# Patient Record
Sex: MALE | Race: WHITE | NOT HISPANIC OR LATINO | Employment: OTHER | URBAN - NONMETROPOLITAN AREA
[De-identification: names, ages, dates, MRNs, and addresses within clinical notes are randomized per-mention and may not be internally consistent; named-entity substitution may affect disease eponyms.]

---

## 2017-06-22 ENCOUNTER — MEDICAL CORRESPONDENCE (OUTPATIENT)
Dept: HEALTH INFORMATION MANAGEMENT | Facility: HOSPITAL | Age: 82
End: 2017-06-22

## 2017-06-22 DIAGNOSIS — I48.91 ATRIAL FIBRILLATION (H): Primary | ICD-10-CM

## 2017-06-22 LAB — INR PPP: 2.77 (ref 0.8–1.2)

## 2017-06-22 PROCEDURE — 36415 COLL VENOUS BLD VENIPUNCTURE: CPT | Mod: ZL | Performed by: INTERNAL MEDICINE

## 2017-06-22 PROCEDURE — 85610 PROTHROMBIN TIME: CPT | Mod: ZL | Performed by: INTERNAL MEDICINE

## 2017-08-21 DIAGNOSIS — I48.91 ATRIAL FIBRILLATION (H): Primary | ICD-10-CM

## 2017-08-21 LAB — INR PPP: 2.68 (ref 0.8–1.2)

## 2017-08-21 PROCEDURE — 85610 PROTHROMBIN TIME: CPT | Mod: ZL | Performed by: INTERNAL MEDICINE

## 2017-08-21 PROCEDURE — 36415 COLL VENOUS BLD VENIPUNCTURE: CPT | Mod: ZL | Performed by: INTERNAL MEDICINE

## 2018-05-23 ENCOUNTER — TRANSFERRED RECORDS (OUTPATIENT)
Dept: HEALTH INFORMATION MANAGEMENT | Facility: CLINIC | Age: 83
End: 2018-05-23

## 2018-09-19 LAB — INR PPP: 2.11 (ref 1–3)

## 2018-10-17 LAB — INR PPP: 1.8 (ref 1–3)

## 2018-10-31 LAB — INR PPP: 2.88 (ref 1–3)

## 2018-11-20 ENCOUNTER — OFFICE VISIT (OUTPATIENT)
Dept: URBAN - METROPOLITAN AREA CLINIC 76 | Facility: CLINIC | Age: 83
End: 2018-11-20
Payer: MEDICARE

## 2018-11-20 DIAGNOSIS — H43.813 VITREOUS DEGENERATION, BILATERAL: ICD-10-CM

## 2018-11-20 DIAGNOSIS — E11.9 TYPE 2 DIABETES MELLITUS WITHOUT COMPLICATIONS: Primary | ICD-10-CM

## 2018-11-20 PROCEDURE — 92014 COMPRE OPH EXAM EST PT 1/>: CPT | Performed by: OPTOMETRIST

## 2018-11-20 RX ORDER — SIMVASTATIN 20 MG/1
20 MG TABLET, FILM COATED ORAL
Qty: 0 | Refills: 0 | Status: INACTIVE
Start: 2018-11-20 | End: 2020-01-03

## 2018-11-20 RX ORDER — CALCIUM CARBONATE 500(1250)
TABLET ORAL
Qty: 0 | Refills: 0 | Status: ACTIVE
Start: 2018-11-20

## 2018-11-20 ASSESSMENT — KERATOMETRY
OD: 40.25
OS: 40.25

## 2018-11-20 ASSESSMENT — INTRAOCULAR PRESSURE
OS: 11
OD: 12

## 2018-11-20 NOTE — IMPRESSION/PLAN
Impression: Type 2 diabetes mellitus without complications: H74.0. OU. Plan: Discussed diagnosis in detail with patient. No treatment is required at this time. Emphasized blood sugar control. Call if [address] worsens. Will continue to observe condition and or symptoms. Recommend yearly exams.

## 2018-11-20 NOTE — IMPRESSION/PLAN
Impression: Vitreous degeneration, bilateral: H43.813. OU. Plan: Discussed condition. No treatment at this time.

## 2018-12-06 ENCOUNTER — TRANSFERRED RECORDS (OUTPATIENT)
Dept: HEALTH INFORMATION MANAGEMENT | Facility: CLINIC | Age: 83
End: 2018-12-06

## 2018-12-06 LAB — INR PPP: 2.92 (ref 1–3)

## 2018-12-27 ENCOUNTER — TRANSFERRED RECORDS (OUTPATIENT)
Dept: HEALTH INFORMATION MANAGEMENT | Facility: CLINIC | Age: 83
End: 2018-12-27

## 2018-12-28 LAB
ALT SERPL-CCNC: 12 UNIT/L (ref 0–41)
AST SERPL-CCNC: 16 UNIT/L (ref 0–40)
CREAT SERPL-MCNC: 1.3 MG/DL (ref 0.8–1.2)
GFR SERPL CREATININE-BSD FRML MDRD: 52 ML/MIN/1.73M2
GLUCOSE SERPL-MCNC: 268 MG/DL (ref 65–105)
INR PPP: 1.81 (ref 1–3)
POTASSIUM SERPL-SCNC: 4.4 MMOL/L (ref 3.4–4.5)

## 2019-01-09 ENCOUNTER — TRANSFERRED RECORDS (OUTPATIENT)
Dept: HEALTH INFORMATION MANAGEMENT | Facility: CLINIC | Age: 84
End: 2019-01-09

## 2019-01-09 LAB
ALT SERPL-CCNC: 10 UNIT/L (ref 0–41)
AST SERPL-CCNC: 15 UNIT/L (ref 0–40)
CHOLEST SERPL-MCNC: 248 MG/DL (ref 110–200)
HDLC SERPL-MCNC: 33 MG/DL (ref 35–80)
LDLC SERPL CALC-MCNC: 149 MG/DL (ref 0–100)
TRIGL SERPL-MCNC: 347 MG/DL (ref 10–150)

## 2019-01-18 LAB
CREAT SERPL-MCNC: 1.3 MG/DL (ref 0.8–1.2)
GFR SERPL CREATININE-BSD FRML MDRD: 52 ML/MIN/1.73M2
GLUCOSE SERPL-MCNC: 249 MG/DL (ref 65–105)
INR PPP: 1.77 (ref 1–3)
POTASSIUM SERPL-SCNC: 4.2 MMOL/L (ref 3.4–4.5)

## 2019-02-04 LAB — INR PPP: 3.71 (ref 1–3)

## 2019-02-18 LAB — INR PPP: 3 (ref 1–3)

## 2019-03-08 ENCOUNTER — DOCUMENTATION ONLY (OUTPATIENT)
Dept: OTHER | Facility: CLINIC | Age: 84
End: 2019-03-08

## 2019-03-08 LAB — INR PPP: 2.87 (ref 1–3)

## 2019-04-08 LAB — INR PPP: 2.5 (ref 0.9–1.1)

## 2019-05-08 LAB — INR PPP: 1.92 (ref 1–3)

## 2019-05-22 LAB — INR PPP: 2.45 (ref 1–3)

## 2019-06-20 ENCOUNTER — TRANSFERRED RECORDS (OUTPATIENT)
Dept: HEALTH INFORMATION MANAGEMENT | Facility: CLINIC | Age: 84
End: 2019-06-20

## 2019-06-20 LAB — INR PPP: 2.02 (ref 1–3)

## 2019-07-22 DIAGNOSIS — R06.02 SHORTNESS OF BREATH: ICD-10-CM

## 2019-07-22 DIAGNOSIS — I25.10 CORONARY ATHEROSCLEROSIS OF NATIVE CORONARY ARTERY: Primary | ICD-10-CM

## 2019-07-22 DIAGNOSIS — I50.9 HEART FAILURE, UNSPECIFIED (H): ICD-10-CM

## 2019-07-22 LAB — INR PPP: 2.05 (ref 0.8–1.2)

## 2019-07-22 PROCEDURE — 85610 PROTHROMBIN TIME: CPT | Mod: ZL | Performed by: INTERNAL MEDICINE

## 2019-07-22 PROCEDURE — 36415 COLL VENOUS BLD VENIPUNCTURE: CPT | Mod: ZL | Performed by: INTERNAL MEDICINE

## 2019-07-23 ENCOUNTER — MEDICAL CORRESPONDENCE (OUTPATIENT)
Dept: HEALTH INFORMATION MANAGEMENT | Facility: CLINIC | Age: 84
End: 2019-07-23

## 2019-08-01 PROBLEM — I82.409 ACUTE EMBOLISM AND THROMBOSIS OF DEEP VEIN OF LOWER EXTREMITY, UNSPECIFIED LATERALITY (H): Status: ACTIVE | Noted: 2018-06-13

## 2020-01-03 ENCOUNTER — OFFICE VISIT (OUTPATIENT)
Dept: URBAN - METROPOLITAN AREA CLINIC 76 | Facility: CLINIC | Age: 85
End: 2020-01-03
Payer: MEDICARE

## 2020-01-03 DIAGNOSIS — Z96.1 PRESENCE OF INTRAOCULAR LENS: ICD-10-CM

## 2020-01-03 PROCEDURE — 92014 COMPRE OPH EXAM EST PT 1/>: CPT | Performed by: OPTOMETRIST

## 2020-01-03 ASSESSMENT — KERATOMETRY
OD: 40.00
OS: 39.88

## 2020-01-03 ASSESSMENT — INTRAOCULAR PRESSURE
OD: 12
OS: 11

## 2020-01-03 NOTE — IMPRESSION/PLAN
Impression: Type 2 diabetes mellitus w/o complication: L17.1. Plan: Discussed diagnosis in detail with patient. No treatment is required at this time. Emphasized blood sugar control. Call if [address] worsens. Will continue to observe condition and or symptoms. Recommend yearly exams.

## 2020-01-03 NOTE — IMPRESSION/PLAN
Impression: Vitreous degeneration, bilateral: H43.813. OU. OD>OS No tears Plan: Posterior vitreous detachment accounts for the patient's complaints. There is no evidence of retinal pathology. All signs and risks of retinal detachment and tears were discussed in detail. Patient instructed to call the office immediately if any symptoms noted. No further treatment required, unless signs/symptoms of retinal detachment develop.

## 2020-09-16 ENCOUNTER — TRANSFERRED RECORDS (OUTPATIENT)
Dept: HEALTH INFORMATION MANAGEMENT | Facility: CLINIC | Age: 85
End: 2020-09-16

## 2020-09-29 DIAGNOSIS — I48.91 ATRIAL FIBRILLATION (H): Primary | ICD-10-CM

## 2020-10-12 NOTE — PROGRESS NOTES
Subjective     Rigoberto Barriga is a 88 year old male who presents to clinic today for the following health issues:    HPI         New Patient/Transfer of Care  Hyperlipidemia Follow-Up      Are you regularly taking any medication or supplement to lower your cholesterol?   Yes-Ezetimibe    Are you having muscle aches or other side effects that you think could be caused by your cholesterol lowering medication?  No    Hypertension Follow-up      Do you check your blood pressure regularly outside of the clinic? Yes     Are you following a low salt diet? No    Are your blood pressures ever more than 140 on the top number (systolic) OR more   than 90 on the bottom number (diastolic), for example 140/90? No    Diabetes Follow-up      How often are you checking your blood sugar? Not at all    What concerns do you have today about your diabetes? None     Do you have any of these symptoms? (Select all that apply)  Numbness in feet and Burning in feet    Have you had a diabetic eye exam in the last 12 months? No        BP Readings from Last 2 Encounters:   10/15/20 132/86     LDL Cholesterol Calculated (mg/dL)   Date Value   01/09/2019 149 (H)         Vascular Disease Follow-up      How often do you take nitroglycerin? Never    Do you take an aspirin every day? Yes    COPD Follow-Up    Overall, how are your COPD symptoms since your last clinic visit?  No change    How much fatigue or shortness of breath do you have when you are walking?  Same as usual    How much shortness of breath do you have when you are resting?  None    How often do you cough? Never    Have you noticed any change in your sputum/phlegm?  No    Have you experienced a recent fever? No    Please describe how far you can walk without stopping to rest:  2-5 blocks    How many flights of stairs are you able to walk up without stopping?  1    Have you had any Emergency Room Visits, Urgent Care Visits, or Hospital Admissions because of your COPD since your last  "office visit?  No    History   Smoking Status     Former Smoker     Packs/day: 0.50     Years: 24.00     Start date: 1/1/1954     Quit date: 1/1/1978   Smokeless Tobacco     Never Used     No results found for: FEV1, [id number]      How many servings of fruits and vegetables do you eat daily?  2-3    On average, how many sweetened beverages do you drink each day (Examples: soda, juice, sweet tea, etc.  Do NOT count diet or artificially sweetened beverages)?   0    How many days per week do you exercise enough to make your heart beat faster? 3 or less    How many minutes a day do you exercise enough to make your heart beat faster? 9 or less  How many days per week do you miss taking your medication? 1    What makes it hard for you to take your medications?  remembering to take         Review of Systems   Constitutional, HEENT, cardiovascular, pulmonary, GI, , musculoskeletal, neuro, skin, endocrine and psych systems are negative, except as otherwise noted.      Objective    /86 (BP Location: Right arm, Patient Position: Sitting, Cuff Size: Adult Regular)   Pulse 75   Temp 97.4  F (36.3  C) (Tympanic)   Ht 1.765 m (5' 9.5\")   Wt 66.7 kg (147 lb)   SpO2 98%   BMI 21.40 kg/m    Body mass index is 21.4 kg/m .  Physical Exam   GENERAL: healthy, alert and no distress  EYES: Eyes grossly normal to inspection, PERRL and conjunctivae and sclerae normal  HENT: ear canals and TM's normal, nose and mouth without ulcers or lesions  NECK: no adenopathy, no asymmetry, masses, or scars and thyroid normal to palpation  RESP: lungs clear to auscultation - no rales, rhonchi or wheezes  CV: regular rate and rhythm, normal S1 S2, no S3 or S4,loud 3/6 pansystolic  murmur, click or rub, no peripheral edema and peripheral pulses strong  ABDOMEN: soft, nontender, no hepatosplenomegaly, no masses and bowel sounds normal  MS: no gross musculoskeletal defects noted, no edema  SKIN: no suspicious lesions or rashes  NEURO: Normal " strength and tone, mentation intact and speech normal  BACK: no CVA tenderness, no paralumbar tenderness    No results found for this or any previous visit (from the past 24 hour(s)).        Assessment & Plan     Need for prophylactic vaccination and inoculation against influenza  He is quiet happy to get this today.   - FLUZONE HIGH DOSE 65+  [69231]  - ADMIN INFLUENZA (For MEDICARE Patients ONLY) []    Establishing care with new doctor, encounter for  He wants to get in to our clinic. Has made an appointment with a male provider who he would like to see. Needing some things filled today so we will get this all set up. Has an appointment with Dr. Arredondo. We will get Mr Barriga's testing all up to date and make referral to see coumadin clinic.   - Magnesium  - Hemoglobin A1c  - Lipid Profile  - Comprehensive metabolic panel  - TSH with free T4 reflex  - CBC with platelets differential  - UA with Microscopic reflex to Culture    Chronic deep vein thrombosis (DVT) of proximal vein of both lower extremities (H)  Has IV filter. On coumadin.   - INR  - ANTICOAGULATION CLINIC REFERRAL  - Comprehensive metabolic panel    Atherosclerosis of native coronary artery of native heart without angina pectoris  Both steneting and bi pass. Multiple DVT in his past. See Cardiology for consult.   - INR  - CARDIOLOGY EVAL ADULT REFERRAL    Diabetes mellitus without complication (H)  Since his wife  a month ago he hasn't checked his sugars at all. He forgets where it is in the packing. His A1C  run near 6.0 eye exam yearly and has glasses. Wanting to get his number.  now.   - Hemoglobin A1c    Mixed hyperlipidemia  Keeps it down and goal is under 70.    - Lipid Profile  - Comprehensive metabolic panel  - TSH with free T4 reflex  - CBC with platelets differential  - UA with Microscopic reflex to Culture    Hypomagnesemia  Check level hx of low mag.   - Magnesium    Chronic obstructive pulmonary disease, unspecified COPD type  (H)  gettng fev1 and using inhaler tried Symbicort and did not like it at all.   - albuterol (PROAIR HFA/PROVENTIL HFA/VENTOLIN HFA) 108 (90 Base) MCG/ACT inhaler; Inhale 2 puffs into the lungs every 4 hours as needed for shortness of breath / dyspnea or wheezing  - General PFT Lab (Please always keep checked); Future  - Pulmonary Function Test; Future  - General PFT Lab (Please always keep checked)    Sarcoidosis  Diagnosis in 1981.            See Patient Instructions    No follow-ups on file.    KRISTI Mcdonnell  [facility]

## 2020-10-15 ENCOUNTER — OFFICE VISIT (OUTPATIENT)
Dept: FAMILY MEDICINE | Facility: OTHER | Age: 85
End: 2020-10-15
Attending: PHYSICIAN ASSISTANT
Payer: MEDICARE

## 2020-10-15 ENCOUNTER — ANTICOAGULATION THERAPY VISIT (OUTPATIENT)
Dept: ANTICOAGULATION | Facility: OTHER | Age: 85
End: 2020-10-15

## 2020-10-15 VITALS
TEMPERATURE: 97.4 F | BODY MASS INDEX: 21.05 KG/M2 | HEART RATE: 75 BPM | OXYGEN SATURATION: 98 % | DIASTOLIC BLOOD PRESSURE: 86 MMHG | WEIGHT: 147 LBS | SYSTOLIC BLOOD PRESSURE: 132 MMHG | HEIGHT: 70 IN

## 2020-10-15 DIAGNOSIS — I25.10 ATHEROSCLEROSIS OF NATIVE CORONARY ARTERY OF NATIVE HEART WITHOUT ANGINA PECTORIS: ICD-10-CM

## 2020-10-15 DIAGNOSIS — J44.9 CHRONIC OBSTRUCTIVE PULMONARY DISEASE, UNSPECIFIED COPD TYPE (H): ICD-10-CM

## 2020-10-15 DIAGNOSIS — E11.9 DIABETES MELLITUS WITHOUT COMPLICATION (H): ICD-10-CM

## 2020-10-15 DIAGNOSIS — Z76.89 ESTABLISHING CARE WITH NEW DOCTOR, ENCOUNTER FOR: ICD-10-CM

## 2020-10-15 DIAGNOSIS — E78.2 MIXED HYPERLIPIDEMIA: ICD-10-CM

## 2020-10-15 DIAGNOSIS — Z23 NEED FOR PROPHYLACTIC VACCINATION AND INOCULATION AGAINST INFLUENZA: Primary | ICD-10-CM

## 2020-10-15 DIAGNOSIS — I82.5Y3 CHRONIC DEEP VEIN THROMBOSIS (DVT) OF PROXIMAL VEIN OF BOTH LOWER EXTREMITIES (H): ICD-10-CM

## 2020-10-15 DIAGNOSIS — E83.42 HYPOMAGNESEMIA: ICD-10-CM

## 2020-10-15 DIAGNOSIS — D86.9 SARCOIDOSIS: ICD-10-CM

## 2020-10-15 PROCEDURE — 90662 IIV NO PRSV INCREASED AG IM: CPT

## 2020-10-15 PROCEDURE — 99204 OFFICE O/P NEW MOD 45 MIN: CPT | Performed by: PHYSICIAN ASSISTANT

## 2020-10-15 PROCEDURE — G0463 HOSPITAL OUTPT CLINIC VISIT: HCPCS | Mod: 25

## 2020-10-15 RX ORDER — ALBUTEROL SULFATE 90 UG/1
2 AEROSOL, METERED RESPIRATORY (INHALATION) EVERY 4 HOURS PRN
Qty: 1 INHALER | Refills: 4 | Status: SHIPPED | OUTPATIENT
Start: 2020-10-15 | End: 2022-04-13

## 2020-10-15 RX ORDER — EZETIMIBE 10 MG/1
10 TABLET ORAL DAILY
COMMUNITY
End: 2020-11-18

## 2020-10-15 RX ORDER — FUROSEMIDE 20 MG
20 TABLET ORAL DAILY
COMMUNITY
End: 2020-12-22

## 2020-10-15 RX ORDER — WARFARIN SODIUM 5 MG/1
5 TABLET ORAL DAILY
COMMUNITY
End: 2021-06-21

## 2020-10-15 ASSESSMENT — PAIN SCALES - GENERAL: PAINLEVEL: NO PAIN (0)

## 2020-10-15 ASSESSMENT — MIFFLIN-ST. JEOR: SCORE: 1335.1

## 2020-10-15 NOTE — NURSING NOTE
"Chief Complaint   Patient presents with     Establish Care       Initial /86 (BP Location: Right arm, Patient Position: Sitting, Cuff Size: Adult Regular)   Pulse 75   Temp 97.4  F (36.3  C) (Tympanic)   Ht 1.765 m (5' 9.5\")   Wt 66.7 kg (147 lb)   SpO2 98%   BMI 21.40 kg/m   Estimated body mass index is 21.4 kg/m  as calculated from the following:    Height as of this encounter: 1.765 m (5' 9.5\").    Weight as of this encounter: 66.7 kg (147 lb).  Medication Reconciliation: complete  Gabriela Swain LPN  "

## 2020-10-15 NOTE — PROGRESS NOTES
ANTICOAGULATION FOLLOW-UP CLINIC VISIT    Patient Name:  Sidney Barriga  Date:  10/15/2020  Contact Type:  Telephone    SUBJECTIVE:  Patient Findings     Comments:  INR referral sent to warfarin clinic by yvonne Macedo. Call placed to patient and spoke to him re:INR referral, INR recheck date and warfarin dosing. He has been on warfarin long term and states he takes 5mg daily. His last INR was approx. 1 month ago. We discussed that he will go to the lab and have INR done and I will call him and let him know what his number is and what dose of warfarin he should take. He verbalized understanding of all things discussed and has no questions. He was given  Number for warfarin clinic and will call if he has questions.         Clinical Outcomes     Negatives:  Major bleeding event, Thromboembolic event, Anticoagulation-related hospital admission, Anticoagulation-related ED visit, Anticoagulation-related fatality    Comments:  INR referral sent to warfarin clinic by yvnone Macedo. Call placed to patient and spoke to him re:INR referral, INR recheck date and warfarin dosing. He has been on warfarin long term and states he takes 5mg daily. His last INR was approx. 1 month ago. We discussed that he will go to the lab and have INR done and I will call him and let him know what his number is and what dose of warfarin he should take. He verbalized understanding of all things discussed and has no questions. He was given  Number for warfarin clinic and will call if he has questions.            OBJECTIVE    No results for input(s): INR, [id number], [id number], 2AGN, F2 in the last 168 hours.    ASSESSMENT / PLAN  No question data found.  Anticoagulation Summary  As of 10/15/2020    INR goal:  2.0-3.0   TTR:  --   INR used for dosing:  No new INR was available at the time of this encounter.   Warfarin maintenance plan:  5 mg (5 mg x 1) every day   Full warfarin instructions:  5 mg every day   Weekly warfarin total:  35 mg   Plan last  modified:  Lashae Barksdale RN (10/15/2020)   Next INR check:  10/20/2020   Priority:  High   Target end date:  10/15/2024    Indications    Chronic deep vein thrombosis (DVT) of proximal vein of both lower extremities (H) [I82.5Y3]             Anticoagulation Episode Summary     INR check location:      Preferred lab:      Send INR reminders to:  JOSR SORIA    Comments:        Anticoagulation Care Providers     Provider Role Specialty Phone number    Latricia Macedo PA Referring [facility] [phone]            See the Encounter Report to view Anticoagulation Flowsheet and Dosing Calendar (Go to Encounters tab in chart review, and find the Anticoagulation Therapy Visit)        Lashae Barksdale RN

## 2020-10-20 ENCOUNTER — ANTICOAGULATION THERAPY VISIT (OUTPATIENT)
Dept: ANTICOAGULATION | Facility: OTHER | Age: 85
End: 2020-10-20
Payer: MEDICARE

## 2020-10-20 DIAGNOSIS — I82.5Y3 CHRONIC DEEP VEIN THROMBOSIS (DVT) OF PROXIMAL VEIN OF BOTH LOWER EXTREMITIES (H): ICD-10-CM

## 2020-10-20 DIAGNOSIS — E83.42 HYPOMAGNESEMIA: ICD-10-CM

## 2020-10-20 DIAGNOSIS — E78.2 MIXED HYPERLIPIDEMIA: ICD-10-CM

## 2020-10-20 DIAGNOSIS — Z76.89 ESTABLISHING CARE WITH NEW DOCTOR, ENCOUNTER FOR: ICD-10-CM

## 2020-10-20 DIAGNOSIS — E11.9 DIABETES MELLITUS WITHOUT COMPLICATION (H): ICD-10-CM

## 2020-10-20 DIAGNOSIS — I25.10 ATHEROSCLEROSIS OF NATIVE CORONARY ARTERY OF NATIVE HEART WITHOUT ANGINA PECTORIS: ICD-10-CM

## 2020-10-20 LAB
ALBUMIN SERPL-MCNC: 3.9 G/DL (ref 3.4–5)
ALBUMIN UR-MCNC: NEGATIVE MG/DL
ALP SERPL-CCNC: 68 U/L (ref 40–150)
ALT SERPL W P-5'-P-CCNC: 15 U/L (ref 0–70)
ANION GAP SERPL CALCULATED.3IONS-SCNC: 5 MMOL/L (ref 3–14)
APPEARANCE UR: CLEAR
AST SERPL W P-5'-P-CCNC: 15 U/L (ref 0–45)
BACTERIA #/AREA URNS HPF: ABNORMAL /HPF
BASOPHILS # BLD AUTO: 0 10E9/L (ref 0–0.2)
BASOPHILS NFR BLD AUTO: 0.4 %
BILIRUB SERPL-MCNC: 1 MG/DL (ref 0.2–1.3)
BILIRUB UR QL STRIP: NEGATIVE
BUN SERPL-MCNC: 20 MG/DL (ref 7–30)
CALCIUM SERPL-MCNC: 9.4 MG/DL (ref 8.5–10.1)
CHLORIDE SERPL-SCNC: 105 MMOL/L (ref 94–109)
CHOLEST SERPL-MCNC: 240 MG/DL
CO2 SERPL-SCNC: 29 MMOL/L (ref 20–32)
COLOR UR AUTO: ABNORMAL
CREAT SERPL-MCNC: 1.15 MG/DL (ref 0.66–1.25)
DIFFERENTIAL METHOD BLD: ABNORMAL
EOSINOPHIL # BLD AUTO: 0.2 10E9/L (ref 0–0.7)
EOSINOPHIL NFR BLD AUTO: 2.7 %
ERYTHROCYTE [DISTWIDTH] IN BLOOD BY AUTOMATED COUNT: 12.5 % (ref 10–15)
EST. AVERAGE GLUCOSE BLD GHB EST-MCNC: 114 MG/DL
GFR SERPL CREATININE-BSD FRML MDRD: 56 ML/MIN/{1.73_M2}
GLUCOSE SERPL-MCNC: 145 MG/DL (ref 70–99)
GLUCOSE UR STRIP-MCNC: NEGATIVE MG/DL
HBA1C MFR BLD: 5.6 % (ref 0–5.6)
HCT VFR BLD AUTO: 40.5 % (ref 40–53)
HDLC SERPL-MCNC: 46 MG/DL
HGB BLD-MCNC: 13.3 G/DL (ref 13.3–17.7)
HGB UR QL STRIP: NEGATIVE
IMM GRANULOCYTES # BLD: 0 10E9/L (ref 0–0.4)
IMM GRANULOCYTES NFR BLD: 0.2 %
INR PPP: 1.73 (ref 0.86–1.14)
KETONES UR STRIP-MCNC: NEGATIVE MG/DL
LDLC SERPL CALC-MCNC: 164 MG/DL
LEUKOCYTE ESTERASE UR QL STRIP: NEGATIVE
LYMPHOCYTES # BLD AUTO: 1.1 10E9/L (ref 0.8–5.3)
LYMPHOCYTES NFR BLD AUTO: 19.6 %
MAGNESIUM SERPL-MCNC: 2.2 MG/DL (ref 1.6–2.3)
MCH RBC QN AUTO: 31.3 PG (ref 26.5–33)
MCHC RBC AUTO-ENTMCNC: 32.8 G/DL (ref 31.5–36.5)
MCV RBC AUTO: 95 FL (ref 78–100)
MONOCYTES # BLD AUTO: 0.6 10E9/L (ref 0–1.3)
MONOCYTES NFR BLD AUTO: 10.1 %
MUCOUS THREADS #/AREA URNS LPF: PRESENT /LPF
NEUTROPHILS # BLD AUTO: 3.8 10E9/L (ref 1.6–8.3)
NEUTROPHILS NFR BLD AUTO: 67 %
NITRATE UR QL: NEGATIVE
NONHDLC SERPL-MCNC: 194 MG/DL
NRBC # BLD AUTO: 0 10*3/UL
NRBC BLD AUTO-RTO: 0 /100
PH UR STRIP: 7 PH (ref 4.7–8)
PLATELET # BLD AUTO: 135 10E9/L (ref 150–450)
POTASSIUM SERPL-SCNC: 4.3 MMOL/L (ref 3.4–5.3)
PROT SERPL-MCNC: 7.8 G/DL (ref 6.8–8.8)
RBC # BLD AUTO: 4.25 10E12/L (ref 4.4–5.9)
RBC #/AREA URNS AUTO: 1 /HPF (ref 0–2)
SODIUM SERPL-SCNC: 139 MMOL/L (ref 133–144)
SOURCE: ABNORMAL
SP GR UR STRIP: 1.02 (ref 1–1.03)
SQUAMOUS #/AREA URNS AUTO: 0 /HPF (ref 0–1)
TRIGL SERPL-MCNC: 149 MG/DL
TSH SERPL DL<=0.005 MIU/L-ACNC: 3.89 MU/L (ref 0.4–4)
UROBILINOGEN UR STRIP-MCNC: NORMAL MG/DL (ref 0–2)
WBC # BLD AUTO: 5.7 10E9/L (ref 4–11)
WBC #/AREA URNS AUTO: <1 /HPF (ref 0–5)

## 2020-10-20 PROCEDURE — 85610 PROTHROMBIN TIME: CPT | Mod: ZL | Performed by: PHYSICIAN ASSISTANT

## 2020-10-20 PROCEDURE — 85025 COMPLETE CBC W/AUTO DIFF WBC: CPT | Mod: ZL | Performed by: PHYSICIAN ASSISTANT

## 2020-10-20 PROCEDURE — 36415 COLL VENOUS BLD VENIPUNCTURE: CPT | Mod: ZL | Performed by: PHYSICIAN ASSISTANT

## 2020-10-20 PROCEDURE — 84443 ASSAY THYROID STIM HORMONE: CPT | Mod: ZL | Performed by: PHYSICIAN ASSISTANT

## 2020-10-20 PROCEDURE — 81001 URINALYSIS AUTO W/SCOPE: CPT | Mod: ZL | Performed by: PHYSICIAN ASSISTANT

## 2020-10-20 PROCEDURE — 83735 ASSAY OF MAGNESIUM: CPT | Mod: ZL | Performed by: PHYSICIAN ASSISTANT

## 2020-10-20 PROCEDURE — 83036 HEMOGLOBIN GLYCOSYLATED A1C: CPT | Mod: ZL | Performed by: PHYSICIAN ASSISTANT

## 2020-10-20 PROCEDURE — 80061 LIPID PANEL: CPT | Mod: ZL | Performed by: PHYSICIAN ASSISTANT

## 2020-10-20 PROCEDURE — 80053 COMPREHEN METABOLIC PANEL: CPT | Mod: ZL | Performed by: PHYSICIAN ASSISTANT

## 2020-10-20 PROCEDURE — 999N001182 HC STATISTIC ESTIMATED AVERAGE GLUCOSE: Mod: ZL | Performed by: PHYSICIAN ASSISTANT

## 2020-10-20 NOTE — PROGRESS NOTES
ANTICOAGULATION FOLLOW-UP CLINIC VISIT    Patient Name:  Sidney Barriga  Date:  10/20/2020  Contact Type:  Telephone    SUBJECTIVE:  Patient Findings     Comments:  INR done by lab. Call placed to patient and spoke to him re: INR result, warfarin dosing/INR recheck date. He verbalized understanding and has no questions. He denies any bleeding/bruising and has no new changes in diet/meds/activity. He will call warfarin clinic if any questions/issues/illness/changes        Clinical Outcomes     Negatives:  Major bleeding event, Thromboembolic event, Anticoagulation-related hospital admission, Anticoagulation-related ED visit, Anticoagulation-related fatality    Comments:  INR done by lab. Call placed to patient and spoke to him re: INR result, warfarin dosing/INR recheck date. He verbalized understanding and has no questions. He denies any bleeding/bruising and has no new changes in diet/meds/activity. He will call warfarin clinic if any questions/issues/illness/changes           OBJECTIVE    Recent labs: (last 7 days)     10/20/20  0902   INR 1.73*       ASSESSMENT / PLAN  INR assessment SUB    Recheck INR In: 2 WEEKS    INR Location Clinic      Anticoagulation Summary  As of 10/20/2020    INR goal:  2.0-3.0   TTR:  --   INR used for dosin.73 (10/20/2020)   Warfarin maintenance plan:  5 mg (5 mg x 1) every day   Full warfarin instructions:  10/20: 7.5 mg; Otherwise 5 mg every day   Weekly warfarin total:  35 mg   Plan last modified:  Lashae Barksdale RN (10/15/2020)   Next INR check:  11/3/2020   Priority:  High   Target end date:  10/15/2024    Indications    Chronic deep vein thrombosis (DVT) of proximal vein of both lower extremities (H) [I82.5Y3]             Anticoagulation Episode Summary     INR check location:      Preferred lab:      Send INR reminders to:  JOSR SORIA    Comments:        Anticoagulation Care Providers     Provider Role Specialty Phone number    Latricia Macedo PA Referring  Family Practice [phone]            See the Encounter Report to view Anticoagulation Flowsheet and Dosing Calendar (Go to Encounters tab in chart review, and find the Anticoagulation Therapy Visit)        Lashae Barksdale RN

## 2020-10-21 ENCOUNTER — DOCUMENTATION ONLY (OUTPATIENT)
Dept: OTHER | Facility: CLINIC | Age: 85
End: 2020-10-21

## 2020-11-03 ENCOUNTER — ANTICOAGULATION THERAPY VISIT (OUTPATIENT)
Dept: ANTICOAGULATION | Facility: OTHER | Age: 85
End: 2020-11-03
Payer: MEDICARE

## 2020-11-03 DIAGNOSIS — I48.91 ATRIAL FIBRILLATION (H): ICD-10-CM

## 2020-11-03 DIAGNOSIS — I82.5Y3 CHRONIC DEEP VEIN THROMBOSIS (DVT) OF PROXIMAL VEIN OF BOTH LOWER EXTREMITIES (H): ICD-10-CM

## 2020-11-03 LAB — INR PPP: 2.58 (ref 0.86–1.14)

## 2020-11-03 PROCEDURE — 36415 COLL VENOUS BLD VENIPUNCTURE: CPT | Mod: ZL | Performed by: INTERNAL MEDICINE

## 2020-11-03 PROCEDURE — 85610 PROTHROMBIN TIME: CPT | Mod: ZL | Performed by: INTERNAL MEDICINE

## 2020-11-03 PROCEDURE — 36416 COLLJ CAPILLARY BLOOD SPEC: CPT | Mod: ZL | Performed by: INTERNAL MEDICINE

## 2020-11-03 NOTE — PROGRESS NOTES
ANTICOAGULATION FOLLOW-UP CLINIC VISIT    Patient Name:  Sidney Barriga  Date:  11/3/2020  Contact Type:  Telephone    SUBJECTIVE:  Patient Findings     Comments:  INR done by lab. Call placed to pt and spoke to pt re: INR results/Warfarin dosing/INR recheck date. Pt verbalized understanding of instructions. He denies any bleeding/bruising, or any new changes in diet/meds/activity. He is to call Warfarin clinic with any questions/changes.         Clinical Outcomes     Negatives:  Major bleeding event, Thromboembolic event, Anticoagulation-related hospital admission, Anticoagulation-related ED visit, Anticoagulation-related fatality    Comments:  INR done by lab. Call placed to pt and spoke to pt re: INR results/Warfarin dosing/INR recheck date. Pt verbalized understanding of instructions. He denies any bleeding/bruising, or any new changes in diet/meds/activity. He is to call Warfarin clinic with any questions/changes.            OBJECTIVE    Recent labs: (last 7 days)     20  0842   INR 2.58*       ASSESSMENT / PLAN  INR assessment THER    Recheck INR In: 4 WEEKS    INR Location Clinic      Anticoagulation Summary  As of 11/3/2020    INR goal:  2.0-3.0   TTR:  100.0 % (1.3 wk)   INR used for dosin.58 (11/3/2020)   Warfarin maintenance plan:  5 mg (5 mg x 1) every day   Full warfarin instructions:  5 mg every day   Weekly warfarin total:  35 mg   Plan last modified:  Lashae Barksdale RN (10/15/2020)   Next INR check:  2020   Priority:  High   Target end date:  10/15/2024    Indications    Chronic deep vein thrombosis (DVT) of proximal vein of both lower extremities (H) [I82.5Y3]             Anticoagulation Episode Summary     INR check location:      Preferred lab:      Send INR reminders to:  JOSR SORIA    Comments:        Anticoagulation Care Providers     Provider Role Specialty Phone number    Latricia Macedo PA Referring Family Medicine [phone]            See the Encounter Report  to view Anticoagulation Flowsheet and Dosing Calendar (Go to Encounters tab in chart review, and find the Anticoagulation Therapy Visit)        Kathryn Cardenas RN

## 2020-11-06 NOTE — PROGRESS NOTES
Subjective     Sidney Barriga is a 88 year old male who presents to clinic today for the following health issues:    HPI         New Patient/Transfer of Care     Lab       Duration: 10/20/2020    Description (location/character/radiation): platelet count was low    Intensity:  mild    Accompanying signs and symptoms: none    History (similar episodes/previous evaluation): None    Precipitating or alleviating factors: None    Therapies tried and outcome: None      Diabetic foot exam   1. foot deformity  no  2. Current or previous foot ulceration     no  3. Current or previous pre-ulcerative calluses     no  4. previous partial amputation of one or both feet or complete amputation of one foot     no  5. 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 poor circulation     no    Doing fine.  We did a meet and greet today.      Review of Systems   Constitutional, HEENT, cardiovascular, pulmonary, gi and gu systems are negative, except as otherwise noted.      Objective    /68   Pulse 69   Wt 69.9 kg (154 lb)   SpO2 98%   BMI 22.42 kg/m    Body mass index is 22.42 kg/m .  Physical Exam   GENERAL: healthy, alert and no distress  NECK: no adenopathy, no asymmetry, masses, or scars and thyroid normal to palpation  RESP: lungs clear to auscultation - no rales, rhonchi or wheezes  CV: regular rate and rhythm, normal S1 S2, no S3 or S4, no murmur, click or rub, no peripheral edema and peripheral pulses strong  CV: regular rates and rhythm, normal S1 S2, no S3 or S4, grade 2/6 systolic murmur heard best over the apex, peripheral pulses strong and no peripheral edema  ABDOMEN: soft, nontender, no hepatosplenomegaly, no masses and bowel sounds normal  MS: no gross musculoskeletal defects noted, no edema            Assessment & Plan     Diabetes mellitus without complication (H)  Stable.  Finish out the labs.  6 month f/u.  He has great control.   - CBC with platelets differential  - GA FOOT EXAM NO  CHARGE  - Albumin Random Urine Quantitative with Creat Ratio; Future    Essential hypertension, benign  Stable.     Chronic deep vein thrombosis (DVT) of proximal vein of both lower extremities (H)  On coumadin.     Atherosclerosis of native coronary artery of native heart without angina pectoris  Stable.     S/P CABG x 3  Stable.     Thrombocytopenia (H)  Stable numbers.  Recheck in a month just to make sure.              No follow-ups on file.    Zach Arredondo MD  [facility]

## 2020-11-10 ENCOUNTER — MEDICAL CORRESPONDENCE (OUTPATIENT)
Dept: HEALTH INFORMATION MANAGEMENT | Facility: CLINIC | Age: 85
End: 2020-11-10

## 2020-11-10 ENCOUNTER — OFFICE VISIT (OUTPATIENT)
Dept: FAMILY MEDICINE | Facility: OTHER | Age: 85
End: 2020-11-10
Attending: FAMILY MEDICINE
Payer: MEDICARE

## 2020-11-10 VITALS
BODY MASS INDEX: 22.42 KG/M2 | HEART RATE: 69 BPM | WEIGHT: 154 LBS | DIASTOLIC BLOOD PRESSURE: 68 MMHG | SYSTOLIC BLOOD PRESSURE: 112 MMHG | OXYGEN SATURATION: 98 %

## 2020-11-10 DIAGNOSIS — I10 ESSENTIAL HYPERTENSION, BENIGN: ICD-10-CM

## 2020-11-10 DIAGNOSIS — I25.10 ATHEROSCLEROSIS OF NATIVE CORONARY ARTERY OF NATIVE HEART WITHOUT ANGINA PECTORIS: ICD-10-CM

## 2020-11-10 DIAGNOSIS — I82.5Y3 CHRONIC DEEP VEIN THROMBOSIS (DVT) OF PROXIMAL VEIN OF BOTH LOWER EXTREMITIES (H): ICD-10-CM

## 2020-11-10 DIAGNOSIS — E11.9 DIABETES MELLITUS WITHOUT COMPLICATION (H): Primary | ICD-10-CM

## 2020-11-10 DIAGNOSIS — Z95.1 S/P CABG X 3: ICD-10-CM

## 2020-11-10 DIAGNOSIS — D69.6 THROMBOCYTOPENIA (H): ICD-10-CM

## 2020-11-10 LAB
BASOPHILS # BLD AUTO: 0 10E9/L (ref 0–0.2)
BASOPHILS NFR BLD AUTO: 0.3 %
DIFFERENTIAL METHOD BLD: ABNORMAL
EOSINOPHIL # BLD AUTO: 0.1 10E9/L (ref 0–0.7)
EOSINOPHIL NFR BLD AUTO: 1.5 %
ERYTHROCYTE [DISTWIDTH] IN BLOOD BY AUTOMATED COUNT: 12.8 % (ref 10–15)
HCT VFR BLD AUTO: 38.5 % (ref 40–53)
HGB BLD-MCNC: 13 G/DL (ref 13.3–17.7)
LYMPHOCYTES # BLD AUTO: 1.5 10E9/L (ref 0.8–5.3)
LYMPHOCYTES NFR BLD AUTO: 20.1 %
MCH RBC QN AUTO: 32 PG (ref 26.5–33)
MCHC RBC AUTO-ENTMCNC: 33.8 G/DL (ref 31.5–36.5)
MCV RBC AUTO: 95 FL (ref 78–100)
MONOCYTES # BLD AUTO: 0.9 10E9/L (ref 0–1.3)
MONOCYTES NFR BLD AUTO: 12.3 %
NEUTROPHILS # BLD AUTO: 4.9 10E9/L (ref 1.6–8.3)
NEUTROPHILS NFR BLD AUTO: 65.8 %
PLATELET # BLD AUTO: 140 10E9/L (ref 150–450)
RBC # BLD AUTO: 4.06 10E12/L (ref 4.4–5.9)
WBC # BLD AUTO: 7.5 10E9/L (ref 4–11)

## 2020-11-10 PROCEDURE — 85025 COMPLETE CBC W/AUTO DIFF WBC: CPT | Mod: ZL | Performed by: FAMILY MEDICINE

## 2020-11-10 PROCEDURE — G0463 HOSPITAL OUTPT CLINIC VISIT: HCPCS

## 2020-11-10 PROCEDURE — 99214 OFFICE O/P EST MOD 30 MIN: CPT | Performed by: FAMILY MEDICINE

## 2020-11-10 PROCEDURE — 36415 COLL VENOUS BLD VENIPUNCTURE: CPT | Mod: ZL | Performed by: FAMILY MEDICINE

## 2020-11-10 ASSESSMENT — PAIN SCALES - GENERAL: PAINLEVEL: NO PAIN (0)

## 2020-11-10 NOTE — NURSING NOTE
"Chief Complaint   Patient presents with     Establish Care       Initial /68   Pulse 69   Wt 69.9 kg (154 lb)   SpO2 98%   BMI 22.42 kg/m   Estimated body mass index is 22.42 kg/m  as calculated from the following:    Height as of 10/15/20: 1.765 m (5' 9.5\").    Weight as of this encounter: 69.9 kg (154 lb).  Medication Reconciliation: complete  Griselda Peck LPN  "

## 2020-11-11 DIAGNOSIS — D69.6 THROMBOCYTOPENIA (H): Primary | ICD-10-CM

## 2020-11-13 DIAGNOSIS — E11.9 DIABETES MELLITUS WITHOUT COMPLICATION (H): ICD-10-CM

## 2020-11-13 LAB
CREAT UR-MCNC: 93 MG/DL
MICROALBUMIN UR-MCNC: 8 MG/L
MICROALBUMIN/CREAT UR: 8.67 MG/G CR (ref 0–17)

## 2020-11-13 PROCEDURE — 82043 UR ALBUMIN QUANTITATIVE: CPT | Mod: ZL | Performed by: FAMILY MEDICINE

## 2020-11-18 ENCOUNTER — HOSPITAL ENCOUNTER (OUTPATIENT)
Dept: RESPIRATORY THERAPY | Facility: HOSPITAL | Age: 85
Discharge: HOME | End: 2020-11-18
Attending: PHYSICIAN ASSISTANT | Admitting: INTERNAL MEDICINE
Payer: MEDICARE

## 2020-11-18 DIAGNOSIS — J44.9 CHRONIC OBSTRUCTIVE PULMONARY DISEASE, UNSPECIFIED COPD TYPE (H): ICD-10-CM

## 2020-11-18 DIAGNOSIS — E78.2 MIXED HYPERLIPIDEMIA: Primary | ICD-10-CM

## 2020-11-18 LAB
COHGB MFR BLD: 1.5 % (ref 0–2)
HGB BLD-MCNC: 12.7 G/DL (ref 13.3–17.7)

## 2020-11-18 PROCEDURE — 85018 HEMOGLOBIN: CPT | Performed by: PHYSICIAN ASSISTANT

## 2020-11-18 PROCEDURE — 82375 ASSAY CARBOXYHB QUANT: CPT | Performed by: PHYSICIAN ASSISTANT

## 2020-11-18 PROCEDURE — 94060 EVALUATION OF WHEEZING: CPT

## 2020-11-18 PROCEDURE — 94726 PLETHYSMOGRAPHY LUNG VOLUMES: CPT

## 2020-11-18 PROCEDURE — 94729 DIFFUSING CAPACITY: CPT | Mod: 26 | Performed by: INTERNAL MEDICINE

## 2020-11-18 PROCEDURE — 94060 EVALUATION OF WHEEZING: CPT | Mod: 26 | Performed by: INTERNAL MEDICINE

## 2020-11-18 PROCEDURE — 94729 DIFFUSING CAPACITY: CPT

## 2020-11-18 PROCEDURE — 36415 COLL VENOUS BLD VENIPUNCTURE: CPT | Performed by: PHYSICIAN ASSISTANT

## 2020-11-18 PROCEDURE — 250N000009 HC RX 250: Performed by: PHYSICIAN ASSISTANT

## 2020-11-18 PROCEDURE — 94726 PLETHYSMOGRAPHY LUNG VOLUMES: CPT | Mod: 26 | Performed by: INTERNAL MEDICINE

## 2020-11-18 RX ORDER — ALBUTEROL SULFATE 90 UG/1
2 AEROSOL, METERED RESPIRATORY (INHALATION) ONCE
Status: COMPLETED | OUTPATIENT
Start: 2020-11-18 | End: 2020-11-18

## 2020-11-18 RX ORDER — EZETIMIBE 10 MG/1
10 TABLET ORAL DAILY
Qty: 90 TABLET | Refills: 0 | Status: SHIPPED | OUTPATIENT
Start: 2020-11-18 | End: 2021-02-09

## 2020-11-18 RX ADMIN — ALBUTEROL SULFATE 2 PUFF: 90 AEROSOL, METERED RESPIRATORY (INHALATION) at 09:59

## 2020-11-18 NOTE — TELEPHONE ENCOUNTER
zetia  Last Written Prescription Date:   Last Fill Quantity:  # of Refills:   Last Office Visit: 11/10/2020    Historical medication- pt is [facility], is having difficulties getting it through them on time

## 2020-12-01 ENCOUNTER — ALLIED HEALTH/NURSE VISIT (OUTPATIENT)
Dept: FAMILY MEDICINE | Facility: OTHER | Age: 85
End: 2020-12-01
Attending: PHYSICIAN ASSISTANT
Payer: MEDICARE

## 2020-12-01 ENCOUNTER — DOCUMENTATION ONLY (OUTPATIENT)
Dept: FAMILY MEDICINE | Facility: OTHER | Age: 85
End: 2020-12-01

## 2020-12-01 ENCOUNTER — ANTICOAGULATION THERAPY VISIT (OUTPATIENT)
Dept: ANTICOAGULATION | Facility: OTHER | Age: 85
End: 2020-12-01
Attending: PHYSICIAN ASSISTANT
Payer: MEDICARE

## 2020-12-01 VITALS — OXYGEN SATURATION: 97 % | HEART RATE: 96 BPM

## 2020-12-01 DIAGNOSIS — I48.91 ATRIAL FIBRILLATION (H): ICD-10-CM

## 2020-12-01 DIAGNOSIS — I82.5Y3 CHRONIC DEEP VEIN THROMBOSIS (DVT) OF PROXIMAL VEIN OF BOTH LOWER EXTREMITIES (H): ICD-10-CM

## 2020-12-01 DIAGNOSIS — J44.9 CHRONIC OBSTRUCTIVE PULMONARY DISEASE, UNSPECIFIED COPD TYPE (H): Primary | ICD-10-CM

## 2020-12-01 DIAGNOSIS — D69.6 THROMBOCYTOPENIA (H): ICD-10-CM

## 2020-12-01 LAB
BASOPHILS # BLD AUTO: 0 10E9/L (ref 0–0.2)
BASOPHILS NFR BLD AUTO: 0.4 %
DIFFERENTIAL METHOD BLD: ABNORMAL
EOSINOPHIL # BLD AUTO: 0.1 10E9/L (ref 0–0.7)
EOSINOPHIL NFR BLD AUTO: 1.8 %
ERYTHROCYTE [DISTWIDTH] IN BLOOD BY AUTOMATED COUNT: 12.4 % (ref 10–15)
HCT VFR BLD AUTO: 38.4 % (ref 40–53)
HGB BLD-MCNC: 12.5 G/DL (ref 13.3–17.7)
IMM GRANULOCYTES # BLD: 0 10E9/L (ref 0–0.4)
IMM GRANULOCYTES NFR BLD: 0.2 %
INR PPP: 2.65 (ref 0.86–1.14)
LYMPHOCYTES # BLD AUTO: 1.2 10E9/L (ref 0.8–5.3)
LYMPHOCYTES NFR BLD AUTO: 21.9 %
MCH RBC QN AUTO: 31.4 PG (ref 26.5–33)
MCHC RBC AUTO-ENTMCNC: 32.6 G/DL (ref 31.5–36.5)
MCV RBC AUTO: 97 FL (ref 78–100)
MONOCYTES # BLD AUTO: 0.7 10E9/L (ref 0–1.3)
MONOCYTES NFR BLD AUTO: 12.2 %
NEUTROPHILS # BLD AUTO: 3.5 10E9/L (ref 1.6–8.3)
NEUTROPHILS NFR BLD AUTO: 63.5 %
NRBC # BLD AUTO: 0 10*3/UL
NRBC BLD AUTO-RTO: 0 /100
PLATELET # BLD AUTO: 141 10E9/L (ref 150–450)
RBC # BLD AUTO: 3.98 10E12/L (ref 4.4–5.9)
WBC # BLD AUTO: 5.5 10E9/L (ref 4–11)

## 2020-12-01 PROCEDURE — 85610 PROTHROMBIN TIME: CPT | Mod: ZL | Performed by: INTERNAL MEDICINE

## 2020-12-01 PROCEDURE — 85025 COMPLETE CBC W/AUTO DIFF WBC: CPT | Mod: ZL | Performed by: FAMILY MEDICINE

## 2020-12-01 PROCEDURE — 36415 COLL VENOUS BLD VENIPUNCTURE: CPT | Mod: ZL | Performed by: FAMILY MEDICINE

## 2020-12-01 NOTE — PROGRESS NOTES
ANTICOAGULATION FOLLOW-UP CLINIC VISIT    Patient Name:  Sidney Barriga  Date:  2020  Contact Type:  Telephone    SUBJECTIVE:  Patient Findings     Comments:  INR done by lab. Call placed to pt and spoke to him re: INR results/Warfarin dosing/INR recheck date. He denies any bleeding/bruising or any other new changes to his diet/meds/activity. He verbalized understanding of instructions. He is to call Warfarin clinic if any questions/issues/illness.         Clinical Outcomes     Negatives:  Major bleeding event, Thromboembolic event, Anticoagulation-related hospital admission, Anticoagulation-related ED visit, Anticoagulation-related fatality    Comments:  INR done by lab. Call placed to pt and spoke to him re: INR results/Warfarin dosing/INR recheck date. He denies any bleeding/bruising or any other new changes to his diet/meds/activity. He verbalized understanding of instructions. He is to call Warfarin clinic if any questions/issues/illness.            OBJECTIVE    Recent labs: (last 7 days)     20  0848   INR 2.65*       ASSESSMENT / PLAN  INR assessment THER    Recheck INR In: 8 WEEKS    INR Location Clinic      Anticoagulation Summary  As of 2020    INR goal:  2.0-3.0   TTR:  100.0 % (1.2 mo)   INR used for dosin.65 (2020)   Warfarin maintenance plan:  5 mg (5 mg x 1) every day   Full warfarin instructions:  5 mg every day   Weekly warfarin total:  35 mg   No change documented:  Kathryn Cardenas RN   Plan last modified:  Lashae Barksdale RN (10/15/2020)   Next INR check:  2021   Priority:  High   Target end date:  10/15/2024    Indications    Chronic deep vein thrombosis (DVT) of proximal vein of both lower extremities (H) [I82.5Y3]             Anticoagulation Episode Summary     INR check location:      Preferred lab:      Send INR reminders to:  JOSR SORIA    Comments:        Anticoagulation Care Providers     Provider Role Specialty Phone number    Latricia Macedo,  PA Referring Family Medicine [phone]            See the Encounter Report to view Anticoagulation Flowsheet and Dosing Calendar (Go to Encounters tab in chart review, and find the Anticoagulation Therapy Visit)        Kathryn Cardenas RN

## 2020-12-01 NOTE — PROGRESS NOTES
6 minute walk  Baseline:  87 P  96 o2    1 minute:   90 P  94 o2    2 minute:  96 P  98o2    3 minute:  95 P  97o2    4 minute:  98 P  98 o2    5 minute:  97 P  97o2    6 minute:  97 P  96 o2

## 2020-12-03 DIAGNOSIS — D64.9 LOW HEMOGLOBIN: Primary | ICD-10-CM

## 2020-12-07 ENCOUNTER — OFFICE VISIT (OUTPATIENT)
Dept: CARDIOLOGY | Facility: OTHER | Age: 85
End: 2020-12-07
Attending: PHYSICIAN ASSISTANT
Payer: MEDICARE

## 2020-12-07 VITALS
OXYGEN SATURATION: 98 % | WEIGHT: 150.6 LBS | HEART RATE: 70 BPM | DIASTOLIC BLOOD PRESSURE: 72 MMHG | BODY MASS INDEX: 21.92 KG/M2 | TEMPERATURE: 97.8 F | SYSTOLIC BLOOD PRESSURE: 127 MMHG

## 2020-12-07 DIAGNOSIS — E78.2 MIXED HYPERLIPIDEMIA: ICD-10-CM

## 2020-12-07 DIAGNOSIS — E78.1 HYPERTRIGLYCERIDEMIA: ICD-10-CM

## 2020-12-07 DIAGNOSIS — R91.1 NODULE OF RIGHT LUNG: ICD-10-CM

## 2020-12-07 DIAGNOSIS — E83.42 HYPOMAGNESEMIA: ICD-10-CM

## 2020-12-07 DIAGNOSIS — I25.10 ATHEROSCLEROSIS OF NATIVE CORONARY ARTERY OF NATIVE HEART WITHOUT ANGINA PECTORIS: Primary | ICD-10-CM

## 2020-12-07 DIAGNOSIS — Z98.890 H/O CARDIAC CATHETERIZATION: ICD-10-CM

## 2020-12-07 DIAGNOSIS — I25.5 ISCHEMIC CARDIOMYOPATHY: ICD-10-CM

## 2020-12-07 DIAGNOSIS — R06.09 DOE (DYSPNEA ON EXERTION): Primary | ICD-10-CM

## 2020-12-07 DIAGNOSIS — Z95.5 HISTORY OF CORONARY ARTERY STENT PLACEMENT: ICD-10-CM

## 2020-12-07 DIAGNOSIS — I50.42 CHRONIC COMBINED SYSTOLIC AND DIASTOLIC CONGESTIVE HEART FAILURE (H): ICD-10-CM

## 2020-12-07 DIAGNOSIS — Z95.1 S/P CABG X 3: ICD-10-CM

## 2020-12-07 DIAGNOSIS — Z86.2 HX OF SARCOIDOSIS: ICD-10-CM

## 2020-12-07 DIAGNOSIS — E11.65 TYPE 2 DIABETES MELLITUS WITH HYPERGLYCEMIA, WITHOUT LONG-TERM CURRENT USE OF INSULIN (H): ICD-10-CM

## 2020-12-07 DIAGNOSIS — Z86.718 HISTORY OF DVT (DEEP VEIN THROMBOSIS): ICD-10-CM

## 2020-12-07 DIAGNOSIS — R01.1 HEART MURMUR: ICD-10-CM

## 2020-12-07 DIAGNOSIS — Z95.828 PRESENCE OF IVC FILTER: ICD-10-CM

## 2020-12-07 DIAGNOSIS — N18.31 STAGE 3A CHRONIC KIDNEY DISEASE (H): ICD-10-CM

## 2020-12-07 DIAGNOSIS — Z87.891 HISTORY OF TOBACCO ABUSE: ICD-10-CM

## 2020-12-07 DIAGNOSIS — I25.810 ATHEROSCLEROSIS OF CORONARY ARTERY BYPASS GRAFT OF NATIVE HEART WITHOUT ANGINA PECTORIS: ICD-10-CM

## 2020-12-07 DIAGNOSIS — J44.9 COPD, MILD (H): ICD-10-CM

## 2020-12-07 DIAGNOSIS — I10 ESSENTIAL HYPERTENSION, BENIGN: ICD-10-CM

## 2020-12-07 PROCEDURE — 93010 ELECTROCARDIOGRAM REPORT: CPT | Performed by: INTERNAL MEDICINE

## 2020-12-07 PROCEDURE — 99205 OFFICE O/P NEW HI 60 MIN: CPT | Performed by: INTERNAL MEDICINE

## 2020-12-07 PROCEDURE — G0463 HOSPITAL OUTPT CLINIC VISIT: HCPCS

## 2020-12-07 PROCEDURE — 93005 ELECTROCARDIOGRAM TRACING: CPT

## 2020-12-07 PROCEDURE — G0463 HOSPITAL OUTPT CLINIC VISIT: HCPCS | Mod: 25

## 2020-12-07 RX ORDER — NITROGLYCERIN 0.3 MG/1
TABLET SUBLINGUAL
COMMUNITY
Start: 2020-03-05 | End: 2020-12-07 | Stop reason: DRUGHIGH

## 2020-12-07 RX ORDER — NITROGLYCERIN 0.4 MG/1
TABLET SUBLINGUAL
Qty: 25 TABLET | Refills: 3 | Status: SHIPPED | OUTPATIENT
Start: 2020-12-07 | End: 2022-04-13

## 2020-12-07 RX ORDER — ROSUVASTATIN CALCIUM 20 MG/1
20 TABLET, COATED ORAL
Qty: 30 TABLET | Refills: 3 | Status: SHIPPED | OUTPATIENT
Start: 2020-12-07 | End: 2021-08-30

## 2020-12-07 ASSESSMENT — PAIN SCALES - GENERAL: PAINLEVEL: NO PAIN (0)

## 2020-12-07 NOTE — PROGRESS NOTES
[facility]   CARDIOLOGY CONSULT     Sidney Barriga   4/14/1932  [id number]    Zach Arredondo     Chief Complaint   Patient presents with     Consult          HPI:   Mr. Barriga is an 88-year-old gentleman being seen by cardiology to establish care.  He has a history of coronary disease with both stenting and CABG x3.  His wife passed away on 9/1/2020, suddenly from cancer.  He does not plan to go back to [place] and is establishing care locally.    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    Sidney is here to establish care.  He has a history of CAD with stenting and bypass.  He previously obtained his cardiac care through [place].  He states that dating back in 1982, he was diagnosed with sarcoidosis.  More recently, he had a cardiac catheterization on 7/17/2007 with history of MI with stenting x2 placed to the p-mLAD.  He went on to have CABG x3 on 4/29/2014 in [place].  He had LIMA to LAD, SVG to OM1 and OM 2.  At that time, his EF was reported to be 30% with ischemic cardiomyopathy.  More recently, he reports having had a cardiac catheterization once again in [place] with stenting to unknown vessels in 2019.  His symptoms in the past have been exertional dyspnea with intrascapular discomfort.  He states he has been having similar symptoms for the last 6 months but to a lesser degree.  Risk factors include history of tobacco abuse at approximately a half a pack a day until 1978, hypertension, hyperlipidemia, DM-2, family history, and diet.    He reportedly has a history of ischemic cardiomyopathy.  EF on 8/12/2010 was 40%.  Recently, while in [place] at  "time of bypass on 4/29/2014, his EF was 30%.  His echo on 11/1/2017 showed an EF of 40-45% with grade 1 diastolic dysfunction.  There was evidence for wall motion normalities as well as mild left atrial enlargement.  He has not had any swelling to his legs, presently on Lasix 20 mg daily.  He states he does not have issues with fluid overload.    He has been on Coumadin for extended period time.  He describes having had multiple recurrent DVT's with a IVC filter in place.  He is currently on Coumadin with management through the Coumadin clinic.    He has a history of hyperlipidemia.  His most recent lipid panel was on 10/20/2020 showing a total cholesterol of 248 and LDL of 164.  HDL was 46.  He has not been able to tolerate Lipitor or Crestor in the past.  He has been on Zetia.  On 12/7/2020,  he was started on Crestor 20 mg twice a week.  If he is able to tolerate this dosage, would increase as able.  He states he has been on \"every statin with issues with all of them\".    Patient is known to have mild COPD with history of tobacco abuse.  PFT's on 11/18/2020 support mild COPD with severe diffusion defect.    IMAGING RESULTS:   Cath on 7/17/2007:  1. Severe, single-vessel coronary artery disease. Chronic   proximal-to-mild  occlusion of the left anterior descending coronary artery with   distal  right-to-left collaterals.  2. Cypher drug-eluting stenting of the [id number] segment of   the left  anterior descending.    CURRENT MEDICATIONS:   Prior to Admission medications    Medication Sig Start Date End Date Taking? Authorizing Provider   albuterol (PROAIR HFA/PROVENTIL HFA/VENTOLIN HFA) 108 (90 Base) MCG/ACT inhaler Inhale 2 puffs into the lungs every 4 hours as needed for shortness of breath / dyspnea or wheezing 10/15/20   Latricia Macedo PA   calcium carbonate 600 mg-vitamin D 400 units (CALCIUM 600 + D) 600-400 MG-UNIT per tablet Take 1 tablet by mouth 2 times daily    Reported, Patient   carvedilol " (COREG) 3.125 MG tablet Take 1 tablet by mouth 2 times daily 3/12/19   Reported, Patient   CINNAMON PO Take 375 mg by mouth daily    Reported, Patient   clopidogrel (PLAVIX) 75 MG tablet Take 1 tablet by mouth daily 4/10/19   Reported, Patient   Coenzyme Q10 (CO Q-10) 200 MG CAPS Take 1 capsule by mouth daily    Reported, Patient   cyanocobalamin (VITAMIN B-12) 500 MCG SUBL sublingual tablet Place 500 mcg under the tongue daily    Reported, Patient   ezetimibe (ZETIA) 10 MG tablet Take 1 tablet (10 mg) by mouth daily 11/18/20   Zach Arredondo MD   furosemide (LASIX) 20 MG tablet Take 20 mg by mouth daily    Reported, Patient   lisinopril (ZESTRIL) 5 MG tablet Take 1 tablet by mouth daily  1/17/19   Reported, Patient   LUTEIN PO Take 1 tablet by mouth daily    Reported, Patient   metFORMIN (GLUCOPHAGE) 500 MG tablet Take 500 mg by mouth daily (with dinner)     Reported, Patient   Omega-3 Fatty Acids (FISH OIL) 1200 MG capsule Take 1 capsule by mouth daily 5/3/07   Reported, Patient   vitamin C (ASCORBIC ACID) 500 MG tablet Take 1,000 mg by mouth daily    Reported, Patient   Vitamin D3 (CHOLECALCIFEROL) 125 MCG (5000 UT) tablet Take by mouth daily    Reported, Patient   warfarin ANTICOAGULANT (COUMADIN) 5 MG tablet Take 5 mg by mouth daily    Reported, Patient       ALLERGIES:   Allergies   Allergen Reactions     Simvastatin Itching     Isosorbide Visual Disturbance     LIGHT HEADED,ALMOST PASSED OUT          PAST MEDICAL HISTORY:   Past Medical History:   Diagnosis Date     Acute thromboembolism of deep veins of lower extremity (H)     No Comments Provided     Benign essential hypertension     No Comments Provided     Coronary atherosclerosis     No Comments Provided     Osteoarthrosis     No Comments Provided     Other and unspecified hyperlipidemia     No Comments Provided     Other specified forms of chronic ischemic heart disease     No Comments Provided     Sarcoidosis     No Comments Provided     Type 2 or  unspecified type diabetes mellitus     No Comments Provided        PAST SURGICAL HISTORY:   Past Surgical History:   Procedure Laterality Date     ANGIOPLASTY  2019    stents     CARDIAC SURGERY  2014    heart bypass     COLONOSCOPY  2019     OTHER SURGICAL HISTORY      2059,BIOPSY LUNG OR [facility]        FAMILY HISTORY:   Family History   Problem Relation Age of Onset     Myocardial Infarction Father      Alcoholism Mother      Hearing Loss Mother      Coronary Artery Disease Brother      Cancer Brother      Hyperlipidemia Brother      Myocardial Infarction Brother      Obesity Brother      Kidney Disease Brother         SOCIAL HISTORY:   Social History     Socioeconomic History     Marital status:      Spouse name: Not on file     Number of children: Not on file     Years of education: Not on file     Highest education level: Not on file   Occupational History     Not on file   Social Needs     Financial resource strain: Not on file     Food insecurity     Worry: Not on file     Inability: Not on file     Transportation needs     Medical: Not on file     Non-medical: Not on file   Tobacco Use     Smoking status: Former Smoker     Packs/day: 0.50     Years: 24.00     Pack years: 12.00     Start date: 1954     Quit date: 1978     Years since quittin.9     Smokeless tobacco: Never Used   Substance and Sexual Activity     Alcohol use: Yes     Alcohol/week: 5.0 standard drinks     Types: 3 Cans of beer, 2 Shots of liquor per week     Frequency: Monthly or less     Drug use: Unknown     Types: Other     Comment: Drug use: No     Sexual activity: Not on file   Lifestyle     Physical activity     Days per week: Not on file     Minutes per session: Not on file     Stress: Not on file   Relationships     Social connections     Talks on phone: Not on file     Gets together: Not on file     Attends Jewish service: Not on file     Active member of club or  organization: Not on file     Attends meetings of clubs or organizations: Not on file     Relationship status: Not on file     Intimate partner violence     Fear of current or ex partner: Not on file     Emotionally abused: Not on file     Physically abused: Not on file     Forced sexual activity: Not on file   Other Topics Concern     Not on file   Social History Narrative     Not on file          ROS:   CONSTITUTIONAL: No weight loss, fever, chills, admits to generalized weakness and fatigue.   HEENT: Eyes: No visual changes. Ears, Nose, Throat: No hearing loss, congestion or difficulty swallowing.   CARDIOVASCULAR: No chest pain, chest pressure or chest discomfort. No palpitations or lower extremity edema.  But admits exertional dyspnea with intermittent scapular discomfort.  RESPIRATORY: (+) shortness of breath with dyspnea upon exertion, no cough or sputum production.   GASTROINTESTINAL: No abdominal pain. No anorexia, nausea, vomiting or diarrhea.   NEUROLOGICAL: No headache, lightheadedness, dizziness, syncope, ataxia or weakness.   HEMATOLOGIC: No anemia, bleeding or bruising.   PSYCHIATRIC: No history of depression or anxiety.   ENDOCRINOLOGIC: No reports of sweating, cold or heat intolerance. No polyuria or polydipsia.   SKIN: No abnormal rashes or itching.       PHYSICAL EXAM:   GENERAL: The patient is a well-developed, well-nourished, in no apparent distress. Alert and oriented x3.   HEENT: Head is normocephalic and atraumatic. Eyes are symmetrical with normal visual tracking.  HEART: Regular rate and rhythm, S1S2 present without murmur, rub or gallop.   LUNGS: Respirations regular and unlabored. Clear to auscultation.   GI: Abdomen is soft and nondistended.   EXTREMITIES: No peripheral edema present.   MUSCULOSKELETAL: No joint swelling.   NEUROLOGIC: Alert and oriented X3.    SKIN: No jaundice. No rashes or visible skin lesions present.        LAB RESULTS:   Orders Only on 12/01/2020   Component Date  Value Ref Range Status     INR 12/01/2020 2.65* 0.86 - 1.14 Final   Orders Only on 12/01/2020   Component Date Value Ref Range Status     WBC 12/01/2020 5.5  4.0 - 11.0 10e9/L Final     RBC Count 12/01/2020 3.98* 4.4 - 5.9 10e12/L Final     Hemoglobin 12/01/2020 12.5* 13.3 - 17.7 g/dL Final     Hematocrit 12/01/2020 38.4* 40.0 - 53.0 % Final     MCV 12/01/2020 97  78 - 100 fl Final     MCH 12/01/2020 31.4  26.5 - 33.0 pg Final     MCHC 12/01/2020 32.6  31.5 - 36.5 g/dL Final     RDW 12/01/2020 12.4  10.0 - 15.0 % Final     Platelet Count 12/01/2020 141* 150 - 450 10e9/L Final     Diff Method 12/01/2020 Automated Method   Final     % Neutrophils 12/01/2020 63.5  % Final     % Lymphocytes 12/01/2020 21.9  % Final     % Monocytes 12/01/2020 12.2  % Final     % Eosinophils 12/01/2020 1.8  % Final     % Basophils 12/01/2020 0.4  % Final     % Immature Granulocytes 12/01/2020 0.2  % Final     Nucleated RBCs 12/01/2020 0  0 /100 Final     Absolute Neutrophil 12/01/2020 3.5  1.6 - 8.3 10e9/L Final     Absolute Lymphocytes 12/01/2020 1.2  0.8 - 5.3 10e9/L Final     Absolute Monocytes 12/01/2020 0.7  0.0 - 1.3 10e9/L Final     Absolute Eosinophils 12/01/2020 0.1  0.0 - 0.7 10e9/L Final     Absolute Basophils 12/01/2020 0.0  0.0 - 0.2 10e9/L Final     Abs Immature Granulocytes 12/01/2020 0.0  0 - 0.4 10e9/L Final     Absolute Nucleated RBC 12/01/2020 0.0   Final   Orders Only on 11/13/2020   Component Date Value Ref Range Status     Creatinine Urine 11/13/2020 93  mg/dL Final     Albumin Urine mg/L 11/13/2020 8  mg/L Final     Albumin Urine mg/g Cr 11/13/2020 8.67  0 - 17 mg/g Cr Final   Office Visit on 11/10/2020   Component Date Value Ref Range Status     WBC 11/10/2020 7.5  4.0 - 11.0 10e9/L Final     RBC Count 11/10/2020 4.06* 4.4 - 5.9 10e12/L Final     Hemoglobin 11/10/2020 13.0* 13.3 - 17.7 g/dL Final     Hematocrit 11/10/2020 38.5* 40.0 - 53.0 % Final     MCV 11/10/2020 95  78 - 100 fl Final     MCH 11/10/2020 32.0   26.5 - 33.0 pg Final     MCHC 11/10/2020 33.8  31.5 - 36.5 g/dL Final     RDW 11/10/2020 12.8  10.0 - 15.0 % Final     Platelet Count 11/10/2020 140* 150 - 450 10e9/L Final     % Neutrophils 11/10/2020 65.8  % Final     % Lymphocytes 11/10/2020 20.1  % Final     % Monocytes 11/10/2020 12.3  % Final     % Eosinophils 11/10/2020 1.5  % Final     % Basophils 11/10/2020 0.3  % Final     Absolute Neutrophil 11/10/2020 4.9  1.6 - 8.3 10e9/L Final     Absolute Lymphocytes 11/10/2020 1.5  0.8 - 5.3 10e9/L Final     Absolute Monocytes 11/10/2020 0.9  0.0 - 1.3 10e9/L Final     Absolute Eosinophils 11/10/2020 0.1  0.0 - 0.7 10e9/L Final     Absolute Basophils 11/10/2020 0.0  0.0 - 0.2 10e9/L Final     Diff Method 11/10/2020 Automated Method   Final   Orders Only on 11/03/2020   Component Date Value Ref Range Status     INR 11/03/2020 2.58* 0.86 - 1.14 Final   Orders Only on 10/20/2020   Component Date Value Ref Range Status     Color Urine 10/20/2020 Light Yellow   Final     Appearance Urine 10/20/2020 Clear   Final     Glucose Urine 10/20/2020 Negative  NEG^Negative mg/dL Final     Bilirubin Urine 10/20/2020 Negative  NEG^Negative Final     Ketones Urine 10/20/2020 Negative  NEG^Negative mg/dL Final     Specific Gravity Urine 10/20/2020 1.019  1.003 - 1.035 Final     Blood Urine 10/20/2020 Negative  NEG^Negative Final     pH Urine 10/20/2020 7.0  4.7 - 8.0 pH Final     Protein Albumin Urine 10/20/2020 Negative  NEG^Negative mg/dL Final     Urobilinogen mg/dL 10/20/2020 Normal  0.0 - 2.0 mg/dL Final     Nitrite Urine 10/20/2020 Negative  NEG^Negative Final     Leukocyte Esterase Urine 10/20/2020 Negative  NEG^Negative Final     Source 10/20/2020 Midstream Urine   Final     WBC Urine 10/20/2020 <1  0 - 5 /HPF Final     RBC Urine 10/20/2020 1  0 - 2 /HPF Final     Bacteria Urine 10/20/2020 None* NEG^Negative /HPF Final     Squamous Epithelial /HPF Urine 10/20/2020 0  0 - 1 /HPF Final     Mucous Urine 10/20/2020 Present*  NEG^Negative /LPF Final     TSH 10/20/2020 3.89  0.40 - 4.00 mU/L Final     Magnesium 10/20/2020 2.2  1.6 - 2.3 mg/dL Final     Cholesterol 10/20/2020 240* <200 mg/dL Final     Triglycerides 10/20/2020 149  <150 mg/dL Final     HDL Cholesterol 10/20/2020 46  >39 mg/dL Final     LDL Cholesterol Calculated 10/20/2020 164* <100 mg/dL Final     Non HDL Cholesterol 10/20/2020 194* <130 mg/dL Final     Hemoglobin A1C 10/20/2020 5.6  0 - 5.6 % Final     INR 10/20/2020 1.73* 0.86 - 1.14 Final     Sodium 10/20/2020 139  133 - 144 mmol/L Final     Potassium 10/20/2020 4.3  3.4 - 5.3 mmol/L Final     Chloride 10/20/2020 105  94 - 109 mmol/L Final     Carbon Dioxide 10/20/2020 29  20 - 32 mmol/L Final     Anion Gap 10/20/2020 5  3 - 14 mmol/L Final     Glucose 10/20/2020 145* 70 - 99 mg/dL Final     Urea Nitrogen 10/20/2020 20  7 - 30 mg/dL Final     Creatinine 10/20/2020 1.15  0.66 - 1.25 mg/dL Final     GFR Estimate 10/20/2020 56* >60 mL/min/[1.73_m2] Final     GFR Estimate If Black 10/20/2020 65  >60 mL/min/[1.73_m2] Final     Calcium 10/20/2020 9.4  8.5 - 10.1 mg/dL Final     Bilirubin Total 10/20/2020 1.0  0.2 - 1.3 mg/dL Final     Albumin 10/20/2020 3.9  3.4 - 5.0 g/dL Final     Protein Total 10/20/2020 7.8  6.8 - 8.8 g/dL Final     Alkaline Phosphatase 10/20/2020 68  40 - 150 U/L Final     ALT 10/20/2020 15  0 - 70 U/L Final     AST 10/20/2020 15  0 - 45 U/L Final     WBC 10/20/2020 5.7  4.0 - 11.0 10e9/L Final     RBC Count 10/20/2020 4.25* 4.4 - 5.9 10e12/L Final     Hemoglobin 10/20/2020 13.3  13.3 - 17.7 g/dL Final     Hematocrit 10/20/2020 40.5  40.0 - 53.0 % Final     MCV 10/20/2020 95  78 - 100 fl Final     MCH 10/20/2020 31.3  26.5 - 33.0 pg Final     MCHC 10/20/2020 32.8  31.5 - 36.5 g/dL Final     RDW 10/20/2020 12.5  10.0 - 15.0 % Final     Platelet Count 10/20/2020 135* 150 - 450 10e9/L Final     Diff Method 10/20/2020 Automated Method   Final     % Neutrophils 10/20/2020 67.0  % Final     % Lymphocytes  10/20/2020 19.6  % Final     % Monocytes 10/20/2020 10.1  % Final     % Eosinophils 10/20/2020 2.7  % Final     % Basophils 10/20/2020 0.4  % Final     % Immature Granulocytes 10/20/2020 0.2  % Final     Nucleated RBCs 10/20/2020 0  0 /100 Final     Absolute Neutrophil 10/20/2020 3.8  1.6 - 8.3 10e9/L Final     Absolute Lymphocytes 10/20/2020 1.1  0.8 - 5.3 10e9/L Final     Absolute Monocytes 10/20/2020 0.6  0.0 - 1.3 10e9/L Final     Absolute Eosinophils 10/20/2020 0.2  0.0 - 0.7 10e9/L Final     Absolute Basophils 10/20/2020 0.0  0.0 - 0.2 10e9/L Final     Abs Immature Granulocytes 10/20/2020 0.0  0 - 0.4 10e9/L Final     Absolute Nucleated RBC 10/20/2020 0.0   Final     Estimated Average Glucose 10/20/2020 114  mg/dL Final          ASSESSMENT:       ICD-10-CM    1. RIVAS (dyspnea on exertion)  R06.00 NM Lexiscan stress test     Echocardiogram Complete   2. Atherosclerosis of coronary artery bypass graft of native heart without angina pectoris  I25.810 rosuvastatin (CRESTOR) 20 MG tablet     nitroGLYcerin (NITROSTAT) 0.4 MG sublingual tablet     NM Lexiscan stress test     Echocardiogram Complete   3. 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, SVG to first OM and second OM  Z95.1 rosuvastatin (CRESTOR) 20 MG tablet     nitroGLYcerin (NITROSTAT) 0.4 MG sublingual tablet     NM Lexiscan stress test     Echocardiogram Complete   4. Stage 3a chronic kidney disease  N18.31    5. Chronic combined systolic and diastolic congestive heart failure (H)  I50.42 Echocardiogram Complete   6. Ischemic cardiomyopathy  I25.5 rosuvastatin (CRESTOR) 20 MG tablet     nitroGLYcerin (NITROSTAT) 0.4 MG sublingual tablet     NM Lexiscan stress test     Echocardiogram Complete   7. Heart murmur  R01.1 Echocardiogram Complete   8. History of DVT (deep vein thrombosis)-multiple  Z86.718    9. Presence of IVC filter  Z95.828    10. Nodule of right lung on CT scan from 5/12/2009  R91.1    11. Mixed hyperlipidemia  E78.2    12. Hypomagnesemia   E83.42    13. Essential hypertension, benign  I10    14. Hypertriglyceridemia  E78.1 rosuvastatin (CRESTOR) 20 MG tablet   15. History of tobacco abuse quitting in 1978  Z87.891    16. H/O cardiac catheterization x3 on 7/17/2007, 4/20/2014, and 2019  Z98.890 rosuvastatin (CRESTOR) 20 MG tablet     nitroGLYcerin (NITROSTAT) 0.4 MG sublingual tablet     NM Lexiscan stress test   17. History of coronary artery stent placement on 7/17/2007 x2 stents to the LAD and in 2019 in [place] to unknown vessel  Z95.5 rosuvastatin (CRESTOR) 20 MG tablet     nitroGLYcerin (NITROSTAT) 0.4 MG sublingual tablet     NM Lexiscan stress test   18. Hx of sarcoidosis in 1982  Z86.2    19. Type 2 diabetes mellitus with hyperglycemia, without long-term current use of insulin (H)  E11.65 rosuvastatin (CRESTOR) 20 MG tablet   20. COPD, mild (H) on 11/18/2020  J44.9          PLAN:   1.  He endorses exertional dyspnea with intrascapular pain.  This has been his anginal equivalent previously.  He states his symptoms are comparable to his previous anginal symptoms but to a lesser degree.  In light of his history of CABG x3 with stenting on 7/17/2007 and 2019, will be set up for a Cardiolite stress test.  He states he would not be able to walk on a treadmill.  2.  I am concerned as he has a murmur.  His last echo was in 2018.  The print is so small when it was scanned in, I am unable to read it.  His report from 2017 suggests mild tricuspid and mitral insufficiency.  Will obtain an echocardiogram and follow-up to his murmur and abnormal valve function.  3.  He is on Zetia for hyperlipidemia.  His cholesterol was uncontrolled on 10/20/2020.  He has not been able to tolerate Crestor or Lipitor on a daily basis.  We will start him on Crestor 20 mg to be taken twice a week.  4.  He is to take Plavix for life.  He is currently on it in place of aspirin since he is on Coumadin.  5.  His cardiologist in [place] Rodrigo Gerber MD.  Area code  [phone].  Patient request that this information is provided in the chart and to call with any questions as heart history.  6.  With recurrent DVT's/blood clots, will need to be on anticoagulation which he is presently on Coumadin for life.            Thank you for allowing me to participate in the care of your patient. Please do not hesitate to contact me if you have any questions.     Justyn Rae, DO

## 2020-12-07 NOTE — PATIENT INSTRUCTIONS
Thank you for allowing Dr. Rae and our  team to participate in your care. Please call our office at [phone] with scheduling questions or if you need to cancel or change your appointment. With any other questions or concerns you may call [facility] cardiology nurse at [phone].       If you experience chest pain, chest pressure, chest tightness, shortness of breath, fainting, lightheadedness, nausea, vomiting, or other concerning symptoms, please report to the Emergency Department or call 911. These symptoms may be emergent, and best treated in the Emergency Department.    Follow up in 1 month     You will have an echocardiogram performed.  This is an ultrasound of the heart, that evaluates heart function.  The hospital scheduling department will call to schedule you for this test.     You will be scheduled for a stress test to evaluate the vessels which supply blood to the heart to rule out the possibility of blockages. You will be called by the hospital scheduling department to schedule this appointment.      Start Crestor taking twice a week.

## 2020-12-07 NOTE — NURSING NOTE
"Chief Complaint   Patient presents with     Consult       Initial /72 (BP Location: Left arm, Patient Position: Sitting, Cuff Size: Adult Regular)   Pulse 70   Temp 97.8  F (36.6  C) (Tympanic)   Wt 68.3 kg (150 lb 9.6 oz)   SpO2 98%   BMI 21.92 kg/m   Estimated body mass index is 21.92 kg/m  as calculated from the following:    Height as of 10/15/20: 1.765 m (5' 9.5\").    Weight as of this encounter: 68.3 kg (150 lb 9.6 oz).  Medication Reconciliation: complete  Orin Brooks LPN    "
times daily 60 tablet 2    blood glucose test strips (ASCENSIA AUTODISC VI;ONE TOUCH ULTRA TEST VI) strip 1 each by In Vitro route daily As needed. 100 each 3    Lancets MISC 1 each by Does not apply route 2 times daily 100 each 5    Prenatal Vit-Fe Fumarate-FA (PRENATAL VITAMINS PO) Take by mouth      pantoprazole (PROTONIX) 20 MG tablet Take 1 tablet by mouth every morning (before breakfast) 90 tablet 1    ferrous sulfate (QUIN-CHRIS) 325 (65 Fe) MG tablet Take 1 tablet by mouth daily (with breakfast) (Patient not taking: Reported on 2/20/2020) 30 tablet 3     No current facility-administered medications for this visit. No Known Allergies  There were no vitals filed for this visit. There is no height or weight on file to calculate BMI. Review of Systems   Constitutional: Negative. HENT: Negative. Eyes: Negative. Respiratory: Negative. Cardiovascular: Negative. Gastrointestinal: Negative. Endocrine: Negative. Genitourinary: Negative. Musculoskeletal: Negative. Skin: Negative. Allergic/Immunologic: Negative. Neurological: Negative. Hematological: Negative. Psychiatric/Behavioral: Negative. Due to this being a TeleHealth encounter, evaluation of the following organ systems is limited: Vitals/Constitutional/EENT/Resp/CV/GI//MS/Neuro/Skin/Heme-Lymph-Imm. Physical Exam  Constitutional:       Appearance: She is well-developed. She is not diaphoretic. HENT:      Head: Normocephalic and atraumatic. Nose: Nose normal.   Eyes:      Conjunctiva/sclera: Conjunctivae normal.      Pupils: Pupils are equal, round, and reactive to light. Neck:      Musculoskeletal: Normal range of motion and neck supple. Thyroid: No thyromegaly. Trachea: No tracheal deviation. Pulmonary:      Effort: Pulmonary effort is normal. No respiratory distress. Musculoskeletal: Normal range of motion. Skin:     General: Skin is dry. Findings: No lesion or rash.

## 2020-12-09 ENCOUNTER — HOSPITAL ENCOUNTER (OUTPATIENT)
Dept: CARDIOLOGY | Facility: HOSPITAL | Age: 85
Discharge: HOME | End: 2020-12-09
Attending: INTERNAL MEDICINE | Admitting: INTERNAL MEDICINE
Payer: MEDICARE

## 2020-12-09 DIAGNOSIS — Z95.1 S/P CABG X 3: ICD-10-CM

## 2020-12-09 DIAGNOSIS — R06.09 DOE (DYSPNEA ON EXERTION): ICD-10-CM

## 2020-12-09 DIAGNOSIS — R01.1 HEART MURMUR: ICD-10-CM

## 2020-12-09 DIAGNOSIS — I25.5 ISCHEMIC CARDIOMYOPATHY: ICD-10-CM

## 2020-12-09 DIAGNOSIS — I50.42 CHRONIC COMBINED SYSTOLIC AND DIASTOLIC CONGESTIVE HEART FAILURE (H): ICD-10-CM

## 2020-12-09 DIAGNOSIS — I25.810 ATHEROSCLEROSIS OF CORONARY ARTERY BYPASS GRAFT OF NATIVE HEART WITHOUT ANGINA PECTORIS: ICD-10-CM

## 2020-12-09 PROCEDURE — 255N000002 HC RX 255 OP 636: Performed by: INTERNAL MEDICINE

## 2020-12-09 PROCEDURE — 93306 TTE W/DOPPLER COMPLETE: CPT | Mod: 26 | Performed by: INTERNAL MEDICINE

## 2020-12-09 PROCEDURE — 93306 TTE W/DOPPLER COMPLETE: CPT

## 2020-12-14 RX ADMIN — PERFLUTREN 2 ML: 6.52 INJECTION, SUSPENSION INTRAVENOUS at 10:35

## 2020-12-15 ENCOUNTER — HOSPITAL ENCOUNTER (OUTPATIENT)
Dept: NUCLEAR MEDICINE | Facility: HOSPITAL | Age: 85
Setting detail: NUCLEAR MEDICINE
End: 2020-12-15
Attending: INTERNAL MEDICINE
Payer: MEDICARE

## 2020-12-15 ENCOUNTER — HOSPITAL ENCOUNTER (OUTPATIENT)
Dept: CARDIOLOGY | Facility: HOSPITAL | Age: 85
Setting detail: NUCLEAR MEDICINE
End: 2020-12-15
Attending: INTERNAL MEDICINE
Payer: MEDICARE

## 2020-12-15 DIAGNOSIS — I25.5 ISCHEMIC CARDIOMYOPATHY: ICD-10-CM

## 2020-12-15 DIAGNOSIS — I25.810 ATHEROSCLEROSIS OF CORONARY ARTERY BYPASS GRAFT OF NATIVE HEART WITHOUT ANGINA PECTORIS: ICD-10-CM

## 2020-12-15 DIAGNOSIS — Z98.890 H/O CARDIAC CATHETERIZATION: ICD-10-CM

## 2020-12-15 DIAGNOSIS — Z95.1 S/P CABG X 3: ICD-10-CM

## 2020-12-15 DIAGNOSIS — Z95.5 HISTORY OF CORONARY ARTERY STENT PLACEMENT: ICD-10-CM

## 2020-12-15 DIAGNOSIS — R06.09 DOE (DYSPNEA ON EXERTION): ICD-10-CM

## 2020-12-15 LAB
CV BLOOD PRESSURE: 44 %
CV STRESS MAX HR HE: 81
NUC STRESS EJECTION FRACTION: 35 %
RATE PRESSURE PRODUCT: 8100
STRESS ECHO BASELINE DIASTOLIC HE: 64
STRESS ECHO BASELINE HR: 68
STRESS ECHO BASELINE SYSTOLIC BP: 118
STRESS ECHO CALCULATED PERCENT HR: 61 %
STRESS ECHO LAST STRESS DIASTOLIC BP: 58
STRESS ECHO LAST STRESS SYSTOLIC BP: 100
STRESS ECHO TARGET HR: 132

## 2020-12-15 PROCEDURE — 250N000011 HC RX IP 250 OP 636: Performed by: INTERNAL MEDICINE

## 2020-12-15 PROCEDURE — 343N000001 HC RX 343: Performed by: RADIOLOGY

## 2020-12-15 PROCEDURE — 78452 HT MUSCLE IMAGE SPECT MULT: CPT

## 2020-12-15 PROCEDURE — 93016 CV STRESS TEST SUPVJ ONLY: CPT | Performed by: INTERNAL MEDICINE

## 2020-12-15 PROCEDURE — 93018 CV STRESS TEST I&R ONLY: CPT | Performed by: INTERNAL MEDICINE

## 2020-12-15 PROCEDURE — 93017 CV STRESS TEST TRACING ONLY: CPT | Performed by: INTERNAL MEDICINE

## 2020-12-15 PROCEDURE — A9500 TC99M SESTAMIBI: HCPCS | Performed by: RADIOLOGY

## 2020-12-15 RX ORDER — REGADENOSON 0.08 MG/ML
0.4 INJECTION, SOLUTION INTRAVENOUS ONCE
Status: COMPLETED | OUTPATIENT
Start: 2020-12-15 | End: 2020-12-15

## 2020-12-15 RX ORDER — AMINOPHYLLINE 25 MG/ML
INJECTION, SOLUTION INTRAVENOUS
Status: DISCONTINUED
Start: 2020-12-15 | End: 2020-12-15 | Stop reason: WASHOUT

## 2020-12-15 RX ADMIN — Medication 10.74 MILLICURIE: at 07:25

## 2020-12-15 RX ADMIN — REGADENOSON 0.4 MG: 0.08 INJECTION, SOLUTION INTRAVENOUS at 09:08

## 2020-12-15 RX ADMIN — Medication 32.8 MILLICURIE: at 09:12

## 2020-12-16 DIAGNOSIS — I10 ESSENTIAL HYPERTENSION: Primary | ICD-10-CM

## 2020-12-16 RX ORDER — CARVEDILOL 3.12 MG/1
3.12 TABLET ORAL 2 TIMES DAILY
Qty: 180 TABLET | Refills: 1 | Status: SHIPPED | OUTPATIENT
Start: 2020-12-16 | End: 2021-01-23

## 2020-12-21 DIAGNOSIS — I25.10 ATHEROSCLEROSIS OF NATIVE CORONARY ARTERY OF NATIVE HEART WITHOUT ANGINA PECTORIS: ICD-10-CM

## 2020-12-21 DIAGNOSIS — I35.0 NONRHEUMATIC AORTIC VALVE STENOSIS: ICD-10-CM

## 2020-12-21 DIAGNOSIS — Z98.890 H/O CARDIAC CATHETERIZATION: ICD-10-CM

## 2020-12-21 DIAGNOSIS — I25.5 ISCHEMIC CARDIOMYOPATHY: ICD-10-CM

## 2020-12-21 DIAGNOSIS — Z95.1 S/P CABG X 3: ICD-10-CM

## 2020-12-21 DIAGNOSIS — Z95.5 HISTORY OF CORONARY ARTERY STENT PLACEMENT: ICD-10-CM

## 2020-12-21 DIAGNOSIS — R06.09 DOE (DYSPNEA ON EXERTION): Primary | ICD-10-CM

## 2020-12-21 RX ORDER — LIDOCAINE 40 MG/G
CREAM TOPICAL
Status: CANCELLED | OUTPATIENT
Start: 2020-12-21

## 2020-12-21 RX ORDER — SODIUM CHLORIDE 9 MG/ML
INJECTION, SOLUTION INTRAVENOUS CONTINUOUS
Status: CANCELLED | OUTPATIENT
Start: 2020-12-21

## 2020-12-22 ENCOUNTER — TELEPHONE (OUTPATIENT)
Dept: CARDIOLOGY | Facility: OTHER | Age: 85
End: 2020-12-22

## 2020-12-22 DIAGNOSIS — I25.5 ISCHEMIC CARDIOMYOPATHY: Primary | ICD-10-CM

## 2020-12-22 PROBLEM — I35.0 NONRHEUMATIC AORTIC VALVE STENOSIS: Status: ACTIVE | Noted: 2020-12-22

## 2020-12-22 RX ORDER — CLOPIDOGREL BISULFATE 75 MG/1
75 TABLET ORAL DAILY
Qty: 90 TABLET | Refills: 1 | Status: SHIPPED | OUTPATIENT
Start: 2020-12-22 | End: 2021-07-06

## 2020-12-22 RX ORDER — FUROSEMIDE 20 MG
20 TABLET ORAL DAILY
Qty: 90 TABLET | Refills: 1 | Status: SHIPPED | OUTPATIENT
Start: 2020-12-22 | End: 2021-01-23

## 2020-12-22 NOTE — TELEPHONE ENCOUNTER
Instructions, dates, and times given to daughter Tish who will be taking care of patient. Verbalized understanding. Will mail out a copy of instructions and appt cards. Will call with any questions.

## 2020-12-22 NOTE — LETTER
December 22, 2020      Sidney Barriga  [address] [place] [address]    Dear Sidney,    Here are the angiogram dates, times, and directions. Feel free to call with any questions! Thank you.   Shamar WAGNER RN  Cardiology Care Coordinator   [phone]    Pre-op- you will be called to schedule this.   COVID Test- Monday 1- AT 10:45AM  Angiogram- Friday 1- AT 7:30AM  Follow up with Dr. Rae- Monday 1- AT 11:15AM    You will be scheduled for a COVID swab at our Curbside location. You are to arrive to the facility and park in the east parking lot. Please stay IN your vehicle and call [phone] and get further instructions. If you do not have a cell phone to call the number you will enter through door #1 and be screened at the door.       You are scheduled for a Coronary Angiogram on Friday January 15TH, 2021 at the [facility], [address] [place] [address], [place] [address]. You are to check in at the [facility] Waiting Area at 7:30AM.     When you arrive you will be escorted back to the pre procedure area called 2A. Here they will insert an IV, draw labs, and obtain a short medical history. Please bring an updated list of your current medications.     A physician will come and talk with you about the procedure and obtain consent.     A nurse from the Cardiac Catheterization Lab will then escort you to the procedure area. You will be receiving sedation during the procedure so you will need someone to drive you to and from the hospital.     After the procedure you will recover for a short period (2 - 6 hours) on 6B. You will be discharged with instructions for post procedure care. However, depending on what the angiogram shows you may have to have stents placed and this might require an overnight stay. We ask that you bring a small bag of necessities for your comfort if you would need to stay overnight. DO NOT BRING ANY VALUABLES!       Pre procedure instructions:   1.  You will need a preop physical (H&P) done by your PCP within 30 days prior to the procedure. Please bring a hard copy of this with you to your procedure OR have your PCP fax to: [phone].   2. Make arrangements to have a  as you will not be allowed to drive following the procedure. Someone should stay with you the night after your procedure.   3.  Do not eat any solid food or milk products for 8 hours prior to the exam. You may drink clear liquids until 2 hours prior to the exam. Clear liquids include the following: water, Jell-O, clear broth, apple juice or any non-carbonated drink that you can see through (no pop!).   4. Do not drink alcohol or smoke 24 hours prior to test.   5. Hold these meds:   Do not take your oral diabetic medication or short acting insulin the day of the procedure. Do not take metformin the day of and for 2 days following, contact your PCP regarding glucose control during this time.   Hold your warfarin (2-3 days prior), Continue Plavix  6. You may take your other morning medications as prescribed with a sip of water. If you currently take an aspirin, continue taking your same dose. If not, take a 325 mg aspirin the day before and the day of your procedure.     If you have further questions, please utilize [employer] to contact us.   If your question concerns the above instructions, contact:   Shamar JIMENEZ RN   Cardiology Care Coordinator   [phone]     If your question concerns the schedule/appointment times, contact:   [facility]    [phone]             Sincerely,        Justyn Rae, DO

## 2020-12-22 NOTE — TELEPHONE ENCOUNTER
Patient scheduled for angiogram at the [facility] on 1-. Patient will need a pre-op within 30 days of procedure. Please call daughter Tish to schedule pre-op at [phone]. PSP is Dr. Arredondo. Thank you.

## 2020-12-22 NOTE — TELEPHONE ENCOUNTER
Pre-op- you will be called to schedule this.   COVID Test- Monday 1- AT 10:45AM  Angiogram- Friday 1- AT 7:30AM  Follow up with Dr. Rae- Monday 1- AT 11:15AM    You will be scheduled for a COVID swab at our Curbside location. You are to arrive to the facility and park in the east parking lot. Please stay IN your vehicle and call [phone] and get further instructions. If you do not have a cell phone to call the number you will enter through door #1 and be screened at the door.       You are scheduled for a Coronary Angiogram on Friday January 15TH, 2021 at the [facility], [address] [place] [address], [place] [address]. You are to check in at the [facility] Waiting Area at 7:30AM.     When you arrive you will be escorted back to the pre procedure area called 2A. Here they will insert an IV, draw labs, and obtain a short medical history. Please bring an updated list of your current medications.     A physician will come and talk with you about the procedure and obtain consent.     A nurse from the Cardiac Catheterization Lab will then escort you to the procedure area. You will be receiving sedation during the procedure so you will need someone to drive you to and from the hospital.     After the procedure you will recover for a short period (2 - 6 hours) on 6B. You will be discharged with instructions for post procedure care. However, depending on what the angiogram shows you may have to have stents placed and this might require an overnight stay. We ask that you bring a small bag of necessities for your comfort if you would need to stay overnight. DO NOT BRING ANY VALUABLES!       Pre procedure instructions:   1. You will need a preop physical (H&P) done by your PCP within 30 days prior to the procedure. Please bring a hard copy of this with you to your procedure OR have your PCP fax to: [phone].   2. Make arrangements to have a  as you will not be  allowed to drive following the procedure. Someone should stay with you the night after your procedure.   3.  Do not eat any solid food or milk products for 8 hours prior to the exam. You may drink clear liquids until 2 hours prior to the exam. Clear liquids include the following: water, Jell-O, clear broth, apple juice or any non-carbonated drink that you can see through (no pop!).   4. Do not drink alcohol or smoke 24 hours prior to test.   5. Hold these meds:   Do not take your oral diabetic medication or short acting insulin the day of the procedure. Do not take metformin the day of and for 2 days following, contact your PCP regarding glucose control during this time.   Hold your warfarin (2-3 days prior), Continue Plavix  6. You may take your other morning medications as prescribed with a sip of water. If you currently take an aspirin, continue taking your same dose. If not, take a 325 mg aspirin the day before and the day of your procedure.     If you have further questions, please utilize [employer] to contact us.   If your question concerns the above instructions, contact:   Shamar JIMENEZ RN   Cardiology Care Coordinator   [phone]     If your question concerns the schedule/appointment times, contact:   [facility]    [phone]

## 2020-12-28 ENCOUNTER — TELEPHONE (OUTPATIENT)
Dept: CARDIOLOGY | Facility: OTHER | Age: 85
End: 2020-12-28

## 2020-12-28 NOTE — TELEPHONE ENCOUNTER
Returned call to patient. He reports he is just wanting Dr. Rae to be aware that he has a history of an abdominal aortic aneurysm that should be checked on yearly. States he will discuss it more in detail at his follow up angiogram on 1- with Dr. Rae. Would like an US ordered as well to make sure it has not gotten bigger.   Will call back with any further questions or concerns.

## 2020-12-28 NOTE — TELEPHONE ENCOUNTER
Patient would like you to call him and explain letter in detail and or his daughter he is hard of hearing. Patient states he has not heard from any one on recent test, patient advised Dr. Rae spoke with him about his Echo and writer is not sure on stress test, but given results and why angiogram is necessary. Patient also wanted to know if he will be getting US to measure Aortic Aneurysm and patient advised usually these are done every year and can discuss on follow up after angiogram. Still would like a call and writer states will have RN call patient back. Please call patient. Thank you.   Orin Brooks LPN

## 2020-12-30 DIAGNOSIS — Z11.59 ENCOUNTER FOR SCREENING FOR OTHER VIRAL DISEASES: Primary | ICD-10-CM

## 2021-01-04 NOTE — PROGRESS NOTES
[place] [address]  [facility] [place] [address]  Phone: [phone]  Primary Provider: Zach Arredondo      PREOPERATIVE EVALUATION:  Today's date: 1/12/2021    Sidney Barriga is a 88 year old male who presents for a preoperative evaluation.    Surgical Information:  Surgery/Procedure: angiogram  Surgery Location: [facility]  Surgeon: Dr Harris  Surgery Date: 1/15/21  Time of Surgery: TBD  Where patient plans to recover: At home with family  Fax number for surgical facility: Note does not need to be faxed, will be available electronically in Epic.    Type of Anesthesia Anticipated: to be determined    Subjective     HPI related to upcoming procedure: upcoming angiogram.  Doing well overall.  No new concerns.        Preop Questions 1/12/2021   1. Have you ever had a heart attack or stroke? YES - heart attack 2007   2. Have you ever had surgery on your heart or blood vessels, such as a stent placement, a coronary artery bypass, or surgery on an artery in your head, neck, heart, or legs? YES - stent and triple bypass   3. Do you have chest pain with activity? No   4. Do you have a history of  heart failure? No   5. Do you currently have a cold, bronchitis or symptoms of other infection? No   6. Do you have a cough, shortness of breath, or wheezing? No   7. Do you or anyone in your family have previous history of blood clots? YES - pt    8. Do you or does anyone in your family have a serious bleeding problem such as prolonged bleeding following surgeries or cuts? No   9. Have you ever had problems with anemia or been told to take iron pills? No   10. Have you had any abnormal blood loss such as black, tarry or bloody stools? No   11. Have you ever had a blood transfusion? No   12. Are you willing to have a blood transfusion if it is medically needed before, during, or after your surgery? Yes   13. Have you or any of your relatives ever had problems with anesthesia? No   14. Do you have sleep  apnea, excessive snoring or daytime drowsiness? No   15. Do you have any artifical heart valves or other implanted medical devices like a pacemaker, defibrillator, or continuous glucose monitor? No   16. Do you have artificial joints? No   17. Are you allergic to latex? No     Health Care Directive:  Patient has a Health Care Directive on file      Preoperative Review of :        Status of Chronic Conditions:  See problem list for active medical problems.  Problems all longstanding and stable, except as noted/documented.  See ROS for pertinent symptoms related to these conditions.      Review of Systems  CONSTITUTIONAL: NEGATIVE for fever, chills, change in weight  INTEGUMENTARY/SKIN: NEGATIVE for worrisome rashes, moles or lesions  EYES: NEGATIVE for vision changes or irritation  ENT/MOUTH: NEGATIVE for ear, mouth and throat problems  RESP: NEGATIVE for significant cough or SOB  BREAST: NEGATIVE for masses, tenderness or discharge  CV: NEGATIVE for chest pain, palpitations or peripheral edema  GI: NEGATIVE for nausea, abdominal pain, heartburn, or change in bowel habits  : NEGATIVE for frequency, dysuria, or hematuria  MUSCULOSKELETAL: NEGATIVE for significant arthralgias or myalgia  NEURO: NEGATIVE for weakness, dizziness or paresthesias  ENDOCRINE: NEGATIVE for temperature intolerance, skin/hair changes  HEME: NEGATIVE for bleeding problems  PSYCHIATRIC: NEGATIVE for changes in mood or affect    Patient Active Problem List    Diagnosis Date Noted     Nonrheumatic aortic valve stenosis 12/22/2020     Priority: Medium     Added automatically from request for surgery [id number]       Stage 3a chronic kidney disease 12/07/2020     Priority: Medium     History of coronary artery stent placement on 7/17/2007 x2 stents to the LAD and in 2019 in [place] to unknown vessel 12/07/2020     Priority: Medium     RIVAS (dyspnea on exertion) 12/07/2020     Priority: Medium     Chronic combined systolic and diastolic congestive  heart failure (H) 12/07/2020     Priority: Medium     Heart murmur 12/07/2020     Priority: Medium     History of DVT (deep vein thrombosis)-multiple 12/07/2020     Priority: Medium     Presence of IVC filter 12/07/2020     Priority: Medium     Nodule of right lung on CT scan from 5/12/2009 12/07/2020     Priority: Medium     Hypertriglyceridemia 12/07/2020     Priority: Medium     History of tobacco abuse quitting in 1978 12/07/2020     Priority: Medium     H/O cardiac catheterization x3 on 7/17/2007, 4/20/2014, and 2019 12/07/2020     Priority: Medium     Hx of sarcoidosis in 1982 10/15/2020     Priority: Medium     COPD, mild (H) on 11/18/2020 10/15/2020     Priority: Medium     Atherosclerosis of native coronary artery of native heart without angina pectoris 04/29/2014     Priority: Medium     Hypomagnesemia 04/29/2014     Priority: Medium     Ischemic cardiomyopathy 04/29/2014     Priority: Medium     Leukocytosis 04/29/2014     Priority: Medium     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, SVG to first OM and second OM 04/29/2014     Priority: Medium     Type 2 diabetes mellitus with hyperglycemia, without long-term current use of insulin (H) 05/03/2007     Priority: Medium     Essential hypertension, benign 05/03/2007     Priority: Medium     Mixed hyperlipidemia 05/03/2007     Priority: Medium      Past Medical History:   Diagnosis Date     Acute thromboembolism of deep veins of lower extremity (H)     No Comments Provided     Benign essential hypertension     No Comments Provided     Coronary atherosclerosis     No Comments Provided     Osteoarthrosis     No Comments Provided     Other and unspecified hyperlipidemia     No Comments Provided     Other specified forms of chronic ischemic heart disease     No Comments Provided     Sarcoidosis     No Comments Provided     Type 2 or unspecified type diabetes mellitus     No Comments Provided     Past Surgical History:   Procedure Laterality Date      ANGIOPLASTY  01/01/2019    stents     CARDIAC SURGERY  01/01/2014    heart bypass     COLONOSCOPY  01/01/2019     OTHER SURGICAL HISTORY      [id number],BIOPSY LUNG OR [facility]ST[facility]UM MEDICAL IMAGING     Current Outpatient Medications   Medication Sig Dispense Refill     albuterol (PROAIR HFA/PROVENTIL HFA/VENTOLIN HFA) 108 (90 Base) MCG/ACT inhaler Inhale 2 puffs into the lungs every 4 hours as needed for shortness of breath / dyspnea or wheezing 1 Inhaler 4     calcium carbonate 600 mg-vitamin D 400 units (CALCIUM 600 + D) 600-400 MG-UNIT per tablet Take 1 tablet by mouth 2 times daily       carvedilol (COREG) 3.125 MG tablet Take 1 tablet (3.125 mg) by mouth 2 times daily 180 tablet 1     CINNAMON PO Take 375 mg by mouth daily       clopidogrel (PLAVIX) 75 MG tablet Take 1 tablet (75 mg) by mouth daily 90 tablet 1     Coenzyme Q10 (CO Q-10) 200 MG CAPS Take 1 capsule by mouth daily       cyanocobalamin (VITAMIN B-12) 500 MCG SUBL sublingual tablet Place 500 mcg under the tongue daily       ezetimibe (ZETIA) 10 MG tablet Take 1 tablet (10 mg) by mouth daily 90 tablet 0     furosemide (LASIX) 20 MG tablet Take 1 tablet (20 mg) by mouth daily 90 tablet 1     lisinopril (ZESTRIL) 5 MG tablet Take 1 tablet by mouth daily   0     LUTEIN PO Take 1 tablet by mouth daily       metFORMIN (GLUCOPHAGE) 500 MG tablet Take 500 mg by mouth daily (with dinner)        nitroGLYcerin (NITROSTAT) 0.4 MG sublingual tablet For chest pain place 1 tablet under the tongue every 5 minutes for 3 doses. If symptoms persist 5 minutes after 1st dose call 911. 25 tablet 3     Omega-3 Fatty Acids (FISH OIL) 1200 MG capsule Take 1 capsule by mouth daily       rosuvastatin (CRESTOR) 20 MG tablet Take 1 tablet (20 mg) by mouth twice a week 30 tablet 3     vitamin C (ASCORBIC ACID) 500 MG tablet Take 1,000 mg by mouth daily       Vitamin D3 (CHOLECALCIFEROL) 125 MCG (5000 UT) tablet Take by mouth daily       warfarin ANTICOAGULANT (COUMADIN) 5 MG tablet  "Take 5 mg by mouth daily         Allergies   Allergen Reactions     Simvastatin Itching     Isosorbide Visual Disturbance     LIGHT HEADED,ALMOST PASSED OUT          Social History     Tobacco Use     Smoking status: Former Smoker     Packs/day: 0.50     Years: 24.00     Pack years: 12.00     Start date: 1954     Quit date: 1978     Years since quittin.0     Smokeless tobacco: Never Used   Substance Use Topics     Alcohol use: Yes     Alcohol/week: 5.0 standard drinks     Types: 3 Cans of beer, 2 Shots of liquor per week     Frequency: Monthly or less     Family History   Problem Relation Age of Onset     Myocardial Infarction Father      Alcoholism Mother      Hearing Loss Mother      Coronary Artery Disease Brother      Cancer Brother      Hyperlipidemia Brother      Myocardial Infarction Brother      Obesity Brother      Kidney Disease Brother      History   Drug use: Unknown     Types: Other     Comment: Drug use: No         Objective     /68   Pulse 74   Ht 1.765 m (5' 9.5\")   Wt 68.9 kg (152 lb)   SpO2 98%   BMI 22.12 kg/m      Physical Exam    GENERAL APPEARANCE: healthy, alert and no distress     EYES: EOMI,  PERRL     HENT: ear canals and TM's normal and nose and mouth without ulcers or lesions     NECK: no adenopathy, no asymmetry, masses, or scars and thyroid normal to palpation     RESP: lungs clear to auscultation - no rales, rhonchi or wheezes     CV: regular rates and rhythm, normal S1 S2, no S3 or S4 and no murmur, click or rub     ABDOMEN:  soft, nontender, no HSM or masses and bowel sounds normal     MS: extremities normal- no gross deformities noted, no evidence of inflammation in joints, FROM in all extremities.     SKIN: no suspicious lesions or rashes     NEURO: Normal strength and tone, sensory exam grossly normal, mentation intact and speech normal     PSYCH: mentation appears normal. and affect normal/bright     LYMPHATICS: No cervical adenopathy    Recent Labs   Lab " Test 12/01/20  0848 11/18/20  0921 11/10/20  1153 11/03/20  0842 10/20/20  0902 01/18/19  0000 01/18/19   HGB 12.5* 12.7* 13.0*  --  13.3   < >  --    *  --  140*  --  135*   < >  --    INR 2.65*  --   --  2.58* 1.73*   < > 1.77   NA  --   --   --   --  139  --   --    POTASSIUM  --   --   --   --  4.3  --  4.2   CR  --   --   --   --  1.15  --  1.30*   A1C  --   --   --   --  5.6  --   --     < > = values in this interval not displayed.        Diagnostics:  Cbc and bmp pending.  I did not repeat EKG       Revised Cardiac Risk Index (RCRI):  The patient has the following serious cardiovascular risks for perioperative complications:   - No serious cardiac risks = 0 points     RCRI Interpretation: 0 points: Class I (very low risk - 0.4% complication rate)           Assessment & Plan   The proposed surgical procedure is considered LOW risk.    (Z01.818) Preop general physical exam  (primary encounter diagnosis)  Comment: doing well.   Plan: CBC with platelets and differential, Basic         metabolic panel        No contraindication to the upcoming procedure.  It can proceed as planned.  We got his meds in order as outlined.      (E11.65) Type 2 diabetes mellitus with hyperglycemia, without long-term current use of insulin (H)  Comment: stable.   Plan: stable.     (I25.10) Atherosclerosis of native coronary artery of native heart without angina pectoris  Comment: stable.   Plan: stable.     (N18.31) Stage 3a chronic kidney disease  Comment: update.   Plan: follow.     (I48.91) Atrial fibrillation (H)  Comment: stable.   Plan: coumadin adjusted as requested.           Risks and Recommendations:  The patient has the following additional risks and recommendations for perioperative complications:   - No identified additional risk factors other than previously addressed    We reviewed instructions with meds.  He will hold metformin, continue plavix, add asa 325 day before and day of, and stay on his other meds other  than coumadin, which is held for 3 days.  He is holding his fish oil as well until after the procedure.      RECOMMENDATION:  APPROVAL GIVEN to proceed with proposed procedure, without further diagnostic evaluation.    Signed Electronically by: Zach Arredondo MD    Copy of this evaluation report is provided to requesting physician.    [facility] Guidelines    Revised Cardiac Risk Index

## 2021-01-11 ENCOUNTER — OFFICE VISIT (OUTPATIENT)
Dept: FAMILY MEDICINE | Facility: OTHER | Age: 86
End: 2021-01-11
Attending: FAMILY MEDICINE
Payer: MEDICARE

## 2021-01-11 DIAGNOSIS — Z11.59 ENCOUNTER FOR SCREENING FOR OTHER VIRAL DISEASES: ICD-10-CM

## 2021-01-11 PROCEDURE — U0003 INFECTIOUS AGENT DETECTION BY NUCLEIC ACID (DNA OR RNA); SEVERE ACUTE RESPIRATORY SYNDROME CORONAVIRUS 2 (SARS-COV-2) (CORONAVIRUS DISEASE [COVID-19]), AMPLIFIED PROBE TECHNIQUE, MAKING USE OF HIGH THROUGHPUT TECHNOLOGIES AS DESCRIBED BY CMS-2020-01-R: HCPCS | Mod: ZL | Performed by: INTERNAL MEDICINE

## 2021-01-11 PROCEDURE — U0005 INFEC AGEN DETEC AMPLI PROBE: HCPCS | Mod: ZL | Performed by: INTERNAL MEDICINE

## 2021-01-11 NOTE — PROGRESS NOTES
DOS 1- Kimberly at [facility]. [facility] testing complete

## 2021-01-12 ENCOUNTER — OFFICE VISIT (OUTPATIENT)
Dept: FAMILY MEDICINE | Facility: OTHER | Age: 86
End: 2021-01-12
Attending: FAMILY MEDICINE
Payer: MEDICARE

## 2021-01-12 ENCOUNTER — ANTICOAGULATION THERAPY VISIT (OUTPATIENT)
Dept: ANTICOAGULATION | Facility: OTHER | Age: 86
End: 2021-01-12

## 2021-01-12 VITALS
BODY MASS INDEX: 21.76 KG/M2 | OXYGEN SATURATION: 98 % | DIASTOLIC BLOOD PRESSURE: 68 MMHG | HEART RATE: 74 BPM | SYSTOLIC BLOOD PRESSURE: 102 MMHG | HEIGHT: 70 IN | WEIGHT: 152 LBS

## 2021-01-12 DIAGNOSIS — I82.5Y3 CHRONIC DEEP VEIN THROMBOSIS (DVT) OF PROXIMAL VEIN OF BOTH LOWER EXTREMITIES (H): ICD-10-CM

## 2021-01-12 DIAGNOSIS — E11.65 TYPE 2 DIABETES MELLITUS WITH HYPERGLYCEMIA, WITHOUT LONG-TERM CURRENT USE OF INSULIN (H): ICD-10-CM

## 2021-01-12 DIAGNOSIS — I25.10 ATHEROSCLEROSIS OF NATIVE CORONARY ARTERY OF NATIVE HEART WITHOUT ANGINA PECTORIS: ICD-10-CM

## 2021-01-12 DIAGNOSIS — N18.31 STAGE 3A CHRONIC KIDNEY DISEASE (H): ICD-10-CM

## 2021-01-12 DIAGNOSIS — I48.91 ATRIAL FIBRILLATION, UNSPECIFIED TYPE (H): ICD-10-CM

## 2021-01-12 DIAGNOSIS — D64.9 LOW HEMOGLOBIN: Primary | ICD-10-CM

## 2021-01-12 DIAGNOSIS — Z01.818 PREOP GENERAL PHYSICAL EXAM: Primary | ICD-10-CM

## 2021-01-12 LAB
ANION GAP SERPL CALCULATED.3IONS-SCNC: 4 MMOL/L (ref 3–14)
BASOPHILS # BLD AUTO: 0 10E9/L (ref 0–0.2)
BASOPHILS NFR BLD AUTO: 0.5 %
BUN SERPL-MCNC: 28 MG/DL (ref 7–30)
CALCIUM SERPL-MCNC: 9.6 MG/DL (ref 8.5–10.1)
CHLORIDE SERPL-SCNC: 109 MMOL/L (ref 94–109)
CO2 SERPL-SCNC: 29 MMOL/L (ref 20–32)
CREAT SERPL-MCNC: 1.14 MG/DL (ref 0.66–1.25)
DIFFERENTIAL METHOD BLD: ABNORMAL
EOSINOPHIL # BLD AUTO: 0.3 10E9/L (ref 0–0.7)
EOSINOPHIL NFR BLD AUTO: 4.2 %
ERYTHROCYTE [DISTWIDTH] IN BLOOD BY AUTOMATED COUNT: 12.2 % (ref 10–15)
GFR SERPL CREATININE-BSD FRML MDRD: 57 ML/MIN/{1.73_M2}
GLUCOSE SERPL-MCNC: 100 MG/DL (ref 70–99)
HCT VFR BLD AUTO: 35.3 % (ref 40–53)
HGB BLD-MCNC: 11.7 G/DL (ref 13.3–17.7)
INR BLD: 3.8 (ref 0.86–1.14)
LYMPHOCYTES # BLD AUTO: 1.3 10E9/L (ref 0.8–5.3)
LYMPHOCYTES NFR BLD AUTO: 19.7 %
MCH RBC QN AUTO: 31.5 PG (ref 26.5–33)
MCHC RBC AUTO-ENTMCNC: 33.1 G/DL (ref 31.5–36.5)
MCV RBC AUTO: 95 FL (ref 78–100)
MONOCYTES # BLD AUTO: 0.9 10E9/L (ref 0–1.3)
MONOCYTES NFR BLD AUTO: 13.5 %
NEUTROPHILS # BLD AUTO: 4 10E9/L (ref 1.6–8.3)
NEUTROPHILS NFR BLD AUTO: 62.1 %
PLATELET # BLD AUTO: 162 10E9/L (ref 150–450)
POTASSIUM SERPL-SCNC: 4.4 MMOL/L (ref 3.4–5.3)
RBC # BLD AUTO: 3.72 10E12/L (ref 4.4–5.9)
SARS-COV-2 RNA RESP QL NAA+PROBE: NOT DETECTED
SODIUM SERPL-SCNC: 142 MMOL/L (ref 133–144)
SPECIMEN SOURCE: NORMAL
WBC # BLD AUTO: 6.4 10E9/L (ref 4–11)

## 2021-01-12 PROCEDURE — 85025 COMPLETE CBC W/AUTO DIFF WBC: CPT | Mod: ZL | Performed by: FAMILY MEDICINE

## 2021-01-12 PROCEDURE — 85610 PROTHROMBIN TIME: CPT | Mod: ZL | Performed by: PHYSICIAN ASSISTANT

## 2021-01-12 PROCEDURE — 99214 OFFICE O/P EST MOD 30 MIN: CPT | Performed by: FAMILY MEDICINE

## 2021-01-12 PROCEDURE — 80048 BASIC METABOLIC PNL TOTAL CA: CPT | Mod: ZL | Performed by: FAMILY MEDICINE

## 2021-01-12 PROCEDURE — G0463 HOSPITAL OUTPT CLINIC VISIT: HCPCS

## 2021-01-12 PROCEDURE — 36415 COLL VENOUS BLD VENIPUNCTURE: CPT | Mod: ZL | Performed by: FAMILY MEDICINE

## 2021-01-12 ASSESSMENT — ANXIETY QUESTIONNAIRES
GAD7 TOTAL SCORE: 4
1. FEELING NERVOUS, ANXIOUS, OR ON EDGE: SEVERAL DAYS
3. WORRYING TOO MUCH ABOUT DIFFERENT THINGS: SEVERAL DAYS
5. BEING SO RESTLESS THAT IT IS HARD TO SIT STILL: NOT AT ALL
6. BECOMING EASILY ANNOYED OR IRRITABLE: SEVERAL DAYS
2. NOT BEING ABLE TO STOP OR CONTROL WORRYING: SEVERAL DAYS
7. FEELING AFRAID AS IF SOMETHING AWFUL MIGHT HAPPEN: NOT AT ALL
IF YOU CHECKED OFF ANY PROBLEMS ON THIS QUESTIONNAIRE, HOW DIFFICULT HAVE THESE PROBLEMS MADE IT FOR YOU TO DO YOUR WORK, TAKE CARE OF THINGS AT HOME, OR GET ALONG WITH OTHER PEOPLE: SOMEWHAT DIFFICULT

## 2021-01-12 ASSESSMENT — PATIENT HEALTH QUESTIONNAIRE - PHQ9
SUM OF ALL RESPONSES TO PHQ QUESTIONS 1-9: 5
5. POOR APPETITE OR OVEREATING: NOT AT ALL

## 2021-01-12 ASSESSMENT — PAIN SCALES - GENERAL: PAINLEVEL: NO PAIN (0)

## 2021-01-12 ASSESSMENT — MIFFLIN-ST. JEOR: SCORE: 1357.78

## 2021-01-12 NOTE — PROGRESS NOTES
ANTICOAGULATION FOLLOW-UP CLINIC VISIT    Patient Name:  Sidney Barriga  Date:  1/12/2021  Contact Type:  Telephone    SUBJECTIVE:  Patient Findings     Positives:  Upcoming invasive procedure (Angiogram on 1/15/2021)    Comments:  INR done by lab. Call placed to pt and spoke to pt re: INR results/Warfarin dosing/INR recheck date. Pt denied any bleeding/bruising. Pt did state that he may have had less activity the last couple weeks. He has an upcoming procedure for an angiogram on 1/15/2021. Pt was seen by Dr. Arredondo and was informed to hold Warfarin for 3 days prior to procedure. We discussed pre procedure Warfarin hold and post procedure Warfarin dosing. Pt verbalized understanding of all instructions. Pt is to call Warfarin Clinic if any questions/issues/illness.         Clinical Outcomes     Negatives:  Major bleeding event, Thromboembolic event, Anticoagulation-related hospital admission, Anticoagulation-related ED visit, Anticoagulation-related fatality    Comments:  INR done by lab. Call placed to pt and spoke to pt re: INR results/Warfarin dosing/INR recheck date. Pt denied any bleeding/bruising. Pt did state that he may have had less activity the last couple weeks. He has an upcoming procedure for an angiogram on 1/15/2021. Pt was seen by Dr. Arredondo and was informed to hold Warfarin for 3 days prior to procedure. We discussed pre procedure Warfarin hold and post procedure Warfarin dosing. Pt verbalized understanding of all instructions. Pt is to call Warfarin Clinic if any questions/issues/illness.            OBJECTIVE    Recent labs: (last 7 days)     01/12/21  1037   INR 3.8*       ASSESSMENT / PLAN  INR assessment SUPRA    Recheck INR In: 1 WEEK    INR Location Clinic      Anticoagulation Summary  As of 1/12/2021    INR goal:  2.0-3.0   TTR:  63.2 % (2.6 mo)   INR used for dosing:  3.8 (1/12/2021)   Warfarin maintenance plan:  5 mg (5 mg x 1) every day   Full warfarin instructions:  1/12: Hold;  1/13: Hold; 1/14: Hold; 1/16: 10 mg; 1/17: 10 mg; Otherwise 5 mg every day   Weekly warfarin total:  35 mg   Plan last modified:  Lashae Barksdale RN (10/15/2020)   Next INR check:  1/21/2021   Priority:  High   Target end date:  10/15/2024         Anticoagulation Episode Summary     INR check location:      Preferred lab:      Send INR reminders to:  [facility] [place]    Comments:        Anticoagulation Care Providers     Provider Role Specialty Phone number    Latricia Macedo PA Referring Family Medicine [phone]            See the Encounter Report to view Anticoagulation Flowsheet and Dosing Calendar (Go to Encounters tab in chart review, and find the Anticoagulation Therapy Visit)        Kathryn Cardenas RN

## 2021-01-12 NOTE — NURSING NOTE
"Chief Complaint   Patient presents with     Pre-Op Exam       Initial /68   Pulse 74   Ht 1.765 m (5' 9.5\")   Wt 68.9 kg (152 lb)   SpO2 98%   BMI 22.12 kg/m   Estimated body mass index is 22.12 kg/m  as calculated from the following:    Height as of this encounter: 1.765 m (5' 9.5\").    Weight as of this encounter: 68.9 kg (152 lb).  Medication Reconciliation: complete  Griselda Peck LPN  "

## 2021-01-13 ASSESSMENT — ANXIETY QUESTIONNAIRES: GAD7 TOTAL SCORE: 4

## 2021-01-14 ENCOUNTER — TELEPHONE (OUTPATIENT)
Dept: CARDIOLOGY | Facility: CLINIC | Age: 86
End: 2021-01-14

## 2021-01-15 ENCOUNTER — HOSPITAL ENCOUNTER (OUTPATIENT)
Facility: CLINIC | Age: 86
Discharge: HOME | End: 2021-01-15
Attending: INTERNAL MEDICINE | Admitting: INTERNAL MEDICINE
Payer: MEDICARE

## 2021-01-15 ENCOUNTER — APPOINTMENT (OUTPATIENT)
Dept: LAB | Facility: CLINIC | Age: 86
End: 2021-01-15
Attending: INTERNAL MEDICINE
Payer: MEDICARE

## 2021-01-15 ENCOUNTER — APPOINTMENT (OUTPATIENT)
Dept: MEDSURG UNIT | Facility: CLINIC | Age: 86
End: 2021-01-15
Attending: INTERNAL MEDICINE
Payer: MEDICARE

## 2021-01-15 VITALS
RESPIRATION RATE: 18 BRPM | TEMPERATURE: 98.6 F | SYSTOLIC BLOOD PRESSURE: 101 MMHG | OXYGEN SATURATION: 97 % | BODY MASS INDEX: 22.12 KG/M2 | HEART RATE: 69 BPM | WEIGHT: 152 LBS | DIASTOLIC BLOOD PRESSURE: 55 MMHG

## 2021-01-15 DIAGNOSIS — I25.5 ISCHEMIC CARDIOMYOPATHY: ICD-10-CM

## 2021-01-15 DIAGNOSIS — Z98.890 H/O CARDIAC CATHETERIZATION: ICD-10-CM

## 2021-01-15 DIAGNOSIS — Z95.1 S/P CABG X 3: ICD-10-CM

## 2021-01-15 DIAGNOSIS — I25.10 ATHEROSCLEROSIS OF NATIVE CORONARY ARTERY OF NATIVE HEART WITHOUT ANGINA PECTORIS: ICD-10-CM

## 2021-01-15 DIAGNOSIS — R06.09 DOE (DYSPNEA ON EXERTION): ICD-10-CM

## 2021-01-15 DIAGNOSIS — I35.0 NONRHEUMATIC AORTIC VALVE STENOSIS: ICD-10-CM

## 2021-01-15 DIAGNOSIS — Z95.5 HISTORY OF CORONARY ARTERY STENT PLACEMENT: ICD-10-CM

## 2021-01-15 LAB
ANION GAP SERPL CALCULATED.3IONS-SCNC: 5 MMOL/L (ref 3–14)
BUN SERPL-MCNC: 41 MG/DL (ref 7–30)
CALCIUM SERPL-MCNC: 10.4 MG/DL (ref 8.5–10.1)
CHLORIDE SERPL-SCNC: 109 MMOL/L (ref 94–109)
CO2 SERPL-SCNC: 28 MMOL/L (ref 20–32)
CREAT SERPL-MCNC: 1.32 MG/DL (ref 0.66–1.25)
ERYTHROCYTE [DISTWIDTH] IN BLOOD BY AUTOMATED COUNT: 12 % (ref 10–15)
GFR SERPL CREATININE-BSD FRML MDRD: 48 ML/MIN/{1.73_M2}
GLUCOSE BLDC GLUCOMTR-MCNC: 90 MG/DL (ref 70–99)
GLUCOSE SERPL-MCNC: 115 MG/DL (ref 70–99)
HCT VFR BLD AUTO: 37.8 % (ref 40–53)
HGB BLD-MCNC: 12.2 G/DL (ref 13.3–17.7)
INR PPP: 1.75 (ref 0.86–1.14)
KCT BLD-ACNC: 180 SEC (ref 75–150)
KCT BLD-ACNC: 205 SEC (ref 75–150)
MCH RBC QN AUTO: 31.3 PG (ref 26.5–33)
MCHC RBC AUTO-ENTMCNC: 32.3 G/DL (ref 31.5–36.5)
MCV RBC AUTO: 97 FL (ref 78–100)
PLATELET # BLD AUTO: 153 10E9/L (ref 150–450)
POTASSIUM SERPL-SCNC: 5 MMOL/L (ref 3.4–5.3)
RBC # BLD AUTO: 3.9 10E12/L (ref 4.4–5.9)
SODIUM SERPL-SCNC: 142 MMOL/L (ref 133–144)
WBC # BLD AUTO: 5.9 10E9/L (ref 4–11)

## 2021-01-15 PROCEDURE — 80048 BASIC METABOLIC PNL TOTAL CA: CPT | Performed by: INTERNAL MEDICINE

## 2021-01-15 PROCEDURE — 85027 COMPLETE CBC AUTOMATED: CPT | Performed by: INTERNAL MEDICINE

## 2021-01-15 PROCEDURE — 250N000013 HC RX MED GY IP 250 OP 250 PS 637: Mod: GY | Performed by: INTERNAL MEDICINE

## 2021-01-15 PROCEDURE — C1894 INTRO/SHEATH, NON-LASER: HCPCS | Performed by: INTERNAL MEDICINE

## 2021-01-15 PROCEDURE — 99153 MOD SED SAME PHYS/QHP EA: CPT | Performed by: INTERNAL MEDICINE

## 2021-01-15 PROCEDURE — 36415 COLL VENOUS BLD VENIPUNCTURE: CPT | Performed by: INTERNAL MEDICINE

## 2021-01-15 PROCEDURE — 250N000009 HC RX 250: Performed by: INTERNAL MEDICINE

## 2021-01-15 PROCEDURE — 250N000011 HC RX IP 250 OP 636: Performed by: STUDENT IN AN ORGANIZED HEALTH CARE EDUCATION/TRAINING PROGRAM

## 2021-01-15 PROCEDURE — 85347 COAGULATION TIME ACTIVATED: CPT

## 2021-01-15 PROCEDURE — 93010 ELECTROCARDIOGRAM REPORT: CPT | Performed by: INTERNAL MEDICINE

## 2021-01-15 PROCEDURE — 999N000054 HC STATISTIC EKG NON-CHARGEABLE

## 2021-01-15 PROCEDURE — 93461 R&L HRT ART/VENTRICLE ANGIO: CPT | Performed by: INTERNAL MEDICINE

## 2021-01-15 PROCEDURE — 99152 MOD SED SAME PHYS/QHP 5/>YRS: CPT | Performed by: INTERNAL MEDICINE

## 2021-01-15 PROCEDURE — C1769 GUIDE WIRE: HCPCS | Performed by: INTERNAL MEDICINE

## 2021-01-15 PROCEDURE — 272N000001 HC OR GENERAL SUPPLY STERILE: Performed by: INTERNAL MEDICINE

## 2021-01-15 PROCEDURE — 250N000011 HC RX IP 250 OP 636: Performed by: INTERNAL MEDICINE

## 2021-01-15 PROCEDURE — 999N000142 HC STATISTIC PROCEDURE PREP ONLY

## 2021-01-15 PROCEDURE — 999N001017 HC STATISTIC GLUCOSE BY METER IP

## 2021-01-15 PROCEDURE — 85610 PROTHROMBIN TIME: CPT | Performed by: INTERNAL MEDICINE

## 2021-01-15 PROCEDURE — 93005 ELECTROCARDIOGRAM TRACING: CPT

## 2021-01-15 RX ORDER — NALOXONE HYDROCHLORIDE 0.4 MG/ML
0.2 INJECTION, SOLUTION INTRAMUSCULAR; INTRAVENOUS; SUBCUTANEOUS
Status: DISCONTINUED | OUTPATIENT
Start: 2021-01-15 | End: 2021-01-15

## 2021-01-15 RX ORDER — FENTANYL CITRATE 50 UG/ML
INJECTION, SOLUTION INTRAMUSCULAR; INTRAVENOUS
Status: DISCONTINUED | OUTPATIENT
Start: 2021-01-15 | End: 2021-01-15 | Stop reason: HOSPADM

## 2021-01-15 RX ORDER — NALOXONE HYDROCHLORIDE 0.4 MG/ML
0.4 INJECTION, SOLUTION INTRAMUSCULAR; INTRAVENOUS; SUBCUTANEOUS
Status: DISCONTINUED | OUTPATIENT
Start: 2021-01-15 | End: 2021-01-15

## 2021-01-15 RX ORDER — SODIUM CHLORIDE 9 MG/ML
INJECTION, SOLUTION INTRAVENOUS CONTINUOUS
Status: DISCONTINUED | OUTPATIENT
Start: 2021-01-15 | End: 2021-01-15 | Stop reason: HOSPADM

## 2021-01-15 RX ORDER — HEPARIN SODIUM 10000 [USP'U]/100ML
100-1000 INJECTION, SOLUTION INTRAVENOUS CONTINUOUS PRN
Status: DISCONTINUED | OUTPATIENT
Start: 2021-01-15 | End: 2021-01-15 | Stop reason: HOSPADM

## 2021-01-15 RX ORDER — HEPARIN SODIUM 1000 [USP'U]/ML
INJECTION, SOLUTION INTRAVENOUS; SUBCUTANEOUS
Status: DISCONTINUED | OUTPATIENT
Start: 2021-01-15 | End: 2021-01-15 | Stop reason: HOSPADM

## 2021-01-15 RX ORDER — EPTIFIBATIDE 2 MG/ML
2 INJECTION, SOLUTION INTRAVENOUS CONTINUOUS PRN
Status: DISCONTINUED | OUTPATIENT
Start: 2021-01-15 | End: 2021-01-15 | Stop reason: HOSPADM

## 2021-01-15 RX ORDER — EPTIFIBATIDE 2 MG/ML
1 INJECTION, SOLUTION INTRAVENOUS CONTINUOUS PRN
Status: DISCONTINUED | OUTPATIENT
Start: 2021-01-15 | End: 2021-01-15 | Stop reason: HOSPADM

## 2021-01-15 RX ORDER — DOPAMINE HYDROCHLORIDE 160 MG/100ML
2-20 INJECTION, SOLUTION INTRAVENOUS CONTINUOUS PRN
Status: DISCONTINUED | OUTPATIENT
Start: 2021-01-15 | End: 2021-01-15 | Stop reason: HOSPADM

## 2021-01-15 RX ORDER — ARGATROBAN 1 MG/ML
150 INJECTION, SOLUTION INTRAVENOUS
Status: DISCONTINUED | OUTPATIENT
Start: 2021-01-15 | End: 2021-01-15 | Stop reason: HOSPADM

## 2021-01-15 RX ORDER — EPTIFIBATIDE 2 MG/ML
180 INJECTION, SOLUTION INTRAVENOUS EVERY 10 MIN PRN
Status: DISCONTINUED | OUTPATIENT
Start: 2021-01-15 | End: 2021-01-15 | Stop reason: HOSPADM

## 2021-01-15 RX ORDER — FENTANYL CITRATE 50 UG/ML
25-50 INJECTION, SOLUTION INTRAMUSCULAR; INTRAVENOUS
Status: DISPENSED | OUTPATIENT
Start: 2021-01-15 | End: 2021-01-15

## 2021-01-15 RX ORDER — NALOXONE HYDROCHLORIDE 0.4 MG/ML
0.4 INJECTION, SOLUTION INTRAMUSCULAR; INTRAVENOUS; SUBCUTANEOUS
Status: DISCONTINUED | OUTPATIENT
Start: 2021-01-15 | End: 2021-01-15 | Stop reason: HOSPADM

## 2021-01-15 RX ORDER — LIDOCAINE 40 MG/G
CREAM TOPICAL
Status: DISCONTINUED | OUTPATIENT
Start: 2021-01-15 | End: 2021-01-15 | Stop reason: HOSPADM

## 2021-01-15 RX ORDER — NITROGLYCERIN 5 MG/ML
VIAL (ML) INTRAVENOUS
Status: DISCONTINUED | OUTPATIENT
Start: 2021-01-15 | End: 2021-01-15 | Stop reason: HOSPADM

## 2021-01-15 RX ORDER — IOPAMIDOL 755 MG/ML
INJECTION, SOLUTION INTRAVASCULAR
Status: DISCONTINUED | OUTPATIENT
Start: 2021-01-15 | End: 2021-01-15 | Stop reason: HOSPADM

## 2021-01-15 RX ORDER — NITROGLYCERIN 20 MG/100ML
10-200 INJECTION INTRAVENOUS CONTINUOUS PRN
Status: DISCONTINUED | OUTPATIENT
Start: 2021-01-15 | End: 2021-01-15 | Stop reason: HOSPADM

## 2021-01-15 RX ORDER — DOBUTAMINE HYDROCHLORIDE 200 MG/100ML
2-20 INJECTION INTRAVENOUS CONTINUOUS PRN
Status: DISCONTINUED | OUTPATIENT
Start: 2021-01-15 | End: 2021-01-15 | Stop reason: HOSPADM

## 2021-01-15 RX ORDER — ATROPINE SULFATE 0.1 MG/ML
0.5 INJECTION INTRAVENOUS
Status: DISCONTINUED | OUTPATIENT
Start: 2021-01-15 | End: 2021-01-15 | Stop reason: HOSPADM

## 2021-01-15 RX ORDER — FLUMAZENIL 0.1 MG/ML
0.2 INJECTION, SOLUTION INTRAVENOUS
Status: DISCONTINUED | OUTPATIENT
Start: 2021-01-15 | End: 2021-01-15 | Stop reason: HOSPADM

## 2021-01-15 RX ORDER — NALOXONE HYDROCHLORIDE 0.4 MG/ML
0.2 INJECTION, SOLUTION INTRAMUSCULAR; INTRAVENOUS; SUBCUTANEOUS
Status: DISCONTINUED | OUTPATIENT
Start: 2021-01-15 | End: 2021-01-15 | Stop reason: HOSPADM

## 2021-01-15 RX ORDER — ARGATROBAN 1 MG/ML
350 INJECTION, SOLUTION INTRAVENOUS
Status: DISCONTINUED | OUTPATIENT
Start: 2021-01-15 | End: 2021-01-15 | Stop reason: HOSPADM

## 2021-01-15 RX ADMIN — ASPIRIN 325 MG: 325 TABLET, COATED ORAL at 08:32

## 2021-01-15 RX ADMIN — FENTANYL CITRATE 25 MCG: 50 INJECTION, SOLUTION INTRAMUSCULAR; INTRAVENOUS at 13:47

## 2021-01-15 ASSESSMENT — EJECTION FRACTION
EF_VALUE: .3
EF_VALUE: .27

## 2021-01-15 NOTE — PROGRESS NOTES
D/I/A: Manual pressure held for 20 minutes to R groin.  Sheath, size 6Fr arterial, 7 Fr venous,  pulled by Thu Lin RN.  Site CDI, no hematoma.  Stasis achieved at 1410. Off bedrest @ 1810.  A+O x4 and making needs known.  Denies pain or sob.  VSSA.  Tolerated sheath removal well.  P: Continue to monitor status.  Discharge to home once meeting criteria.    Thu Lin RN

## 2021-01-15 NOTE — Clinical Note
dry, intact, no bleeding and no hematoma. 4F from L radial sheath removed, TR Band in place with 12ml of air. Proximal and distal markings, clean dry and intact.

## 2021-01-15 NOTE — Clinical Note
HISTORY OF PRESENT ILLNESS  Benjy Espitia is a 68 y. o.white female who comes in for breast cancer follow up  Breast Cancer   Associated symptoms include headaches and shortness of breath. Pertinent negatives include no chest pain and no abdominal pain. Associated symptoms include headaches and shortness of breath. Pertinent negatives include no chest pain and no abdominal pain. She was diagnosed with a stage 1 ([id number]) left breast cancer on Coty 10, 2005. She had infiltrating lobular carcinoma which was ER pos 65%, PA negative and her2/thais negative. She was treated with Left lumpectomy and [place] lymph node biopsy, followed by whole breast radiation on B39/0413 clinical trial (Dr Shamika Rodas) and Arimidex (Dr Sb Dumont). She developed pulmonary infiltrates ultimately requiring lung bx and this was determined to be due to the Arimidex. She was switched over to tamoxifen which she took for five years. She has ongoing breast pain but denies any new changes in her breasts, nipple changes or drainage, unexplained weight loss or bone pain. Breast MRI ([facility]) 10/5/2015 was negative. She had a bilateral mammogram 9/21/2015 which was ok. She had menarche at 5, first of two pregnancies at 29, menopause in her late 45s and did not take HRT. Her sister was dx'd with breast cancer at 72 and her BRCA testing which was negative. She had a breast MRI at the [facility] 10/2016 and was noted to have a 5 mm enhancing suspicious lesion near the lumpectomy bed. MRI bx came back as fibrosis but Dr Owens felt the area was still worrisome and may have been discordant. She strongly recommended excision of the area. She underwent reexcision of the lumpectomy bed 11/2016 which was negative.        Past Medical History:   Diagnosis Date    Anxiety     Arthritis     Breast CA ([facility] [address] 2005    invasive breast lobular ca (radiation, arimidex, then switched tamoxifen)    Breast cancer ([facility] [address] 10/2016    left breast Report given to 3C RN lobular ca, [id number], lumpectomy, ALND, radiation, arimidex but switched to tamoxifen    Diarrhea     Dyspepsia and other specified disorders of function of stomach     GERD (gastroesophageal reflux disease)     Headache     Hypertension     Migraine     Osteoporosis     Osteoporosis     Osteoporosis     Premature ventricular contractions (PVCs) (VPCs)     Thyroid disease     Visual loss      Past Surgical History:   Procedure Laterality Date    CHEST SURGERY PROCEDURE UNLISTED  2005    lung bx    HX BREAST BIOPSY Left 12/5/2016    LEFT BREAST NEEDLE LOCALIZATION, EXCISIONAL BIOPSY (CHOICE ANESTH) performed by Juan Manuel Aragon MD at [facility]    HX BREAST LUMPECTOMY Left 05/2005    and ALND    HX BUNIONECTOMY Bilateral 1972    no hardware    HX CATARACT REMOVAL Bilateral 06/2012    HX GYN      3 D&Cs    HX HYSTERECTOMY  2008    HX LAURA AND BSO       Family History   Problem Relation Age of Onset    Stroke Mother     Heart Disease Mother     Hypertension Mother     Stroke Father     Heart Disease Father     Hypertension Father     Diabetes Brother     Heart Disease Brother     Hypertension Brother     Cancer Sister 76     breast ca    Cancer Sister 58     endometriosis     Social History   Substance Use Topics    Smoking status: Former Smoker     Quit date: 4/11/1978    Smokeless tobacco: Never Used    Alcohol use Yes      Comment: rarely     Current Outpatient Prescriptions   Medication Sig    mometasone-formoterol (DULERA) 200-5 mcg/actuation HFA inhaler Take 2 Puffs by inhalation two (2) times daily as needed.  albuterol (PROAIR HFA) 90 mcg/actuation inhaler Take 2 Puffs by inhalation every four (4) hours as needed for Wheezing.  THYROID,PORK (ARMOUR THYROID PO) Take 15 mcg by mouth.  clonazePAM (KLONOPIN) 0.5 mg tablet Take  by mouth nightly as needed.  fluticasone (FLONASE) 50 mcg/actuation nasal spray nightly as needed.     valsartan (DIOVAN) 80 mg tablet Take 80 mg by mouth two (2) times a day.  atenolol (TENORMIN) 25 mg tablet Take 25 mg by mouth two (2) times a day.  hydrochlorothiazide (HYDRODIURIL) 25 mg tablet Take 25 mg by mouth daily.  pantoprazole (PROTONIX) 40 mg tablet Take 40 mg by mouth daily.  CALCIUM CARBONATE (CALCIUM 600 PO) Take  by mouth.  cholecalciferol, vitamin d3, (VITAMIN D) 1,000 unit tablet Take 4,000 Units by mouth daily.  ciprofloxacin HCl (CIPRO) 500 mg tablet Take 500 mg by mouth two (2) times a day. No current facility-administered medications for this visit. Allergies   Allergen Reactions    Donnaphen [Phenobarb-Hyoscy-Atropine-Scop] Rash    Arimidex [Anastrozole] Other (comments)     Wandering pulmonary infiltrates    Levaquin [Levofloxacin] Other (comments)     \"set me wild\"      Erythromycin Other (comments)     Abdominal pain       Review of Systems   Constitutional: Negative for chills, diaphoresis, fever and weight loss. HENT: Positive for congestion. Negative for sore throat. Eyes: Positive for blurred vision. Negative for discharge. Respiratory: Positive for shortness of breath. Negative for cough ( occ) and wheezing. Cardiovascular: Negative for chest pain, palpitations, orthopnea, claudication and leg swelling. Gastrointestinal: Positive for diarrhea and heartburn. Negative for abdominal pain, constipation, melena, nausea and vomiting. Bloating and indigestion  Recent colonoscopy suggesting microcytic colitis   Genitourinary: Negative for dysuria, flank pain, frequency and hematuria. Musculoskeletal: Positive for joint pain. Negative for back pain, myalgias and neck pain. Skin: Negative for rash. Neurological: Positive for headaches. Negative for dizziness, speech change, focal weakness, seizures, loss of consciousness and weakness. Endo/Heme/Allergies: Bruises/bleeds easily. Psychiatric/Behavioral: Negative for depression and memory loss.      Visit Vitals    /75 (BP 1 Location: Left arm, BP Patient Position: Sitting)    Pulse 66    Ht 5' 1\" (1.549 m)    Wt 67.1 kg (148 lb)    SpO2 95%    BMI 27.96 kg/m2       Physical Exam   Constitutional: She is oriented to person, place, and time. She appears well-developed and well-nourished. No distress. HENT:   Head: Normocephalic and atraumatic. Mouth/Throat: Oropharynx is clear and moist. No oropharyngeal exudate. Eyes: Conjunctivae and EOM are normal. Pupils are equal, round, and reactive to light. No scleral icterus. Neck: Normal range of motion. Neck supple. No JVD present. No tracheal deviation present. No thyromegaly present. Cardiovascular: Normal rate and regular rhythm. Exam reveals no gallop and no friction rub. No murmur heard. Pulmonary/Chest: Effort normal. No respiratory distress. She has no wheezes. She has no rales. Right breast exhibits no inverted nipple, no mass, no nipple discharge, no skin change and no tenderness. Left breast exhibits mass (5 cm seroma in cavity) and tenderness (lumpectomy site). Left breast exhibits no inverted nipple, no nipple discharge and no skin change. Breasts are asymmetrical (slight disfigurement in the lower inner aspect of the left breast). Abdominal: Soft. Bowel sounds are normal. She exhibits no distension and no mass. There is no tenderness. There is no rebound and no guarding. Musculoskeletal: Normal range of motion. She exhibits no edema. Lymphadenopathy:     She has no cervical adenopathy. She has no axillary adenopathy. Right: No supraclavicular adenopathy present. Left: No supraclavicular adenopathy present. Neurological: She is alert and oriented to person, place, and time. No cranial nerve deficit. Skin: Skin is warm and dry. No rash noted. She is not diaphoretic. No erythema. No pallor.    Psychiatric: Her behavior is normal. Judgment and thought content normal.       ASSESSMENT and PLAN  Hx stage I breast ca: currently CHAR at 11 years 11 months:  CHAR  Bilateral 3D screening mammogram after 9/21/2017. She desires change to [facility] for mammogram  We had a lengthy discussion regarding breast cancer and MRIs. She does not want to pursue them unless absolutely necessary. We discussed her risk profile with her sister with postmenopausal breast cancer and her personal hx of post menopausal breast cancer. She is in a moderate risk category (BRCA negative)  Seroma left breast aspirated.    30 ml serosanguinous  Keep f/u with [facility]     RTC 6 months      Reji Camarena MD FACS

## 2021-01-15 NOTE — PROGRESS NOTES
D/I/A: Pt roomed on 3C in bay 28.  Arrived via litter and accompanied by cath lab RN On/Off: Off monitor.  VSSA.  Rhythm upon arrival SR on monitor.  Denies pain or sob.  Reviewed activity restrictions and when to notify RN, ie-changes to breathing or increased chest pressure or chest pain.  CCL access:  R groin site with sheaths in place (6 Fr Arterial, 7Fr Venous), CMS intact, no bleeding/hematoma; TR band on L wrist, bleeding noted upon arrival, 3 ml added, total 15 ml air, CMS intact, dusky, faint pulse. ACT due at 1300.   P: Continue to monitor status.  Discharge to home once meeting criteria.      Thu Lin RN

## 2021-01-15 NOTE — DISCHARGE INSTRUCTIONS
______________________________________________      After you go home:    Have an adult stay with you for 24 hours.    Drink plenty of fluids.    You may eat your normal diet, unless your doctor tells you otherwise.    For 24 hours:    Relax and take it easy.    Do NOT smoke.    Do NOT make any important or legal decisions.    Do NOT drive or operate machines at home or at work.    Do NOT drink alcohol.    Remove the Band-Aid after 24 hours. If there is minor oozing, apply another Band-aid and remove it after 12 hours.    For 2 days, do NOT have sex or do any heavy exercise.    Do NOT take a bath, or use a hot tub or pool for at least 3 days. You may shower.    Care of groin site  It is normal to have a small bruise or lump at the site.    Do not scrub the site.    For the first 2 days: Do not stoop or squat. When you cough, sneeze or move your bowels, hold your hand over the puncture site and press gently.    Do not lift more than 10 pounds for at least 3 to 5 days.    Do not use lotion or powder near the puncture site for 3 days.    If you start bleeding from the site in your groin, lie down flat and press firmly  on the site. Call your doctor as soon as you can.    Care of wrist or arm site  It is normal to have soreness at the puncture site and mild tingling in your hand for up to 3 days.    For 2 days, do not use your hand or arm to support your weight (such as rising from a chair) or bend your wrist (such as lifting a garage door).    For 2 days, do not lift more than 5 pounds or exercise your arm (tennis, golf or bowling).    If you start bleeding from the site in your arm:    Sit down and press firmly on the site with your fingers for 10 minutes. Call your doctor as soon as you can.    If the bleeding stops, sit still and keep your wrist straight for 2 hours.    Medicines    If you have started taking Plavix or Effient, do not stop taking it until you talk to your heart doctor (cardiologist).    If you  are on metformin (Glucophage), do not restart it until you have blood tests (within 2 to 3 days after discharge). When your doctor tells you it is safe, you may restart the metformin.    If you have stopped any other medicines, check with your nurse or provider about when to restart them.    Call 911 right away if you have bleeding that is heavy or does not stop.    Call your doctor if:    You have a large or growing hard lump around the site.    The site is red, swollen, hot or tender.    Blood or fluid is draining from the site.    You have chills or a fever greater than 101 F (38 C).    Your leg or arm feels numb or cool.    You have hives, a rash or unusual itching.      [facility] [place]:  [phone] (7 days a week)

## 2021-01-15 NOTE — H&P
Cardiology History and Physical  Sidney Barriga MRN: [id number]  Age: 88 year old, : 1932  Primary care provider: Zach Arredondo         History of Present Illness:     89 y/o male with PMHx significant for CAD s/p 3v CABG in  (LIMA->LAD, SVG->RI and OM1) s/p PCI proximal to mid LAD x2 in 2017, DVT s/p IVC filter which was removed but is on warfarin, CKD3 and DM2 comes in to undergo coronary angiogram,hemodynamic measurements of his aortic and mitral valve and RHC as part of work up as patient has been experiencing worsening anginal pain and dyspnea on exertion.           Past Medical History:     Past Medical History:   Diagnosis Date     Acute thromboembolism of deep veins of lower extremity (H)     No Comments Provided     Benign essential hypertension     No Comments Provided     Coronary atherosclerosis     No Comments Provided     Osteoarthrosis     No Comments Provided     Other and unspecified hyperlipidemia     No Comments Provided     Other specified forms of chronic ischemic heart disease     No Comments Provided     Sarcoidosis     No Comments Provided     Type 2 or unspecified type diabetes mellitus     No Comments Provided              Past Surgical History:      Past Surgical History:   Procedure Laterality Date     ANGIOPLASTY  2019    stents     CARDIAC SURGERY  2014    heart bypass     COLONOSCOPY  2019     OTHER SURGICAL HISTORY      2059,BIOPSY LUNG OR [facility]              Social History:     Social History     Tobacco Use     Smoking status: Former Smoker     Packs/day: 0.50     Years: 24.00     Pack years: 12.00     Start date: 1954     Quit date: 1978     Years since quittin.0     Smokeless tobacco: Never Used   Substance Use Topics     Alcohol use: Yes     Alcohol/week: 5.0 standard drinks     Types: 3 Cans of beer, 2 Shots of liquor per week     Frequency: Monthly or less              Family History:     Family  History   Problem Relation Age of Onset     Myocardial Infarction Father      Alcoholism Mother      Hearing Loss Mother      Coronary Artery Disease Brother      Cancer Brother      Hyperlipidemia Brother      Myocardial Infarction Brother      Obesity Brother      Kidney Disease Brother      Family history reviewed and updated in EPIC          Allergies:     Allergies   Allergen Reactions     Simvastatin Itching     Isosorbide Visual Disturbance     LIGHT HEADED,ALMOST PASSED OUT                Medications:     Current Facility-Administered Medications   Medication     argatroban (ACOVA) 1 mg/mL ANTICOAGULANT infusion     argatroban (ACOVA) ANTICOAGULANT bolus from infusion pump 10,340 mcg     argatroban (ACOVA) ANTICOAGULANT bolus from infusion pump 24,120 mcg     aspirin (ASA) EC tablet 325 mg     cangrelor (KENGREAL) 200 mcg/mL bolus dose from infusion pump 2.06 mg    Followed by     cangrelor (KENGREAL) 50 mg in sodium chloride 0.9 % 250 mL infusion     DOBUTamine 500 mg in dextrose 5% 250 mL (adult std conc) premix     DOPamine 400 mg in dextrose 5% 250 mL (adult std) - premix - CENTRAL     eptifibatide (INTEGRILIN) 2 mg/mL loading dose     eptifibatide (INTEGRILIN) 2 mg/mL loading dose     eptifibatide (INTEGRILIN) 200 mg/100 mL infusion     eptifibatide (INTEGRILIN) 200 mg/100 mL infusion     fentaNYL (PF) (SUBLIMAZE) injection     heparin (porcine) 100 unit/mL in 0.45% Sodium Chloride ANTICOAGULANT infusion     heparin (porcine) injection     Hold: warfarin (COUMADIN) pre-procedure     iopamidol (ISOVUE-370) solution     lidocaine (LMX4) cream     lidocaine 1 % 0.1-1 mL     Medication Considerations - To maintain platelet inhibition after discontinuation of cangrelor (KENGREAL) [see admin instructions]     midazolam (VERSED) injection     nitroGLYcerin 50 mg in D5W 250 mL (adult std) infusion CENTRAL     nitroGLYcerin in D5W injection     nitroPRUsside (NIPRIDE) 50 mg, sodium thiosulfate 500 mg in D5W 125  mL IV infusion (conc: 0.4 mg/mL)     Patient is NOT allergic to contrast dye OR was given pre-procedure oral steroids for treatment     sodium chloride (PF) 0.9% PF flush 3 mL     sodium chloride (PF) 0.9% PF flush 3 mL     sodium chloride 0.9% infusion                    Physical Exam:     B/P: 133/65, T: 98, P: 72, R: 18    Wt Readings from Last 4 Encounters:   01/15/21 68.9 kg (152 lb)   01/12/21 68.9 kg (152 lb)   12/07/20 68.3 kg (150 lb 9.6 oz)   11/10/20 69.9 kg (154 lb)           Gen: AA&Ox3, no acute distress  HEENT:AT/ NC, PERRL b/l, EOM grossly intact, mucous membranes pink, moist without plaque or exudate  BACK: no CVA tenderness, no midline bony tenderness  PULM/THORAX: Clear to auscultation bilaterally, no rales/rhonchi/wheezes  CV:RRR, 3/6 systolic murmur in the right upper sternal border.   ABD: , soft, nontender, nondistended. Normoactive bowel sounds x 4, no HSM appreciated.  SKIN: no rash or petechaie  EXT: No edema, clubbing or cyanosis. No asymmetrical edema or tenderness to palpation in calves bilaterally.  NEURO: no focal deficit            Data:     Labs Reviewed on Admission  Pertinent for:  No results found for: TROPI, TROPONIN, TROPR, TROPN    Results for orders placed or performed during the hospital encounter of 01/15/21 (from the past 24 hour(s))   Basic metabolic panel   Result Value Ref Range    Sodium 142 133 - 144 mmol/L    Potassium 5.0 3.4 - 5.3 mmol/L    Chloride 109 94 - 109 mmol/L    Carbon Dioxide 28 20 - 32 mmol/L    Anion Gap 5 3 - 14 mmol/L    Glucose 115 (H) 70 - 99 mg/dL    Urea Nitrogen 41 (H) 7 - 30 mg/dL    Creatinine 1.32 (H) 0.66 - 1.25 mg/dL    GFR Estimate 48 (L) >60 mL/min/[1.73_m2]    GFR Estimate If Black 55 (L) >60 mL/min/[1.73_m2]    Calcium 10.4 (H) 8.5 - 10.1 mg/dL   CBC with platelets   Result Value Ref Range    WBC 5.9 4.0 - 11.0 10e9/L    RBC Count 3.90 (L) 4.4 - 5.9 10e12/L    Hemoglobin 12.2 (L) 13.3 - 17.7 g/dL    Hematocrit 37.8 (L) 40.0 - 53.0 %     MCV 97 78 - 100 fl    MCH 31.3 26.5 - 33.0 pg    MCHC 32.3 31.5 - 36.5 g/dL    RDW 12.0 10.0 - 15.0 %    Platelet Count 153 150 - 450 10e9/L   INR   Result Value Ref Range    INR 1.75 (H) 0.86 - 1.14   EKG 12-lead, tracing only   Result Value Ref Range    Interpretation ECG Click View Image link to view waveform and result    Activated clotting time POCT   Result Value Ref Range    Activated Clot Time 205 (H) 75 - 150 sec                Assessment and Plan:     Proceed with coronary angiogram, hemodynamic study and RHC.     Hermann Puente MD PhD  Cardiology Fellow  Pager: [phone]                Staff Cardiologist: I appreciate the assistance of the cardiology fellow.  I agree with the documentation above.  Proceed with right heart catheterization and left heart catheterization, and coronary angiography.    Charlie Harris MD

## 2021-01-16 NOTE — PROGRESS NOTES
D/I/A:  Patient is tolerating liquids and foods, ambulating, urinating, puncture sites are stable ( no bleeding and no hematoma) and patient has a .  A+O x4 and making needs known.  CCL access sites C/D/I; no bleeding or hematoma; CMS intact.  VSSA.  SR on monitor.  IV access removed.  Education completed and outlined in AVS or handout: medications reviewed with patient. Pt's daughter also present at discharge instructions as pt is [place]. Pt and daughter denied any questions or complaints. Pt's left wrist ecchymotic, CMS intact and arm board in place. Pt ambulated to BR with SBA groin site reassessed and found no bleeding or hematoma. Questions answered prior to discharge.  Belongings returned to patient at discharge. Pt escorted to front door via WC safely.   P: Discharged to self care(care of daughter).  Patient to follow up with appts as per discharge instruction.

## 2021-01-17 LAB — INTERPRETATION ECG - MUSE: NORMAL

## 2021-01-21 ENCOUNTER — ANTICOAGULATION THERAPY VISIT (OUTPATIENT)
Dept: ANTICOAGULATION | Facility: OTHER | Age: 86
End: 2021-01-21
Attending: INTERNAL MEDICINE
Payer: MEDICARE

## 2021-01-21 DIAGNOSIS — R06.09 DOE (DYSPNEA ON EXERTION): ICD-10-CM

## 2021-01-21 DIAGNOSIS — I25.10 ATHEROSCLEROSIS OF NATIVE CORONARY ARTERY OF NATIVE HEART WITHOUT ANGINA PECTORIS: ICD-10-CM

## 2021-01-21 DIAGNOSIS — Z95.5 HISTORY OF CORONARY ARTERY STENT PLACEMENT: ICD-10-CM

## 2021-01-21 DIAGNOSIS — I82.5Y3 CHRONIC DEEP VEIN THROMBOSIS (DVT) OF PROXIMAL VEIN OF BOTH LOWER EXTREMITIES (H): ICD-10-CM

## 2021-01-21 DIAGNOSIS — Z95.1 S/P CABG X 3: ICD-10-CM

## 2021-01-21 DIAGNOSIS — I25.5 ISCHEMIC CARDIOMYOPATHY: ICD-10-CM

## 2021-01-21 DIAGNOSIS — I35.0 NONRHEUMATIC AORTIC VALVE STENOSIS: ICD-10-CM

## 2021-01-21 DIAGNOSIS — Z98.890 H/O CARDIAC CATHETERIZATION: ICD-10-CM

## 2021-01-21 LAB
ANION GAP SERPL CALCULATED.3IONS-SCNC: 5 MMOL/L (ref 3–14)
BUN SERPL-MCNC: 26 MG/DL (ref 7–30)
CALCIUM SERPL-MCNC: 9.9 MG/DL (ref 8.5–10.1)
CAPILLARY BLOOD COLLECTION: NORMAL
CHLORIDE SERPL-SCNC: 107 MMOL/L (ref 94–109)
CO2 SERPL-SCNC: 29 MMOL/L (ref 20–32)
CREAT SERPL-MCNC: 1.4 MG/DL (ref 0.66–1.25)
ERYTHROCYTE [DISTWIDTH] IN BLOOD BY AUTOMATED COUNT: 12.2 % (ref 10–15)
GFR SERPL CREATININE-BSD FRML MDRD: 44 ML/MIN/{1.73_M2}
GLUCOSE SERPL-MCNC: 125 MG/DL (ref 70–99)
HCT VFR BLD AUTO: 34.9 % (ref 40–53)
HGB BLD-MCNC: 11.3 G/DL (ref 13.3–17.7)
INR BLD: 2.9 (ref 0.86–1.14)
MCH RBC QN AUTO: 31.1 PG (ref 26.5–33)
MCHC RBC AUTO-ENTMCNC: 32.4 G/DL (ref 31.5–36.5)
MCV RBC AUTO: 96 FL (ref 78–100)
PLATELET # BLD AUTO: 163 10E9/L (ref 150–450)
POTASSIUM SERPL-SCNC: 4.7 MMOL/L (ref 3.4–5.3)
RBC # BLD AUTO: 3.63 10E12/L (ref 4.4–5.9)
SODIUM SERPL-SCNC: 141 MMOL/L (ref 133–144)
WBC # BLD AUTO: 6.7 10E9/L (ref 4–11)

## 2021-01-21 PROCEDURE — 80048 BASIC METABOLIC PNL TOTAL CA: CPT | Mod: ZL | Performed by: INTERNAL MEDICINE

## 2021-01-21 PROCEDURE — 85610 PROTHROMBIN TIME: CPT | Mod: ZL | Performed by: PHYSICIAN ASSISTANT

## 2021-01-21 PROCEDURE — 36415 COLL VENOUS BLD VENIPUNCTURE: CPT | Mod: ZL | Performed by: INTERNAL MEDICINE

## 2021-01-21 PROCEDURE — 85027 COMPLETE CBC AUTOMATED: CPT | Mod: ZL | Performed by: INTERNAL MEDICINE

## 2021-01-21 NOTE — PROGRESS NOTES
ANTICOAGULATION FOLLOW-UP CLINIC VISIT    Patient Name:  Sidney Barriga  Date:  2021  Contact Type:  Telephone    SUBJECTIVE:  Patient Findings     Comments:  INR done by lab. Call placed to pt and spoke to pt re: INR results/Warfarin dosing/INR recheck date. Pt stated that he has had increased bruising with this angiogram versus prior procedures. He stated his arm and groin are pretty bruised, but improving slowly. Encouraged pt to call Warfarin clinic if bruising becomes worse, swollen, or painful. He was encouraged to eat some greens throughout the week as well. Pt denied any bleeding or any other new changes to their diet/meds/activity. Pt verbalized understanding of all instructions. Pt is to call Warfarin Clinic if any questions/issues/illness.           Clinical Outcomes     Negatives:  Major bleeding event, Thromboembolic event, Anticoagulation-related hospital admission, Anticoagulation-related ED visit, Anticoagulation-related fatality    Comments:  INR done by lab. Call placed to pt and spoke to pt re: INR results/Warfarin dosing/INR recheck date. Pt stated that he has had increased bruising with this angiogram versus prior procedures. He stated his arm and groin are pretty bruised, but improving slowly. Encouraged pt to call Warfarin clinic if bruising becomes worse, swollen, or painful. He was encouraged to eat some greens throughout the week as well. Pt denied any bleeding or any other new changes to their diet/meds/activity. Pt verbalized understanding of all instructions. Pt is to call Warfarin Clinic if any questions/issues/illness.              OBJECTIVE    Recent labs: (last 7 days)     21  0935   INR 2.9*       ASSESSMENT / PLAN  INR assessment THER    Recheck INR In: 2 WEEKS    INR Location Clinic      Anticoagulation Summary  As of 2021    INR goal:  2.0-3.0   TTR:  63.7 % (2.9 mo)   INR used for dosin.9 (2021)   Warfarin maintenance plan:  5 mg (5 mg x 1) every day    Full warfarin instructions:  5 mg every day   Weekly warfarin total:  35 mg   Plan last modified:  Lashae Barksdale RN (10/15/2020)   Next INR check:  2/4/2021   Priority:  High   Target end date:  10/15/2024         Anticoagulation Episode Summary     INR check location:      Preferred lab:      Send INR reminders to:  JOSR SORIA    Comments:        Anticoagulation Care Providers     Provider Role Specialty Phone number    Latricia Macedo, PA Referring Family Medicine [phone]            See the Encounter Report to view Anticoagulation Flowsheet and Dosing Calendar (Go to Encounters tab in chart review, and find the Anticoagulation Therapy Visit)        Kathryn Cardenas RN

## 2021-01-22 DIAGNOSIS — Z95.1 S/P CABG X 3: ICD-10-CM

## 2021-01-22 DIAGNOSIS — Z95.5 HISTORY OF CORONARY ARTERY STENT PLACEMENT: ICD-10-CM

## 2021-01-22 DIAGNOSIS — R01.1 HEART MURMUR: ICD-10-CM

## 2021-01-22 DIAGNOSIS — I25.5 ISCHEMIC CARDIOMYOPATHY: ICD-10-CM

## 2021-01-22 DIAGNOSIS — I50.42 CHRONIC COMBINED SYSTOLIC AND DIASTOLIC CONGESTIVE HEART FAILURE (H): ICD-10-CM

## 2021-01-22 DIAGNOSIS — R06.09 DOE (DYSPNEA ON EXERTION): Primary | ICD-10-CM

## 2021-01-22 DIAGNOSIS — I35.0 NONRHEUMATIC AORTIC VALVE STENOSIS: ICD-10-CM

## 2021-01-22 DIAGNOSIS — I10 ESSENTIAL HYPERTENSION, BENIGN: ICD-10-CM

## 2021-01-22 DIAGNOSIS — Z98.890 H/O CARDIAC CATHETERIZATION: ICD-10-CM

## 2021-01-22 DIAGNOSIS — Z98.890 STATUS POST CORONARY ANGIOGRAM: ICD-10-CM

## 2021-01-23 NOTE — PROGRESS NOTES
[facility] HEART [facility]   CARDIOLOGY PROGRESS NOTE     Chief Complaint   Patient presents with     Follow Up     follow up from angiogram          Diagnosis:  1.  RIVAS 2/2 CHF, pulm htn, mild COPD, and DVT/PE???.  2.  CAD s/p CABG x3 on 4/29/2014-LIMA to LAD, SVG to OM, and second OM.  3.  CKD-3.  4.  Chronic systolic at 30-35% on 12/9/20 and diastolic heart failure grade 1 on 11/1/2007.  5.  Ischemic cardiomyopathy.  6.  Moderate mitral and aorta valve stenosis on cardiac cath on 1/15/21.   7.  H/O DVT with h/o IVC filter.  8.  Hypomagnesia.  9.  Hypertension-controlled.  10.  Hypertriglyceridemia.  11.  H/O tobacco abuse quitting in 1978.  12.  H/O cardiac cath x3 on 7/17/2007, 4/20//14, 2019, and 1/15/21.  13.  H/O coronary stent placement on 7/17/2007 x2 stents to the LAD and in 2019 in [place] to unknown vessel.  14.  H/O sarcoidosis in 1982.  15.  DM-2 with A1c of 5.6% on 10/20/20.   16.  Mild COPD on 11/18/2020.  17.  WMA on 12/9/20.  18.  Right lung nodule.  19.  Concern for TAAA.  20.  Concern for AAA.  21.  Mild pulmonary hypertension on his cardiac cath at the [facility] on 1/15/2021.      Assessment/Plan:    1.  He endorses exertional dyspnea with intrascapular pain.  This has been his anginal equivalent previously.  He states his symptoms were comparable to his previous anginal symptoms but to a lesser degree.  In light of his history of CABG x3 with stenting on 7/17/2007 and 2019, he was set up for a Cardiolite stress test.  Stress test was completed on 12/15/2020 with a large area of severe infarction in the entire apical and anterior septal segments.  Based on these findings, a cardiac catheterization was completed at the [facility] on 1/15/2021.  He was found to have significant disease but with appropriate flow to all vessels with history of CABG.  No stenting or intervention performed.  He was found to have moderate both aortic and mitral insufficiency with elevated left  ventricular pressures.  2.  Will increase Lasix from 20 mg to 40 mg daily secondary to the elevated pressures on his cardiac catheterization as outlined above.  3.  Will increase Coreg from 3.125 mg twice a day to 6.25 mg twice a day.  Goal will be 25 mg twice a day pending blood pressure and heart rate tolerance.  4.  We will increase lisinopril from 5 mg to 10 mg daily with a goal of 40 mg daily secondary to severely reduced EF potentially related to ischemia or sarcoidosis.  5.  We will obtain a CT of chest as he reports a history of a ascending aortic aneurysm.  He is not confident he has 1.  Looking at his echo from 12/9/2020, the ascending aorta is measured at 4.0 cm.  6.  Reported history of a AAA, will have an ultrasound of his abdomen looking for AAA.  7.  Will obtain an ultrasound carotids looking for carotid artery stenosis.  8.  With a history of sarcoidosis, COPD, chronic cough, and dyspnea exertion, will obtain a chest x-ray today.  9.  With the increase of Lasix, lisinopril, and Coreg, will obtain labs in 1 to 2 weeks.  This will include CBC, BMP, and BNP.  10.  We will plan for repeat echocardiogram to assess his ejection fraction as well as ascending aortic aneurysm.  10.  Will be set up for cardiac rehab with a history of severe systolic heart failure with an EF of 30 to 35% on 12/9/2020.  11.  With concerns for sarcoidosis and reduced ejection fraction.  It is possible his reduced EF is from sarcoidosis.  He may benefit from a cardiac MRI at [facility] versus biopsy in the future.  11.  Related to declining hemoglobin and possible sarcoidosis.  It was suggested he follow-up with Dr. Arredondo.  12.  With ongoing shortness of breath with history of DVT and filter, may consider VQ scan in the future but I feel this will be adversely affected by his history of sarcoidosis.  13.  Follow-up in the future after completion of testing.      Interval history:  Duane is here in follow-up to his  cardiac catheterization completed at the [facility].  He had a cardiac catheterization on 1/15/2021.  He has significant findings.  He did have coronary disease but nothing that would require stenting.  It appears he has appropriate blood flow to all vessels.  He was found to have both moderate aortic and mitral insufficiency.  He was found to have moderately elevated left-sided heart pressures on his cath on 1/15/2021.  Based on his findings, he was suggested we increase Lasix from 20 to 40 mg.  He does not have significant peripheral edema.  Chest x-ray shows effusion to the costophrenic angles.  Also, with his history of severely reduced EF potentially ischemia related or related sarcoidosis, suggested lisinopril and Coreg be increased.  He was previously doing cardiac rehab and is wishing to get back to this.  Based on his severely reduced EF, he should qualify for cardiac rehab related to heart failure.  He states he has been coughing occasionally with shortness of breath and history of sarcoidosis, suggesting the chest x-ray.  He reports a history of an aneurysm both abdominal and ascending.  I believe he has an abdominal aortic aneurysm.  We will obtain an ultrasound of his abdomen, echocardiogram of his heart, CTA of his chest looking at this abdomen and ascending aortic aneurysm.  As part of routine screening, will obtain an ultrasound of his carotids.  If CT head continues to show sarcoidosis, it was suggested he see pulmonary.  It may be that he would benefit from a biopsy or cardiac MRI in the future of his heart.  He is concerned with declining hemoglobin.  He has not noted dark stools.  It was suggested he follow-up with his primary related to sarcoidosis and anemia.      HPI:    Mr. Barriga is being seen by cardiology to establish care.  He has a history of coronary disease with both stenting and CABG x3.  His wife passed away on 9/1/2020, suddenly from cancer.  He does not plan to go back  to [place] and is establishing care locally.     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     Sidney is here to establish care.  He has a history of CAD with stenting and bypass. He previously obtained his cardiac care through [place].  He states that dating back in 1982, he was diagnosed with sarcoidosis.  More recently, he had a cardiac catheterization on 7/17/2007 with history of MI with stenting x2 placed to the p-mLAD.  He went on to have CABG x3 on 4/29/2014 in [place].  He had LIMA to LAD, SVG to OM1 and OM 2.  At that time, his EF was reported to be 30% with ischemic cardiomyopathy.  More recently, he reports having had a cardiac catheterization once again in [place] with stenting to unknown vessels in 2019.  His symptoms in the past have been exertional dyspnea with intrascapular discomfort.  He states he has been having similar symptoms for the last 6 months but to a lesser degree.  Risk factors include history of tobacco abuse at approximately a half a pack a day until 1978, hypertension, hyperlipidemia, DM-2, family history, and diet.     He reportedly has a history of ischemic cardiomyopathy.  EF on 8/12/2010 was 40%.  Recently, while in [place] at time of bypass on 4/29/2014, his EF was 30%.  His echo on 11/1/2017 showed an EF of 40-45% with grade 1 diastolic dysfunction.  There was evidence for wall motion normalities as well as mild left atrial enlargement.  He has not had any swelling to his legs, presently on Lasix 20 mg daily.  He states he does not have issues with fluid overload.     He has been on Coumadin for extended period time.  He describes  "having had multiple recurrent DVT's with a IVC filter in place.  He is currently on Coumadin with management through the Coumadin clinic.     He has a history of hyperlipidemia.  His most recent lipid panel was on 10/20/2020 showing a total cholesterol of 248 and LDL of 164.  HDL was 46.  He has not been able to tolerate Lipitor or Crestor in the past.  He has been on Zetia.  On 12/7/2020,  he was started on Crestor 20 mg twice a week.  If he is able to tolerate this dosage, would increase as able. He states he has been on \"every statin with issues with all of them\".     Patient is known to have mild COPD with history of tobacco abuse.  PFT's on 11/18/2020 support mild COPD with severe diffusion defect.      Relevant testing:  Cardiac cath on 1/15/21:    Right sided filling pressures are normal.    Mild elevated pulmonary hypertension.    Left sided filling pressures are moderately elevated.    Left ventricular filling pressures are moderately elevated.    Normal cardiac output level.    Moderate mitral valve stenosis (mean gradient 8.4 mm Hg, 2.2 cm2)    Moderate aortic valve stenosis (mean gradient 17 mm Hg, 1.2 cm2)    Native three vessel CAD    >>> Moderate 50% proximal RCA stenosis [ungrafted vessel]    >>> Mimimal LMCA stenosis    >>> 100% Ostial LAD stenosis [LIMA-LAD widely patent]    >>> Proximal 50% LCx and 90% RI stenoses [Sequential SVG-RI-OM patent with focal 60% ISR in jump segment of SVG]     Suggest medical treatment at this time.  Neither the aortic valve nor mitral valve require intervention at this time.  The proximal RCA stenosis is unlikely be physiologically significant. The LAD territory is infarcted but the LIMA-LAD remains widely patent.  There is 70% ISR of SVG in jump segment but the OM is receiving brisk DAVID-3 flow via the native coronary (only 50% proximal stenosis).    Cath on 7/17/2007:  1. Severe, single-vessel coronary artery disease. Chronic proximal-to-mild occlusion of the left " anterior descending coronary artery with distal right-to-left collaterals.  2. Cypher drug-eluting stenting of the [id number]-[id number] segment of the left anterior descending.    Stress test on 12/15/20:     The nuclear stress test is abnormal.     There is a large area of a severe degree of infarction in the entire apical and anteroseptal segment(s) of the left ventricle.     Left ventricular function is moderately reduced.     The left ventricular ejection fraction at rest is 44%.  The left ventricular ejection fraction at stress is 35%.     There is no prior study for comparison.    ECHO on 12/9/20:  No pericardial effusion is present.  Left ventricular size is normal.  Anterior wall hypokinesis is present.  Apical wall hypokinesis is present.  Moderately (EF 30-35%) reduced left ventricular function is present.  The right ventricle is normal size.  Global right ventricular function is normal.  Mild mitral annular calcification is present.  Mild mitral insufficiency is present.  Moderate aortic valve calcification is present.  Trace aortic insufficiency is present.  The Aortic valve has significant calcification of valve leaflets with poor  mobility. Our measured values I feel underestimate the amount of stenosis. By  visual estimate would put at least moderate Aortic stenosis.  Trace tricuspid insufficiency is present.  Right ventricular systolic pressure is 6 mmHg above the right atrial pressure.  The peak aortic velocity is 2.57 m/sec.  The mean gradient across the aortic valve is15.5 mmHg.        ICD-10-CM    1. RIVAS (dyspnea on exertion)  R06.00 X-ray Chest 2 vws*     Basic metabolic panel     CBC with platelets     CARDIAC REHAB REFERRAL     CTA Chest with Contrast     Echocardiogram Complete     BNP-N terminal pro   2. COPD, mild (H) on 11/18/2020  J44.9 X-ray Chest 2 vws*   3. Chronic combined systolic and diastolic congestive heart failure (H)  I50.42 carvedilol (COREG) 6.25 MG tablet     lisinopril (ZESTRIL)  10 MG tablet     X-ray Chest 2 vws*     CBC with platelets     CARDIAC REHAB REFERRAL   4. Ischemic cardiomyopathy  I25.5 carvedilol (COREG) 6.25 MG tablet     lisinopril (ZESTRIL) 10 MG tablet     furosemide (LASIX) 40 MG tablet     CARDIAC REHAB REFERRAL   5. Nonrheumatic aortic valve stenosis  I35.0    6. Heart murmur  R01.1    7. Essential hypertension  I10 carvedilol (COREG) 6.25 MG tablet     lisinopril (ZESTRIL) 10 MG tablet     furosemide (LASIX) 40 MG tablet   8. 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, SVG to first OM and second OM  Z95.1    9. History of coronary artery stent placement on 7/17/2007 x2 stents to the LAD and in 2019 in [place] to unknown vessel  Z95.5    10. H/O cardiac catheterization x3 on 7/17/2007, 4/20/2014, and 2019  Z98.890    11. History of tobacco abuse quitting in 1978  Z87.891    12. Status post coronary angiogram x4 on 7/17/2007, 4/20/2014, 2019, and 1/15/2021  Z98.890    13. Essential hypertension, benign  I10 carvedilol (COREG) 6.25 MG tablet     lisinopril (ZESTRIL) 10 MG tablet     furosemide (LASIX) 40 MG tablet   14. Atherosclerosis of native coronary artery of native heart without angina pectoris  I25.10    15. Presence of IVC filter  Z95.828    16. Type 2 diabetes mellitus with hyperglycemia, without long-term current use of insulin (H)  E11.65    17. Mixed hyperlipidemia  E78.2    18. Hypomagnesemia  E83.42    19. Hypertriglyceridemia  E78.1    20. Nodule of right lung on CT scan from 5/12/2009  R91.1 X-ray Chest 2 vws*   21. History of tobacco abuse quitting in the 1970s  Z87.891    22. Stage 3b chronic kidney disease  N18.32 Basic metabolic panel     CBC with platelets   23. Nodule of right lung  R91.1 CTA Chest with Contrast   24. Regional wall motion abnormality of heart  R93.1    25. Hx of sarcoidosis in 1982  Z86.2 CTA Chest with Contrast   26. History of DVT (deep vein thrombosis)-multiple  Z86.718 CTA Chest with Contrast   27. Ascending aortic aneurysm (H)   I71.2 CTA Chest with Contrast   28. Bruit  R09.89 US Carotid Bilateral   29. Encounter for abdominal aortic aneurysm screening  Z13.6 US Aortic Imaging     CANCELED: US Aorta Medicare AAA Screening       Past Medical History:   Diagnosis Date     Acute thromboembolism of deep veins of lower extremity (H)     No Comments Provided     Benign essential hypertension     No Comments Provided     Coronary atherosclerosis     No Comments Provided     Osteoarthrosis     No Comments Provided     Other and unspecified hyperlipidemia     No Comments Provided     Other specified forms of chronic ischemic heart disease     No Comments Provided     Sarcoidosis     No Comments Provided     Type 2 or unspecified type diabetes mellitus     No Comments Provided       Past Surgical History:   Procedure Laterality Date     ANGIOPLASTY  01/01/2019    stents     CARDIAC SURGERY  01/01/2014    heart bypass     COLONOSCOPY  01/01/2019     CV CORONARY ANGIOGRAM N/A 1/15/2021    Procedure: CV CORONARY ANGIOGRAM;  Surgeon: Charlie Harris MD;  Location: [facility] CARDIAC CATH LAB     CV LEFT HEART CATH N/A 1/15/2021    Procedure: Left Heart Cath;  Surgeon: Chalrie Harris MD;  Location:  HEART CARDIAC CATH LAB     CV RIGHT HEART CATH MEASUREMENTS RECORDED N/A 1/15/2021    Procedure: CV RIGHT HEART CATH;  Surgeon: Charlie Harris MD;  Location:  HEART CARDIAC CATH LAB     OTHER SURGICAL HISTORY      [id number],BIOPSY LUNG OR [facility]ING       Allergies   Allergen Reactions     Simvastatin Itching     Isosorbide Visual Disturbance     LIGHT HEADED,ALMOST PASSED OUT         Current Outpatient Medications   Medication Sig Dispense Refill     albuterol (PROAIR HFA/PROVENTIL HFA/VENTOLIN HFA) 108 (90 Base) MCG/ACT inhaler Inhale 2 puffs into the lungs every 4 hours as needed for shortness of breath / dyspnea or wheezing 1 Inhaler 4     calcium carbonate 600 mg-vitamin D 400 units (CALCIUM 600 + D) 600-400 MG-UNIT per  tablet Take 1 tablet by mouth 2 times daily       carvedilol (COREG) 6.25 MG tablet Take 1 tablet (6.25 mg) by mouth 2 times daily 180 tablet 3     CINNAMON PO Take 375 mg by mouth daily       clopidogrel (PLAVIX) 75 MG tablet Take 1 tablet (75 mg) by mouth daily 90 tablet 1     Coenzyme Q10 (CO Q-10) 200 MG CAPS Take 1 capsule by mouth daily       cyanocobalamin (VITAMIN B-12) 500 MCG SUBL sublingual tablet Place 500 mcg under the tongue daily       ezetimibe (ZETIA) 10 MG tablet Take 1 tablet (10 mg) by mouth daily 90 tablet 0     furosemide (LASIX) 40 MG tablet Take 1 tablet (40 mg) by mouth daily 90 tablet 3     lisinopril (ZESTRIL) 10 MG tablet Take 1 tablet (10 mg) by mouth daily 90 tablet 3     LUTEIN PO Take 1 tablet by mouth daily       metFORMIN (GLUCOPHAGE) 500 MG tablet Take 500 mg by mouth daily (with dinner)        Omega-3 Fatty Acids (FISH OIL) 1200 MG capsule Take 1 capsule by mouth daily       rosuvastatin (CRESTOR) 20 MG tablet Take 1 tablet (20 mg) by mouth twice a week 30 tablet 3     vitamin C (ASCORBIC ACID) 500 MG tablet Take 1,000 mg by mouth daily       Vitamin D3 (CHOLECALCIFEROL) 125 MCG (5000 UT) tablet Take by mouth daily       warfarin ANTICOAGULANT (COUMADIN) 5 MG tablet Take 5 mg by mouth daily       nitroGLYcerin (NITROSTAT) 0.4 MG sublingual tablet For chest pain place 1 tablet under the tongue every 5 minutes for 3 doses. If symptoms persist 5 minutes after 1st dose call 911. (Patient not taking: Reported on 1/25/2021) 25 tablet 3       Social History     Socioeconomic History     Marital status:      Spouse name: Not on file     Number of children: Not on file     Years of education: Not on file     Highest education level: Not on file   Occupational History     Not on file   Social Needs     Financial resource strain: Not on file     Food insecurity     Worry: Not on file     Inability: Not on file     Transportation needs     Medical: Not on file     Non-medical: Not on  file   Tobacco Use     Smoking status: Former Smoker     Packs/day: 0.50     Years: 24.00     Pack years: 12.00     Start date: 1954     Quit date: 1978     Years since quittin.0     Smokeless tobacco: Never Used   Substance and Sexual Activity     Alcohol use: Yes     Alcohol/week: 5.0 standard drinks     Types: 3 Cans of beer, 2 Shots of liquor per week     Frequency: Monthly or less     Drug use: Unknown     Types: Other     Comment: Drug use: No     Sexual activity: Not on file   Lifestyle     Physical activity     Days per week: Not on file     Minutes per session: Not on file     Stress: Not on file   Relationships     Social connections     Talks on phone: Not on file     Gets together: Not on file     Attends Taoist service: Not on file     Active member of club or organization: Not on file     Attends meetings of clubs or organizations: Not on file     Relationship status: Not on file     Intimate partner violence     Fear of current or ex partner: Not on file     Emotionally abused: Not on file     Physically abused: Not on file     Forced sexual activity: Not on file   Other Topics Concern     Not on file   Social History Narrative     Not on file       LAB RESULTS:   Orders Only on 2021   Component Date Value Ref Range Status     Sodium 2021 141  133 - 144 mmol/L Final     Potassium 2021 4.7  3.4 - 5.3 mmol/L Final     Chloride 2021 107  94 - 109 mmol/L Final     Carbon Dioxide 2021 29  20 - 32 mmol/L Final     Anion Gap 2021 5  3 - 14 mmol/L Final     Glucose 2021 125* 70 - 99 mg/dL Final     Urea Nitrogen 2021 26  7 - 30 mg/dL Final     Creatinine 2021 1.40* 0.66 - 1.25 mg/dL Final     GFR Estimate 2021 44* >60 mL/min/[1.73_m2] Final     GFR Estimate If Black 2021 51* >60 mL/min/[1.73_m2] Final     Calcium 2021 9.9  8.5 - 10.1 mg/dL Final     WBC 2021 6.7  4.0 - 11.0 10e9/L Final     RBC Count 2021 3.63*  4.4 - 5.9 10e12/L Final     Hemoglobin 01/21/2021 11.3* 13.3 - 17.7 g/dL Final     Hematocrit 01/21/2021 34.9* 40.0 - 53.0 % Final     MCV 01/21/2021 96  78 - 100 fl Final     MCH 01/21/2021 31.1  26.5 - 33.0 pg Final     MCHC 01/21/2021 32.4  31.5 - 36.5 g/dL Final     RDW 01/21/2021 12.2  10.0 - 15.0 % Final     Platelet Count 01/21/2021 163  150 - 450 10e9/L Final     INR Point of Care 01/21/2021 2.9* 0.86 - 1.14 Final     Capillary Blood Collection 01/21/2021 Capillary collection performed   Final   Office Visit on 01/12/2021   Component Date Value Ref Range Status     WBC 01/12/2021 6.4  4.0 - 11.0 10e9/L Final     RBC Count 01/12/2021 3.72* 4.4 - 5.9 10e12/L Final     Hemoglobin 01/12/2021 11.7* 13.3 - 17.7 g/dL Final     Hematocrit 01/12/2021 35.3* 40.0 - 53.0 % Final     MCV 01/12/2021 95  78 - 100 fl Final     MCH 01/12/2021 31.5  26.5 - 33.0 pg Final     MCHC 01/12/2021 33.1  31.5 - 36.5 g/dL Final     RDW 01/12/2021 12.2  10.0 - 15.0 % Final     Platelet Count 01/12/2021 162  150 - 450 10e9/L Final     % Neutrophils 01/12/2021 62.1  % Final     % Lymphocytes 01/12/2021 19.7  % Final     % Monocytes 01/12/2021 13.5  % Final     % Eosinophils 01/12/2021 4.2  % Final     % Basophils 01/12/2021 0.5  % Final     Absolute Neutrophil 01/12/2021 4.0  1.6 - 8.3 10e9/L Final     Absolute Lymphocytes 01/12/2021 1.3  0.8 - 5.3 10e9/L Final     Absolute Monocytes 01/12/2021 0.9  0.0 - 1.3 10e9/L Final     Absolute Eosinophils 01/12/2021 0.3  0.0 - 0.7 10e9/L Final     Absolute Basophils 01/12/2021 0.0  0.0 - 0.2 10e9/L Final     Diff Method 01/12/2021 Automated Method   Final     Sodium 01/12/2021 142  133 - 144 mmol/L Final     Potassium 01/12/2021 4.4  3.4 - 5.3 mmol/L Final     Chloride 01/12/2021 109  94 - 109 mmol/L Final     Carbon Dioxide 01/12/2021 29  20 - 32 mmol/L Final     Anion Gap 01/12/2021 4  3 - 14 mmol/L Final     Glucose 01/12/2021 100* 70 - 99 mg/dL Final     Urea Nitrogen 01/12/2021 28  7 - 30  mg/dL Final     Creatinine 01/12/2021 1.14  0.66 - 1.25 mg/dL Final     GFR Estimate 01/12/2021 57* >60 mL/min/[1.73_m2] Final     GFR Estimate If Black 01/12/2021 66  >60 mL/min/[1.73_m2] Final     Calcium 01/12/2021 9.6  8.5 - 10.1 mg/dL Final     INR Point of Care 01/12/2021 3.8* 0.86 - 1.14 Final   Office Visit on 01/11/2021   Component Date Value Ref Range Status     COVID-19 Virus PCR to [facility] - So* 01/11/2021 Nasopharyngeal   Final     COVID-19 Virus PCR to [facility] - Re* 01/11/2021 Not Detected   Final   Orders Only on 12/01/2020   Component Date Value Ref Range Status     INR 12/01/2020 2.65* 0.86 - 1.14 Final   Orders Only on 12/01/2020   Component Date Value Ref Range Status     WBC 12/01/2020 5.5  4.0 - 11.0 10e9/L Final     RBC Count 12/01/2020 3.98* 4.4 - 5.9 10e12/L Final     Hemoglobin 12/01/2020 12.5* 13.3 - 17.7 g/dL Final     Hematocrit 12/01/2020 38.4* 40.0 - 53.0 % Final     MCV 12/01/2020 97  78 - 100 fl Final     MCH 12/01/2020 31.4  26.5 - 33.0 pg Final     MCHC 12/01/2020 32.6  31.5 - 36.5 g/dL Final     RDW 12/01/2020 12.4  10.0 - 15.0 % Final     Platelet Count 12/01/2020 141* 150 - 450 10e9/L Final     Diff Method 12/01/2020 Automated Method   Final     % Neutrophils 12/01/2020 63.5  % Final     % Lymphocytes 12/01/2020 21.9  % Final     % Monocytes 12/01/2020 12.2  % Final     % Eosinophils 12/01/2020 1.8  % Final     % Basophils 12/01/2020 0.4  % Final     % Immature Granulocytes 12/01/2020 0.2  % Final     Nucleated RBCs 12/01/2020 0  0 /100 Final     Absolute Neutrophil 12/01/2020 3.5  1.6 - 8.3 10e9/L Final     Absolute Lymphocytes 12/01/2020 1.2  0.8 - 5.3 10e9/L Final     Absolute Monocytes 12/01/2020 0.7  0.0 - 1.3 10e9/L Final     Absolute Eosinophils 12/01/2020 0.1  0.0 - 0.7 10e9/L Final     Absolute Basophils 12/01/2020 0.0  0.0 - 0.2 10e9/L Final     Abs Immature Granulocytes 12/01/2020 0.0  0 - 0.4 10e9/L Final     Absolute Nucleated RBC 12/01/2020 0.0   Final        Review  "of systems: Negative except that which was noted in the HPI.    Physical examination:  /63 (BP Location: Left arm, Cuff Size: Adult Regular)   Pulse 51   Ht 1.778 m (5' 10\")   Wt 68.9 kg (152 lb)   SpO2 97%   BMI 21.81 kg/m      GENERAL APPEARANCE: healthy, alert and no distress  HEENT: no icterus, no xanthelasmas, normal pupil size and reaction, no cyanosis.  NECK: no adenopathy, no asymmetry, masses.  CHEST: lungs clear to auscultation - no rales, rhonchi or wheezes, no use of accessory muscles, no retractions, respirations are unlabored, normal respiratory rate  CARDIOVASCULAR: regular rhythm, normal S1 with physiologic split S2, no S3 or S4 and no murmur, click or rub  ABDOMEN: soft, non tender, bowel sounds normal  EXTREMITIES: no clubbing, cyanosis or edema  NEURO: alert and oriented normal speech, and affect  VASC: No vascular bruits heard.  SKIN: no ecchymoses, no rashes        Thank you for allowing me to participate in the care of your patient. Please do not hesitate to contact me if you have any questions.     Justyn Rae, DO          "

## 2021-01-25 ENCOUNTER — ANCILLARY PROCEDURE (OUTPATIENT)
Dept: GENERAL RADIOLOGY | Facility: OTHER | Age: 86
End: 2021-01-25
Attending: INTERNAL MEDICINE
Payer: MEDICARE

## 2021-01-25 ENCOUNTER — OFFICE VISIT (OUTPATIENT)
Dept: CARDIOLOGY | Facility: OTHER | Age: 86
End: 2021-01-25
Attending: INTERNAL MEDICINE
Payer: MEDICARE

## 2021-01-25 VITALS
WEIGHT: 152 LBS | SYSTOLIC BLOOD PRESSURE: 106 MMHG | HEIGHT: 70 IN | BODY MASS INDEX: 21.76 KG/M2 | OXYGEN SATURATION: 97 % | HEART RATE: 51 BPM | DIASTOLIC BLOOD PRESSURE: 63 MMHG

## 2021-01-25 DIAGNOSIS — E11.65 TYPE 2 DIABETES MELLITUS WITH HYPERGLYCEMIA, WITHOUT LONG-TERM CURRENT USE OF INSULIN (H): ICD-10-CM

## 2021-01-25 DIAGNOSIS — Z87.891 HISTORY OF TOBACCO ABUSE: ICD-10-CM

## 2021-01-25 DIAGNOSIS — R06.09 DOE (DYSPNEA ON EXERTION): Primary | ICD-10-CM

## 2021-01-25 DIAGNOSIS — Z95.1 S/P CABG X 3: ICD-10-CM

## 2021-01-25 DIAGNOSIS — E78.1 HYPERTRIGLYCERIDEMIA: ICD-10-CM

## 2021-01-25 DIAGNOSIS — E83.42 HYPOMAGNESEMIA: ICD-10-CM

## 2021-01-25 DIAGNOSIS — J44.9 COPD, MILD (H): ICD-10-CM

## 2021-01-25 DIAGNOSIS — R01.1 HEART MURMUR: ICD-10-CM

## 2021-01-25 DIAGNOSIS — Z98.890 H/O CARDIAC CATHETERIZATION: ICD-10-CM

## 2021-01-25 DIAGNOSIS — I50.42 CHRONIC COMBINED SYSTOLIC AND DIASTOLIC CONGESTIVE HEART FAILURE (H): ICD-10-CM

## 2021-01-25 DIAGNOSIS — R91.1 NODULE OF RIGHT LUNG: ICD-10-CM

## 2021-01-25 DIAGNOSIS — R09.89 BRUIT: ICD-10-CM

## 2021-01-25 DIAGNOSIS — N18.32 STAGE 3B CHRONIC KIDNEY DISEASE (H): ICD-10-CM

## 2021-01-25 DIAGNOSIS — I27.20 PULMONARY HYPERTENSION, MILD (H): ICD-10-CM

## 2021-01-25 DIAGNOSIS — Z98.890 STATUS POST CORONARY ANGIOGRAM: ICD-10-CM

## 2021-01-25 DIAGNOSIS — R93.1 REGIONAL WALL MOTION ABNORMALITY OF HEART: ICD-10-CM

## 2021-01-25 DIAGNOSIS — E78.2 MIXED HYPERLIPIDEMIA: ICD-10-CM

## 2021-01-25 DIAGNOSIS — I25.5 ISCHEMIC CARDIOMYOPATHY: ICD-10-CM

## 2021-01-25 DIAGNOSIS — Z95.828 PRESENCE OF IVC FILTER: ICD-10-CM

## 2021-01-25 DIAGNOSIS — I35.0 NONRHEUMATIC AORTIC VALVE STENOSIS: ICD-10-CM

## 2021-01-25 DIAGNOSIS — Z13.6 ENCOUNTER FOR ABDOMINAL AORTIC ANEURYSM SCREENING: ICD-10-CM

## 2021-01-25 DIAGNOSIS — R06.09 DOE (DYSPNEA ON EXERTION): ICD-10-CM

## 2021-01-25 DIAGNOSIS — Z86.718 HISTORY OF DVT (DEEP VEIN THROMBOSIS): ICD-10-CM

## 2021-01-25 DIAGNOSIS — I10 ESSENTIAL HYPERTENSION: ICD-10-CM

## 2021-01-25 DIAGNOSIS — Z95.5 HISTORY OF CORONARY ARTERY STENT PLACEMENT: ICD-10-CM

## 2021-01-25 DIAGNOSIS — I25.10 ATHEROSCLEROSIS OF NATIVE CORONARY ARTERY OF NATIVE HEART WITHOUT ANGINA PECTORIS: ICD-10-CM

## 2021-01-25 DIAGNOSIS — I10 ESSENTIAL HYPERTENSION, BENIGN: ICD-10-CM

## 2021-01-25 DIAGNOSIS — Z86.2 HX OF SARCOIDOSIS: ICD-10-CM

## 2021-01-25 DIAGNOSIS — I71.21 ASCENDING AORTIC ANEURYSM (H): ICD-10-CM

## 2021-01-25 PROCEDURE — 71046 X-RAY EXAM CHEST 2 VIEWS: CPT | Mod: TC

## 2021-01-25 PROCEDURE — G0463 HOSPITAL OUTPT CLINIC VISIT: HCPCS

## 2021-01-25 PROCEDURE — 99215 OFFICE O/P EST HI 40 MIN: CPT | Performed by: INTERNAL MEDICINE

## 2021-01-25 PROCEDURE — G0463 HOSPITAL OUTPT CLINIC VISIT: HCPCS | Mod: 25

## 2021-01-25 RX ORDER — LISINOPRIL 10 MG/1
10 TABLET ORAL DAILY
Qty: 90 TABLET | Refills: 3 | Status: SHIPPED | OUTPATIENT
Start: 2021-01-25 | End: 2022-07-25

## 2021-01-25 RX ORDER — FUROSEMIDE 40 MG
40 TABLET ORAL DAILY
Qty: 90 TABLET | Refills: 3 | Status: SHIPPED | OUTPATIENT
Start: 2021-01-25 | End: 2021-02-15

## 2021-01-25 RX ORDER — CARVEDILOL 6.25 MG/1
6.25 TABLET ORAL 2 TIMES DAILY
Qty: 180 TABLET | Refills: 3 | Status: SHIPPED | OUTPATIENT
Start: 2021-01-25 | End: 2021-04-23

## 2021-01-25 ASSESSMENT — MIFFLIN-ST. JEOR: SCORE: 1365.72

## 2021-01-25 ASSESSMENT — PAIN SCALES - GENERAL: PAINLEVEL: NO PAIN (0)

## 2021-01-25 NOTE — PATIENT INSTRUCTIONS
Thank you for allowing Dr. Rae and our  team to participate in your care. Please call our office at [phone] with scheduling questions or if you need to cancel or change your appointment. With any other questions or concerns you may call [facility] cardiology nurse at [phone].       If you experience chest pain, chest pressure, chest tightness, shortness of breath, fainting, lightheadedness, nausea, vomiting, or other concerning symptoms, please report to the Emergency Department or call 911. These symptoms may be emergent, and best treated in the Emergency Department.    Follow up in 2-3 months.     You will be called to schedule a CTA of the chest.     You will have an ultrasound of the bilateral carotids and abdomen. The hospital will call you to schedule this.     You will have a chest xray today. We will notify you of the results.     Furosemide 40 mg daily.     Lisinopril 10 mg daily.     Carvedilol 6.25 mg twice per day.     You will have an echocardiogram performed.  This is an ultrasound of the heart, that evaluates heart function.  The hospital scheduling department will call to schedule you for this test.     You will be referred to cardiac rehab, they will contact you to schedule this.     Follow up with Dr. Arredondo regarding anemia.

## 2021-01-25 NOTE — NURSING NOTE
"Chief Complaint   Patient presents with     Follow Up     follow up from angiogram       Initial /63 (BP Location: Left arm, Cuff Size: Adult Regular)   Pulse 51   Ht 1.778 m (5' 10\")   Wt 68.9 kg (152 lb)   SpO2 97%   BMI 21.81 kg/m   Estimated body mass index is 21.81 kg/m  as calculated from the following:    Height as of this encounter: 1.778 m (5' 10\").    Weight as of this encounter: 68.9 kg (152 lb).  Medication Reconciliation: complete  Rylie You LPN  "

## 2021-01-27 DIAGNOSIS — Z98.890 H/O CARDIAC CATHETERIZATION: ICD-10-CM

## 2021-01-27 DIAGNOSIS — Z98.890 STATUS POST CORONARY ANGIOGRAM: Primary | ICD-10-CM

## 2021-02-03 ENCOUNTER — IMMUNIZATION (OUTPATIENT)
Dept: FAMILY MEDICINE | Facility: OTHER | Age: 86
End: 2021-02-03
Attending: FAMILY MEDICINE
Payer: MEDICARE

## 2021-02-03 PROCEDURE — 91300 PR COVID VAC PFIZER DIL RECON 30 MCG/0.3 ML IM: CPT

## 2021-02-04 ENCOUNTER — ANTICOAGULATION THERAPY VISIT (OUTPATIENT)
Dept: ANTICOAGULATION | Facility: OTHER | Age: 86
End: 2021-02-04
Attending: FAMILY MEDICINE
Payer: MEDICARE

## 2021-02-04 ENCOUNTER — HOSPITAL ENCOUNTER (OUTPATIENT)
Dept: CARDIAC REHAB | Facility: HOSPITAL | Age: 86
Setting detail: THERAPIES SERIES
End: 2021-02-04
Attending: INTERNAL MEDICINE
Payer: MEDICARE

## 2021-02-04 ENCOUNTER — TRANSFERRED RECORDS (OUTPATIENT)
Dept: HEALTH INFORMATION MANAGEMENT | Facility: CLINIC | Age: 86
End: 2021-02-04

## 2021-02-04 VITALS — WEIGHT: 158 LBS | BODY MASS INDEX: 22.62 KG/M2 | HEIGHT: 70 IN

## 2021-02-04 DIAGNOSIS — Z98.890 STATUS POST CORONARY ANGIOGRAM: ICD-10-CM

## 2021-02-04 DIAGNOSIS — Z98.890 H/O CARDIAC CATHETERIZATION: ICD-10-CM

## 2021-02-04 DIAGNOSIS — I82.5Y3 CHRONIC DEEP VEIN THROMBOSIS (DVT) OF PROXIMAL VEIN OF BOTH LOWER EXTREMITIES (H): ICD-10-CM

## 2021-02-04 LAB
CAPILLARY BLOOD COLLECTION: NORMAL
GLUCOSE BLDC GLUCOMTR-MCNC: 113 MG/DL (ref 70–99)
GLUCOSE BLDC GLUCOMTR-MCNC: 90 MG/DL (ref 70–99)
GLUCOSE BLDC GLUCOMTR-MCNC: 96 MG/DL (ref 70–99)
INR BLD: 3 (ref 0.86–1.14)

## 2021-02-04 PROCEDURE — 93798 PHYS/QHP OP CAR RHAB W/ECG: CPT

## 2021-02-04 PROCEDURE — 999N000109 HC STATISTIC OP CR VISIT

## 2021-02-04 PROCEDURE — 85610 PROTHROMBIN TIME: CPT | Mod: ZL | Performed by: PHYSICIAN ASSISTANT

## 2021-02-04 PROCEDURE — 999N001017 HC STATISTIC GLUCOSE BY METER IP

## 2021-02-04 PROCEDURE — 999N000108 HC STATISTIC OP CARDIAC VISIT #2

## 2021-02-04 PROCEDURE — 36416 COLLJ CAPILLARY BLOOD SPEC: CPT | Mod: ZL | Performed by: PHYSICIAN ASSISTANT

## 2021-02-04 ASSESSMENT — 6 MINUTE WALK TEST (6MWT)
TOTAL DISTANCE WALKED (FT): 940
FEMALE CALC: 1210.5
MALE CALC: 1537.62
PREDICTED: 1546.99
GENDER SELECTION: MALE

## 2021-02-04 ASSESSMENT — MIFFLIN-ST. JEOR: SCORE: 1392.93

## 2021-02-04 NOTE — PROGRESS NOTES
02/04/21 1000   Session   Session Initial Evaluation and Exercise Prescription   Certified through this date 03/05/21   Cardiac Rehab Assessment   Cardiac Rehab Assessment 2/4: Pt arrives to Phase II rehab following recent angioplasty on January 15th, 2021 for symptoms of increased SOB and pain in between the shoulder blades. Pt states they have been experiencing SOB since 1982 after diagnosis of sarcoidosis and mild-COPD. Additionally, pt states their back pain has always been associated as an angina symptom but recent XRAY shows arthritis formation. Cardiac rehab staff discussed the importance of notifying therapists of pain to assess angina-like symptoms. Staff will continue to monitor and assess changes during rehab sessions and encourage pt to stop exercise, reduce workloads or lower their mask (when appropriate) during exercise. Pt has an extensive cardiac history and which has ranged from MI, PCI, CABG, and heart failure.     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 Previous jobs had pt running up and down flights of stairs throughout their day and since intermediate pt has slowly decreased their activity. It is the goal for pt to increase stamina and strength while in rehab 2 d/week.    The patient's history and clinical status including hemodynamics and ECG were evaluated. The patient was assessed to be stable and appropriate to begin exercise.   The patient's functional capacity and exercise prescription were determined by the completion of the 6 minute walk test. See results above. The patient was oriented to the program.  Risk factor profile was completed. Goals and objectives were discussed. CV response was WNL. No symptoms, complaints or pain were reported. Good prognosis for reaching goals below. Skilled therapy is necessary in order to monitor CV response to exercise, to provide education  on risk factors and behavior change counseling needed to achieve patient's goals.  Plan to progress to 30-40 minutes of exercise prior to discharge from cardiac rehab.  Initial THR of 20-30 beats above RHR; Effort rating of 4-6. Initiate muscle conditioning as appropriate. Provide risk factor education and behavior change counseling.    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   Lead up symptoms 2/4: Pt noticed an increase of SOB, associated with intrascapular pain with exertion. Pt states the SOB has been ongoing with sarcoidosis diagnosis in 1982, pt unsure if symptoms is associated with pulmonary disease.   Hospital Location [facility]   Hospital Discharge Date 01/15/21   Signs and Symptoms Post Hospital Discharge SOB  (Continuation from prior event)   Outpatient Cardiac Rehab Start Date 02/04/21   Primary Physician Dr. Arredondo   Primary Physician Follow Up Advised to schedule appointment   Specialist Dr. Charlie Harris   Specialist Follow Up    Cardiologist Dr. Rae   Cardiologist Follow Up Completed   Ejection Fraction 34-44%   Risk Stratification Low   Living and Work Status    Living Arrangements and Social Status house;other relative  (Daughter)   Support System Live with an adult;Check-in help as needed   Return to Employment Retired   Occupation  worker in the mine; Owner of [employer]   Preventative Medications   CMS recommended medications Anticoagulants;Beta Blocker;Lipid Lowering;Ace inhibitors   Fall Risk Screen   Fall screen completed by Cardiac Rehab   Have you fallen 2 or more times in the past year? No   Have you fallen and had an  "injury in the past year? No   Timed Up and Go score (seconds) NA   Is patient a fall risk? No   Fall screen comments 2/4: Pt is not concerned about falling at this time and will not need a referral to PT.    Abuse Screen (yes response referral indicated)   Feels Unsafe at Home or Work/School no   Feels Threatened by Someone no   Does Anyone Try to Keep You From Having Contact with Others or Doing Things Outside Your Home? no   Physical Signs of Abuse Present no   Pain   Patient Currently in Pain No   Physical Assessments   Incisions WNL   Edema None   Right Lung Sounds not assessed   Left Lung Sounds not assessed   Limitations Arthritis   Comments 2/4: Pt has known arthritis between the scapula as seen on XRAY.   Individualized Treatment Plan   Monitored Sessions Scheduled 24   Monitored Sessions Attended 1   Oxygen   Supplemental Oxygen needed No   Nutrition Management - Weight Management   Assessment Initial Assessment   Age 88   Weight 71.7 kg (158 lb)   Height 1.778 m (5' 10\")   BMI (Calculated) 22.67   Initial Rate Your Plate Score. Dietary tool to assess eating patterns. Scores range from 24 to 72. The higher the score the healthier the eating pattern. 49   Weight Management Comments 2/4: Pt is content with current weight and is not concerned with losing or gaining weight.    Nutrition Management - Lipids   Lipids Labs Available   Date 10/20/20   Total Cholesterol 240   Triglycerides 149   HDL 46      Prescribed Lipid Medication Yes;Intolerant   Statin Intensity High Intensity   Lipid Comments 2/4: Pt is intolerant to statin medications and has been prescribed Zetia. Currently, cardiology has added rosuvastatin to 2x weekly. Cardiology will continue to monitor their symptoms and adjust as needed.    Nutrition Management - Diabetes   Diabetes Type II   Do you Monitor BS at Home? Yes   Diabetes Medication Prescribed Yes, Oral Medication   Diabetes Comments 2/4: Staff will continue to monitor blood sugar " "values while they are in attnedance at [facility], as requested by pt.    Nutrition Management Summary   Dietary Recommendations Low Fat;Low Cholesterol;Low Sodium   Stages of Change for Diet Compliance Contemplation   Interventions Planned Instruct on Label Reading;Educate about Signs/Symptoms and Treatment of Hypo/Hyperglycemia   Nutrition Summary Comments 2/4: Pt is content with current diet but is aware that they are not living a complete \"heart healthy\" lifestyle. Staff will provide information during wekely discussions.   Nutrition Target Outcome BMI < 25   Psychosocial Management   Psychosocial Assessment Initial   Is there history of clinical depression or increased risk of depression? No previous history   Current Level of Stress per Patient Report Denies   Current Coping Skills Has Positive Support System   Initial Patient Health Questionnaire -9 Score (PHQ-9) for depression. 5-9 Minimal symptoms, 10-14 Minor depression, 15-19 Major depression, moderately severe, > 20 Major depression, severe  4   Initial [facility] Survey score.  Quality of Life:   If total score > 25 review individual areas where patient rated a 4 or 5.  Consider patients current medical condition and what role that plays on the score.   Adjust treatment protocol to improve areas of concern.  Consider the following:  PHQ9 score, DASI, and re-assessment within the next 30 days to assist with developing treatments.  23   Stages of Change Action   Interventions Planned Patient denies need for intervention at this time.   Patient Goal No   Psychosocial Comments 2/4: Pt does not have a desire to set a goal pertaining to the psychosocial goals Staff will continue to monitor and assess.    Psychosocial Target Outcome Identify absence or presence of depression using valid screening tool   Other Core Components - Hypertension   History of or Diagnosis of Hypertension Yes   Currently taking Anti-Hypertensives Yes;Beta blocker;Ace Inhibitor "   Hypertension Comments 2/4: Pt adheres to medications as prescribed but staff will continue to monitor as their blood pressure is below 100 systolically at rest.    Other Core Components - Tobacco   History of Tobacco Use Yes   Quit Date or Planned Quit Date 01/01/78   Tobacco Use Status Former (Quit > 6 mo ago)   Tobacco Habit Cigarettes   Tobacco Use per Day (average) .5   Years of Tobacco Use 24   Stages of Change Maintenance   Tobacco Comments 2/4: Pt denies the urgentcy to smoke and limits their time from second hand smoke as much as possible.    Other Core Components Summary   Interventions Planned None   Patient Goals No   Other Core Components Comments 2/4: Pt denies the need to set goals at this time.    Other Core Components Target Outcome BP < 140/90 or < 130/80 with DM or CKD   Activity/Exercise History   Activity/Exercise Assessment Initial   Activity/Exercise Status prior to event? Sedentary   Number of Days Currently participating in Moderate Physical Activity? 0   Number of Days Currently performing  Aerobic Exercise (including rehab)? 0   Number of Minutes per Session Currently of Aerobic Exercise (average)? 0   Current Stage of Change (Physical Activity) Preparation   Current Stage of Change (Aerobic Exercise) Preparation   Activity/Exercise Comments 2/4: Due to the colder weather and snow, pt has not been as active outdoors for fear of falling. Pt is eager to start exercising in the rehab setting to build up endurance.    Activity/Exercise Target Outcome An Accumulation of 150  Minutes of Aerobic Activity per Week   Exercise Assessment   6 Minute Walk Predicted - Gender Selection Male   6 Minute Walk Predicted (Male) 1537.62   6 Minute Walk Predicted (Female) 1210.5   Initial 6 Minute Walk Distance (Feet) 940 ft   Resting HR 73 bpm   Exercise HR 93 bpm   Post Exercise HR 68 bpm   Resting BP 98/60   Exercise /60   Post Exercise BP 92/62   Pre SpO2 98   While Exercising SpO2 100   Post SpO2  99   Pre BG 90 mg/dL   Post  mg/dL   Effort Rating 3   Current MET Level 2.4   MET Level Goal 3-4   ECG Rhythm Sinus arrhythmia   Ectopy PACs;PVCs   Current Symptoms Denies symptoms   Limitations/Restrictions Other (see comments)  (Arthritic)   Exercise Prescription   Mode Nustep;Recumbant bike;Arm Ergometer;Weights;Calisthenics;Ambulation;Upright bike   Duration/Time Intermittent bouts;15-30 min   Frequency 2 days/week   THR (85% of age predicted max HR) 112.2   OMNI Effort Rating (0-10 Scale) 4-6/10   Progression Intermittent bouts;Total exercise time of 20-30 minutes   Comments 2/4: Pt will be attending Phase II rehab 2x weekly and focusing on increasing stamina/strength.    Recommended Home Exercise   Type of Exercise Walking;Weights   Frequency (days per week) 2   Duration (minutes per session) Intermittent;15-30 min   Effort Rating Recommended 4-6/10   30 Day Exercise Plan 2/4: Pt will be encouraged to start hand weight exercises and/or ambulating within the household.   Current Home Exercise   Type of Exercise None   Frequency (days per week) 0   Duration (minutes per session) 0   Follow-up/On-going Support   Provider follow-up needed on the following No follow-up needed   Comments 2/4: Staff will contact MD if blood pressure and back pain continues or is effecting pt's ability to exercise.    Learning Assessment   Learner Patient   Primary Language English   Preferred Learning Style Listening;Reading;Demonstration;Pictures/Video   Barriers to Learning No barriers noted   Patient Education   Title of Program Cardiac Rehab Education   Education recommended Anatomy and Physiology of the Heart;Blood Pressure;Diabetes;Exercise Principles;Heart Failure;Medication Overview;Muscle Conditioning;Nutrition;Risk Factors;Stress Management   Education Comments 2/4: Currently, staff has been providing pt with educational material based on personal risk factors to review. Staff offers pt the ability to discuss  questions or concerns pertaining to the educational topics.    Physician cosignature/electronic signature indicates approval of this ITP document. I have established, reviewed and made necessary changes to the individualized treatment plan and exercise prescription for this patient.

## 2021-02-04 NOTE — PROGRESS NOTES
ANTICOAGULATION FOLLOW-UP CLINIC VISIT    Patient Name:  Sidney Barriga  Date:  2/4/2021  Contact Type:  Telephone    SUBJECTIVE:  Patient Findings     Comments:  INR done by lab. Call placed to patient and spoke with him re: INR results/Warfarin dosing/INR recheck date. He stated that his bruising from his last procedure (Angiogram) has since healed. He denied any bruising/bleeding or any other new changes to his diet/meds/activity. He was encouraged to eat a little more greens- he reported he does have salads 2-3 times per week, but unsure which type of lettuce. We discussed the difference that Iceberg vs Lauri lettuce has on an INR. He verbalized understanding of all instructions. He is to call Warfarin clinic with any questions/issues/illness.         Clinical Outcomes     Negatives:  Major bleeding event, Thromboembolic event, Anticoagulation-related hospital admission, Anticoagulation-related ED visit, Anticoagulation-related fatality    Comments:  INR done by lab. Call placed to patient and spoke with him re: INR results/Warfarin dosing/INR recheck date. He stated that his bruising from his last procedure (Angiogram) has since healed. He denied any bruising/bleeding or any other new changes to his diet/meds/activity. He was encouraged to eat a little more greens- he reported he does have salads 2-3 times per week, but unsure which type of lettuce. We discussed the difference that Iceberg vs Lauri lettuce has on an INR. He verbalized understanding of all instructions. He is to call Warfarin clinic with any questions/issues/illness.            OBJECTIVE    Recent labs: (last 7 days)     02/04/21  0927   INR 3.0*       ASSESSMENT / PLAN  INR assessment THER    Recheck INR In: 4 WEEKS    INR Location Clinic      Anticoagulation Summary  As of 2/4/2021    INR goal:  2.0-3.0   TTR:  68.7 % (3.4 mo)   INR used for dosing:  3.0 (2/4/2021)   Warfarin maintenance plan:  5 mg (5 mg x 1) every day   Full warfarin  instructions:  5 mg every day   Weekly warfarin total:  35 mg   No change documented:  Kathryn Cardenas RN   Plan last modified:  Lashae Barksdale RN (10/15/2020)   Next INR check:  3/4/2021   Priority:  High   Target end date:  10/15/2024         Anticoagulation Episode Summary     INR check location:      Preferred lab:      Send INR reminders to:  JOSR SORIA    Comments:        Anticoagulation Care Providers     Provider Role Specialty Phone number    Latricia Macedo, PA Referring Family Medicine [phone]            See the Encounter Report to view Anticoagulation Flowsheet and Dosing Calendar (Go to Encounters tab in chart review, and find the Anticoagulation Therapy Visit)        Kathryn Cardenas, RN

## 2021-02-09 DIAGNOSIS — E78.2 MIXED HYPERLIPIDEMIA: ICD-10-CM

## 2021-02-09 RX ORDER — EZETIMIBE 10 MG/1
10 TABLET ORAL DAILY
Qty: 90 TABLET | Refills: 3 | Status: SHIPPED | OUTPATIENT
Start: 2021-02-09 | End: 2022-02-07

## 2021-02-10 ENCOUNTER — HOSPITAL ENCOUNTER (OUTPATIENT)
Dept: CARDIAC REHAB | Facility: HOSPITAL | Age: 86
Setting detail: THERAPIES SERIES
End: 2021-02-10
Attending: INTERNAL MEDICINE
Payer: MEDICARE

## 2021-02-10 LAB
GLUCOSE BLDC GLUCOMTR-MCNC: 108 MG/DL (ref 70–99)
GLUCOSE BLDC GLUCOMTR-MCNC: 110 MG/DL (ref 70–99)

## 2021-02-10 PROCEDURE — 93798 PHYS/QHP OP CAR RHAB W/ECG: CPT

## 2021-02-10 PROCEDURE — 999N000109 HC STATISTIC OP CR VISIT

## 2021-02-10 PROCEDURE — 999N001017 HC STATISTIC GLUCOSE BY METER IP

## 2021-02-11 ENCOUNTER — HOSPITAL ENCOUNTER (OUTPATIENT)
Dept: ULTRASOUND IMAGING | Facility: HOSPITAL | Age: 86
End: 2021-02-11
Attending: INTERNAL MEDICINE
Payer: MEDICARE

## 2021-02-11 ENCOUNTER — HOSPITAL ENCOUNTER (OUTPATIENT)
Dept: CT IMAGING | Facility: HOSPITAL | Age: 86
End: 2021-02-11
Attending: INTERNAL MEDICINE
Payer: MEDICARE

## 2021-02-11 ENCOUNTER — HOSPITAL ENCOUNTER (OUTPATIENT)
Dept: CARDIOLOGY | Facility: HOSPITAL | Age: 86
End: 2021-02-11
Attending: INTERNAL MEDICINE
Payer: MEDICARE

## 2021-02-11 DIAGNOSIS — R09.89 BRUIT: ICD-10-CM

## 2021-02-11 DIAGNOSIS — J44.9 COPD, MILD (H): ICD-10-CM

## 2021-02-11 DIAGNOSIS — Z86.2 HX OF SARCOIDOSIS: ICD-10-CM

## 2021-02-11 DIAGNOSIS — I71.21 ASCENDING AORTIC ANEURYSM (H): ICD-10-CM

## 2021-02-11 DIAGNOSIS — R06.09 DOE (DYSPNEA ON EXERTION): ICD-10-CM

## 2021-02-11 DIAGNOSIS — R91.8 PULMONARY NODULES: Primary | ICD-10-CM

## 2021-02-11 DIAGNOSIS — Z13.6 ENCOUNTER FOR ABDOMINAL AORTIC ANEURYSM SCREENING: ICD-10-CM

## 2021-02-11 DIAGNOSIS — R91.1 NODULE OF RIGHT LUNG: ICD-10-CM

## 2021-02-11 DIAGNOSIS — Z86.718 HISTORY OF DVT (DEEP VEIN THROMBOSIS): ICD-10-CM

## 2021-02-11 PROCEDURE — 93880 EXTRACRANIAL BILAT STUDY: CPT

## 2021-02-11 PROCEDURE — 93306 TTE W/DOPPLER COMPLETE: CPT | Mod: 26 | Performed by: INTERNAL MEDICINE

## 2021-02-11 PROCEDURE — 71275 CT ANGIOGRAPHY CHEST: CPT | Mod: ME

## 2021-02-11 PROCEDURE — 250N000011 HC RX IP 250 OP 636: Performed by: RADIOLOGY

## 2021-02-11 PROCEDURE — 93306 TTE W/DOPPLER COMPLETE: CPT

## 2021-02-11 PROCEDURE — 76775 US EXAM ABDO BACK WALL LIM: CPT

## 2021-02-11 RX ORDER — IOPAMIDOL 755 MG/ML
100 INJECTION, SOLUTION INTRAVASCULAR ONCE
Status: COMPLETED | OUTPATIENT
Start: 2021-02-11 | End: 2021-02-11

## 2021-02-11 RX ADMIN — IOPAMIDOL 100 ML: 755 INJECTION, SOLUTION INTRAVENOUS at 08:43

## 2021-02-12 ENCOUNTER — HOSPITAL ENCOUNTER (OUTPATIENT)
Dept: CARDIAC REHAB | Facility: HOSPITAL | Age: 86
Setting detail: THERAPIES SERIES
End: 2021-02-12
Attending: INTERNAL MEDICINE
Payer: MEDICARE

## 2021-02-12 LAB
GLUCOSE BLDC GLUCOMTR-MCNC: 108 MG/DL (ref 70–99)
GLUCOSE BLDC GLUCOMTR-MCNC: 116 MG/DL (ref 70–99)

## 2021-02-12 PROCEDURE — 93798 PHYS/QHP OP CAR RHAB W/ECG: CPT

## 2021-02-12 PROCEDURE — 999N001017 HC STATISTIC GLUCOSE BY METER IP

## 2021-02-12 PROCEDURE — 999N000109 HC STATISTIC OP CR VISIT

## 2021-02-15 DIAGNOSIS — R93.1 REGIONAL WALL MOTION ABNORMALITY OF HEART: ICD-10-CM

## 2021-02-15 DIAGNOSIS — I25.5 ISCHEMIC CARDIOMYOPATHY: ICD-10-CM

## 2021-02-15 DIAGNOSIS — R06.09 DOE (DYSPNEA ON EXERTION): Primary | ICD-10-CM

## 2021-02-15 DIAGNOSIS — I50.42 CHRONIC COMBINED SYSTOLIC AND DIASTOLIC CONGESTIVE HEART FAILURE (H): ICD-10-CM

## 2021-02-15 DIAGNOSIS — I10 ESSENTIAL HYPERTENSION, BENIGN: ICD-10-CM

## 2021-02-15 DIAGNOSIS — R01.1 HEART MURMUR: ICD-10-CM

## 2021-02-15 DIAGNOSIS — I27.20 MILD PULMONARY HYPERTENSION (H): ICD-10-CM

## 2021-02-15 DIAGNOSIS — I35.0 NONRHEUMATIC AORTIC VALVE STENOSIS: ICD-10-CM

## 2021-02-15 DIAGNOSIS — Z86.2 HX OF SARCOIDOSIS: ICD-10-CM

## 2021-02-15 DIAGNOSIS — I71.21 ASCENDING AORTIC ANEURYSM (H): ICD-10-CM

## 2021-02-15 DIAGNOSIS — I10 ESSENTIAL HYPERTENSION: ICD-10-CM

## 2021-02-15 DIAGNOSIS — I35.0 MODERATE AORTIC STENOSIS: ICD-10-CM

## 2021-02-15 DIAGNOSIS — N18.32 STAGE 3B CHRONIC KIDNEY DISEASE (H): ICD-10-CM

## 2021-02-15 RX ORDER — FUROSEMIDE 20 MG
20 TABLET ORAL DAILY
Qty: 90 TABLET | Refills: 3 | Status: SHIPPED | OUTPATIENT
Start: 2021-02-15 | End: 2022-03-04

## 2021-02-16 ENCOUNTER — TELEPHONE (OUTPATIENT)
Dept: CARDIOLOGY | Facility: OTHER | Age: 86
End: 2021-02-16

## 2021-02-16 NOTE — TELEPHONE ENCOUNTER
Patient was called and given instruction on taking his Lasix per Dr. Rae's note on 2/15/2021 ( note of decrease was with fax from Cardiac Rehab sent to us on 2/15/2021) writer sent to scanning. Patient to decrease lasix to 20 mg daily upon reaching out to patient today he has already taken his 40 mg for the day, patient advised to start the new order tmrw. Patient was asked to repeat on what orders I advised him and he was able to repeat what he should take in lasix daily. Patient also was read letter that is in mail of echocardiogram results and what to be watching daily on  fluids and salt and wt's daily. Patient did mention that lately as well his Blood Sugars and Blood pressure have been low in rehab. ( Dr. Rae aware due to fax sent by CR ) Patient on his own has decreased his Metformin to 500 mg every other day blood sugers he states are still low. Patient advised he should see primary on this. patient agreed and will be making arrangements to schedule appointment. Writer did speak with CR in regards to lasix change.    negative

## 2021-02-17 ENCOUNTER — HOSPITAL ENCOUNTER (OUTPATIENT)
Dept: CARDIAC REHAB | Facility: HOSPITAL | Age: 86
Setting detail: THERAPIES SERIES
End: 2021-02-17
Attending: INTERNAL MEDICINE
Payer: MEDICARE

## 2021-02-17 LAB
GLUCOSE BLDC GLUCOMTR-MCNC: 113 MG/DL (ref 70–99)
GLUCOSE BLDC GLUCOMTR-MCNC: 129 MG/DL (ref 70–99)

## 2021-02-17 PROCEDURE — 93798 PHYS/QHP OP CAR RHAB W/ECG: CPT

## 2021-02-17 PROCEDURE — 999N001017 HC STATISTIC GLUCOSE BY METER IP

## 2021-02-17 PROCEDURE — 999N000109 HC STATISTIC OP CR VISIT

## 2021-02-19 ENCOUNTER — TELEPHONE (OUTPATIENT)
Dept: CARDIOLOGY | Facility: OTHER | Age: 86
End: 2021-02-19

## 2021-02-19 ENCOUNTER — HOSPITAL ENCOUNTER (OUTPATIENT)
Dept: CARDIAC REHAB | Facility: HOSPITAL | Age: 86
Setting detail: THERAPIES SERIES
End: 2021-02-19
Attending: INTERNAL MEDICINE
Payer: MEDICARE

## 2021-02-19 DIAGNOSIS — Z98.890 STATUS POST CORONARY ANGIOGRAM: Primary | ICD-10-CM

## 2021-02-19 DIAGNOSIS — I49.9 IRREGULAR HEARTBEAT: ICD-10-CM

## 2021-02-19 DIAGNOSIS — I49.8 VENTRICULAR BIGEMINY: Primary | ICD-10-CM

## 2021-02-19 DIAGNOSIS — Z98.890 H/O CARDIAC CATHETERIZATION: ICD-10-CM

## 2021-02-19 LAB
GLUCOSE BLDC GLUCOMTR-MCNC: 132 MG/DL (ref 70–99)
GLUCOSE BLDC GLUCOMTR-MCNC: 145 MG/DL (ref 70–99)

## 2021-02-19 PROCEDURE — 999N000109 HC STATISTIC OP CR VISIT

## 2021-02-19 PROCEDURE — 999N001017 HC STATISTIC GLUCOSE BY METER IP

## 2021-02-19 PROCEDURE — 93798 PHYS/QHP OP CAR RHAB W/ECG: CPT

## 2021-02-19 NOTE — TELEPHONE ENCOUNTER
Dr. Rae I believe we were going to order patient a zio patch due to having some prominent bigeminy in cardiac rehab. I sent rehab note to scanning and kept a copy for myself if you need reminder. And we also change his lasix order as well to decrease to 20 mg. Patient is aware of med change. I have not called on zio patch as still needs ordering.   Orin Brooks, JUSTINAN

## 2021-02-21 PROBLEM — I49.9 IRREGULAR HEARTBEAT: Status: ACTIVE | Noted: 2021-02-21

## 2021-02-21 PROBLEM — I49.8 VENTRICULAR BIGEMINY: Status: ACTIVE | Noted: 2021-02-21

## 2021-02-22 ENCOUNTER — HOSPITAL ENCOUNTER (OUTPATIENT)
Dept: CARDIAC REHAB | Facility: HOSPITAL | Age: 86
Setting detail: THERAPIES SERIES
End: 2021-02-22
Attending: INTERNAL MEDICINE
Payer: MEDICARE

## 2021-02-22 ENCOUNTER — TELEPHONE (OUTPATIENT)
Dept: CARDIOLOGY | Facility: OTHER | Age: 86
End: 2021-02-22

## 2021-02-22 LAB
GLUCOSE BLDC GLUCOMTR-MCNC: 103 MG/DL (ref 70–99)
GLUCOSE BLDC GLUCOMTR-MCNC: 120 MG/DL (ref 70–99)

## 2021-02-22 PROCEDURE — 93798 PHYS/QHP OP CAR RHAB W/ECG: CPT

## 2021-02-22 PROCEDURE — 999N001017 HC STATISTIC GLUCOSE BY METER IP

## 2021-02-22 PROCEDURE — 999N000109 HC STATISTIC OP CR VISIT

## 2021-02-22 NOTE — TELEPHONE ENCOUNTER
Called patient today to let him know that we are going to place zio due to report from rehab, patient still has not received letter for results and verbally gave permission to send via e-mail to daughter,  [email or web address]. Writer let patient know we do need consent to communication, after this patient agreed to plan and will obtain paperwork at rehab.

## 2021-02-24 ENCOUNTER — IMMUNIZATION (OUTPATIENT)
Dept: FAMILY MEDICINE | Facility: OTHER | Age: 86
End: 2021-02-24
Attending: FAMILY MEDICINE
Payer: MEDICARE

## 2021-02-24 PROCEDURE — 91300 PR COVID VAC PFIZER DIL RECON 30 MCG/0.3 ML IM: CPT | Performed by: COUNSELOR

## 2021-02-26 ENCOUNTER — HOSPITAL ENCOUNTER (OUTPATIENT)
Dept: CARDIAC REHAB | Facility: HOSPITAL | Age: 86
Setting detail: THERAPIES SERIES
End: 2021-02-26
Attending: INTERNAL MEDICINE
Payer: MEDICARE

## 2021-02-26 LAB
GLUCOSE BLDC GLUCOMTR-MCNC: 131 MG/DL (ref 70–99)
GLUCOSE BLDC GLUCOMTR-MCNC: 158 MG/DL (ref 70–99)

## 2021-02-26 PROCEDURE — 999N001017 HC STATISTIC GLUCOSE BY METER IP

## 2021-02-26 PROCEDURE — 999N000109 HC STATISTIC OP CR VISIT

## 2021-02-26 PROCEDURE — 93798 PHYS/QHP OP CAR RHAB W/ECG: CPT

## 2021-02-26 NOTE — PROGRESS NOTES
Assessment & Plan     Type 2 diabetes mellitus with hyperglycemia, without long-term current use of insulin (H)  Doing great.  Using metformin every 2 days.  We will check a1c.  We can be less strict with his sugars.    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      Essential hypertension  Stable.  Doing great.      Family history of prostate cancer  Reviewed.  He understands very well my discussion on advanced age and prostate cancer.  2 brothers with prostate cancer one fatal.  He would like to know it.  History of a 12 last check in [place].                     No follow-ups on file.    Zach Arredondo MD  [facility]   Sidney is a 88 year old who presents for the following health issues     HPI       Medication Followup of metformin    Taking Medication as prescribed: NO-taking every other day due to low blood sugars    Side Effects:  None    Medication Helping Symptoms:  yes     Pt is requesting diabetic shoes and inserts.  Approval given for 3 pairs of diabetic shoes and 3 inserts.        Review of Systems   Constitutional, HEENT, cardiovascular, pulmonary, gi and gu systems are negative, except as otherwise noted.      Objective    BP (!) 86/58   Pulse 53   Wt 73 kg (161 lb)   SpO2 99%   BMI 23.10 kg/m    Body mass index is 23.1 kg/m .  Physical Exam   GENERAL: healthy, alert and no distress  NECK: no adenopathy, no asymmetry, masses, or scars and thyroid normal to palpation  RESP: lungs clear to auscultation - no rales, rhonchi or wheezes  CV: regular rate and rhythm, normal S1 S2, no S3 or S4, 3/6 systolic murmur, click or rub, no peripheral edema and peripheral pulses strong  ABDOMEN: soft, nontender, no hepatosplenomegaly, no masses and bowel sounds normal  MS: no gross musculoskeletal defects noted, no edema    Full labs stable.  We are stopping the metformin with such a low  a1c and his advanced age.  He is going to think on the PSA but he understands at his age options are limited and he will think about it.

## 2021-03-01 ENCOUNTER — HOSPITAL ENCOUNTER (OUTPATIENT)
Dept: CARDIOLOGY | Facility: HOSPITAL | Age: 86
Discharge: HOME | End: 2021-03-01
Attending: INTERNAL MEDICINE | Admitting: INTERNAL MEDICINE
Payer: MEDICARE

## 2021-03-01 DIAGNOSIS — I49.8 VENTRICULAR BIGEMINY: ICD-10-CM

## 2021-03-01 DIAGNOSIS — I49.9 IRREGULAR HEARTBEAT: ICD-10-CM

## 2021-03-01 PROCEDURE — 93248 EXT ECG>7D<15D REV&INTERPJ: CPT | Performed by: INTERNAL MEDICINE

## 2021-03-01 PROCEDURE — 93246 EXT ECG>7D<15D RECORDING: CPT

## 2021-03-02 VITALS — WEIGHT: 158 LBS | HEIGHT: 70 IN | BODY MASS INDEX: 22.62 KG/M2

## 2021-03-02 ASSESSMENT — 6 MINUTE WALK TEST (6MWT)
MALE CALC: 1537.62
GENDER SELECTION: MALE
PREDICTED: 1546.99
FEMALE CALC: 1210.5
TOTAL DISTANCE WALKED (FT): 940

## 2021-03-02 ASSESSMENT — MIFFLIN-ST. JEOR: SCORE: 1392.93

## 2021-03-02 NOTE — PROGRESS NOTES
03/02/21 0700   Session   Session 30 Day Individualized Treatment Plan   Certified through this date 03/31/21   Cardiac Rehab Assessment   Cardiac Rehab Assessment 3/2: Pt completes 7 sessions of phase II rehab and has been provided education regarding diabetes/HF, nutrition, and stress management. Pt is progressing appropriately while reaching an overall MET level of 3.6. 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 Blood pressure remains low and experiences dizziness sometimes while standing. Cardiology has been notified and staff will continue to monitor and assess. Pt's EKG has been showing frequent bigeminy to which a Zio patch monitor was ordered and has been placed on pt. Staff will continue to check pt's blood sugar levels pre and post exercise with Cardiac Rehab's glucometer due to the discrepancy between the pt's 2 monitors and staffs. Primary has been notified and pt was encouraged to schedule an appointment. Skilled services are necessary to monitor CV response and educate on personal risk factors.   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   Lead up symptoms 2/4: Pt noticed an increase of SOB, associated with intrascapular pain with exertion. Pt states the SOB has been ongoing with sarcoidosis diagnosis in 1982, pt unsure if symptoms is associated with pulmonary disease.   Hospital Location [facility]   Hospital Discharge Date 01/15/21   Signs and Symptoms Post Hospital Discharge SOB  (Continuation from prior event)   Comments 3/2: Staff contacted cardiology regarding persistent bigeminy during phase II rehab with no associated  symptoms and pt unaware of skipped beats. Ziopatch has been placed on pt. Primary was contacted to bring awareness about the discrepancy between staff anf pt's glucometer. He was advised to make an appointment with Dr. Arredondo.    Outpatient Cardiac Rehab Start Date 02/04/21   Primary Physician Dr. Arredondo   Primary Physician Follow Up Advised to schedule appointment   Specialist Dr. Charlie Harris   Specialist Follow Up NA   Cardiologist Dr. Rae   Cardiologist Follow Up Completed   Ejection Fraction 34-44%   Risk Stratification Low   Summary of Cath Report   Summary of Cath Report No information available   Living and Work Status    Living Arrangements and Social Status house;other relative  (Daughter)   Support System Live with an adult;Check-in help as needed   Return to Employment Retired   Occupation  worker in the mine; Owner of [employer]   Preventative Medications   CMS recommended medications Anticoagulants;Beta Blocker;Lipid Lowering;Ace inhibitors   Fall Risk Screen   Fall screen completed by Cardiac Rehab   Have you fallen 2 or more times in the past year? No   Have you fallen and had an injury in the past year? No   Timed Up and Go score (seconds) NA   Is patient a fall risk? No   Fall screen comments 3/2: Pt has not had any recent falls and is not concerned about falling at this time. Staff will continue to monitor and assess pt.   Abuse Screen (yes response referral indicated)   Feels Unsafe at Home or Work/School no   Feels Threatened by Someone no   Does Anyone Try to Keep You From Having Contact with Others or Doing Things Outside Your Home? no   Physical Signs of Abuse Present no   Pain   Patient Currently in Pain No   Physical Assessments   Incisions WNL   Edema None   Right Lung Sounds not assessed   Left Lung Sounds not assessed   Limitations Arthritis   Comments 3/2: Pt does not have any physical limitations at this time. He has known arthiritis between the scapula, bu that  "ddoe not seem to limit him.   Individualized Treatment Plan   Monitored Sessions Scheduled 24   Monitored Sessions Attended 7   Oxygen   Supplemental Oxygen needed No   Nutrition Management - Weight Management   Assessment Initial Assessment   Age 88   Weight 71.7 kg (158 lb)   Height 1.778 m (5' 10\")   BMI (Calculated) 22.67   Initial Rate Your Plate Score. Dietary tool to assess eating patterns. Scores range from 24 to 72. The higher the score the healthier the eating pattern. 49   Weight Management Comments 3/2: Pt continues to be content with current weight and is not concerned about gaining or losing weight.   Nutrition Management - Lipids   Lipids Labs Available   Date 10/20/20   Total Cholesterol 240   Triglycerides 149   HDL 46      Prescribed Lipid Medication Yes;Intolerant   Statin Intensity High Intensity   Lipid Comments 3/2: Pt is intolerant to statin medications and has been prescribed Zetia. Currently, cardiology has added rosuvastatin to 2x weekly. Cardiology will continue to monitor their symptoms and adjust as needed.    Nutrition Management - Diabetes   Diabetes Type II   Do you Monitor BS at Home? Yes   Diabetes Medication Prescribed Yes, Oral Medication   Diabetes Comments 3/2: There has been a consistent discrepancy between pt's 2 glucometers and the one placed in Cardiac Rehab. Staff contacted primary and pt is advised to schdule an appointment. Staff will continue to check blood sugar levels pre and post exercise.      Nutrition Management Summary   Dietary Recommendations Low Fat;Low Cholesterol;Low Sodium   Stages of Change for Diet Compliance Contemplation   Interventions Planned Instruct on Label Reading;Educate about Signs/Symptoms and Treatment of Hypo/Hyperglycemia   Nutrition Summary Comments 3/2: Pt is content with current diet but is aware that they are not living a complete \"heart healthy\" lifestyle. Staff will provide information during wekely discussions.   Nutrition " Target Outcome BMI < 25   Psychosocial Management   Psychosocial Assessment Re-assessment   Is there history of clinical depression or increased risk of depression? No previous history   Current Level of Stress per Patient Report Denies   Current Coping Skills Has Positive Support System   Initial Patient Health Questionnaire -9 Score (PHQ-9) for depression. 5-9 Minimal symptoms, 10-14 Minor depression, 15-19 Major depression, moderately severe, > 20 Major depression, severe  4   Initial [facility] Survey score.  Quality of Life:   If total score > 25 review individual areas where patient rated a 4 or 5.  Consider patients current medical condition and what role that plays on the score.   Adjust treatment protocol to improve areas of concern.  Consider the following:  PHQ9 score, DASI, and re-assessment within the next 30 days to assist with developing treatments.  23   Stages of Change Action   Interventions Planned Patient denies need for intervention at this time.   Patient Goal No   Psychosocial Comments 3/2: Pt does not have a desire to set a goal pertaining to the psychosocial goals Staff will continue to monitor and assess.    Psychosocial Target Outcome Identify absence or presence of depression using valid screening tool   Other Core Components - Hypertension   History of or Diagnosis of Hypertension Yes   Currently taking Anti-Hypertensives Yes;Beta blocker;Ace Inhibitor   Hypertension Comments 3/2: Pt continues to be compliant with prescribed medications and pt's BP continues to be lower, but stable throughout exercise sessions.   Other Core Components - Tobacco   History of Tobacco Use Yes   Quit Date or Planned Quit Date 01/01/78   Tobacco Use Status Former (Quit > 6 mo ago)   Tobacco Habit Cigarettes   Tobacco Use per Day (average) .5   Years of Tobacco Use 24   Stages of Change Maintenance   Tobacco Comments 2/4: Pt denies the urgency to smoke and limits their time from second hand smoke as much  as possible.    Other Core Components Summary   Interventions Planned None   Patient Goals No   Other Core Components Comments 3/2: Pt denies the need to set goals at this time.    Other Core Components Target Outcome BP < 140/90 or < 130/80 with DM or CKD   Activity/Exercise History   Activity/Exercise Assessment Re-assessment   Activity/Exercise Status prior to event? Sedentary   Number of Days Currently participating in Moderate Physical Activity? 2   Number of Days Currently performing  Aerobic Exercise (including rehab)? 2   Number of Minutes per Session Currently of Aerobic Exercise (average)? 34   Current Stage of Change (Physical Activity) Action   Current Stage of Change (Aerobic Exercise) Action   Activity/Exercise Comments 3/2: Pt has been attendong phase II rehab 2 days/wk reaching an overall MET level of 3.6 while aerobically exercising for 34 minutes.   Activity/Exercise Target Outcome An Accumulation of 150  Minutes of Aerobic Activity per Week   Exercise Assessment   6 Minute Walk Predicted - Gender Selection Male   6 Minute Walk Predicted (Male) 1537.62   6 Minute Walk Predicted (Female) 1210.5   Initial 6 Minute Walk Distance (Feet) 940 ft   Resting HR 71 bpm   Exercise HR 89 bpm   Post Exercise HR 73 bpm   Resting BP 92/58   Exercise /56   Post Exercise BP 86/54   Pre  mg/dL   Post  mg/dL   Effort Rating 4-5   Current MET Level 3.6   MET Level Goal 3-4   ECG Rhythm Sinus arrhythmia   Ectopy PVCs;Other (Comments)  (Staff notes regular episodes of bigeminy started 2/17/21.)   Current Symptoms Denies symptoms   Limitations/Restrictions Other (see comments)  (Arthritic)   Exercise Prescription   Mode Nustep;Recumbant bike;Arm Ergometer;Weights;Calisthenics;Ambulation;Upright bike   Duration/Time 30-45 min;Intermittent bouts   Frequency 2 days/week   THR (85% of age predicted max HR) 112.2   OMNI Effort Rating (0-10 Scale) 4-6/10   Progression Total exercise time of 30-45  minutes;Continuous bouts;Aerobic exercise to OMNI rating of 5-7, and heart rate at or below target   Comments 3/2:    Recommended Home Exercise   Type of Exercise Walking;Weights   Frequency (days per week) 2   Duration (minutes per session) Intermittent;15-30 min   Effort Rating Recommended 4-6/10   30 Day Exercise Plan 3/2: Staff will check HEP status since warmer weather has arrived.   Current Home Exercise   Type of Exercise None   Frequency (days per week) 0   Duration (minutes per session) 0   Follow-up/On-going Support   Provider follow-up needed on the following Blood Glucose;Other (see comments)   Comments 3/2: Staff contacted PCP about glucometer discrepancies. Cardiology has been notified about bigeminy.   Learning Assessment   Learner Patient   Primary Language English   Preferred Learning Style Listening;Reading;Demonstration;Pictures/Video   Barriers to Learning No barriers noted   Patient Education   Title of Program Cardiac Rehab Education   Education recommended Anatomy and Physiology of the Heart;Blood Pressure;Diabetes;Exercise Principles;Heart Failure;Medication Overview;Muscle Conditioning;Nutrition;Risk Factors;Stress Management   Education classes attended Diabetes;Heart Failure;Nutrition;Stress Management   Education Comments 3/2: Currently, staff has been providing pt with educational material based on personal risk factors to review. Staff offers pt the ability to discuss questions or concerns pertaining to the educational topics.    Physician cosignature/electronic signature indicates approval of this ITP document. I have established, reviewed and made necessary changes to the individualized treatment plan and exercise prescription for this patient.

## 2021-03-03 ENCOUNTER — HOSPITAL ENCOUNTER (OUTPATIENT)
Dept: CARDIAC REHAB | Facility: HOSPITAL | Age: 86
Setting detail: THERAPIES SERIES
End: 2021-03-03
Attending: INTERNAL MEDICINE
Payer: MEDICARE

## 2021-03-03 LAB
GLUCOSE BLDC GLUCOMTR-MCNC: 118 MG/DL (ref 70–99)
GLUCOSE BLDC GLUCOMTR-MCNC: 126 MG/DL (ref 70–99)

## 2021-03-03 PROCEDURE — 93798 PHYS/QHP OP CAR RHAB W/ECG: CPT

## 2021-03-03 PROCEDURE — 999N001017 HC STATISTIC GLUCOSE BY METER IP

## 2021-03-03 PROCEDURE — 999N000109 HC STATISTIC OP CR VISIT

## 2021-03-04 ENCOUNTER — ANTICOAGULATION THERAPY VISIT (OUTPATIENT)
Dept: ANTICOAGULATION | Facility: OTHER | Age: 86
End: 2021-03-04
Attending: FAMILY MEDICINE
Payer: MEDICARE

## 2021-03-04 ENCOUNTER — OFFICE VISIT (OUTPATIENT)
Dept: FAMILY MEDICINE | Facility: OTHER | Age: 86
End: 2021-03-04
Attending: FAMILY MEDICINE
Payer: MEDICARE

## 2021-03-04 VITALS
OXYGEN SATURATION: 99 % | HEART RATE: 53 BPM | DIASTOLIC BLOOD PRESSURE: 58 MMHG | SYSTOLIC BLOOD PRESSURE: 86 MMHG | BODY MASS INDEX: 23.1 KG/M2 | WEIGHT: 161 LBS

## 2021-03-04 DIAGNOSIS — D64.9 LOW HEMOGLOBIN: ICD-10-CM

## 2021-03-04 DIAGNOSIS — I10 ESSENTIAL HYPERTENSION: ICD-10-CM

## 2021-03-04 DIAGNOSIS — Z80.42 FAMILY HISTORY OF PROSTATE CANCER: ICD-10-CM

## 2021-03-04 DIAGNOSIS — I82.5Y3 CHRONIC DEEP VEIN THROMBOSIS (DVT) OF PROXIMAL VEIN OF BOTH LOWER EXTREMITIES (H): ICD-10-CM

## 2021-03-04 DIAGNOSIS — I50.42 CHRONIC COMBINED SYSTOLIC AND DIASTOLIC CONGESTIVE HEART FAILURE (H): ICD-10-CM

## 2021-03-04 DIAGNOSIS — E11.65 TYPE 2 DIABETES MELLITUS WITH HYPERGLYCEMIA, WITHOUT LONG-TERM CURRENT USE OF INSULIN (H): Primary | ICD-10-CM

## 2021-03-04 DIAGNOSIS — D69.6 THROMBOCYTOPENIA (H): Primary | ICD-10-CM

## 2021-03-04 DIAGNOSIS — Z12.5 ENCOUNTER FOR SCREENING FOR MALIGNANT NEOPLASM OF PROSTATE: ICD-10-CM

## 2021-03-04 DIAGNOSIS — R06.09 DOE (DYSPNEA ON EXERTION): ICD-10-CM

## 2021-03-04 DIAGNOSIS — J44.9 COPD, MILD (H): ICD-10-CM

## 2021-03-04 DIAGNOSIS — N18.32 STAGE 3B CHRONIC KIDNEY DISEASE (H): ICD-10-CM

## 2021-03-04 LAB
ANION GAP SERPL CALCULATED.3IONS-SCNC: 6 MMOL/L (ref 3–14)
BASOPHILS # BLD AUTO: 0 10E9/L (ref 0–0.2)
BASOPHILS NFR BLD AUTO: 0.3 %
BUN SERPL-MCNC: 36 MG/DL (ref 7–30)
CALCIUM SERPL-MCNC: 9.2 MG/DL (ref 8.5–10.1)
CHLORIDE SERPL-SCNC: 106 MMOL/L (ref 94–109)
CO2 SERPL-SCNC: 26 MMOL/L (ref 20–32)
CREAT SERPL-MCNC: 1.23 MG/DL (ref 0.66–1.25)
DIFFERENTIAL METHOD BLD: ABNORMAL
EOSINOPHIL # BLD AUTO: 0.2 10E9/L (ref 0–0.7)
EOSINOPHIL NFR BLD AUTO: 3.1 %
ERYTHROCYTE [DISTWIDTH] IN BLOOD BY AUTOMATED COUNT: 13 % (ref 10–15)
EST. AVERAGE GLUCOSE BLD GHB EST-MCNC: 111 MG/DL
GFR SERPL CREATININE-BSD FRML MDRD: 52 ML/MIN/{1.73_M2}
GLUCOSE SERPL-MCNC: 95 MG/DL (ref 70–99)
HBA1C MFR BLD: 5.5 % (ref 0–5.6)
HCT VFR BLD AUTO: 35.4 % (ref 40–53)
HGB BLD-MCNC: 11.3 G/DL (ref 13.3–17.7)
INR BLD: 2.4 (ref 0.86–1.14)
LYMPHOCYTES # BLD AUTO: 1.3 10E9/L (ref 0.8–5.3)
LYMPHOCYTES NFR BLD AUTO: 22.4 %
MCH RBC QN AUTO: 31 PG (ref 26.5–33)
MCHC RBC AUTO-ENTMCNC: 31.9 G/DL (ref 31.5–36.5)
MCV RBC AUTO: 97 FL (ref 78–100)
MONOCYTES # BLD AUTO: 0.8 10E9/L (ref 0–1.3)
MONOCYTES NFR BLD AUTO: 14.1 %
NEUTROPHILS # BLD AUTO: 3.5 10E9/L (ref 1.6–8.3)
NEUTROPHILS NFR BLD AUTO: 60.1 %
NT-PROBNP SERPL-MCNC: 2099 PG/ML (ref 0–450)
PLATELET # BLD AUTO: 122 10E9/L (ref 150–450)
POTASSIUM SERPL-SCNC: 4.5 MMOL/L (ref 3.4–5.3)
PSA SERPL-ACNC: 13 UG/L (ref 0–4)
RBC # BLD AUTO: 3.64 10E12/L (ref 4.4–5.9)
SODIUM SERPL-SCNC: 138 MMOL/L (ref 133–144)
WBC # BLD AUTO: 5.8 10E9/L (ref 4–11)

## 2021-03-04 PROCEDURE — G0463 HOSPITAL OUTPT CLINIC VISIT: HCPCS

## 2021-03-04 PROCEDURE — 83880 ASSAY OF NATRIURETIC PEPTIDE: CPT | Mod: ZL | Performed by: FAMILY MEDICINE

## 2021-03-04 PROCEDURE — 36415 COLL VENOUS BLD VENIPUNCTURE: CPT | Mod: ZL | Performed by: FAMILY MEDICINE

## 2021-03-04 PROCEDURE — 999N001182 HC STATISTIC ESTIMATED AVERAGE GLUCOSE: Mod: ZL | Performed by: FAMILY MEDICINE

## 2021-03-04 PROCEDURE — 83036 HEMOGLOBIN GLYCOSYLATED A1C: CPT | Mod: ZL | Performed by: FAMILY MEDICINE

## 2021-03-04 PROCEDURE — 85610 PROTHROMBIN TIME: CPT | Mod: ZL | Performed by: PHYSICIAN ASSISTANT

## 2021-03-04 PROCEDURE — G0103 PSA SCREENING: HCPCS | Mod: ZL | Performed by: FAMILY MEDICINE

## 2021-03-04 PROCEDURE — 80048 BASIC METABOLIC PNL TOTAL CA: CPT | Mod: ZL | Performed by: FAMILY MEDICINE

## 2021-03-04 PROCEDURE — 85025 COMPLETE CBC W/AUTO DIFF WBC: CPT | Mod: ZL | Performed by: FAMILY MEDICINE

## 2021-03-04 PROCEDURE — 99214 OFFICE O/P EST MOD 30 MIN: CPT | Performed by: FAMILY MEDICINE

## 2021-03-04 ASSESSMENT — PAIN SCALES - GENERAL: PAINLEVEL: NO PAIN (0)

## 2021-03-04 NOTE — PROGRESS NOTES
ANTICOAGULATION FOLLOW-UP CLINIC VISIT    Patient Name:  Sidney Barriga  Date:  3/4/2021  Contact Type:  Telephone    SUBJECTIVE:  Patient Findings     Comments:  INR done by lab. Call placed to pt and spoke to patient re: INR results/Warfarin dosing/INR recheck date. Pt denied any bleeding/bruising or any other new changes to their diet/meds/activity. Pt verbalized understanding of all instructions. Pt is to call Warfarin Clinic if any questions/issues/illness.           Clinical Outcomes     Negatives:  Major bleeding event, Thromboembolic event, Anticoagulation-related hospital admission, Anticoagulation-related ED visit, Anticoagulation-related fatality    Comments:  INR done by lab. Call placed to pt and spoke to patient re: INR results/Warfarin dosing/INR recheck date. Pt denied any bleeding/bruising or any other new changes to their diet/meds/activity. Pt verbalized understanding of all instructions. Pt is to call Warfarin Clinic if any questions/issues/illness.              OBJECTIVE    Recent labs: (last 7 days)     21  1033   INR 2.4*       ASSESSMENT / PLAN  INR assessment THER    Recheck INR In: 6 WEEKS    INR Location Clinic      Anticoagulation Summary  As of 3/4/2021    INR goal:  2.0-3.0   TTR:  75.4 % (4.3 mo)   INR used for dosin.4 (3/4/2021)   Warfarin maintenance plan:  5 mg (5 mg x 1) every day   Full warfarin instructions:  5 mg every day   Weekly warfarin total:  35 mg   No change documented:  Kathryn Cardenas RN   Plan last modified:  Lashae Barksdale RN (10/15/2020)   Next INR check:  2021   Priority:  High   Target end date:  10/15/2024         Anticoagulation Episode Summary     INR check location:      Preferred lab:      Send INR reminders to:  JOSR SORIA    Comments:        Anticoagulation Care Providers     Provider Role Specialty Phone number    Latricia Macedo PA Referring Family Medicine [phone]            See the Encounter Report to view  Anticoagulation Flowsheet and Dosing Calendar (Go to Encounters tab in chart review, and find the Anticoagulation Therapy Visit)        Kathryn Cardenas RN

## 2021-03-04 NOTE — NURSING NOTE
"Chief Complaint   Patient presents with     RECHECK       Initial BP (!) 86/58   Pulse 53   Wt 73 kg (161 lb)   SpO2 99%   BMI 23.10 kg/m   Estimated body mass index is 23.1 kg/m  as calculated from the following:    Height as of 3/2/21: 1.778 m (5' 10\").    Weight as of this encounter: 73 kg (161 lb).  Medication Reconciliation: complete  Griselda Peck LPN  "

## 2021-03-04 NOTE — LETTER
March 4, 2021      Sidney Barriga  [address]  [place] [address]        Dear ,    We are writing to inform you of your test results.    Your BNP which is a marker for heart failure/fluid overload is elevated.  This is consistent with your history of a reduced heart function/heart failure.    Resulted Orders   BNP-N terminal pro   Result Value Ref Range    N-Terminal Pro Bnp 2,099 (H) 0 - 450 pg/mL      Comment:         Reference range shown and results flagged as abnormal are for the outpatient,   non acute settings. Establishing a baseline value for each individual patient   is useful for follow-up.  Suggested inpatient cut points for confirming diagnosis of CHF in an acute   setting are:   >450 pg/mL (age 18 to less than 50)   >900 pg/mL (age 50 to less than 75)   >1800 pg/mL (75 yrs and older)  An inpatient or emergency department NT-proPBNP <300 pg/mL effectively rules   out acute CHF, with 99% negative predictive value.          If you have any questions or concerns, please call the clinic at the number listed above.       Sincerely,      Justyn Rae, DO

## 2021-03-04 NOTE — LETTER
March 5, 2021      Sidneycarol Barriga  [address]  [place] [address]        Dear ,    We are writing to inform you of your test results.    Your test results fall within the expected range(s) or remain unchanged from previous results.  Please continue with current treatment plan.    Resulted Orders   Hemoglobin A1c   Result Value Ref Range    Hemoglobin A1C 5.5 0 - 5.6 %      Comment:      Normal <5.7% Prediabetes 5.7-6.4%  Diabetes 6.5% or higher - adopted from ADA   consensus guidelines.     Basic metabolic panel   Result Value Ref Range    Sodium 138 133 - 144 mmol/L    Potassium 4.5 3.4 - 5.3 mmol/L    Chloride 106 94 - 109 mmol/L    Carbon Dioxide 26 20 - 32 mmol/L    Anion Gap 6 3 - 14 mmol/L    Glucose 95 70 - 99 mg/dL    Urea Nitrogen 36 (H) 7 - 30 mg/dL    Creatinine 1.23 0.66 - 1.25 mg/dL    GFR Estimate 52 (L) >60 mL/min/[1.73_m2]      Comment:      Non  GFR Calc  Starting 12/18/2018, serum creatinine based estimated GFR (eGFR) will be   calculated using the Chronic Kidney Disease Epidemiology Collaboration   (CKD-EPI) equation.      GFR Estimate If Black 60 (L) >60 mL/min/[1.73_m2]      Comment:       GFR Calc  Starting 12/18/2018, serum creatinine based estimated GFR (eGFR) will be   calculated using the Chronic Kidney Disease Epidemiology Collaboration   (CKD-EPI) equation.      Calcium 9.2 8.5 - 10.1 mg/dL   CBC with platelets differential   Result Value Ref Range    WBC 5.8 4.0 - 11.0 10e9/L    RBC Count 3.64 (L) 4.4 - 5.9 10e12/L    Hemoglobin 11.3 (L) 13.3 - 17.7 g/dL    Hematocrit 35.4 (L) 40.0 - 53.0 %    MCV 97 78 - 100 fl    MCH 31.0 26.5 - 33.0 pg    MCHC 31.9 31.5 - 36.5 g/dL    RDW 13.0 10.0 - 15.0 %    Platelet Count 122 (L) 150 - 450 10e9/L    % Neutrophils 60.1 %    % Lymphocytes 22.4 %    % Monocytes 14.1 %    % Eosinophils 3.1 %    % Basophils 0.3 %    Absolute Neutrophil 3.5 1.6 - 8.3 10e9/L    Absolute Lymphocytes 1.3 0.8 - 5.3 10e9/L     Absolute Monocytes 0.8 0.0 - 1.3 10e9/L    Absolute Eosinophils 0.2 0.0 - 0.7 10e9/L    Absolute Basophils 0.0 0.0 - 0.2 10e9/L    Diff Method Automated Method    PSA, screen   Result Value Ref Range    PSA 13.00 (H) 0 - 4 ug/L      Comment:      Assay Method:  Chemiluminescence using Siemens Vista analyzer   BNP-N terminal pro   Result Value Ref Range    N-Terminal Pro Bnp 2,099 (H) 0 - 450 pg/mL      Comment:         Reference range shown and results flagged as abnormal are for the outpatient,   non acute settings. Establishing a baseline value for each individual patient   is useful for follow-up.  Suggested inpatient cut points for confirming diagnosis of CHF in an acute   setting are:   >450 pg/mL (age 18 to less than 50)   >900 pg/mL (age 50 to less than 75)   >1800 pg/mL (75 yrs and older)  An inpatient or emergency department NT-proPBNP <300 pg/mL effectively rules   out acute CHF, with 99% negative predictive value.      Estimated Average Glucose   Result Value Ref Range    Estimated Average Glucose 111 mg/dL       If you have any questions or concerns, please call the clinic at the number listed above.       Sincerely,      Zach Arredondo MD

## 2021-03-05 ENCOUNTER — HOSPITAL ENCOUNTER (OUTPATIENT)
Dept: CARDIAC REHAB | Facility: HOSPITAL | Age: 86
Setting detail: THERAPIES SERIES
End: 2021-03-05
Attending: INTERNAL MEDICINE
Payer: MEDICARE

## 2021-03-05 LAB
GLUCOSE BLDC GLUCOMTR-MCNC: 131 MG/DL (ref 70–99)
GLUCOSE BLDC GLUCOMTR-MCNC: 89 MG/DL (ref 70–99)

## 2021-03-05 PROCEDURE — 999N001017 HC STATISTIC GLUCOSE BY METER IP

## 2021-03-05 PROCEDURE — 999N000109 HC STATISTIC OP CR VISIT

## 2021-03-05 PROCEDURE — 93798 PHYS/QHP OP CAR RHAB W/ECG: CPT

## 2021-03-10 ENCOUNTER — HOSPITAL ENCOUNTER (OUTPATIENT)
Dept: CARDIAC REHAB | Facility: HOSPITAL | Age: 86
Setting detail: THERAPIES SERIES
End: 2021-03-10
Attending: INTERNAL MEDICINE
Payer: MEDICARE

## 2021-03-10 LAB
GLUCOSE BLDC GLUCOMTR-MCNC: 116 MG/DL (ref 70–99)
GLUCOSE BLDC GLUCOMTR-MCNC: 136 MG/DL (ref 70–99)

## 2021-03-10 PROCEDURE — 93798 PHYS/QHP OP CAR RHAB W/ECG: CPT | Mod: KX

## 2021-03-10 PROCEDURE — 999N001017 HC STATISTIC GLUCOSE BY METER IP

## 2021-03-10 PROCEDURE — 999N000109 HC STATISTIC OP CR VISIT

## 2021-03-12 ENCOUNTER — HOSPITAL ENCOUNTER (OUTPATIENT)
Dept: CARDIAC REHAB | Facility: HOSPITAL | Age: 86
Setting detail: THERAPIES SERIES
End: 2021-03-12
Attending: INTERNAL MEDICINE
Payer: MEDICARE

## 2021-03-12 LAB — GLUCOSE BLDC GLUCOMTR-MCNC: 131 MG/DL (ref 70–99)

## 2021-03-12 PROCEDURE — 999N000109 HC STATISTIC OP CR VISIT

## 2021-03-12 PROCEDURE — 999N001017 HC STATISTIC GLUCOSE BY METER IP

## 2021-03-12 PROCEDURE — 93798 PHYS/QHP OP CAR RHAB W/ECG: CPT

## 2021-03-15 LAB — GLUCOSE BLDC GLUCOMTR-MCNC: 102 MG/DL (ref 70–99)

## 2021-03-16 ENCOUNTER — TELEPHONE (OUTPATIENT)
Dept: FAMILY MEDICINE | Facility: OTHER | Age: 86
End: 2021-03-16

## 2021-03-16 NOTE — TELEPHONE ENCOUNTER
9:01 AM    Reason for Call: Request for Addendum to Office Visit Note on 3/4/21    Description:     Call from Emma with [facility] requesting an addendum to Dr. 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 Arredondo included this on the form (diabetic statement) signed and returned to [facility]) in order to cover diabetic shoes.     Please fax addended note to: [phone]  Attn: Emma Carter may be reached at [phone] if you have any questions.     Was an appointment offered for this call? No  If yes : Appointment type              Date    Preferred method for responding to this message: Telephone Call  What is your phone number- Emma- [phone]    If we cannot reach you directly, may we leave a detailed response at the number you provided? Yes    Can this message wait until your PCP/provider returns, if available today?   Not applicable        Mariangel Walsh RN

## 2021-03-17 ENCOUNTER — HOSPITAL ENCOUNTER (OUTPATIENT)
Dept: CARDIAC REHAB | Facility: HOSPITAL | Age: 86
Setting detail: THERAPIES SERIES
End: 2021-03-17
Attending: INTERNAL MEDICINE
Payer: MEDICARE

## 2021-03-17 LAB
GLUCOSE BLDC GLUCOMTR-MCNC: 141 MG/DL (ref 70–99)
GLUCOSE BLDC GLUCOMTR-MCNC: 152 MG/DL (ref 70–99)

## 2021-03-17 PROCEDURE — 999N001017 HC STATISTIC GLUCOSE BY METER IP

## 2021-03-17 PROCEDURE — 93798 PHYS/QHP OP CAR RHAB W/ECG: CPT

## 2021-03-17 PROCEDURE — 999N000109 HC STATISTIC OP CR VISIT

## 2021-03-19 ENCOUNTER — HOSPITAL ENCOUNTER (OUTPATIENT)
Dept: CARDIAC REHAB | Facility: HOSPITAL | Age: 86
Setting detail: THERAPIES SERIES
End: 2021-03-19
Attending: INTERNAL MEDICINE
Payer: MEDICARE

## 2021-03-19 LAB — GLUCOSE BLDC GLUCOMTR-MCNC: 122 MG/DL (ref 70–99)

## 2021-03-19 PROCEDURE — 999N001017 HC STATISTIC GLUCOSE BY METER IP

## 2021-03-19 PROCEDURE — 999N001017 HC STATISTIC GLUCOSE BY METER IP: Performed by: INTERNAL MEDICINE

## 2021-03-19 PROCEDURE — 93798 PHYS/QHP OP CAR RHAB W/ECG: CPT | Mod: KX

## 2021-03-19 PROCEDURE — 999N000109 HC STATISTIC OP CR VISIT

## 2021-03-24 ENCOUNTER — HOSPITAL ENCOUNTER (OUTPATIENT)
Dept: CARDIAC REHAB | Facility: HOSPITAL | Age: 86
Setting detail: THERAPIES SERIES
End: 2021-03-24
Attending: INTERNAL MEDICINE
Payer: MEDICARE

## 2021-03-24 LAB
GLUCOSE BLDC GLUCOMTR-MCNC: 113 MG/DL (ref 70–99)
GLUCOSE BLDC GLUCOMTR-MCNC: 121 MG/DL (ref 70–99)
GLUCOSE BLDC GLUCOMTR-MCNC: 149 MG/DL (ref 70–99)

## 2021-03-24 PROCEDURE — 999N001017 HC STATISTIC GLUCOSE BY METER IP

## 2021-03-24 PROCEDURE — 93798 PHYS/QHP OP CAR RHAB W/ECG: CPT

## 2021-03-24 PROCEDURE — 999N000109 HC STATISTIC OP CR VISIT

## 2021-03-26 ENCOUNTER — HOSPITAL ENCOUNTER (OUTPATIENT)
Dept: CARDIAC REHAB | Facility: HOSPITAL | Age: 86
Setting detail: THERAPIES SERIES
End: 2021-03-26
Attending: INTERNAL MEDICINE
Payer: MEDICARE

## 2021-03-26 LAB
GLUCOSE BLDC GLUCOMTR-MCNC: 114 MG/DL (ref 70–99)
GLUCOSE BLDC GLUCOMTR-MCNC: 119 MG/DL (ref 70–99)

## 2021-03-26 PROCEDURE — 999N001017 HC STATISTIC GLUCOSE BY METER IP

## 2021-03-26 PROCEDURE — 999N000109 HC STATISTIC OP CR VISIT

## 2021-03-26 PROCEDURE — 93798 PHYS/QHP OP CAR RHAB W/ECG: CPT | Mod: KX

## 2021-03-29 VITALS — BODY MASS INDEX: 22.76 KG/M2 | WEIGHT: 159 LBS | HEIGHT: 70 IN

## 2021-03-29 ASSESSMENT — 6 MINUTE WALK TEST (6MWT)
TOTAL DISTANCE WALKED (FT): 940
GENDER SELECTION: MALE
MALE CALC: 1534.99
PREDICTED: 1544.35
FEMALE CALC: 1207.08

## 2021-03-29 ASSESSMENT — MIFFLIN-ST. JEOR: SCORE: 1397.47

## 2021-03-29 NOTE — PROGRESS NOTES
03/29/21 1300   Session   Session 60 Day Individualized Treatment Plan   Certified through this date 04/27/21   Cardiac Rehab Assessment   Cardiac Rehab Assessment 3/29: Patient has attended 15 exercise sessions thus far in Phase II Cardiac Rehab. Overall, he is doing well. He is working at up to 4.5 MET's intermittently. His blood pressures have been low systolically, however he is not symptomatic. Patient applies himself throughout his exercise and is progressing well. Education has been given to patient on a weekly basis.   Continued therapy is necessary to help him advance to higher levels of exercise capabilities safely by monitoring his CV response to exercise.    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   Lead up symptoms 2/4: Pt noticed an increase of SOB, associated with intrascapular pain with exertion. Pt states the SOB has been ongoing with sarcoidosis diagnosis in 1982, pt unsure if symptoms is associated with pulmonary disease.   Hospital Location [facility]   Hospital Discharge Date 01/15/21   Signs and Symptoms Post Hospital Discharge SOB  (Continuation from prior event)   Outpatient Cardiac Rehab Start Date 02/04/21   Primary Physician Dr. Arredondo   Primary Physician Follow Up Advised to schedule appointment   Specialist Dr. Charlie Harris   Specialist Follow Up NA   Cardiologist Dr. Rae   Cardiologist Follow Up Completed   Ejection Fraction 34-44%   Risk Stratification Low   Summary of Cath Report   Summary of Cath Report No information available   Living and Work Status    Living Arrangements and Social Status house;other relative  (Daughter)  "  Support System Live with an adult;Check-in help as needed   Return to Employment Retired   Occupation  worker in the mine; Owner of [employer]   Preventative Medications   CMS recommended medications Anticoagulants;Beta Blocker;Lipid Lowering;Ace inhibitors   Fall Risk Screen   Fall screen completed by Cardiac Rehab   Have you fallen 2 or more times in the past year? No   Have you fallen and had an injury in the past year? No   Timed Up and Go score (seconds) NA   Is patient a fall risk? No   Fall screen comments 3/2: Pt has not had any recent falls and is not concerned about falling at this time. Staff will continue to monitor and assess pt.   Abuse Screen (yes response referral indicated)   Feels Unsafe at Home or Work/School no   Feels Threatened by Someone no   Does Anyone Try to Keep You From Having Contact with Others or Doing Things Outside Your Home? no   Physical Signs of Abuse Present no   Pain   Patient Currently in Pain No   Physical Assessments   Incisions Not assessed   Edema Not assessed   Right Lung Sounds not assessed   Left Lung Sounds not assessed   Limitations Arthritis   Comments 3/29: Patient has not been limited by his arthritis thus far in Phase II CR.    Individualized Treatment Plan   Monitored Sessions Scheduled 24   Monitored Sessions Attended 15   Oxygen   Supplemental Oxygen needed No   Nutrition Management - Weight Management   Assessment Re-assessment   Age 88   Weight 72.1 kg (159 lb)   Height 1.778 m (5' 10\")   BMI (Calculated) 22.81   Initial Rate Your Plate Score. Dietary tool to assess eating patterns. Scores range from 24 to 72. The higher the score the healthier the eating pattern. 49   Weight Management Comments 3/29: Patient is not concerned about his weight.    Nutrition Management - Lipids   Lipids Labs Available   Date 10/20/20   Total Cholesterol 240   Triglycerides 149   HDL 46      Prescribed Lipid Medication Yes;Intolerant   Statin Intensity " "High Intensity   Lipid Comments 3/29: Pt is intolerant to statin medications and has been prescribed Zetia. Currently, cardiology has added rosuvastatin to 2x weekly. Cardiology will continue to monitor their symptoms and adjust as needed.    Nutrition Management - Diabetes   Diabetes Type II   Do you Monitor BS at Home? Yes   Diabetes Medication Prescribed Yes, Oral Medication   Diabetes Comments 3/29: Patient continues to have blood sugar levels checked during Cardiac Rehab. Patient appears to manage his blood sugars well.    Nutrition Management Summary   Dietary Recommendations Low Fat;Low Cholesterol;Low Sodium   Stages of Change for Diet Compliance Contemplation   Interventions Planned Instruct on Label Reading;Educate about Signs/Symptoms and Treatment of Hypo/Hyperglycemia   Interventions In Progress or Completed Other (see comments)  (Patient has been given education regarding Nutrition. )   Nutrition Summary Comments 3/29:  Pt is content with current diet but is aware that they are not living a complete \"heart healthy\" lifestyle. Staff will provide information during wekely discussions.   Nutrition Target Outcome BMI < 25   Psychosocial Management   Psychosocial Assessment Re-assessment   Is there history of clinical depression or increased risk of depression? No previous history   Current Level of Stress per Patient Report Denies   Current Coping Skills Has Positive Support System   Initial Patient Health Questionnaire -9 Score (PHQ-9) for depression. 5-9 Minimal symptoms, 10-14 Minor depression, 15-19 Major depression, moderately severe, > 20 Major depression, severe  4   [facility] score.  Quality of Life:   If total score > 25 review individual areas where patient rated a 4 or 5.  Consider patients current medical condition and what role that plays on the score.   Adjust treatment protocol to improve areas of concern.  Consider the following:  PHQ9 score, DASI, and re-assessment " within the next 30 days to assist with developing treatments.  23   Stages of Change Action   Interventions Planned Patient denies need for intervention at this time.   Patient Goal No   Psychosocial Comments 3/29: Pt does not have a desire to set a goal pertaining to the psychosocial goals Staff will continue to monitor and assess.    Psychosocial Target Outcome Identify absence or presence of depression using valid screening tool   Other Core Components - Hypertension   History of or Diagnosis of Hypertension Yes   Currently taking Anti-Hypertensives Yes;Beta blocker;Ace Inhibitor   Hypertension Comments 3/21: Pt continues to be compliant with prescribed medications and pt's BP continues to be lower, but stable throughout exercise sessions.   Other Core Components - Tobacco   History of Tobacco Use Yes   Quit Date or Planned Quit Date 01/01/78   Tobacco Use Status Former (Quit > 6 mo ago)   Tobacco Habit Cigarettes   Tobacco Use per Day (average) .5   Years of Tobacco Use 24   Stages of Change Maintenance   Tobacco Comments 3/29: Patient continues to live a smoke-free lifestyle.    Other Core Components Summary   Interventions Planned None   Patient Goals No   Other Core Components Comments 3/29: Patient does not wish to set goals at this time.    Other Core Components Target Outcome BP < 140/90 or < 130/80 with DM or CKD   Activity/Exercise History   Activity/Exercise Assessment Re-assessment   Activity/Exercise Status prior to event? Sedentary   Number of Days Currently participating in Moderate Physical Activity? 2   Number of Days Currently performing  Aerobic Exercise (including rehab)? 2   Number of Minutes per Session Currently of Aerobic Exercise (average)? 34   Current Stage of Change (Physical Activity) Action   Current Stage of Change (Aerobic Exercise) Action   Activity/Exercise Comments 3/29: Patient has been attending CR twice per week. he has reached a maximum of 4.5 METs at this time.     Activity/Exercise Target Outcome An Accumulation of 150  Minutes of Aerobic Activity per Week   Exercise Assessment   6 Minute Walk Predicted - Gender Selection Male   6 Minute Walk Predicted (Male) 1534.99   6 Minute Walk Predicted (Female) 1207.08   Initial 6 Minute Walk Distance (Feet) 940 ft   Resting HR 69 bpm   Exercise HR 87 bpm   Post Exercise HR 74 bpm   Resting BP 88/58   Exercise /60   Post Exercise BP 86/62   Pre  mg/dL   Post  mg/dL   Effort Rating 4-5   Current MET Level 4.5   MET Level Goal 5   ECG Rhythm Sinus arrhythmia   Ectopy PVCs;Other (Comments)  (Staff notes regular episodes of bigeminy started 2/17/21.)   Current Symptoms Denies symptoms   Limitations/Restrictions Other (see comments)  (Arthritic)   Exercise Prescription   Mode Nustep;Recumbant bike;Arm Ergometer;Weights;Calisthenics;Ambulation;Upright bike   Duration/Time 30-45 min;Intermittent bouts   Frequency 2 days/week   THR (85% of age predicted max HR) 112.2   OMNI Effort Rating (0-10 Scale) 4-6/10   Progression Total exercise time of 30-45 minutes;Continuous bouts;Aerobic exercise to OMNI rating of 5-7, and heart rate at or below target   Comments 3/29: Patient has attended Phase II regularly. He continues to increase his overall MET level.    Recommended Home Exercise   Type of Exercise Walking;Weights   Frequency (days per week) 2   Duration (minutes per session) Intermittent;15-30 min   Effort Rating Recommended 4-6/10   30 Day Exercise Plan 3/29: Patient has not started HEP to date.    Current Home Exercise   Type of Exercise None   Follow-up/On-going Support   Provider follow-up needed on the following No follow-up needed   Learning Assessment   Learner Patient   Primary Language English   Preferred Learning Style Listening;Reading;Demonstration;Pictures/Video   Barriers to Learning No barriers noted   Patient Education   Title of Program Cardiac Rehab Education   Education recommended Anatomy and Physiology  of the Heart;Blood Pressure;Diabetes;Exercise Principles;Heart Failure;Medication Overview;Muscle Conditioning;Nutrition;Risk Factors;Stress Management   Education classes attended Anatomy and Physiology of the Heart;Blood Pressure;Diabetes;Exercise Principles;Heart Failure;Medication Overview;Nutrition;Stress Management   Education Comments 3/29: Currently, staff has been providing pt with educational material based on personal risk factors to review. Staff offers pt the ability to discuss questions or concerns pertaining to the educational topics.    Physician cosignature/electronic signature indicates approval of this ITP document. I have established, reviewed and made necessary changes to the individualized treatment plan and exercise prescription for this patient.

## 2021-03-30 ENCOUNTER — HOSPITAL ENCOUNTER (OUTPATIENT)
Dept: CARDIAC REHAB | Facility: HOSPITAL | Age: 86
Setting detail: THERAPIES SERIES
End: 2021-03-30
Attending: INTERNAL MEDICINE
Payer: MEDICARE

## 2021-03-30 LAB
GLUCOSE BLDC GLUCOMTR-MCNC: 114 MG/DL (ref 70–99)
GLUCOSE BLDC GLUCOMTR-MCNC: 132 MG/DL (ref 70–99)

## 2021-03-30 PROCEDURE — 93798 PHYS/QHP OP CAR RHAB W/ECG: CPT

## 2021-03-30 PROCEDURE — 999N000109 HC STATISTIC OP CR VISIT

## 2021-03-30 PROCEDURE — 999N001017 HC STATISTIC GLUCOSE BY METER IP

## 2021-04-01 ENCOUNTER — HOSPITAL ENCOUNTER (OUTPATIENT)
Dept: CARDIAC REHAB | Facility: HOSPITAL | Age: 86
Setting detail: THERAPIES SERIES
End: 2021-04-01
Attending: INTERNAL MEDICINE
Payer: MEDICARE

## 2021-04-01 LAB
GLUCOSE BLDC GLUCOMTR-MCNC: 115 MG/DL (ref 70–99)
GLUCOSE BLDC GLUCOMTR-MCNC: 147 MG/DL (ref 70–99)

## 2021-04-01 PROCEDURE — 93798 PHYS/QHP OP CAR RHAB W/ECG: CPT | Mod: KX

## 2021-04-01 PROCEDURE — 999N000109 HC STATISTIC OP CR VISIT

## 2021-04-01 PROCEDURE — 999N001017 HC STATISTIC GLUCOSE BY METER IP

## 2021-04-07 ENCOUNTER — HOSPITAL ENCOUNTER (OUTPATIENT)
Dept: CARDIAC REHAB | Facility: HOSPITAL | Age: 86
Setting detail: THERAPIES SERIES
End: 2021-04-07
Attending: INTERNAL MEDICINE
Payer: MEDICARE

## 2021-04-07 LAB
GLUCOSE BLDC GLUCOMTR-MCNC: 118 MG/DL (ref 70–99)
GLUCOSE BLDC GLUCOMTR-MCNC: 137 MG/DL (ref 70–99)

## 2021-04-07 PROCEDURE — 93798 PHYS/QHP OP CAR RHAB W/ECG: CPT

## 2021-04-07 PROCEDURE — 999N001017 HC STATISTIC GLUCOSE BY METER IP

## 2021-04-07 PROCEDURE — 999N000109 HC STATISTIC OP CR VISIT

## 2021-04-09 ENCOUNTER — HOSPITAL ENCOUNTER (OUTPATIENT)
Dept: CARDIAC REHAB | Facility: HOSPITAL | Age: 86
Setting detail: THERAPIES SERIES
End: 2021-04-09
Attending: INTERNAL MEDICINE
Payer: MEDICARE

## 2021-04-09 LAB
GLUCOSE BLDC GLUCOMTR-MCNC: 107 MG/DL (ref 70–99)
GLUCOSE BLDC GLUCOMTR-MCNC: 94 MG/DL (ref 70–99)

## 2021-04-09 PROCEDURE — 999N001017 HC STATISTIC GLUCOSE BY METER IP

## 2021-04-09 PROCEDURE — 999N000109 HC STATISTIC OP CR VISIT

## 2021-04-09 PROCEDURE — 93798 PHYS/QHP OP CAR RHAB W/ECG: CPT | Mod: KX

## 2021-04-12 ENCOUNTER — OFFICE VISIT (OUTPATIENT)
Dept: CARDIOLOGY | Facility: OTHER | Age: 86
End: 2021-04-12
Attending: INTERNAL MEDICINE
Payer: MEDICARE

## 2021-04-12 ENCOUNTER — ANTICOAGULATION THERAPY VISIT (OUTPATIENT)
Dept: ANTICOAGULATION | Facility: OTHER | Age: 86
End: 2021-04-12
Attending: FAMILY MEDICINE
Payer: MEDICARE

## 2021-04-12 VITALS
OXYGEN SATURATION: 100 % | TEMPERATURE: 97.4 F | DIASTOLIC BLOOD PRESSURE: 61 MMHG | BODY MASS INDEX: 21.84 KG/M2 | WEIGHT: 152.2 LBS | HEART RATE: 66 BPM | SYSTOLIC BLOOD PRESSURE: 98 MMHG

## 2021-04-12 DIAGNOSIS — Z95.1 S/P CABG X 3: ICD-10-CM

## 2021-04-12 DIAGNOSIS — D69.6 THROMBOCYTOPENIA (H): Primary | ICD-10-CM

## 2021-04-12 DIAGNOSIS — R91.8 PULMONARY NODULES: ICD-10-CM

## 2021-04-12 DIAGNOSIS — Z98.890 H/O CARDIAC CATHETERIZATION: ICD-10-CM

## 2021-04-12 DIAGNOSIS — E78.1 HYPERTRIGLYCERIDEMIA: ICD-10-CM

## 2021-04-12 DIAGNOSIS — I10 ESSENTIAL HYPERTENSION, BENIGN: ICD-10-CM

## 2021-04-12 DIAGNOSIS — Z98.890 STATUS POST CORONARY ANGIOGRAM: ICD-10-CM

## 2021-04-12 DIAGNOSIS — Z95.5 HISTORY OF CORONARY ARTERY STENT PLACEMENT: ICD-10-CM

## 2021-04-12 DIAGNOSIS — I35.0 MODERATE AORTIC STENOSIS: ICD-10-CM

## 2021-04-12 DIAGNOSIS — R01.1 HEART MURMUR: ICD-10-CM

## 2021-04-12 DIAGNOSIS — Z86.718 HISTORY OF DVT (DEEP VEIN THROMBOSIS): ICD-10-CM

## 2021-04-12 DIAGNOSIS — I82.5Y3 CHRONIC DEEP VEIN THROMBOSIS (DVT) OF PROXIMAL VEIN OF BOTH LOWER EXTREMITIES (H): ICD-10-CM

## 2021-04-12 DIAGNOSIS — I71.21 ASCENDING AORTIC ANEURYSM (H): ICD-10-CM

## 2021-04-12 DIAGNOSIS — R91.1 NODULE OF RIGHT LUNG: ICD-10-CM

## 2021-04-12 DIAGNOSIS — I49.3 FREQUENT PVCS: ICD-10-CM

## 2021-04-12 DIAGNOSIS — I25.5 ISCHEMIC CARDIOMYOPATHY: ICD-10-CM

## 2021-04-12 DIAGNOSIS — I50.42 CHRONIC COMBINED SYSTOLIC AND DIASTOLIC CONGESTIVE HEART FAILURE (H): ICD-10-CM

## 2021-04-12 DIAGNOSIS — Z86.2 HX OF SARCOIDOSIS: ICD-10-CM

## 2021-04-12 DIAGNOSIS — E11.65 TYPE 2 DIABETES MELLITUS WITH HYPERGLYCEMIA, WITHOUT LONG-TERM CURRENT USE OF INSULIN (H): ICD-10-CM

## 2021-04-12 DIAGNOSIS — I27.20 MILD PULMONARY HYPERTENSION (H): ICD-10-CM

## 2021-04-12 DIAGNOSIS — J44.9 COPD, MILD (H): ICD-10-CM

## 2021-04-12 DIAGNOSIS — E83.42 HYPOMAGNESEMIA: ICD-10-CM

## 2021-04-12 DIAGNOSIS — E78.2 MIXED HYPERLIPIDEMIA: ICD-10-CM

## 2021-04-12 DIAGNOSIS — I05.0 MODERATE MITRAL STENOSIS: ICD-10-CM

## 2021-04-12 DIAGNOSIS — Z95.828 PRESENCE OF IVC FILTER: ICD-10-CM

## 2021-04-12 DIAGNOSIS — I49.9 IRREGULAR HEARTBEAT: ICD-10-CM

## 2021-04-12 DIAGNOSIS — D69.6 THROMBOCYTOPENIA (H): ICD-10-CM

## 2021-04-12 DIAGNOSIS — N18.32 STAGE 3B CHRONIC KIDNEY DISEASE (H): ICD-10-CM

## 2021-04-12 DIAGNOSIS — I49.8 VENTRICULAR BIGEMINY: ICD-10-CM

## 2021-04-12 DIAGNOSIS — I25.10 ATHEROSCLEROSIS OF NATIVE CORONARY ARTERY OF NATIVE HEART WITHOUT ANGINA PECTORIS: ICD-10-CM

## 2021-04-12 DIAGNOSIS — I35.0 NONRHEUMATIC AORTIC VALVE STENOSIS: ICD-10-CM

## 2021-04-12 DIAGNOSIS — Z87.891 HISTORY OF TOBACCO ABUSE: ICD-10-CM

## 2021-04-12 DIAGNOSIS — R06.09 DOE (DYSPNEA ON EXERTION): Primary | ICD-10-CM

## 2021-04-12 DIAGNOSIS — R93.1 REGIONAL WALL MOTION ABNORMALITY OF HEART: ICD-10-CM

## 2021-04-12 LAB
BASOPHILS # BLD AUTO: 0 10E9/L (ref 0–0.2)
BASOPHILS NFR BLD AUTO: 0.5 %
DIFFERENTIAL METHOD BLD: ABNORMAL
EOSINOPHIL # BLD AUTO: 0.2 10E9/L (ref 0–0.7)
EOSINOPHIL NFR BLD AUTO: 3 %
ERYTHROCYTE [DISTWIDTH] IN BLOOD BY AUTOMATED COUNT: 13 % (ref 10–15)
HCT VFR BLD AUTO: 39.3 % (ref 40–53)
HGB BLD-MCNC: 12.6 G/DL (ref 13.3–17.7)
IMM GRANULOCYTES # BLD: 0 10E9/L (ref 0–0.4)
IMM GRANULOCYTES NFR BLD: 0.2 %
INR BLD: 2.8 (ref 0.86–1.14)
LYMPHOCYTES # BLD AUTO: 1.2 10E9/L (ref 0.8–5.3)
LYMPHOCYTES NFR BLD AUTO: 19.2 %
MCH RBC QN AUTO: 30.3 PG (ref 26.5–33)
MCHC RBC AUTO-ENTMCNC: 32.1 G/DL (ref 31.5–36.5)
MCV RBC AUTO: 95 FL (ref 78–100)
MONOCYTES # BLD AUTO: 0.7 10E9/L (ref 0–1.3)
MONOCYTES NFR BLD AUTO: 11.2 %
NEUTROPHILS # BLD AUTO: 4 10E9/L (ref 1.6–8.3)
NEUTROPHILS NFR BLD AUTO: 65.9 %
NRBC # BLD AUTO: 0 10*3/UL
NRBC BLD AUTO-RTO: 0 /100
PLATELET # BLD AUTO: 121 10E9/L (ref 150–450)
RBC # BLD AUTO: 4.16 10E12/L (ref 4.4–5.9)
WBC # BLD AUTO: 6 10E9/L (ref 4–11)

## 2021-04-12 PROCEDURE — 85610 PROTHROMBIN TIME: CPT | Mod: ZL | Performed by: FAMILY MEDICINE

## 2021-04-12 PROCEDURE — G0463 HOSPITAL OUTPT CLINIC VISIT: HCPCS

## 2021-04-12 PROCEDURE — 99214 OFFICE O/P EST MOD 30 MIN: CPT | Performed by: INTERNAL MEDICINE

## 2021-04-12 PROCEDURE — 36415 COLL VENOUS BLD VENIPUNCTURE: CPT | Mod: ZL | Performed by: FAMILY MEDICINE

## 2021-04-12 PROCEDURE — 85025 COMPLETE CBC W/AUTO DIFF WBC: CPT | Mod: ZL | Performed by: FAMILY MEDICINE

## 2021-04-12 ASSESSMENT — PAIN SCALES - GENERAL: PAINLEVEL: NO PAIN (0)

## 2021-04-12 NOTE — NURSING NOTE
"Chief Complaint   Patient presents with     RECHECK       Initial BP 98/61 (BP Location: Left arm, Patient Position: Sitting, Cuff Size: Adult Regular)   Pulse 66   Temp 97.4  F (36.3  C) (Tympanic)   Wt 69 kg (152 lb 3.2 oz)   SpO2 100%   BMI 21.84 kg/m   Estimated body mass index is 21.84 kg/m  as calculated from the following:    Height as of 3/29/21: 1.778 m (5' 10\").    Weight as of this encounter: 69 kg (152 lb 3.2 oz).  Medication Reconciliation: complete  Orin Brooks LPN    "

## 2021-04-12 NOTE — PROGRESS NOTES
ANTICOAGULATION FOLLOW-UP CLINIC VISIT    Patient Name:  Sidney Barriga  Date:  2021  Contact Type:  Telephone/ Left voicemail on patient's home phone     SUBJECTIVE:  Patient Findings     Comments:  INR done by lab. Call placed to pt and left voicemail on home phone re: INR results/Warfarin dosing/INR recheck date. INR therapeutic today- 2.8. He is to continue on his maintenance dose. Pt is to call Warfarin clinic with any bleeding/bruising or any new changes to their diet/meds/activity or any questions/issues/illness.           Clinical Outcomes     Negatives:  Major bleeding event, Thromboembolic event, Anticoagulation-related hospital admission, Anticoagulation-related ED visit, Anticoagulation-related fatality    Comments:  INR done by lab. Call placed to pt and left voicemail on home phone re: INR results/Warfarin dosing/INR recheck date. INR therapeutic today- 2.8. He is to continue on his maintenance dose. Pt is to call Warfarin clinic with any bleeding/bruising or any new changes to their diet/meds/activity or any questions/issues/illness.              OBJECTIVE    Recent labs: (last 7 days)     21  1045   INR 2.8*       ASSESSMENT / PLAN  INR assessment THER    Recheck INR In: 6 WEEKS    INR Location Clinic      Anticoagulation Summary  As of 2021    INR goal:  2.0-3.0   TTR:  81.1 % (5.6 mo)   INR used for dosin.8 (2021)   Warfarin maintenance plan:  5 mg (5 mg x 1) every day   Full warfarin instructions:  5 mg every day   Weekly warfarin total:  35 mg   No change documented:  Kathryn Cardenas RN   Plan last modified:  Lashae Barksdale RN (10/15/2020)   Next INR check:  2021   Priority:  High   Target end date:  10/15/2024         Anticoagulation Episode Summary     INR check location:      Preferred lab:      Send INR reminders to:  JOSR SORIA    Comments:        Anticoagulation Care Providers     Provider Role Specialty Phone number    Latricia Macedo PA  Referring Family Medicine [phone]            See the Encounter Report to view Anticoagulation Flowsheet and Dosing Calendar (Go to Encounters tab in chart review, and find the Anticoagulation Therapy Visit)        Kathryn Cardenas RN

## 2021-04-12 NOTE — PATIENT INSTRUCTIONS
Thank you for allowing Dr. Rae and our  team to participate in your care. Please call our office at [phone] with scheduling questions or if you need to cancel or change your appointment. With any other questions or concerns you may call Orin cardiology nurse at [phone].       If you experience chest pain, chest pressure, chest tightness, shortness of breath, fainting, lightheadedness, nausea, vomiting, or other concerning symptoms, please report to the Emergency Department or call 911. These symptoms may be emergent, and best treated in the Emergency Department.    Follow up in 1 -2 months     You will be scheduled for a stress test to evaluate the vessels which supply blood to the heart to rule out the possibility of blockages. You will be called by the hospital scheduling department to schedule this appointment. Someone from [facility] will call to set this up, if you do not hear from someone with in a week please call us.

## 2021-04-12 NOTE — PROGRESS NOTES
[facility] HEART CARE   CARDIOLOGY PROGRESS NOTE     Chief Complaint   Patient presents with     RECHECK          Diagnosis:  1.  RIVAS 2/2 CHF, pulm htn, mild COPD, severe diffusion defect on 11/18/20, and DVT/PE???.  2.  CAD s/p CABG x3 on 4/29/2014-LIMA to LAD, SVG to OM, and second OM.  3.  CKD-3.  4.  Chronic systolic at 30-35% on 12/9/20 and diastolic heart failure grade 1 on 11/1/2007. Unchanged on 2/11/21.  5.  Ischemic cardiomyopathy.  6.  Moderate mitral and aorta valve stenosis on cardiac cath on 1/15/21.   7.  H/O DVT with h/o IVC filter.  8.  Hypomagnesia.  9.  Hypertension-controlled.  10.  Hypertriglyceridemia.  11.  H/O tobacco abuse quitting in 1978.  12.  H/O cardiac cath x3 on 7/17/2007, 4/20//14, 2019, and 1/15/21.  13.  H/O coronary stent placement on 7/17/2007 x2 stents to the LAD and in 2019 in [place] to unknown vessel.  14.  H/O sarcoidosis in 1982.  15.  DM-2 with A1c of 5.5% on 3/4/2021.   16.  Mild COPD on 11/18/2020.  17.  WMA on 12/9/20.  18.  Right lung nodule x2 on 2/12/2021.  19.  Concern for TAAA at 4.3 cm on 5/15/2021.  20.  Concern for AAA r/o on ultrasound from 2/12/2021.  21.  Mild pulmonary hypertension on his cardiac cath at the [facility] on 1/15/2021.      Assessment/Plan:    1.  He endorses exertional dyspnea with intrascapular pain.  This has been his anginal equivalent previously.  He states his symptoms were comparable to his previous anginal symptoms but to a lesser degree.  In light of his history of CABG x3 with stenting on 7/17/2007 and 2019, he was set up for a Cardiolite stress test.  Stress test was completed on 12/15/2020 with a large area of severe infarction in the entire apical and anterior septal segments.  Based on these findings, a cardiac catheterization was completed at the [facility] on 1/15/2021.  He was found to have significant disease but with appropriate flow to all vessels with history of CABG.  No stenting or intervention  performed.  He was found to have moderate both aortic and mitral insufficiency with elevated left ventricular pressures.  2.  Would consider increasing Coreg and lisinopril in the future with history of systolic heart failure.    3.  Reviewed the CTA of chest showing a stable aneurysm at 4.2 cm.    4.  Reviewed the ultrasound for AAA, found to have a 4.3 cm ascending aortic aneurysm.  5.  Ultrasound of carotids on 2/11/2021 which was deficient of stenosis.  6.  His echo shows a severely reduced EF at 30-35% with moderate aortic stenosis and an aneurysm at 4.3 cm.  7.  Reviewed his Zio patch.  8.  Continue cardiac rehab for severe systolic heart failure with an EF of 30 to 35% on 2/11/2020.  9.  Plan for low-dose dobutamine stress echo in [place] for possible severe AS.  10.  Follow-up after completion of this stress test.      Interval history:  Duane had a cardiac catheterization completed at the [facility].  He had a cardiac catheterization on 1/15/2021. He did have coronary disease but nothing that would require stenting.  It appears he has appropriate blood flow to all vessels.  He was found to have both moderate aortic and mitral stenosis.  He continues to be dyspneic on exertion.  We will plan for low-dose dobutamine stress echo in [place].  He was found to have moderately elevated left-sided heart pressures on his cath on 1/15/2021.  Based on his findings, Lasix was increased from 20 to 40 mg.  He does not have significant peripheral edema.  Chest x-ray showed effusion to the costophrenic angles.  Also, with his history of severely reduced EF potentially ischemia related or related sarcoidosis, suggested lisinopril and Coreg be increased.  He has been doing cardiac rehab for his severely reduced EF.  We reviewed testing as outlined above.  He continues to be dyspneic on exertion.    HPI:    Mr. Barriga is being seen by cardiology to establish care.  He has a history of coronary disease  with both stenting and CABG x3.  His wife passed away on 9/1/2020, suddenly from cancer.  He does not plan to go back to [place] and is establishing care locally.     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     Sidney is here to establish care.  He has a history of CAD with stenting and bypass. He previously obtained his cardiac care through [place].  He states that dating back in 1982, he was diagnosed with sarcoidosis.  More recently, he had a cardiac catheterization on 7/17/2007 with history of MI with stenting x2 placed to the p-mLAD.  He went on to have CABG x3 on 4/29/2014 in [place].  He had LIMA to LAD, SVG to OM1 and OM 2.  At that time, his EF was reported to be 30% with ischemic cardiomyopathy.  More recently, he reports having had a cardiac catheterization once again in [place] with stenting to unknown vessels in 2019.  His symptoms in the past have been exertional dyspnea with intrascapular discomfort.  He states he has been having similar symptoms for the last 6 months but to a lesser degree.  Risk factors include history of tobacco abuse at approximately a half a pack a day until 1978, hypertension, hyperlipidemia, DM-2, family history, and diet.     He reportedly has a history of ischemic cardiomyopathy.  EF on 8/12/2010 was 40%.  Recently, while in [place] at time of bypass on 4/29/2014, his EF was 30%.  His echo on 11/1/2017 showed an EF of 40-45% with grade 1 diastolic dysfunction.  There was evidence for wall motion normalities as well as mild left atrial enlargement.  He has not had any swelling to his legs, presently on Lasix 20 mg daily.  He  "states he does not have issues with fluid overload.     He has been on Coumadin for extended period time.  He describes having had multiple recurrent DVT's with a IVC filter in place.  He is currently on Coumadin with management through the Coumadin clinic.     He has a history of hyperlipidemia.  His most recent lipid panel was on 10/20/2020 showing a total cholesterol of 248 and LDL of 164.  HDL was 46.  He has not been able to tolerate Lipitor or Crestor in the past.  He has been on Zetia.  On 12/7/2020,  he was started on Crestor 20 mg twice a week.  If he is able to tolerate this dosage, would increase as able. He states he has been on \"every statin with issues with all of them\".     Patient is known to have mild COPD with history of tobacco abuse.  PFT's on 11/18/2020 support mild COPD with severe diffusion defect.      Relevant testing:  Zio on 3/1/21:  Underlying rhythm was sinus.  Hrt rate ranged from 50 bpm, maximum heart rate of 197 bmp, averaging 72 bmp.  No significant bradycardia, pauses, 2nd degree Mobitz type II or 3rd degree heart block.  No atrial fibrillation was identified on this study.  x0 triggered events and x0 diary entries.  x18 runs of VT lasting to 11 beats with a maximum heart rate of 197 bmp.    x25 runs of SVT lasting up to 15 beats with a maximum heart rate of 156 bmp.  Rare, less than 1% of PAC's, atrial couplets, atrial triplets, ventricular couplets and ventricular triplets.  Frequent PVCs at 15.9%.  + episodes of ventricular bigeminy lasting up to 18 min's and 9 sec's.  + episodes of ventricular trigeminy lasting up to 30 mins and 5 sec's.    US aorta on 2/11/21:  No abdominal aortic aneurysm.    US carotids on 2/11/21:  No hemodynamically significant stenoses are identified in either carotid bifurcation.    ECHO on 2/11/21:  No pericardial effusion is present.  Moderately (EF 30-35%) reduced left ventricular function is present.  The right ventricle is normal size.  Global right " ventricular function is normal.  Mild left atrial enlargement is present.  Mild mitral insufficiency is present.  Severe aortic valve calcification is present.  The peak aortic velocity is 3.02 m/sec.  The mean gradient across the aortic valve is18.4 mmHg.  Moderate aortic stenosis is present.  Right ventricular systolic pressure is 44mmHg above the right atrial pressure.  Mild tricuspid insufficiency is present.  Mild pulmonic insufficiency is present.  Ascending aorta 4.1 cm.  Sinuses of Valsalva 4.3 cm.    CTA chest on 2/11/21:  2 small nodules are identified in the right lung.  Follow-up CT scan in 6 months time is recommended. The ascending aorta measures 4.2 cm in maximal transverse diameter.    Cardiac cath on 1/15/21:    Right sided filling pressures are normal.    Mild elevated pulmonary hypertension.    Left sided filling pressures are moderately elevated.    Left ventricular filling pressures are moderately elevated.    Normal cardiac output level.    Moderate mitral valve stenosis (mean gradient 8.4 mm Hg, 2.2 cm2)    Moderate aortic valve stenosis (mean gradient 17 mm Hg, 1.2 cm2)    Native three vessel CAD    >>> Moderate 50% proximal RCA stenosis [ungrafted vessel]    >>> Mimimal LMCA stenosis    >>> 100% Ostial LAD stenosis [LIMA-LAD widely patent]    >>> Proximal 50% LCx and 90% RI stenoses [Sequential SVG-RI-OM patent with focal 60% ISR in jump segment of SVG]     Suggest medical treatment at this time.  Neither the aortic valve nor mitral valve require intervention at this time.  The proximal RCA stenosis is unlikely be physiologically significant. The LAD territory is infarcted but the LIMA-LAD remains widely patent.  There is 70% ISR of SVG in jump segment but the OM is receiving brisk DAVID-3 flow via the native coronary (only 50% proximal stenosis).    Cath on 7/17/2007:  1. Severe, single-vessel coronary artery disease. Chronic proximal-to-mild occlusion of the left anterior descending  coronary artery with distal right-to-left collaterals.  2. Cypher drug-eluting stenting of the [id number] segment of the left anterior descending.    Stress test on 12/15/20:     The nuclear stress test is abnormal.     There is a large area of a severe degree of infarction in the entire apical and anteroseptal segment(s) of the left ventricle.     Left ventricular function is moderately reduced.     The left ventricular ejection fraction at rest is 44%.  The left ventricular ejection fraction at stress is 35%.     There is no prior study for comparison.    ECHO on 12/9/20:  No pericardial effusion is present.  Left ventricular size is normal.  Anterior wall hypokinesis is present.  Apical wall hypokinesis is present.  Moderately (EF 30-35%) reduced left ventricular function is present.  The right ventricle is normal size.  Global right ventricular function is normal.  Mild mitral annular calcification is present.  Mild mitral insufficiency is present.  Moderate aortic valve calcification is present.  Trace aortic insufficiency is present.  The Aortic valve has significant calcification of valve leaflets with poor  mobility. Our measured values I feel underestimate the amount of stenosis. By  visual estimate would put at least moderate Aortic stenosis.  Trace tricuspid insufficiency is present.  Right ventricular systolic pressure is 6 mmHg above the right atrial pressure.  The peak aortic velocity is 2.57 m/sec.  The mean gradient across the aortic valve is15.5 mmHg.        ICD-10-CM    1. RIVAS (dyspnea on exertion)  R06.00 Echocardiogram Dobutamine Stress   2. Heart murmur  R01.1 Echocardiogram Dobutamine Stress   3. Moderate aortic stenosis  I35.0 Echocardiogram Dobutamine Stress   4. COPD, mild (H) on 11/18/2020  J44.9    5. Nodule of right lung on CT scan from 5/12/2009  R91.1    6. Pulmonary nodules  R91.8    7. Chronic combined systolic and diastolic congestive heart failure (H)  I50.42    8. Ischemic  cardiomyopathy  I25.5    9. Mild pulmonary hypertension (H)  I27.20    10. Nonrheumatic aortic valve stenosis  I35.0    11. History of tobacco abuse quitting in 1978  Z87.891    12. H/O cardiac catheterization x3 on 7/17/2007, 4/20/2014, and 2019  Z98.890    13. History of coronary artery stent placement on 7/17/2007 x2 stents to the LAD and in 2019 in [place] to unknown vessel  Z95.5    14. History of DVT (deep vein thrombosis)-multiple  Z86.718    15. Hx of sarcoidosis in 1982  Z86.2    16. 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, SVG to first OM and second OM  Z95.1    17. Status post coronary angiogram x4 on 7/17/2007, 4/20/2014, 2019, and 1/15/2021  Z98.890    18. Regional wall motion abnormality of heart  R93.1    19. Essential hypertension, benign  I10    20. Atherosclerosis of native coronary artery of native heart without angina pectoris  I25.10    21. Presence of IVC filter  Z95.828    22. Ventricular bigeminy  I49.8    23. Stage 3b chronic kidney disease  N18.32    24. Ascending aortic aneurysm (H)  I71.2    25. Type 2 diabetes mellitus with hyperglycemia, without long-term current use of insulin (H)  E11.65    26. Mixed hyperlipidemia  E78.2    27. Hypomagnesemia  E83.42    28. Hypertriglyceridemia  E78.1    29. Irregular heartbeat  I49.9    30. Frequent PVCs  I49.3    31. Moderate mitral stenosis on his cardiac cath on 1/15/2021  I05.0        Past Medical History:   Diagnosis Date     Acute thromboembolism of deep veins of lower extremity (H)     No Comments Provided     Benign essential hypertension     No Comments Provided     Coronary atherosclerosis     No Comments Provided     Osteoarthrosis     No Comments Provided     Other and unspecified hyperlipidemia     No Comments Provided     Other specified forms of chronic ischemic heart disease     No Comments Provided     Sarcoidosis     No Comments Provided     Type 2 or unspecified type diabetes mellitus     No Comments Provided       Past  Surgical History:   Procedure Laterality Date     ANGIOPLASTY  01/01/2019    stents     CARDIAC SURGERY  01/01/2014    heart bypass     COLONOSCOPY  01/01/2019     CV CORONARY ANGIOGRAM N/A 1/15/2021    Procedure: CV CORONARY ANGIOGRAM;  Surgeon: Charlie Harris MD;  Location: [facility] CARDIAC CATH LAB     CV LEFT HEART CATH N/A 1/15/2021    Procedure: Left Heart Cath;  Surgeon: Charlie Harris MD;  Location: [facility] CARDIAC CATH LAB     CV RIGHT HEART CATH MEASUREMENTS RECORDED N/A 1/15/2021    Procedure: CV RIGHT HEART CATH;  Surgeon: Charlie Harris MD;  Location:  HEART CARDIAC CATH LAB     OTHER SURGICAL HISTORY      [id number],BIOPSY LUNG OR [facility]       Allergies   Allergen Reactions     Simvastatin Itching     Isosorbide Visual Disturbance     LIGHT HEADED,ALMOST PASSED OUT         Current Outpatient Medications   Medication Sig Dispense Refill     albuterol (PROAIR HFA/PROVENTIL HFA/VENTOLIN HFA) 108 (90 Base) MCG/ACT inhaler Inhale 2 puffs into the lungs every 4 hours as needed for shortness of breath / dyspnea or wheezing 1 Inhaler 4     calcium carbonate 600 mg-vitamin D 400 units (CALCIUM 600 + D) 600-400 MG-UNIT per tablet Take 1 tablet by mouth 2 times daily       carvedilol (COREG) 6.25 MG tablet Take 1 tablet (6.25 mg) by mouth 2 times daily 180 tablet 3     CINNAMON PO Take 500 mg by mouth daily        clopidogrel (PLAVIX) 75 MG tablet Take 1 tablet (75 mg) by mouth daily 90 tablet 1     Coenzyme Q10 (CO Q-10) 200 MG CAPS Take 1 capsule by mouth daily       cyanocobalamin (VITAMIN B-12) 500 MCG SUBL sublingual tablet Place 500 mcg under the tongue daily       ezetimibe (ZETIA) 10 MG tablet Take 1 tablet (10 mg) by mouth daily 90 tablet 3     furosemide (LASIX) 20 MG tablet Take 1 tablet (20 mg) by mouth daily 90 tablet 3     lisinopril (ZESTRIL) 10 MG tablet Take 1 tablet (10 mg) by mouth daily 90 tablet 3     LUTEIN PO Take 1 tablet by mouth daily        nitroGLYcerin (NITROSTAT) 0.4 MG sublingual tablet For chest pain place 1 tablet under the tongue every 5 minutes for 3 doses. If symptoms persist 5 minutes after 1st dose call 911. 25 tablet 3     Omega-3 Fatty Acids (FISH OIL) 1200 MG capsule Take 1 capsule by mouth daily       rosuvastatin (CRESTOR) 20 MG tablet Take 1 tablet (20 mg) by mouth twice a week 30 tablet 3     vitamin C (ASCORBIC ACID) 500 MG tablet Take 1,000 mg by mouth daily       Vitamin D3 (CHOLECALCIFEROL) 125 MCG (5000 UT) tablet Take by mouth daily       warfarin ANTICOAGULANT (COUMADIN) 5 MG tablet Take 5 mg by mouth daily         Social History     Socioeconomic History     Marital status:      Spouse name: Not on file     Number of children: Not on file     Years of education: Not on file     Highest education level: Not on file   Occupational History     Not on file   Social Needs     Financial resource strain: Not on file     Food insecurity     Worry: Not on file     Inability: Not on file     Transportation needs     Medical: Not on file     Non-medical: Not on file   Tobacco Use     Smoking status: Former Smoker     Packs/day: 0.50     Years: 24.00     Pack years: 12.00     Start date: 1954     Quit date: 1978     Years since quittin.3     Smokeless tobacco: Never Used   Substance and Sexual Activity     Alcohol use: Yes     Alcohol/week: 5.0 standard drinks     Types: 3 Cans of beer, 2 Shots of liquor per week     Frequency: Monthly or less     Drug use: Unknown     Types: Other     Comment: Drug use: No     Sexual activity: Not on file   Lifestyle     Physical activity     Days per week: Not on file     Minutes per session: Not on file     Stress: Not on file   Relationships     Social connections     Talks on phone: Not on file     Gets together: Not on file     Attends Yazidi service: Not on file     Active member of club or organization: Not on file     Attends meetings of clubs or organizations: Not on file      Relationship status: Not on file     Intimate partner violence     Fear of current or ex partner: Not on file     Emotionally abused: Not on file     Physically abused: Not on file     Forced sexual activity: Not on file   Other Topics Concern     Not on file   Social History Narrative     Not on file       LAB RESULTS:   Orders Only on 01/21/2021   Component Date Value Ref Range Status     Sodium 01/21/2021 141  133 - 144 mmol/L Final     Potassium 01/21/2021 4.7  3.4 - 5.3 mmol/L Final     Chloride 01/21/2021 107  94 - 109 mmol/L Final     Carbon Dioxide 01/21/2021 29  20 - 32 mmol/L Final     Anion Gap 01/21/2021 5  3 - 14 mmol/L Final     Glucose 01/21/2021 125* 70 - 99 mg/dL Final     Urea Nitrogen 01/21/2021 26  7 - 30 mg/dL Final     Creatinine 01/21/2021 1.40* 0.66 - 1.25 mg/dL Final     GFR Estimate 01/21/2021 44* >60 mL/min/[1.73_m2] Final     GFR Estimate If Black 01/21/2021 51* >60 mL/min/[1.73_m2] Final     Calcium 01/21/2021 9.9  8.5 - 10.1 mg/dL Final     WBC 01/21/2021 6.7  4.0 - 11.0 10e9/L Final     RBC Count 01/21/2021 3.63* 4.4 - 5.9 10e12/L Final     Hemoglobin 01/21/2021 11.3* 13.3 - 17.7 g/dL Final     Hematocrit 01/21/2021 34.9* 40.0 - 53.0 % Final     MCV 01/21/2021 96  78 - 100 fl Final     MCH 01/21/2021 31.1  26.5 - 33.0 pg Final     MCHC 01/21/2021 32.4  31.5 - 36.5 g/dL Final     RDW 01/21/2021 12.2  10.0 - 15.0 % Final     Platelet Count 01/21/2021 163  150 - 450 10e9/L Final     INR Point of Care 01/21/2021 2.9* 0.86 - 1.14 Final     Capillary Blood Collection 01/21/2021 Capillary collection performed   Final   Office Visit on 01/12/2021   Component Date Value Ref Range Status     WBC 01/12/2021 6.4  4.0 - 11.0 10e9/L Final     RBC Count 01/12/2021 3.72* 4.4 - 5.9 10e12/L Final     Hemoglobin 01/12/2021 11.7* 13.3 - 17.7 g/dL Final     Hematocrit 01/12/2021 35.3* 40.0 - 53.0 % Final     MCV 01/12/2021 95  78 - 100 fl Final     MCH 01/12/2021 31.5  26.5 - 33.0 pg Final     MCHC  01/12/2021 33.1  31.5 - 36.5 g/dL Final     RDW 01/12/2021 12.2  10.0 - 15.0 % Final     Platelet Count 01/12/2021 162  150 - 450 10e9/L Final     % Neutrophils 01/12/2021 62.1  % Final     % Lymphocytes 01/12/2021 19.7  % Final     % Monocytes 01/12/2021 13.5  % Final     % Eosinophils 01/12/2021 4.2  % Final     % Basophils 01/12/2021 0.5  % Final     Absolute Neutrophil 01/12/2021 4.0  1.6 - 8.3 10e9/L Final     Absolute Lymphocytes 01/12/2021 1.3  0.8 - 5.3 10e9/L Final     Absolute Monocytes 01/12/2021 0.9  0.0 - 1.3 10e9/L Final     Absolute Eosinophils 01/12/2021 0.3  0.0 - 0.7 10e9/L Final     Absolute Basophils 01/12/2021 0.0  0.0 - 0.2 10e9/L Final     Diff Method 01/12/2021 Automated Method   Final     Sodium 01/12/2021 142  133 - 144 mmol/L Final     Potassium 01/12/2021 4.4  3.4 - 5.3 mmol/L Final     Chloride 01/12/2021 109  94 - 109 mmol/L Final     Carbon Dioxide 01/12/2021 29  20 - 32 mmol/L Final     Anion Gap 01/12/2021 4  3 - 14 mmol/L Final     Glucose 01/12/2021 100* 70 - 99 mg/dL Final     Urea Nitrogen 01/12/2021 28  7 - 30 mg/dL Final     Creatinine 01/12/2021 1.14  0.66 - 1.25 mg/dL Final     GFR Estimate 01/12/2021 57* >60 mL/min/[1.73_m2] Final     GFR Estimate If Black 01/12/2021 66  >60 mL/min/[1.73_m2] Final     Calcium 01/12/2021 9.6  8.5 - 10.1 mg/dL Final     INR Point of Care 01/12/2021 3.8* 0.86 - 1.14 Final   Office Visit on 01/11/2021   Component Date Value Ref Range Status     COVID-19 Virus PCR to [facility] - So* 01/11/2021 Nasopharyngeal   Final     COVID-19 Virus PCR to [facility] - Re* 01/11/2021 Not Detected   Final   Orders Only on 12/01/2020   Component Date Value Ref Range Status     INR 12/01/2020 2.65* 0.86 - 1.14 Final   Orders Only on 12/01/2020   Component Date Value Ref Range Status     WBC 12/01/2020 5.5  4.0 - 11.0 10e9/L Final     RBC Count 12/01/2020 3.98* 4.4 - 5.9 10e12/L Final     Hemoglobin 12/01/2020 12.5* 13.3 - 17.7 g/dL Final     Hematocrit 12/01/2020 38.4*  40.0 - 53.0 % Final     MCV 12/01/2020 97  78 - 100 fl Final     MCH 12/01/2020 31.4  26.5 - 33.0 pg Final     MCHC 12/01/2020 32.6  31.5 - 36.5 g/dL Final     RDW 12/01/2020 12.4  10.0 - 15.0 % Final     Platelet Count 12/01/2020 141* 150 - 450 10e9/L Final     Diff Method 12/01/2020 Automated Method   Final     % Neutrophils 12/01/2020 63.5  % Final     % Lymphocytes 12/01/2020 21.9  % Final     % Monocytes 12/01/2020 12.2  % Final     % Eosinophils 12/01/2020 1.8  % Final     % Basophils 12/01/2020 0.4  % Final     % Immature Granulocytes 12/01/2020 0.2  % Final     Nucleated RBCs 12/01/2020 0  0 /100 Final     Absolute Neutrophil 12/01/2020 3.5  1.6 - 8.3 10e9/L Final     Absolute Lymphocytes 12/01/2020 1.2  0.8 - 5.3 10e9/L Final     Absolute Monocytes 12/01/2020 0.7  0.0 - 1.3 10e9/L Final     Absolute Eosinophils 12/01/2020 0.1  0.0 - 0.7 10e9/L Final     Absolute Basophils 12/01/2020 0.0  0.0 - 0.2 10e9/L Final     Abs Immature Granulocytes 12/01/2020 0.0  0 - 0.4 10e9/L Final     Absolute Nucleated RBC 12/01/2020 0.0   Final        Review of systems: Negative except that which was noted in the HPI.    Physical examination:  BP 98/61 (BP Location: Left arm, Patient Position: Sitting, Cuff Size: Adult Regular)   Pulse 66   Temp 97.4  F (36.3  C) (Tympanic)   Wt 69 kg (152 lb 3.2 oz)   SpO2 100%   BMI 21.84 kg/m      GENERAL APPEARANCE: healthy, alert and no distress  HEENT: no icterus, no xanthelasmas, normal pupil size and reaction, no cyanosis.  NECK: no adenopathy, no asymmetry, masses.  CHEST: lungs clear to auscultation - no rales, rhonchi or wheezes, no use of accessory muscles, no retractions, respirations are unlabored, normal respiratory rate  CARDIOVASCULAR: regular rhythm, normal S1 with physiologic split S2, no S3 or S4 but with systolic crescendo decrescendo murmur, no click or rub  ABDOMEN: soft, non tender, bowel sounds normal  EXTREMITIES: no clubbing, cyanosis or edema  NEURO: alert and  oriented normal speech, and affect  VASC: No vascular bruits heard.  SKIN: no ecchymoses, no rashes        Thank you for allowing me to participate in the care of your patient. Please do not hesitate to contact me if you have any questions.     Justyn Rae, DO

## 2021-04-14 ENCOUNTER — HOSPITAL ENCOUNTER (OUTPATIENT)
Dept: CARDIAC REHAB | Facility: HOSPITAL | Age: 86
Setting detail: THERAPIES SERIES
End: 2021-04-14
Attending: INTERNAL MEDICINE
Payer: MEDICARE

## 2021-04-14 LAB
GLUCOSE BLDC GLUCOMTR-MCNC: 101 MG/DL (ref 70–99)
GLUCOSE BLDC GLUCOMTR-MCNC: 97 MG/DL (ref 70–99)

## 2021-04-14 PROCEDURE — 999N001017 HC STATISTIC GLUCOSE BY METER IP

## 2021-04-14 PROCEDURE — 93798 PHYS/QHP OP CAR RHAB W/ECG: CPT

## 2021-04-14 PROCEDURE — 999N000109 HC STATISTIC OP CR VISIT

## 2021-04-16 ENCOUNTER — HOSPITAL ENCOUNTER (OUTPATIENT)
Dept: CARDIAC REHAB | Facility: HOSPITAL | Age: 86
Setting detail: THERAPIES SERIES
End: 2021-04-16
Attending: INTERNAL MEDICINE
Payer: MEDICARE

## 2021-04-16 LAB
GLUCOSE BLDC GLUCOMTR-MCNC: 120 MG/DL (ref 70–99)
GLUCOSE BLDC GLUCOMTR-MCNC: 135 MG/DL (ref 70–99)

## 2021-04-16 PROCEDURE — 999N000109 HC STATISTIC OP CR VISIT: Mod: KX

## 2021-04-16 PROCEDURE — 999N001017 HC STATISTIC GLUCOSE BY METER IP

## 2021-04-16 PROCEDURE — 93798 PHYS/QHP OP CAR RHAB W/ECG: CPT | Mod: KX

## 2021-04-20 NOTE — PROGRESS NOTES
Place Shingle 1st injection and T-dap noted in immun. Reported. Patient reported. [facility] administered.

## 2021-04-21 ENCOUNTER — TELEPHONE (OUTPATIENT)
Dept: FAMILY MEDICINE | Facility: OTHER | Age: 86
End: 2021-04-21

## 2021-04-21 ENCOUNTER — HOSPITAL ENCOUNTER (OUTPATIENT)
Dept: CARDIAC REHAB | Facility: HOSPITAL | Age: 86
Setting detail: THERAPIES SERIES
End: 2021-04-21
Attending: INTERNAL MEDICINE
Payer: MEDICARE

## 2021-04-21 LAB
GLUCOSE BLDC GLUCOMTR-MCNC: 111 MG/DL (ref 70–99)
GLUCOSE BLDC GLUCOMTR-MCNC: 146 MG/DL (ref 70–99)

## 2021-04-21 PROCEDURE — 999N001017 HC STATISTIC GLUCOSE BY METER IP

## 2021-04-21 PROCEDURE — 999N000109 HC STATISTIC OP CR VISIT: Mod: KX

## 2021-04-21 PROCEDURE — 93798 PHYS/QHP OP CAR RHAB W/ECG: CPT | Mod: KX

## 2021-04-21 NOTE — TELEPHONE ENCOUNTER
2:07 PM    Reason for Call: Phone Call    Description: FYI pt called wanted to let Dr. Arredondo he received his Shingles shot, and Tetanus w/ whooping cough and diptheria [facility]. Call pt if any questions    Was an appointment offered for this call? No  If yes : Appointment type              Date    Preferred method for responding to this message: Telephone Call  What is your phone number ? [phone]    If we cannot reach you directly, may we leave a detailed response at the number you provided? Yes    Can this message wait until your PCP/provider returns, if available today? YES    Aedla Alejandra

## 2021-04-23 ENCOUNTER — HOSPITAL ENCOUNTER (OUTPATIENT)
Dept: CARDIAC REHAB | Facility: HOSPITAL | Age: 86
Setting detail: THERAPIES SERIES
End: 2021-04-23
Attending: INTERNAL MEDICINE
Payer: MEDICARE

## 2021-04-23 ENCOUNTER — TELEPHONE (OUTPATIENT)
Dept: FAMILY MEDICINE | Facility: OTHER | Age: 86
End: 2021-04-23

## 2021-04-23 DIAGNOSIS — I10 ESSENTIAL HYPERTENSION, BENIGN: ICD-10-CM

## 2021-04-23 DIAGNOSIS — I10 ESSENTIAL HYPERTENSION: ICD-10-CM

## 2021-04-23 DIAGNOSIS — I25.5 ISCHEMIC CARDIOMYOPATHY: ICD-10-CM

## 2021-04-23 DIAGNOSIS — I50.42 CHRONIC COMBINED SYSTOLIC AND DIASTOLIC CONGESTIVE HEART FAILURE (H): ICD-10-CM

## 2021-04-23 LAB
GLUCOSE BLDC GLUCOMTR-MCNC: 132 MG/DL (ref 70–99)
GLUCOSE BLDC GLUCOMTR-MCNC: 148 MG/DL (ref 70–99)

## 2021-04-23 PROCEDURE — 999N001017 HC STATISTIC GLUCOSE BY METER IP

## 2021-04-23 PROCEDURE — 93798 PHYS/QHP OP CAR RHAB W/ECG: CPT

## 2021-04-23 PROCEDURE — 999N000109 HC STATISTIC OP CR VISIT: Mod: KX

## 2021-04-23 RX ORDER — CARVEDILOL 3.12 MG/1
3.12 TABLET ORAL 2 TIMES DAILY
Qty: 180 TABLET | Refills: 3
Start: 2021-04-23 | End: 2021-04-29

## 2021-04-23 NOTE — TELEPHONE ENCOUNTER
4/23/21:     Pt arrives to Phase II rehab asymptomatic but staff notes blood pressure at rest to be 84/42. Pt takes medications as prescribed and denies experiencing symptoms outside of Phase II rehab. Blood pressure increased to 104/48 during exercise and decreased to 100/60 upon discharge.     Please advise.    Ck

## 2021-04-26 VITALS — BODY MASS INDEX: 22.76 KG/M2 | WEIGHT: 159 LBS | HEIGHT: 70 IN

## 2021-04-26 ASSESSMENT — 6 MINUTE WALK TEST (6MWT)
PREDICTED: 1544.35
MALE CALC: 1534.99
TOTAL DISTANCE WALKED (FT): 940
FEMALE CALC: 1207.08
GENDER SELECTION: MALE

## 2021-04-26 ASSESSMENT — MIFFLIN-ST. JEOR: SCORE: 1392.47

## 2021-04-26 NOTE — PROGRESS NOTES
04/26/21 1200   Session   Session 90 Day Individualized Treatment Plan   Certified through this date 05/25/21   Cardiac Rehab Assessment   Cardiac Rehab Assessment 3/29: Pt completes 24 sessions of phase II rehab and will be discharged at nest session. Overall, pt has been maintaining his strength and endurance by attending phase II rehab 2 days/wk. He reaches 5.1 METs with appropriate hemodynamic response. Pt's blood pressure reading have been lower both pre and post exercise. Staff contacted cardiology who decreased pt's carvedilol from 6.25 mg to 3.125 mg twice daily. Pt is aware of change. Staff continues to monitor pt's blood sugar values even though pt was able to discontinue taking metformin. Blood sugar values have remained appropriate. Skilled services are necessary to monitor CV response and educate on personal risk factors.       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   Lead up symptoms 2/4: Pt noticed an increase of SOB, associated with intrascapular pain with exertion. Pt states the SOB has been ongoing with sarcoidosis diagnosis in 1982, pt unsure if symptoms is associated with pulmonary disease.   Hospital Location [facility]   Hospital Discharge Date 01/15/21   Signs and Symptoms Post Hospital Discharge SOB  (Continuation from prior event)   Comments 4/26: Staff sent message to PCP and cardiologist regarding pt's low BP. Dr. Rae decreased pt's carvedilol from 6.25 mg twice daily to 3.125 mg twice daily.    Outpatient Cardiac Rehab Start Date 02/04/21   Primary Physician Dr. Arredondo   Primary Physician Follow Up Advised to schedule appointment   Specialist Dr. Charlie Harris  "  Specialist Follow Up NA   Cardiologist Dr. Rae   Cardiologist Follow Up Completed   Ejection Fraction 34-44% %   Risk Stratification Low   Summary of Cath Report   Summary of Cath Report No information available   Living and Work Status    Living Arrangements and Social Status house;other relative  (Daughter)   Support System Live with an adult;Check-in help as needed   Return to Employment Retired   Occupation  worker in the mine; Owner of [employer]   Preventative Medications   CMS recommended medications Anticoagulants;Beta Blocker;Lipid Lowering;Ace inhibitors   Fall Risk Screen   Fall screen completed by Cardiac Rehab   Have you fallen 2 or more times in the past year? No   Have you fallen and had an injury in the past year? No   Timed Up and Go score (seconds) NA   Is patient a fall risk? No   Fall screen comments 4/26: Pt has not had any recent falls and is not concerned about falling at this time. Staff will continue to monitor and assess pt.   Abuse Screen (yes response referral indicated)   Feels Unsafe at Home or Work/School no   Feels Threatened by Someone no   Does Anyone Try to Keep You From Having Contact with Others or Doing Things Outside Your Home? no   Physical Signs of Abuse Present no   Pain   Patient Currently in Pain No   Physical Assessments   Incisions Not assessed   Edema Not assessed   Right Lung Sounds not assessed   Left Lung Sounds not assessed   Limitations Arthritis   Comments 4/26: Patient has not been limited by his arthritis thus far in Phase II CR.    Individualized Treatment Plan   Monitored Sessions Scheduled 25   Monitored Sessions Attended 24   Oxygen   Supplemental Oxygen needed No   Nutrition Management - Weight Management   Assessment Re-assessment   Age 88   Weight 72.1 kg (159 lb)   Height 1.778 m (5' 10\")   BMI (Calculated) 22.81   Initial Rate Your Plate Score. Dietary tool to assess eating patterns. Scores range from 24 to 72. The higher the score " "the healthier the eating pattern. 49   Weight Management Comments 4/26: Pt continues to be content with his stable weight since initial evaluation.   Nutrition Management - Lipids   Lipids Labs Available   Date 10/20/20   Total Cholesterol 240   Triglycerides 149   HDL 46      Prescribed Lipid Medication Yes;Intolerant   Statin Intensity High Intensity   Lipid Comments 4/26: Pt is intolerant to statin medications and has been prescribed Zetia. Currently, cardiology has added rosuvastatin to 2x weekly. Cardiology will continue to monitor their symptoms and adjust as needed.    Nutrition Management - Diabetes   Diabetes Type II   Do you Monitor BS at Home? Yes   Diabetes Medication Prescribed Yes, Oral Medication;Other (see comments)  (Pt was able to discontinue metformin.)   Diabetes Comments 4/26: Staff continues to check pt's blood sugar values both pre and post exercise. Pt continues to manage his blood sugars well.   Nutrition Management Summary   Dietary Recommendations Low Fat;Low Cholesterol;Low Sodium   Stages of Change for Diet Compliance Contemplation   Interventions Planned Instruct on Label Reading;Educate about Signs/Symptoms and Treatment of Hypo/Hyperglycemia   Interventions In Progress or Completed Other (see comments)  (Patient has been given education regarding Nutrition. )   Nutrition Summary Comments 3/29:  Pt is content with current diet but is aware that they are not living a complete \"heart healthy\" lifestyle. Staff has provided educational material on nutrition.   Nutrition Target Outcome BMI < 25   Psychosocial Management   Psychosocial Assessment Re-assessment   Is there history of clinical depression or increased risk of depression? No previous history   Current Level of Stress per Patient Report Denies   Current Coping Skills Has Positive Support System   Initial Patient Health Questionnaire -9 Score (PHQ-9) for depression. 5-9 Minimal symptoms, 10-14 Minor depression, 15-19 Major " depression, moderately severe, > 20 Major depression, severe  4   Initial [facility] Survey score.  Quality of Life:   If total score > 25 review individual areas where patient rated a 4 or 5.  Consider patients current medical condition and what role that plays on the score.   Adjust treatment protocol to improve areas of concern.  Consider the following:  PHQ9 score, DASI, and re-assessment within the next 30 days to assist with developing treatments.  23   Stages of Change Action   Interventions Planned Patient denies need for intervention at this time.   Patient Goal No   Psychosocial Comments 4/26: Pt does not need further assessment regarding psychosocial components.   Psychosocial Target Outcome Identify absence or presence of depression using valid screening tool   Other Core Components - Hypertension   History of or Diagnosis of Hypertension Yes   Currently taking Anti-Hypertensives Yes;Beta blocker;Ace Inhibitor   Hypertension Comments 4/26: Cardiology decreased pt's carvedilol fron 6.25 to 3.125 mg twice daily.   Other Core Components - Tobacco   History of Tobacco Use Yes   Quit Date or Planned Quit Date 01/01/78   Tobacco Use Status Former (Quit > 6 mo ago)   Tobacco Habit Cigarettes   Tobacco Use per Day (average) .5   Years of Tobacco Use 24   Stages of Change Maintenance   Tobacco Comments 4/26: Patient continues to live a smoke-free lifestyle.    Other Core Components Summary   Interventions Planned None   Patient Goals No   Other Core Components Comments 4/26: Pt chose not to set cany goals pertaining to core components.   Other Core Components Target Outcome BP < 140/90 or < 130/80 with DM or CKD   Activity/Exercise History   Activity/Exercise Assessment Re-assessment   Activity/Exercise Status prior to event? Sedentary   Number of Days Currently participating in Moderate Physical Activity? 2   Number of Days Currently performing  Aerobic Exercise (including rehab)? 2   Number of Minutes per  Session Currently of Aerobic Exercise (average)? 37   Current Stage of Change (Physical Activity) Action   Current Stage of Change (Aerobic Exercise) Action   Activity/Exercise Comments 4/26: Pt continues to attend phase II rehab 2 days/wk reaching 5.1 METs.   Activity/Exercise Target Outcome An Accumulation of 150  Minutes of Aerobic Activity per Week   Exercise Assessment   6 Minute Walk Predicted - Gender Selection Male   6 Minute Walk Predicted (Male) 1534.99   6 Minute Walk Predicted (Female) 1207.08   Initial 6 Minute Walk Distance (Feet) 940 ft   Resting HR 70 bpm   Exercise HR 98 bpm   Post Exercise HR 71 bpm   Resting BP 84/42   Exercise /48   Post Exercise /60   Pre  mg/dL   Post  mg/dL   Effort Rating 4-5   Current MET Level 5.1   MET Level Goal 5   ECG Rhythm Sinus arrhythmia   Ectopy PVCs;Other (Comments)  (Staff notes regular episodes of bigeminy started 2/17/21.)   Current Symptoms Denies symptoms   Limitations/Restrictions Other (see comments)  (Arthritic)   Exercise Prescription   Mode Nustep;Recumbant bike;Arm Ergometer;Weights;Calisthenics;Ambulation;Upright bike   Duration/Time 30-45 min;Intermittent bouts   Frequency 2 days/week   THR (85% of age predicted max HR) 112.2   OMNI Effort Rating (0-10 Scale) 4-6/10   Progression Total exercise time of 30-45 minutes;Continuous bouts;Aerobic exercise to OMNI rating of 5-7, and heart rate at or below target   Comments 4/26: Pt increases his workloads as tolerated during phase II rehab. He is encouraged to start his HEP.   Recommended Home Exercise   Type of Exercise Walking;Weights   Frequency (days per week) 2   Duration (minutes per session) Intermittent;15-30 min   Effort Rating Recommended 4-6/10   30 Day Exercise Plan 4/26: Patient has not started HEP to date.    Current Home Exercise   Type of Exercise None   Follow-up/On-going Support   Provider follow-up needed on the following No follow-up needed   Learning Assessment    Learner Patient   Primary Language English   Preferred Learning Style Listening;Reading;Demonstration;Pictures/Video   Barriers to Learning No barriers noted   Patient Education   Title of Program Cardiac Rehab Education   Education recommended Anatomy and Physiology of the Heart;Blood Pressure;Diabetes;Exercise Principles;Heart Failure;Medication Overview;Nutrition;Risk Factors;Stress Management   Education classes attended Anatomy and Physiology of the Heart;Blood Pressure;Diabetes;Exercise Principles;Heart Failure;Medication Overview;Nutrition;Stress Management   Education Comments 4/26: Currently, staff has been providing pt with educational material based on personal risk factors to review. Staff offers pt the ability to discuss questions or concerns pertaining to the educational topics.    Physician cosignature/electronic signature indicates approval of this ITP document. I have established, reviewed and made necessary changes to the individualized treatment plan and exercise prescription for this patient.

## 2021-04-28 ENCOUNTER — HOSPITAL ENCOUNTER (OUTPATIENT)
Dept: CARDIAC REHAB | Facility: HOSPITAL | Age: 86
Setting detail: THERAPIES SERIES
End: 2021-04-28
Attending: INTERNAL MEDICINE
Payer: MEDICARE

## 2021-04-28 ENCOUNTER — TELEPHONE (OUTPATIENT)
Dept: CARDIOLOGY | Facility: OTHER | Age: 86
End: 2021-04-28

## 2021-04-28 VITALS — WEIGHT: 161 LBS | BODY MASS INDEX: 23.05 KG/M2 | HEIGHT: 70 IN

## 2021-04-28 LAB
GLUCOSE BLDC GLUCOMTR-MCNC: 102 MG/DL (ref 70–99)
GLUCOSE BLDC GLUCOMTR-MCNC: 147 MG/DL (ref 70–99)

## 2021-04-28 PROCEDURE — 93798 PHYS/QHP OP CAR RHAB W/ECG: CPT | Mod: KX

## 2021-04-28 PROCEDURE — 999N001017 HC STATISTIC GLUCOSE BY METER IP

## 2021-04-28 PROCEDURE — 999N000109 HC STATISTIC OP CR VISIT

## 2021-04-28 ASSESSMENT — 6 MINUTE WALK TEST (6MWT)
TOTAL DISTANCE WALKED (FT): 955
MALE CALC: 1513.28
GENDER SELECTION: MALE
FEMALE CALC: 1181.3
PREDICTED: 1522.51

## 2021-04-28 ASSESSMENT — MIFFLIN-ST. JEOR: SCORE: 1401.54

## 2021-04-28 NOTE — TELEPHONE ENCOUNTER
Patient called back and he states he does not have new prescription, but due to the fact he has a lot of the 6.25 mg tabs he is just going to take one in the AM.

## 2021-04-28 NOTE — PROGRESS NOTES
04/28/21 1000   Session   Session Discharge Note   Certified through this date 05/08/21   Cardiac Rehab Assessment   Cardiac Rehab Assessment 4/28: Pt completes 25 sessions of phase II rehab and has been provided all educational material. Overall, pt did well reaching 5.2 METs an increase of 2.3 METs when compared to his 3rd session. All survey scores improved from pre to post. Pt has discontinued metformin and blood sugar values have remained in range. Pt will continue to check daily post discharge. Staff has notified cardiology of low blood pressure readings, asymptomatic, and is making a medication change to Coreg. Pt will be calling today to discuss after discharge. Pt's EF was shown to be reduced to 30-35% during his last echo on 2/11/2021. Pt increased his 6MWT by 15 ft and feels cardiac rehab has made a huge difference in his strength. Pt plans to join the WEL Program at [facility] and staff will contact PCP for referral.   Pt made significant gains in exercise tolerance. Initially patient tolerated 30 minutes at 2.9 METs, now tolerating 37 minutes at 5.2 METs. Patient also increased 6-minute walk test by 2% (an increase of 15 feet.) The PT was given instructions on frequency (3-5 days/wk), intensity (4-6/10), and duration (30-45 minutes intermittently) for continued exercise as well as muscle conditioning and stretching exercises.  Your PT plans to join the WEL Program at [facility].    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   Lead up symptoms 2/4: Pt noticed an increase of SOB, associated with intrascapular pain with exertion. 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   Hospital [facility]   Hospital Discharge Date 01/15/21   Signs and Symptoms Post Hospital Discharge SOB  (Continuation from prior event)   Comments 4/28: Pt's BP have been running low. Staff contacted cardiology who made a med change. Pt is going to contact cardiology to discuss med change.   Outpatient Cardiac Rehab Start Date 02/04/21   Primary Physician Dr. Arredondo   Primary Physician Follow Up Advised to schedule appointment   Specialist Dr. Charlie Harris   Specialist Follow Up NA   Cardiologist Dr. Rae   Cardiologist Follow Up Completed   Ejection Fraction 30-35%  (2/11/2021)   Risk Stratification High   Summary of Cath Report   Summary of Cath Report No information available   Living and Work Status    Living Arrangements and Social Status house;other relative  (Daughter)   Support System Live with an adult;Check-in help as needed   Return to Employment Retired   Occupation  worker in the mine; Owner of [employer]   Preventative Medications   CMS recommended medications Anticoagulants;Beta Blocker;Lipid Lowering;Ace inhibitors   Fall Risk Screen   Fall screen completed by Cardiac Rehab   Have you fallen 2 or more times in the past year? No   Have you fallen and had an injury in the past year? No   Timed Up and Go score (seconds) NA   Is patient a fall risk? No   Fall screen comments 4/28: Pt has not experienced any falls during phase II rehab.   Abuse Screen (yes response referral indicated)   Feels Unsafe at Home or Work/School no   Feels Threatened by Someone no   Does Anyone Try to Keep You From Having Contact with Others or Doing Things Outside Your Home? no   Physical Signs of Abuse Present no   Pain   Patient Currently in Pain No   Physical Assessments   Incisions Not assessed   Edema Not assessed   Right Lung Sounds not assessed   Left Lung Sounds not assessed  "  Limitations Arthritis   Comments 4/28: Patient has not been limited by his arthritis thus far in Phase II CR.    Individualized Treatment Plan   Monitored Sessions Scheduled 25   Monitored Sessions Attended 25   Oxygen   Supplemental Oxygen needed No   Nutrition Management - Weight Management   Assessment Discharge   Age 89   Weight 73 kg (161 lb)   Height 1.778 m (5' 10\")   BMI (Calculated) 23.1   Initial Rate Your Plate Score. Dietary tool to assess eating patterns. Scores range from 24 to 72. The higher the score the healthier the eating pattern. 49   Discharge Rate Your Plate Score 51   Weight Management Comments 4/28: Pt is discharged from phase II rehab satisfied with his current weight.   Nutrition Management - Lipids   Lipids Labs Available   Date 10/20/20   Total Cholesterol 240   Triglycerides 149   HDL 46      Prescribed Lipid Medication Yes;Intolerant   Statin Intensity High Intensity   Lipid Comments 4/28: Pt continues to take medications as prescribed.   Nutrition Management - Diabetes   Diabetes Type II   Do you Monitor BS at Home? Yes   Diabetes Medication Prescribed Yes, Oral Medication;Other (see comments)  (Pt was able to discontinue metformin.)   Diabetes Comments 4/28: Pt will continue to monitor his blood sugar values daily following his phase II rehab discharge.   Nutrition Management Summary   Dietary Recommendations Low Fat;Low Cholesterol;Low Sodium   Stages of Change for Diet Compliance Contemplation   Interventions Planned Instruct on Label Reading;Educate about Signs/Symptoms and Treatment of Hypo/Hyperglycemia   Interventions In Progress or Completed Other (see comments);Recognizes Signs and Symptoms of Hypo/Hyperglycemia;Understands how to treat Hypo/Hyperglycemia  (Patient has been given education regarding Nutrition. )   Nutrition Summary Comments 4/28:  Pt is content with current diet but is aware that they are not living a complete \"heart healthy\" lifestyle. Staff has " provided educational material on nutrition.   Nutrition Target Outcome BMI < 25   Psychosocial Management   Psychosocial Assessment Discharge   Is there history of clinical depression or increased risk of depression? No previous history   Current Level of Stress per Patient Report Denies   Current Coping Skills Has Positive Support System   Initial Patient Health Questionnaire -9 Score (PHQ-9) for depression. 5-9 Minimal symptoms, 10-14 Minor depression, 15-19 Major depression, moderately severe, > 20 Major depression, severe  4   Discharge PHQ-9 Score for Depression 3   Initial [place] CO[employer] Survey score.  Quality of Life:   If total score > 25 review individual areas where patient rated a 4 or 5.  Consider patients current medical condition and what role that plays on the score.   Adjust treatment protocol to improve areas of concern.  Consider the following:  PHQ9 score, DASI, and re-assessment within the next 30 days to assist with developing treatments.  23   Reassessment [place] CO[employer] Survey Score 21   Stages of Change Action   Interventions Planned Patient denies need for intervention at this time.   Patient Goal No   Psychosocial Comments 4/28: Pt's [place] COOP and PHQ-9 surveys both increased during discharge session.    Psychosocial Target Outcome Identify absence or presence of depression using valid screening tool   Other Core Components - Hypertension   History of or Diagnosis of Hypertension Yes   Currently taking Anti-Hypertensives Yes;Beta blocker;Ace Inhibitor   Hypertension Comments 4/26: Cardiology decreased pt's carvedilol fron 6.25 to 3.125 mg twice daily. Pt was not notfied of change directly, so pt will call cardiology directly.   Other Core Components - Tobacco   History of Tobacco Use Yes   Quit Date or Planned Quit Date 01/01/78   Tobacco Use Status Former (Quit > 6 mo ago)   Tobacco Habit Cigarettes   Tobacco Use per Day (average) .5   Years of Tobacco Use 24   Stages of Change  Maintenance   Tobacco Comments 4/28: Patient continues to live a smoke-free lifestyle.    Other Core Components Summary   Interventions Planned None   Patient Goals No   Other Core Components Comments 4/28: Pt chose not to set any goals pertaining to core components.   Other Core Components Target Outcome BP < 140/90 or < 130/80 with DM or CKD   Activity/Exercise History   Activity/Exercise Assessment Discharge   Activity/Exercise Status prior to event? Sedentary   Number of Days Currently participating in Moderate Physical Activity? 2   Number of Days Currently performing  Aerobic Exercise (including rehab)? 2   Number of Minutes per Session Currently of Aerobic Exercise (average)? 37   Current Stage of Change (Physical Activity) Action   Current Stage of Change (Aerobic Exercise) Action   Activity/Exercise Comments 4/28: Pt is discharged from phase II rehab reaching on overall MET level of 5.2. He will continue to aerobically exercise by joining the WEL program at [facility]. Staff to contact pcp for referral.   Activity/Exercise Target Outcome An Accumulation of 150  Minutes of Aerobic Activity per Week   Exercise Assessment   6 Minute Walk Predicted - Gender Selection Male   6 Minute Walk Predicted (Male) 1513.28   6 Minute Walk Predicted (Female) 1181.3   Initial 6 Minute Walk Distance (Feet) 955 ft   Resting HR 70 bpm   Exercise HR 98 bpm   Post Exercise HR 74 bpm   Resting BP 75/46   Exercise /50   Post Exercise BP 86/52   Pre SpO2 100   While Exercising SpO2 98   Post SpO2 99   Pre  mg/dL   Post  mg/dL   Effort Rating 5-7   Current MET Level 5.2   MET Level Goal 5   ECG Rhythm Sinus arrhythmia   Ectopy PVCs;Other (Comments)  (Staff notes regular episodes of bigeminy started 2/17/21.)   Current Symptoms Denies symptoms   Limitations/Restrictions Other (see comments)  (Arthritic)   Exercise Prescription   Mode Nustep;Recumbant bike;Arm Ergometer;Weights;Calisthenics;Ambulation;Upright  bike   Duration/Time 30-45 min;Intermittent bouts   Frequency 2 days/week   THR (85% of age predicted max HR) 111.35   OMNI Effort Rating (0-10 Scale) 4-6/10   Progression Total exercise time of 30-45 minutes;Continuous bouts;Aerobic exercise to OMNI rating of 5-7, and heart rate at or below target   Comments 4/28: Pt reaches 5.2 METs during phase II rehab and will be joining the [facility] program at [facility].   Recommended Home Exercise   Type of Exercise Walking;Weights   Frequency (days per week) 2   Duration (minutes per session) Intermittent;15-30 min   Effort Rating Recommended 4-6/10   30 Day Exercise Plan 4/28: Pt will be joining the [facility] program at [facility] for continuation of exercise.   Current Home Exercise   Type of Exercise None   Follow-up/On-going Support   Provider follow-up needed on the following No follow-up needed   Learning Assessment   Learner Patient   Primary Language English   Preferred Learning Style Listening;Reading;Demonstration;Pictures/Video   Barriers to Learning No barriers noted   Patient Education   Title of Program Cardiac Rehab Education   Education recommended Anatomy and Physiology of the Heart;Blood Pressure;Diabetes;Exercise Principles;Heart Failure;Medication Overview;Nutrition;Risk Factors;Stress Management   Education classes attended Anatomy and Physiology of the Heart;Blood Pressure;Diabetes;Exercise Principles;Heart Failure;Medication Overview;Nutrition;Stress Management   Education Comments 4/28: Currently, staff has been providing pt with educational material based on personal risk factors to review. Staff offers pt the ability to discuss questions or concerns pertaining to the educational topics.    Physician cosignature/electronic signature indicates approval of this ITP document. I have established, reviewed and made necessary changes to the individualized treatment plan and exercise prescription for this patient.

## 2021-04-28 NOTE — TELEPHONE ENCOUNTER
Patient called today and stated Rehab stated he should reach out due to B/P being low when he is at Rehab. Upon speaking to patient he has been taking Coreg at 6.25 mg twice daily and should be taking 3.125 mg twice daily. Patient was advised if he has taken coreg at 6.25 mg today that all he should take for today, and start taking the 6.25 mg once daily if this is what he has. Patient agreed to plan. He is aware there is a new script for the 3.125 mg.

## 2021-04-29 RX ORDER — CARVEDILOL 3.12 MG/1
3.12 TABLET ORAL 2 TIMES DAILY
Qty: 180 TABLET | Refills: 3 | Status: SHIPPED | OUTPATIENT
Start: 2021-04-29 | End: 2022-03-01 | Stop reason: DRUGHIGH

## 2021-04-29 NOTE — TELEPHONE ENCOUNTER
Patient advised to start taking the 3.125 twice daily, patient does have the 6.25 tab and advised to ask pharmacist if he can cut them in half. Patient agreed to plan. Patient is now aware of what he needs to be taking on a daily basis and this may help improve those B/P's that are low in the AM before rehab.

## 2021-04-30 ENCOUNTER — TELEPHONE (OUTPATIENT)
Dept: FAMILY MEDICINE | Facility: OTHER | Age: 86
End: 2021-04-30

## 2021-04-30 NOTE — TELEPHONE ENCOUNTER
4/30: Pt completes Phase II cardiac rehab and is interested to attend the WEL program offered at [facility]. Pt would like to remain physically active by joining the program and will be monitored by rehab staff.     Pt is interested for PCP to order the WEL program.     Ck

## 2021-05-24 ENCOUNTER — ANTICOAGULATION THERAPY VISIT (OUTPATIENT)
Dept: ANTICOAGULATION | Facility: OTHER | Age: 86
End: 2021-05-24
Attending: FAMILY MEDICINE
Payer: MEDICARE

## 2021-05-24 DIAGNOSIS — I82.5Y3 CHRONIC DEEP VEIN THROMBOSIS (DVT) OF PROXIMAL VEIN OF BOTH LOWER EXTREMITIES (H): ICD-10-CM

## 2021-05-24 DIAGNOSIS — D69.6 THROMBOCYTOPENIA (H): Primary | ICD-10-CM

## 2021-05-24 DIAGNOSIS — D69.6 THROMBOCYTOPENIA (H): ICD-10-CM

## 2021-05-24 LAB
BASOPHILS # BLD AUTO: 0 10E9/L (ref 0–0.2)
BASOPHILS NFR BLD AUTO: 0.6 %
DIFFERENTIAL METHOD BLD: ABNORMAL
EOSINOPHIL # BLD AUTO: 0.2 10E9/L (ref 0–0.7)
EOSINOPHIL NFR BLD AUTO: 3 %
ERYTHROCYTE [DISTWIDTH] IN BLOOD BY AUTOMATED COUNT: 13.1 % (ref 10–15)
HCT VFR BLD AUTO: 38.3 % (ref 40–53)
HGB BLD-MCNC: 12.1 G/DL (ref 13.3–17.7)
IMM GRANULOCYTES # BLD: 0 10E9/L (ref 0–0.4)
IMM GRANULOCYTES NFR BLD: 0.2 %
INR BLD: 1.6 (ref 0.86–1.14)
LYMPHOCYTES # BLD AUTO: 1 10E9/L (ref 0.8–5.3)
LYMPHOCYTES NFR BLD AUTO: 19.5 %
MCH RBC QN AUTO: 30.5 PG (ref 26.5–33)
MCHC RBC AUTO-ENTMCNC: 31.6 G/DL (ref 31.5–36.5)
MCV RBC AUTO: 97 FL (ref 78–100)
MONOCYTES # BLD AUTO: 0.5 10E9/L (ref 0–1.3)
MONOCYTES NFR BLD AUTO: 9.9 %
NEUTROPHILS # BLD AUTO: 3.3 10E9/L (ref 1.6–8.3)
NEUTROPHILS NFR BLD AUTO: 66.8 %
NRBC # BLD AUTO: 0 10*3/UL
NRBC BLD AUTO-RTO: 0 /100
PLATELET # BLD AUTO: 106 10E9/L (ref 150–450)
RBC # BLD AUTO: 3.97 10E12/L (ref 4.4–5.9)
WBC # BLD AUTO: 5 10E9/L (ref 4–11)

## 2021-05-24 PROCEDURE — 85025 COMPLETE CBC W/AUTO DIFF WBC: CPT | Mod: ZL | Performed by: FAMILY MEDICINE

## 2021-05-24 PROCEDURE — 85610 PROTHROMBIN TIME: CPT | Mod: ZL | Performed by: FAMILY MEDICINE

## 2021-05-24 PROCEDURE — 36415 COLL VENOUS BLD VENIPUNCTURE: CPT | Mod: ZL | Performed by: FAMILY MEDICINE

## 2021-05-24 NOTE — PROGRESS NOTES
ANTICOAGULATION FOLLOW-UP CLINIC VISIT    Patient Name:  Sidney Barriga  Date:  2021  Contact Type:  Telephone    SUBJECTIVE:  Patient Findings     Positives:  Missed doses    Comments:  INR done by lab. Call placed to patient and spoke with him re: INR results/Warfarin dosing/INR recheck date. INR sub-therapeutic today- 1.6. He stated that he had forgotten to take all of his medications one day last week, but took a half dose of his Warfarin the next day. A 1x dose increase was given to today's dose and after today he is to continue on his maintenance dose. He verbalized understanding of all instructions. He is to contact Warfarin Clinic with any unusual bleeding/bruising or any other new changes to his diet/meds/activity or any questions/issues/illness.         Clinical Outcomes     Negatives:  Major bleeding event, Thromboembolic event, Anticoagulation-related hospital admission, Anticoagulation-related ED visit, Anticoagulation-related fatality    Comments:  INR done by lab. Call placed to patient and spoke with him re: INR results/Warfarin dosing/INR recheck date. INR sub-therapeutic today- 1.6. He stated that he had forgotten to take all of his medications one day last week, but took a half dose of his Warfarin the next day. A 1x dose increase was given to today's dose and after today he is to continue on his maintenance dose. He verbalized understanding of all instructions. He is to contact Warfarin Clinic with any unusual bleeding/bruising or any other new changes to his diet/meds/activity or any questions/issues/illness.            OBJECTIVE    Recent labs: (last 7 days)     21  0926   INR 1.6*       ASSESSMENT / PLAN  No question data found.  Anticoagulation Summary  As of 2021    INR goal:  2.0-3.0   TTR:  78.2 % (7 mo)   INR used for dosin.6 (2021)   Warfarin maintenance plan:  5 mg (5 mg x 1) every day   Full warfarin instructions:  : 10 mg; Otherwise 5 mg every day    Weekly warfarin total:  35 mg   Plan last modified:  Lashae Barksdale RN (10/15/2020)   Next INR check:  6/28/2021   Priority:  Maintenance   Target end date:  10/15/2024         Anticoagulation Episode Summary     INR check location:      Preferred lab:      Send INR reminders to:  JOSR SORIA    Comments:        Anticoagulation Care Providers     Provider Role Specialty Phone number    Latricia Macedo, PA Referring Family Medicine [phone]            See the Encounter Report to view Anticoagulation Flowsheet and Dosing Calendar (Go to Encounters tab in chart review, and find the Anticoagulation Therapy Visit)        Kathryn Cardenas, RN

## 2021-06-21 DIAGNOSIS — I25.5 ISCHEMIC CARDIOMYOPATHY: ICD-10-CM

## 2021-06-21 DIAGNOSIS — E11.65 TYPE 2 DIABETES MELLITUS WITH HYPERGLYCEMIA, WITHOUT LONG-TERM CURRENT USE OF INSULIN (H): Primary | ICD-10-CM

## 2021-06-21 DIAGNOSIS — Z86.718 HISTORY OF DVT (DEEP VEIN THROMBOSIS): ICD-10-CM

## 2021-06-21 RX ORDER — PERPHENAZINE/AMITRIPTYLINE HCL 4 MG-25 MG
1 TABLET ORAL
COMMUNITY
End: 2022-09-06

## 2021-06-21 RX ORDER — WARFARIN SODIUM 5 MG/1
5 TABLET ORAL DAILY
Qty: 30 TABLET | Refills: 0 | Status: SHIPPED | OUTPATIENT
Start: 2021-06-21 | End: 2021-07-16

## 2021-06-21 RX ORDER — WARFARIN SODIUM 5 MG/1
5 TABLET ORAL
COMMUNITY
End: 2022-09-06

## 2021-06-21 RX ORDER — LYSINE HCL 500 MG
1 TABLET ORAL DAILY
COMMUNITY

## 2021-06-21 NOTE — TELEPHONE ENCOUNTER
.coumadin pending   Patient also requesting new meter to check blood sugars.    Orders pending     TAISHA Harding

## 2021-06-28 ENCOUNTER — ANTICOAGULATION THERAPY VISIT (OUTPATIENT)
Dept: ANTICOAGULATION | Facility: OTHER | Age: 86
End: 2021-06-28
Attending: FAMILY MEDICINE
Payer: MEDICARE

## 2021-06-28 DIAGNOSIS — I82.5Y3 CHRONIC DEEP VEIN THROMBOSIS (DVT) OF PROXIMAL VEIN OF BOTH LOWER EXTREMITIES (H): ICD-10-CM

## 2021-06-28 LAB
CAPILLARY BLOOD COLLECTION: NORMAL
INR BLD: 3.5 (ref 0.86–1.14)

## 2021-06-28 PROCEDURE — 36416 COLLJ CAPILLARY BLOOD SPEC: CPT | Mod: ZL | Performed by: PHYSICIAN ASSISTANT

## 2021-06-28 PROCEDURE — 85610 PROTHROMBIN TIME: CPT | Mod: ZL | Performed by: PHYSICIAN ASSISTANT

## 2021-06-28 NOTE — PROGRESS NOTES
ANTICOAGULATION FOLLOW-UP CLINIC VISIT    Patient Name:  Sidney Barriga  Date:  6/28/2021  Contact Type:  Telephone    SUBJECTIVE:  Patient Findings     Positives:  Change in medications (Motrin )    Comments:  INR done by lab. Call placed to patient and spoke with him re: INR results/Warfarin dosing/INR recheck date. INR supra therapeutic today- 3.5. He stated that he has had a sore neck for a couple days and has been taking Motrin. Informed him that Motrin will increase INRs, but Tylenol will not. A 1x dose decrease was made to today's dose and after today he is to continue on his maintenance dose. He verbalized understanding of all instructions. He is to contact Warfarin Clinic with any unusual bleeding/bruising or any other new changes to his diet/meds/activity or any questions/issues/illness.         Clinical Outcomes     Negatives:  Major bleeding event, Thromboembolic event, Anticoagulation-related hospital admission, Anticoagulation-related ED visit, Anticoagulation-related fatality    Comments:  INR done by lab. Call placed to patient and spoke with him re: INR results/Warfarin dosing/INR recheck date. INR supra therapeutic today- 3.5. He stated that he has had a sore neck for a couple days and has been taking Motrin. Informed him that Motrin will increase INRs, but Tylenol will not. A 1x dose decrease was made to today's dose and after today he is to continue on his maintenance dose. He verbalized understanding of all instructions. He is to contact Warfarin Clinic with any unusual bleeding/bruising or any other new changes to his diet/meds/activity or any questions/issues/illness.            OBJECTIVE    Recent labs: (last 7 days)     06/28/21  1013   INR 3.5*       ASSESSMENT / PLAN  No question data found.  Anticoagulation Summary  As of 6/28/2021    INR goal:  2.0-3.0   TTR:  74.6 % (8.2 mo)   INR used for dosing:  3.5 (6/28/2021)   Warfarin maintenance plan:  5 mg (5 mg x 1) every day   Full warfarin  instructions:  6/28: 2.5 mg; Otherwise 5 mg every day   Weekly warfarin total:  35 mg   Plan last modified:  Lashae Barksdale RN (10/15/2020)   Next INR check:  7/20/2021   Priority:  Maintenance   Target end date:  10/15/2024         Anticoagulation Episode Summary     INR check location:      Preferred lab:      Send INR reminders to:  JOSR SORIA    Comments:        Anticoagulation Care Providers     Provider Role Specialty Phone number    Latricia Macedo PA Referring Family Medicine [phone]            See the Encounter Report to view Anticoagulation Flowsheet and Dosing Calendar (Go to Encounters tab in chart review, and find the Anticoagulation Therapy Visit)        Kathryn Cardenas RN

## 2021-07-06 DIAGNOSIS — I25.5 ISCHEMIC CARDIOMYOPATHY: ICD-10-CM

## 2021-07-06 RX ORDER — CLOPIDOGREL BISULFATE 75 MG/1
75 TABLET ORAL DAILY
Qty: 90 TABLET | Refills: 3 | Status: SHIPPED | OUTPATIENT
Start: 2021-07-06 | End: 2022-04-13

## 2021-07-09 ENCOUNTER — ANTICOAGULATION THERAPY VISIT (OUTPATIENT)
Dept: ANTICOAGULATION | Facility: OTHER | Age: 86
End: 2021-07-09

## 2021-07-09 NOTE — PROGRESS NOTES
ANTICOAGULATION FOLLOW-UP CLINIC VISIT    Patient Name:  Sidney Barriga  Date:  7/9/2021  Contact Type:  Telephone    SUBJECTIVE:  Patient Findings     Positives:  Upcoming invasive procedure (CV TRANSCATHETER AORTIC VALVE REPLACEMENT on 8/11)    Comments:  Patient called reporting that he has an upcoming procedure on 8/11- CV TRANSCATHETER AORTIC VALVE REPLACEMENT        Clinical Outcomes     Negatives:  Major bleeding event, Thromboembolic event, Anticoagulation-related hospital admission, Anticoagulation-related ED visit, Anticoagulation-related fatality    Comments:  Patient called reporting that he has an upcoming procedure on 8/11- CV TRANSCATHETER AORTIC VALVE REPLACEMENT           OBJECTIVE    No results for input(s): INR, [id number], [id number], 2AGN, F2 in the last 168 hours.    ASSESSMENT / PLAN  No question data found.  Anticoagulation Summary  As of 7/9/2021    INR goal:  2.0-3.0   TTR:  74.6 % (8.2 mo)   INR used for dosing:  No new INR was available at the time of this encounter.   Warfarin maintenance plan:  5 mg (5 mg x 1) every day   Full warfarin instructions:  8/6: Hold; 8/7: Hold; 8/8: Hold; 8/9: Hold; 8/10: Hold; 8/12: 10 mg; 8/13: 10 mg; 8/14: 7.5 mg; Otherwise 5 mg every day   Weekly warfarin total:  35 mg   Plan last modified:  Lashae Barksdale RN (10/15/2020)   Next INR check:  8/17/2021   Priority:  Maintenance   Target end date:  10/15/2024         Anticoagulation Episode Summary     INR check location:      Preferred lab:      Send INR reminders to:  JOSR SORIA    Comments:        Anticoagulation Care Providers     Provider Role Specialty Phone number    Latricia Macedo PA Referring Family Medicine [phone]            See the Encounter Report to view Anticoagulation Flowsheet and Dosing Calendar (Go to Encounters tab in chart review, and find the Anticoagulation Therapy Visit)        Kathryn Cardenas, AUNG

## 2021-07-15 ENCOUNTER — ANTICOAGULATION THERAPY VISIT (OUTPATIENT)
Dept: ANTICOAGULATION | Facility: OTHER | Age: 86
End: 2021-07-15

## 2021-07-15 DIAGNOSIS — Z86.718 HISTORY OF DVT (DEEP VEIN THROMBOSIS): Primary | ICD-10-CM

## 2021-07-15 NOTE — PROGRESS NOTES
ANTICOAGULATION MANAGEMENT     Sidney Barriga 89 year old male is on warfarin. (Goal INR 2.0-3.0).     No results for input(s): INR in the last 168 hours.    ASSESSMENT     Source(s): Patient/Caregiver Call       Warfarin doses taken: Warfarin taken as instructed    Diet: No new diet changes identified    New illness, injury, or hospitalization: No    Medication/supplement changes: None noted    Signs or symptoms of bleeding or clotting: No    Previous INR: Supratherapeutic    Additional findings: Bridging with Enoxaparin until INR >= 2.0     PLAN     Recommended plan for temporary change(s) affecting INR     Dosing Instructions: See anticog calendar.   with next INR in 5 weeks       Summary  As of 7/15/2021    Full warfarin instructions:  8/6: Hold; 8/7: Hold; 8/8: Hold; 8/9: Hold; 8/10: Hold; 8/12: 10 mg; 8/13: 10 mg; 8/14: 7.5 mg; Otherwise 5 mg every day   Next INR check:  8/17/2021             Telephone call with Sidney who verbalizes understanding and agrees to plan and will call with any questions after receiving printed dosing instructions and lovenox.    Lab visit scheduled    Education provided: Please call back if any changes to your diet, medications or how you've been taking warfarin, Importance of therapeutic range, Monitoring for bleeding signs and symptoms, Monitoring for clotting signs and symptoms, Lovenox/Heparin education provided: role of enoxaparin/heparin in bridge therapy, prescribed dose and frequency, recommended injection site and site rotation, adjusting syringe/pushing out medication to obtain prescribed dose, proper technique for administration of subcutaneous injection, disposal of syringes, monitoring for signs and symptoms of bruising and bleeding and monitoring for signs and symptoms of clotting  and Written instructions provided    Plan made per [facility] anticoagulation protocol    Siri Jay, AUNG  Anticoagulation  Clinic  7/15/2021    _______________________________________________________________________     Anticoagulation Episode Summary     Current INR goal:  2.0-3.0   TTR:  74.6 % (8.2 mo)   Target end date:  10/15/2024   Send INR reminders to:  JOSR SORIA       Comments:           Anticoagulation Care Providers     Provider Role Specialty Phone number    Latricia Macedo PA Referring Family Medicine [phone]

## 2021-07-16 DIAGNOSIS — Z86.718 HISTORY OF DVT (DEEP VEIN THROMBOSIS): ICD-10-CM

## 2021-07-16 RX ORDER — WARFARIN SODIUM 5 MG/1
5 TABLET ORAL DAILY
Qty: 90 TABLET | Refills: 1 | Status: SHIPPED | OUTPATIENT
Start: 2021-07-16 | End: 2022-02-07

## 2021-07-20 ENCOUNTER — HOSPITAL ENCOUNTER (OUTPATIENT)
Dept: GENERAL RADIOLOGY | Facility: HOSPITAL | Age: 86
End: 2021-07-20
Attending: INTERNAL MEDICINE
Payer: MEDICARE

## 2021-07-20 ENCOUNTER — LAB (OUTPATIENT)
Dept: LAB | Facility: OTHER | Age: 86
End: 2021-07-20
Attending: FAMILY MEDICINE
Payer: MEDICARE

## 2021-07-20 ENCOUNTER — ONCOLOGY VISIT (OUTPATIENT)
Dept: ONCOLOGY | Facility: OTHER | Age: 86
End: 2021-07-20
Attending: FAMILY MEDICINE
Payer: MEDICARE

## 2021-07-20 ENCOUNTER — ANTICOAGULATION THERAPY VISIT (OUTPATIENT)
Dept: ANTICOAGULATION | Facility: OTHER | Age: 86
End: 2021-07-20
Attending: FAMILY MEDICINE
Payer: MEDICARE

## 2021-07-20 ENCOUNTER — TELEPHONE (OUTPATIENT)
Dept: ONCOLOGY | Facility: OTHER | Age: 86
End: 2021-07-20

## 2021-07-20 VITALS
HEART RATE: 43 BPM | SYSTOLIC BLOOD PRESSURE: 98 MMHG | DIASTOLIC BLOOD PRESSURE: 60 MMHG | TEMPERATURE: 98.5 F | RESPIRATION RATE: 20 BRPM | OXYGEN SATURATION: 98 % | WEIGHT: 155.42 LBS | BODY MASS INDEX: 22.25 KG/M2 | HEIGHT: 70 IN

## 2021-07-20 DIAGNOSIS — M75.00 FROZEN SHOULDER: ICD-10-CM

## 2021-07-20 DIAGNOSIS — D69.6 THROMBOCYTOPENIA (H): ICD-10-CM

## 2021-07-20 DIAGNOSIS — I82.5Y3 CHRONIC DEEP VEIN THROMBOSIS (DVT) OF PROXIMAL VEIN OF BOTH LOWER EXTREMITIES (H): ICD-10-CM

## 2021-07-20 DIAGNOSIS — M75.01 ADHESIVE CAPSULITIS OF RIGHT SHOULDER: Primary | ICD-10-CM

## 2021-07-20 DIAGNOSIS — I48.91 ATRIAL FIBRILLATION, UNSPECIFIED TYPE (H): ICD-10-CM

## 2021-07-20 LAB
ALBUMIN SERPL-MCNC: 3.8 G/DL (ref 3.4–5)
ALP SERPL-CCNC: 64 U/L (ref 40–150)
ALT SERPL W P-5'-P-CCNC: 24 U/L (ref 0–70)
ANION GAP SERPL CALCULATED.3IONS-SCNC: 8 MMOL/L (ref 3–14)
AST SERPL W P-5'-P-CCNC: 18 U/L (ref 0–45)
BASOPHILS # BLD AUTO: 0 10E3/UL (ref 0–0.2)
BASOPHILS # BLD AUTO: 0 10E3/UL (ref 0–0.2)
BASOPHILS NFR BLD AUTO: 0 %
BASOPHILS NFR BLD AUTO: 0 %
BILIRUB SERPL-MCNC: 0.8 MG/DL (ref 0.2–1.3)
BUN SERPL-MCNC: 39 MG/DL (ref 7–30)
CALCIUM SERPL-MCNC: 9.5 MG/DL (ref 8.5–10.1)
CHLORIDE BLD-SCNC: 105 MMOL/L (ref 94–109)
CO2 SERPL-SCNC: 26 MMOL/L (ref 20–32)
CREAT SERPL-MCNC: 1.52 MG/DL (ref 0.66–1.25)
EOSINOPHIL # BLD AUTO: 0.2 10E3/UL (ref 0–0.7)
EOSINOPHIL # BLD AUTO: 0.2 10E3/UL (ref 0–0.7)
EOSINOPHIL NFR BLD AUTO: 3 %
EOSINOPHIL NFR BLD AUTO: 3 %
ERYTHROCYTE [DISTWIDTH] IN BLOOD BY AUTOMATED COUNT: 13.3 % (ref 10–15)
ERYTHROCYTE [DISTWIDTH] IN BLOOD BY AUTOMATED COUNT: 13.4 % (ref 10–15)
ERYTHROCYTE [SEDIMENTATION RATE] IN BLOOD BY WESTERGREN METHOD: 57 MM/HR (ref 0–20)
GFR SERPL CREATININE-BSD FRML MDRD: 40 ML/MIN/1.73M2
GLUCOSE BLD-MCNC: 152 MG/DL (ref 70–99)
HCT VFR BLD AUTO: 35 % (ref 40–53)
HCT VFR BLD AUTO: 35.7 % (ref 40–53)
HGB BLD-MCNC: 11.4 G/DL (ref 13.3–17.7)
HGB BLD-MCNC: 11.5 G/DL (ref 13.3–17.7)
IMM GRANULOCYTES # BLD: 0 10E3/UL
IMM GRANULOCYTES # BLD: 0 10E3/UL
IMM GRANULOCYTES NFR BLD: 0 %
IMM GRANULOCYTES NFR BLD: 0 %
INR BLD: 3.3 (ref 0.9–1.1)
LDH SERPL L TO P-CCNC: 206 U/L (ref 85–227)
LYMPHOCYTES # BLD AUTO: 1.2 10E3/UL (ref 0.8–5.3)
LYMPHOCYTES # BLD AUTO: 1.3 10E3/UL (ref 0.8–5.3)
LYMPHOCYTES NFR BLD AUTO: 16 %
LYMPHOCYTES NFR BLD AUTO: 17 %
MCH RBC QN AUTO: 29.9 PG (ref 26.5–33)
MCH RBC QN AUTO: 30.2 PG (ref 26.5–33)
MCHC RBC AUTO-ENTMCNC: 32.2 G/DL (ref 31.5–36.5)
MCHC RBC AUTO-ENTMCNC: 32.6 G/DL (ref 31.5–36.5)
MCV RBC AUTO: 93 FL (ref 78–100)
MCV RBC AUTO: 93 FL (ref 78–100)
MONOCYTES # BLD AUTO: 1 10E3/UL (ref 0–1.3)
MONOCYTES # BLD AUTO: 1 10E3/UL (ref 0–1.3)
MONOCYTES NFR BLD AUTO: 12 %
MONOCYTES NFR BLD AUTO: 13 %
NEUTROPHILS # BLD AUTO: 5.3 10E3/UL (ref 1.6–8.3)
NEUTROPHILS # BLD AUTO: 5.4 10E3/UL (ref 1.6–8.3)
NEUTROPHILS NFR BLD AUTO: 68 %
NEUTROPHILS NFR BLD AUTO: 68 %
NRBC # BLD AUTO: 0 10E3/UL
NRBC # BLD AUTO: 0 10E3/UL
NRBC BLD AUTO-RTO: 0 /100
NRBC BLD AUTO-RTO: 0 /100
PLATELET # BLD AUTO: 144 10E3/UL (ref 150–450)
PLATELET # BLD AUTO: 146 10E3/UL (ref 150–450)
POTASSIUM BLD-SCNC: 4.4 MMOL/L (ref 3.4–5.3)
PROT SERPL-MCNC: 7.6 G/DL (ref 6.8–8.8)
RBC # BLD AUTO: 3.77 10E6/UL (ref 4.4–5.9)
RBC # BLD AUTO: 3.84 10E6/UL (ref 4.4–5.9)
RETICS # AUTO: 0.04 10E6/UL (ref 0.03–0.1)
RETICS/RBC NFR AUTO: 1 % (ref 0.5–2)
SODIUM SERPL-SCNC: 139 MMOL/L (ref 133–144)
WBC # BLD AUTO: 7.8 10E3/UL (ref 4–11)
WBC # BLD AUTO: 7.9 10E3/UL (ref 4–11)

## 2021-07-20 PROCEDURE — 80053 COMPREHEN METABOLIC PANEL: CPT | Mod: ZL

## 2021-07-20 PROCEDURE — 73030 X-RAY EXAM OF SHOULDER: CPT | Mod: RT

## 2021-07-20 PROCEDURE — 84155 ASSAY OF PROTEIN SERUM: CPT | Mod: ZL,91 | Performed by: INTERNAL MEDICINE

## 2021-07-20 PROCEDURE — 82746 ASSAY OF FOLIC ACID SERUM: CPT | Mod: ZL

## 2021-07-20 PROCEDURE — 36416 COLLJ CAPILLARY BLOOD SPEC: CPT | Mod: ZL

## 2021-07-20 PROCEDURE — 83615 LACTATE (LD) (LDH) ENZYME: CPT | Mod: ZL

## 2021-07-20 PROCEDURE — 99203 OFFICE O/P NEW LOW 30 MIN: CPT | Performed by: INTERNAL MEDICINE

## 2021-07-20 PROCEDURE — 36415 COLL VENOUS BLD VENIPUNCTURE: CPT | Mod: ZL | Performed by: INTERNAL MEDICINE

## 2021-07-20 PROCEDURE — 36415 COLL VENOUS BLD VENIPUNCTURE: CPT | Mod: ZL

## 2021-07-20 PROCEDURE — 85045 AUTOMATED RETICULOCYTE COUNT: CPT | Mod: ZL

## 2021-07-20 PROCEDURE — 85610 PROTHROMBIN TIME: CPT | Mod: ZL

## 2021-07-20 PROCEDURE — 86038 ANTINUCLEAR ANTIBODIES: CPT | Mod: ZL

## 2021-07-20 PROCEDURE — 86431 RHEUMATOID FACTOR QUANT: CPT | Mod: ZL

## 2021-07-20 PROCEDURE — 84165 PROTEIN E-PHORESIS SERUM: CPT | Mod: ZL | Performed by: INTERNAL MEDICINE

## 2021-07-20 PROCEDURE — 85652 RBC SED RATE AUTOMATED: CPT | Mod: ZL

## 2021-07-20 PROCEDURE — G0463 HOSPITAL OUTPT CLINIC VISIT: HCPCS

## 2021-07-20 PROCEDURE — 85025 COMPLETE CBC W/AUTO DIFF WBC: CPT | Mod: ZL

## 2021-07-20 PROCEDURE — 82607 VITAMIN B-12: CPT | Mod: ZL

## 2021-07-20 ASSESSMENT — MIFFLIN-ST. JEOR: SCORE: 1376.25

## 2021-07-20 ASSESSMENT — PAIN SCALES - GENERAL: PAINLEVEL: NO PAIN (0)

## 2021-07-20 ASSESSMENT — PATIENT HEALTH QUESTIONNAIRE - PHQ9: SUM OF ALL RESPONSES TO PHQ QUESTIONS 1-9: 2

## 2021-07-20 NOTE — TELEPHONE ENCOUNTER
Clinic Care Coordination Contact  Care Team Conversations    TC to patient to review x ray. Informed him that Dr. Cisneros would like him to follow up with primary care provider regarding shoulder. Patient verbalizes understanding.  Melani Montesinos RN Oncology Care Coordinator

## 2021-07-20 NOTE — NURSING NOTE
"Chief Complaint   Patient presents with     Consult     Consult Thrombocytopenia       Initial BP 98/60   Pulse (!) 43   Temp 98.5  F (36.9  C) (Tympanic)   Resp 20   Ht 1.778 m (5' 10\")   Wt 70.5 kg (155 lb 6.8 oz)   SpO2 98%   BMI 22.30 kg/m   Estimated body mass index is 22.3 kg/m  as calculated from the following:    Height as of this encounter: 1.778 m (5' 10\").    Weight as of this encounter: 70.5 kg (155 lb 6.8 oz).  Medication Reconciliation: complete.  Immunizations and advance directives status reviewed. Pain scale 3 right neck and shoulder , PHQ-9=2.    Patient was assessed using the NCCN psychosocial distress thermometer. Patient rated the score as a 1. Patient rated current stressors as per scanned form. Stressors will be brought to the attention of provider or Oncology RN Care Coordinator for a score of 6 or greater or per nurses discretion.                   Lyssa Fraire LPN    "
systolic

## 2021-07-20 NOTE — PATIENT INSTRUCTIONS
We would like to see you back in 1-2- weeks to review lab results. We will call with results for shoulder x ray.When you are in need of a refill of your medications, please call your pharmacy and they will send us the request. If you have any questions please call [phone]

## 2021-07-20 NOTE — PROGRESS NOTES
ANTICOAGULATION MANAGEMENT     Sidney Barriga 89 year old male is on warfarin with supratherapeutic INR result. (Goal INR 2.0-3.0)    Recent labs: (last 7 days)     07/20/21  1236   INR 3.3*       ASSESSMENT     Source(s): Patient/Caregiver Call       Warfarin doses taken: Warfarin taken as instructed    Diet: No new diet changes identified    New illness, injury, or hospitalization: No    Medication/supplement changes: None noted    Signs or symptoms of bleeding or clotting: No    Previous INR: Supratherapeutic    Additional findings: Upcoming surgery/procedure CV TRANSCATHETER AORTIC VALVE REPLACEMENT on 8/11)     PLAN     Recommended plan for no diet, medication or health factor changes affecting INR     Dosing Instructions: Partial hold then continue your current warfarin dose  Patient has already received dosing instructions for pre-procedure Warfarin hold/Lovenox injections and post-procedure Warfarin dosing/Lovenox injections/INR recheck date with next INR in 4 weeks       Summary  As of 7/20/2021    Full warfarin instructions:  7/20: 2.5 mg; 8/6: Hold; 8/7: Hold; 8/8: Hold; 8/9: Hold; 8/10: Hold; 8/12: 10 mg; 8/13: 10 mg; 8/14: 7.5 mg; Otherwise 5 mg every day   Next INR check:  8/17/2021             Detailed voice message left for Sidney with dosing instructions and follow up date.     Lab visit scheduled    Education provided: Please call back if any changes to your diet, medications or how you've been taking warfarin, Monitoring for bleeding signs and symptoms and Monitoring for clotting signs and symptoms    Plan made per [facility] anticoagulation protocol    Kathryn Cardenas, RN  Anticoagulation Clinic  7/20/2021    _______________________________________________________________________     Anticoagulation Episode Summary     Current INR goal:  2.0-3.0   TTR:  68.4 % (8.9 mo)   Target end date:  10/15/2024   Send INR reminders to:  JOSR SORIA       Comments:           Anticoagulation Care Providers      Provider Role Specialty Phone number    Latricia Macedo PA Referring Family Medicine [phone]

## 2021-07-20 NOTE — PROGRESS NOTES
Visit Date: 07/20/2021    REASON FOR CONSULTATION:  Thrombocytopenia.    REQUESTING PHYSICIAN:  Dr. Arredondo.    HISTORY OF PRESENT ILLNESS:  Mr. Barriga is an 89-year-old white male with history of aortic stenosis, stage IIIB chronic kidney disease, ischemic cardiomyopathy.  We were asked to evaluate concerning diagnosis of thrombocytopenia apparently. As part of a routine evaluation, Dr. Arredondo had ordered a CBC was drawn on 05/24/2021 and the patient was found to have a platelet count of 106.  White count was 5.0, hemoglobin 12.1, hematocrit 38.3.  The patient in the interim has been set up for aortic valve replacement, had a repeat CBC recently on 07/12/2021 which revealed a normal platelet count of 165.  The patient is otherwise asymptomatic except for chronic shortness of breath, particularly on exertion.  He is also on anticoagulants for atrial fibrillation.  He is currently on warfarin and Plavix.  He denies any bleeding episodes.  No fevers, night sweats, weight loss.  He does have hearing loss.  He denies any bright red blood per rectum, hematemesis.  No chest pain, palpitations.    PAST MEDICAL HISTORY:  Significant for frequent PVCs, ventricular bigeminy aortic stenosis, ascending aortic aneurysm, stage IIIB chronic kidney disease, nonrheumatic aortic valve stenosis, coronary artery disease, history of coronary artery stent placement in 2007 as well as 2019, chronic combined systolic and diastolic congestive heart failure, hyperlipidemia, history of multiple DVTs.    ALLERGIES:  HE IS ALLERGIC TO SIMVASTATIN, ISOSORBIDE.    CURRENT MEDICATIONS:  Include:  1.  Warfarin 5 mg daily.  2.  Plavix 75 daily.  3.  Coreg 3.125 mg b.i.d.  4.  Lasix 20 mg daily.  5.  Zetia 10 mg daily.  6.  Zestril 10 mg daily.  7.  Nitroglycerin sublingual p.r.n. chest pain.  8.  Crestor 20 mg daily.  9.  Albuterol inhaler 2 puffs into lungs q.4 hours as needed.  10.  Omega 3 fatty acids 1 capsule by mouth daily.  11.  Coenzyme  CoQ10 of 200 mg daily.  12.  Vitamin C 1000 daily.  13.  Vitamin D3 of 5000 units daily.    SOCIAL HISTORY:  He was a chronic 1 pack per day smoker.  He quit in his 40s.  Alcohol is negative.  He is a retired , but worked in the [employer] field primarily in [employer].  He retired in 1982.    FAMILY HISTORY:  Noncontributory.    REVIEW OF SYSTEMS:  CENTRAL NERVOUS SYSTEM:  Negative for headaches, change in mental status.  ENT:  Significant for hearing loss.  RESPIRATORY:  Significant dyspnea on exertion.  No cough, hemoptysis.  CARDIAC:  No chest pain, palpitations, orthopnea, PND, ankle edema.  GASTROINTESTINAL:  Negative for change in bowel habits, bright red blood per rectum, hematemesis.  MUSCULOSKELETAL:  Unremarkable except for right shoulder pain.  GENITOURINARY:  Negative for hematuria, urgency, frequency.  CONSTITUTIONAL:  Negative fevers, night sweats, weight loss.  HEMATOLOGIC:  Significant for some ecchymoses.  No epistaxis or gingival bleeding.    PHYSICAL EXAMINATION:    GENERAL:  He is a frail, elderly white male in no acute distress. ECOG performance status is a 1.  VITAL SIGNS:  Reveal blood pressure 98/60, pulse 43, respirations 20, temperature 98.  HEENT:  Atraumatic, normocephalic.  Oropharynx is nonerythematous.  NECK:  Supple, no thyromegaly.  LUNGS:  Clear to auscultation and percussion.  HEART:  Regular rate and rhythm.  S1, S2 normal.  Systolic crescendo-decrescendo murmur heard best at the left sternal border.  ABDOMEN:  Soft, normoactive bowel sounds.  No mass, nontender.  LYMPHATICS:  No cervical, supraclavicular, axillary or inguinal nodes.  EXTREMITIES:  Reveal limited abduction of the right shoulder.  NEUROLOGIC:  Nonfocal.    LABORATORY DATA:  Reveal CBC with white count 7.9, H and H 11.5 and 35.7, platelet count 146, BUN 39, creatinine 1.52.  LDH is 206.    IMPRESSION:    1.  Mild thrombocytopenia with essentially near normal values.  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  Nonetheless, we will obtain B12, folate level, sed rate, rheumatoid factor, RON, review peripheral blood smear, obtain serum protein electrophoresis, also obtain antiplatelet antibodies.  Otherwise, his platelet count is essentially near normal at this point, no need for further intervention.  2.  Right shoulder pain.  We will obtain a shoulder x-ray, suspect frozen shoulder.  Otherwise, the patient will follow up with Dr. Arredondo.    Ninety-four minutes was spent on this patient.  Time was spent reviewing previous physician provider notes, performing history and physical, reviewing lab results, ordering x-rays, documenting history and physical and ordering followup labs.    Abraham Cisneros MD        D: 2021   T: 2021   MT: [id number]    Name:     SCARLET HOANG  MRN:      [id number]        Account:    [id number]   :      1932           Visit Date: 2021     Document: [id number]    cc:  Zach Arredondo MD

## 2021-07-21 LAB
FOLATE SERPL-MCNC: 88.1 NG/ML
VIT B12 SERPL-MCNC: 3347 PG/ML (ref 193–986)

## 2021-07-22 LAB
ANA PAT SER IF-IMP: ABNORMAL
ANA SER QL IF: POSITIVE
ANA TITR SER IF: ABNORMAL {TITER}
RHEUMATOID FACT SER NEPH-ACNC: 11 IU/ML
TOTAL PROTEIN SERUM FOR ELP: 8.3 G/DL (ref 6.8–8.8)

## 2021-07-23 LAB
ALBUMIN SERPL ELPH-MCNC: 3.9 G/DL (ref 3.7–5.1)
ALPHA1 GLOB SERPL ELPH-MCNC: 0.5 G/DL (ref 0.2–0.4)
ALPHA2 GLOB SERPL ELPH-MCNC: 1.4 G/DL (ref 0.5–0.9)
B-GLOBULIN SERPL ELPH-MCNC: 1.2 G/DL (ref 0.6–1)
GAMMA GLOB SERPL ELPH-MCNC: 1.3 G/DL (ref 0.7–1.6)
M PROTEIN SERPL ELPH-MCNC: 0 G/DL
PATH REPORT.COMMENTS IMP SPEC: NORMAL
PATH REPORT.COMMENTS IMP SPEC: NORMAL
PATH REPORT.FINAL DX SPEC: NORMAL
PATH REPORT.MICROSCOPIC SPEC OTHER STN: NORMAL
PATH REPORT.MICROSCOPIC SPEC OTHER STN: NORMAL
PROT PATTERN SERPL ELPH-IMP: ABNORMAL

## 2021-07-23 PROCEDURE — 85060 BLOOD SMEAR INTERPRETATION: CPT | Performed by: PATHOLOGY

## 2021-07-23 PROCEDURE — 84165 PROTEIN E-PHORESIS SERUM: CPT | Mod: 26 | Performed by: PATHOLOGY

## 2021-08-17 ENCOUNTER — ANTICOAGULATION THERAPY VISIT (OUTPATIENT)
Dept: ANTICOAGULATION | Facility: OTHER | Age: 86
End: 2021-08-17
Attending: FAMILY MEDICINE
Payer: MEDICARE

## 2021-08-17 ENCOUNTER — LAB (OUTPATIENT)
Dept: LAB | Facility: OTHER | Age: 86
End: 2021-08-17
Attending: FAMILY MEDICINE
Payer: MEDICARE

## 2021-08-17 DIAGNOSIS — I82.5Y3 CHRONIC DEEP VEIN THROMBOSIS (DVT) OF PROXIMAL VEIN OF BOTH LOWER EXTREMITIES (H): ICD-10-CM

## 2021-08-17 LAB — INR BLD: 3.3 (ref 0.9–1.1)

## 2021-08-17 PROCEDURE — 85610 PROTHROMBIN TIME: CPT | Mod: ZL

## 2021-08-17 PROCEDURE — 36416 COLLJ CAPILLARY BLOOD SPEC: CPT | Mod: ZL

## 2021-08-17 NOTE — PROGRESS NOTES
ANTICOAGULATION MANAGEMENT     Sidney Barriga 89 year old male is on warfarin with supratherapeutic INR result. (Goal INR 2.0-3.0)    Recent labs: (last 7 days)     08/17/21  0936   INR 3.3*       ASSESSMENT     Source(s): Chart Review and Patient/Caregiver Call       Warfarin doses taken: Warfarin taken as instructed    Diet: Decreased greens/vitamin K in diet; plans to resume previous intake    New illness, injury, or hospitalization: No    Medication/supplement changes: None noted    Signs or symptoms of bleeding or clotting: No    Previous INR: Supratherapeutic    Additional findings: None     PLAN     Recommended plan for temporary change(s) affecting INR     Dosing Instructions: Partial hold then continue your current warfarin dose with next INR in 3 weeks       Summary  As of 8/17/2021    Full warfarin instructions:  8/17: 2.5 mg; Otherwise 5 mg every day   Next INR check:  9/7/2021             Telephone call with Sidney who verbalizes understanding and agrees to plan    Lab visit scheduled    Education provided: Please call back if any changes to your diet, medications or how you've been taking warfarin, Importance of consistent vitamin K intake, Impact of vitamin K foods on INR, Vitamin K content of foods, Monitoring for bleeding signs and symptoms and Monitoring for clotting signs and symptoms    Plan made per [facility] anticoagulation protocol    Kathryn Cardenas RN  Anticoagulation Clinic  8/17/2021    _______________________________________________________________________     Anticoagulation Episode Summary     Current INR goal:  2.0-3.0   TTR:  62.0 % (9.9 mo)   Target end date:  10/15/2024   Send INR reminders to:  JOSR SORIA       Comments:           Anticoagulation Care Providers     Provider Role Specialty Phone number    Latricia Macedo PA Referring Family Medicine [phone]

## 2021-08-30 DIAGNOSIS — I25.810 ATHEROSCLEROSIS OF CORONARY ARTERY BYPASS GRAFT OF NATIVE HEART WITHOUT ANGINA PECTORIS: ICD-10-CM

## 2021-08-30 DIAGNOSIS — Z95.5 HISTORY OF CORONARY ARTERY STENT PLACEMENT: ICD-10-CM

## 2021-08-30 DIAGNOSIS — E78.1 HYPERTRIGLYCERIDEMIA: ICD-10-CM

## 2021-08-30 DIAGNOSIS — E11.65 TYPE 2 DIABETES MELLITUS WITH HYPERGLYCEMIA, WITHOUT LONG-TERM CURRENT USE OF INSULIN (H): ICD-10-CM

## 2021-08-30 DIAGNOSIS — I25.5 ISCHEMIC CARDIOMYOPATHY: ICD-10-CM

## 2021-08-30 DIAGNOSIS — Z98.890 H/O CARDIAC CATHETERIZATION: ICD-10-CM

## 2021-08-30 DIAGNOSIS — Z95.1 S/P CABG X 3: ICD-10-CM

## 2021-08-30 RX ORDER — ROSUVASTATIN CALCIUM 20 MG/1
20 TABLET, COATED ORAL
Qty: 90 TABLET | Refills: 3 | Status: SHIPPED | OUTPATIENT
Start: 2021-08-30 | End: 2021-11-22

## 2021-08-30 RX ORDER — ROSUVASTATIN CALCIUM 20 MG/1
20 TABLET, COATED ORAL
Qty: 30 TABLET | Refills: 0 | Status: SHIPPED | OUTPATIENT
Start: 2021-08-30 | End: 2021-08-30

## 2021-08-30 NOTE — TELEPHONE ENCOUNTER
CODY Spoke with patient and states he is only doing a study with the [place], but would like to continue follow up with Dr. Rae.

## 2021-08-30 NOTE — TELEPHONE ENCOUNTER
Patient needs refill on Crestor could you please refill. Medication refill started below.  Thank you, Orin Brooks LPN

## 2021-09-07 ENCOUNTER — LAB (OUTPATIENT)
Dept: LAB | Facility: OTHER | Age: 86
End: 2021-09-07
Attending: FAMILY MEDICINE
Payer: MEDICARE

## 2021-09-07 ENCOUNTER — ANTICOAGULATION THERAPY VISIT (OUTPATIENT)
Dept: ANTICOAGULATION | Facility: OTHER | Age: 86
End: 2021-09-07
Attending: FAMILY MEDICINE
Payer: MEDICARE

## 2021-09-07 DIAGNOSIS — I82.5Y3 CHRONIC DEEP VEIN THROMBOSIS (DVT) OF PROXIMAL VEIN OF BOTH LOWER EXTREMITIES (H): ICD-10-CM

## 2021-09-07 DIAGNOSIS — Z86.718 HISTORY OF DVT (DEEP VEIN THROMBOSIS): Primary | ICD-10-CM

## 2021-09-07 LAB — INR BLD: 2.5 (ref 0.9–1.1)

## 2021-09-07 PROCEDURE — 85610 PROTHROMBIN TIME: CPT | Mod: ZL

## 2021-09-07 NOTE — PROGRESS NOTES
ANTICOAGULATION MANAGEMENT     Sidney Barriga 89 year old male is on warfarin with therapeutic INR result. (Goal INR 2.0-3.0)    Recent labs: (last 7 days)     09/07/21  0927   INR 2.5*       ASSESSMENT     Source(s): Chart Review and Patient/Caregiver Call       Warfarin doses taken: Warfarin taken as instructed    Diet: No new diet changes identified    New illness, injury, or hospitalization: No    Medication/supplement changes: None noted    Signs or symptoms of bleeding or clotting: No    Previous INR: Supratherapeutic    Additional findings: None     PLAN     Recommended plan for no diet, medication or health factor changes affecting INR     Dosing Instructions: Continue your current warfarin dose with next INR in 4 weeks       Summary  As of 9/7/2021    Full warfarin instructions:  5 mg every day   Next INR check:  10/5/2021             Telephone call with Sidney who verbalizes understanding and agrees to plan    Lab visit scheduled    Education provided: Please call back if any changes to your diet, medications or how you've been taking warfarin, Monitoring for bleeding signs and symptoms and Monitoring for clotting signs and symptoms    Plan made per [facility] anticoagulation protocol    Kathryn Cardenas RN  Anticoagulation Clinic  9/7/2021    _______________________________________________________________________     Anticoagulation Episode Summary     Current INR goal:  2.0-3.0   TTR:  62.0 % (10.6 mo)   Target end date:  10/15/2024   Send INR reminders to:  [facility]    Indications    History of DVT (deep vein thrombosis)-multiple [Z86.718]  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

## 2021-10-04 ENCOUNTER — LAB (OUTPATIENT)
Dept: LAB | Facility: OTHER | Age: 86
End: 2021-10-04
Payer: MEDICARE

## 2021-10-04 ENCOUNTER — IMMUNIZATION (OUTPATIENT)
Dept: FAMILY MEDICINE | Facility: OTHER | Age: 86
End: 2021-10-04
Attending: FAMILY MEDICINE
Payer: MEDICARE

## 2021-10-04 ENCOUNTER — ANTICOAGULATION THERAPY VISIT (OUTPATIENT)
Dept: ANTICOAGULATION | Facility: OTHER | Age: 86
End: 2021-10-04
Attending: FAMILY MEDICINE
Payer: MEDICARE

## 2021-10-04 DIAGNOSIS — I82.5Y3 CHRONIC DEEP VEIN THROMBOSIS (DVT) OF PROXIMAL VEIN OF BOTH LOWER EXTREMITIES (H): ICD-10-CM

## 2021-10-04 DIAGNOSIS — Z86.718 HISTORY OF DVT (DEEP VEIN THROMBOSIS): Primary | ICD-10-CM

## 2021-10-04 LAB — INR BLD: 3.5 (ref 0.9–1.1)

## 2021-10-04 PROCEDURE — 91300 PR COVID VAC PFIZER DIL RECON 30 MCG/0.3 ML IM: CPT

## 2021-10-04 PROCEDURE — 36416 COLLJ CAPILLARY BLOOD SPEC: CPT | Mod: ZL

## 2021-10-04 PROCEDURE — 85610 PROTHROMBIN TIME: CPT | Mod: ZL

## 2021-10-04 NOTE — PROGRESS NOTES
ANTICOAGULATION MANAGEMENT     Sidney Barriga 89 year old male is on warfarin with supratherapeutic INR result. (Goal INR 2.0-3.0)    Recent labs: (last 7 days)     10/04/21  1340   INR 3.5*       ASSESSMENT     Source(s): Chart Review and Patient/Caregiver Call       Warfarin doses taken: Warfarin taken as instructed    Diet: Decreased greens/vitamin K in diet; plans to resume previous intake    New illness, injury, or hospitalization: No    Medication/supplement changes: None noted    Signs or symptoms of bleeding or clotting: No    Previous INR: Therapeutic last visit; previously outside of goal range    Additional findings: None     PLAN     Recommended plan for no diet, medication or health factor changes affecting INR     Dosing Instructions: Partial hold then continue your current warfarin dose with next INR in 4 weeks - Pt requested to be out 4 weeks    Summary  As of 10/4/2021    Full warfarin instructions:  10/4: 2.5 mg; Otherwise 5 mg every day   Next INR check:  11/1/2021             Telephone call with Sidney who verbalizes understanding and agrees to plan    Lab visit scheduled    Education provided: Please call back if any changes to your diet, medications or how you've been taking warfarin, Monitoring for bleeding signs and symptoms and Monitoring for clotting signs and symptoms    Plan made per [facility] anticoagulation protocol    Kathryn Cardenas RN  Anticoagulation Clinic  10/4/2021    _______________________________________________________________________     Anticoagulation Episode Summary     Current INR goal:  2.0-3.0   TTR:  60.7 % (11.4 mo)   Target end date:  10/15/2024   Send INR reminders to:  [facility]    Indications    History of DVT (deep vein thrombosis)-multiple [Z86.718]  Chronic deep vein thrombosis (DVT) of proximal vein of both lower extremities (H) [I82.5Y3]           Comments:           Anticoagulation Care Providers     Provider Role Specialty Phone number    [place]  KRISTI Rasmussen Referring Family Medicine [phone]    Zach Arredondo MD Referring Family Medicine [phone]

## 2021-11-01 ENCOUNTER — ANTICOAGULATION THERAPY VISIT (OUTPATIENT)
Dept: ANTICOAGULATION | Facility: OTHER | Age: 86
End: 2021-11-01
Attending: FAMILY MEDICINE
Payer: MEDICARE

## 2021-11-01 ENCOUNTER — LAB (OUTPATIENT)
Dept: LAB | Facility: OTHER | Age: 86
End: 2021-11-01
Payer: MEDICARE

## 2021-11-01 DIAGNOSIS — Z86.718 HISTORY OF DVT (DEEP VEIN THROMBOSIS): Primary | ICD-10-CM

## 2021-11-01 DIAGNOSIS — I82.5Y3 CHRONIC DEEP VEIN THROMBOSIS (DVT) OF PROXIMAL VEIN OF BOTH LOWER EXTREMITIES (H): ICD-10-CM

## 2021-11-01 LAB — INR BLD: 4.2 (ref 0.9–1.1)

## 2021-11-01 PROCEDURE — 36415 COLL VENOUS BLD VENIPUNCTURE: CPT | Mod: ZL

## 2021-11-01 PROCEDURE — 85610 PROTHROMBIN TIME: CPT | Mod: ZL

## 2021-11-01 NOTE — PROGRESS NOTES
ANTICOAGULATION MANAGEMENT     Sidney LAURITA Barriga 89 year old male is on warfarin with supratherapeutic INR result. (Goal INR 2.0-3.0)    Recent labs: (last 7 days)     11/01/21  0938   INR 4.2*       ASSESSMENT     Source(s): Chart Review and Patient/Caregiver Call       Warfarin doses taken: Warfarin taken as instructed    Diet: No new diet changes identified    New illness, injury, or hospitalization: No    Medication/supplement changes: Patient states he is active in a study with the [facility] and increases his Coreg weekly in the am then next week in the pm under their direction.  States he is currently taking over 15 mg in the am and pm.  States just increased the pm dose to match on Oct 25 and if doesn't hear from them next Monday Nov 8 he increases the am dose and so on.      Signs or symptoms of bleeding or clotting: No    Previous INR: Therapeutic last visit; previously outside of goal range    Additional findings: None     PLAN     Recommended plan for ongoing change(s) affecting INR     Dosing Instructions: Patient in study with [facility].  Increases am and then pm dose every 2 weeks.  Currently taking over 15 mg Coreg in the am and pm.  States just increased last Monday and if no call from them increases on Monday 11/8.  We discussed closer monitoring and he agrees.  We went over no coumadin today and 2.5 mg on 11/2 and then back to maintenance dose 5 mg and recheck on Monday 11/8 and patient agrees with plan.with next INR in 1 week     Summary  As of 11/1/2021    Full warfarin instructions:  11/1: Hold; 11/2: 2.5 mg; Otherwise 5 mg every day   Next INR check:  11/8/2021             Telephone call with Sidney who verbalizes understanding and agrees to plan    Lab visit scheduled    Education provided: Importance of therapeutic range and Potential interaction between warfarin and Coreg.  We discussed closer follow up during study and increase in Coreg and patient agrees.  Patient scheduled in 1 week for recheck.   We discussed bleeding and bruising and being seen in ER if needed if this occurs and patient verbalized understanding.  Educated patient on being extra careful when elevated INR due to risk of bleeding and he verbalized understanding.    Plan made per [facility] anticoagulation protocol    Siri Jay RN  Anticoagulation Clinic  11/1/2021    _______________________________________________________________________     Anticoagulation Episode Summary     Current INR goal:  2.0-3.0   TTR:  55.7 % (1 y)   Target end date:  10/15/2024   Send INR reminders to:  [facility]    Indications    History of DVT (deep vein thrombosis)-multiple [Z86.718]  Chronic deep vein thrombosis (DVT) of proximal vein of both lower extremities (H) [I82.5Y3]           Comments:           Anticoagulation Care Providers     Provider Role Specialty Phone number    Latricia Macedo PA Referring Family Medicine [phone]    Zach Arredondo MD Referring Family Medicine [phone]

## 2021-11-08 ENCOUNTER — ANTICOAGULATION THERAPY VISIT (OUTPATIENT)
Dept: ANTICOAGULATION | Facility: OTHER | Age: 86
End: 2021-11-08
Attending: FAMILY MEDICINE
Payer: MEDICARE

## 2021-11-08 ENCOUNTER — LAB (OUTPATIENT)
Dept: LAB | Facility: OTHER | Age: 86
End: 2021-11-08
Payer: MEDICARE

## 2021-11-08 DIAGNOSIS — I82.5Y3 CHRONIC DEEP VEIN THROMBOSIS (DVT) OF PROXIMAL VEIN OF BOTH LOWER EXTREMITIES (H): ICD-10-CM

## 2021-11-08 DIAGNOSIS — Z86.718 HISTORY OF DVT (DEEP VEIN THROMBOSIS): Primary | ICD-10-CM

## 2021-11-08 LAB — INR BLD: 3.2 (ref 0.9–1.1)

## 2021-11-08 PROCEDURE — 85610 PROTHROMBIN TIME: CPT | Mod: ZL

## 2021-11-08 PROCEDURE — 36416 COLLJ CAPILLARY BLOOD SPEC: CPT | Mod: ZL

## 2021-11-08 NOTE — PROGRESS NOTES
ANTICOAGULATION MANAGEMENT     Sidney Barriga 89 year old male is on warfarin with supratherapeutic INR result. (Goal INR 2.0-3.0)    Recent labs: (last 7 days)     11/08/21  0913   INR 3.2*       ASSESSMENT     Source(s): Chart Review and Patient/Caregiver Call       Warfarin doses taken: Warfarin taken as instructed    Diet: No new diet changes identified    New illness, injury, or hospitalization: No    Medication/supplement changes: None noted    Signs or symptoms of bleeding or clotting: No    Previous INR: Supratherapeutic    Additional findings: None     PLAN     Recommended plan for no diet, medication or health factor changes affecting INR     Dosing Instructions:  Decrease your warfarin dose (7% change) with next INR in 3 weeks       Summary  As of 11/8/2021    Full warfarin instructions:  2.5 mg every Mon; 5 mg all other days   Next INR check:  12/6/2021             Telephone call with Sidney who verbalizes understanding and agrees to plan    Lab visit scheduled    Education provided: Please call back if any changes to your diet, medications or how you've been taking warfarin, Monitoring for bleeding signs and symptoms and Monitoring for clotting signs and symptoms    Plan made per [facility]agulation protocol    Kathryn Cardenas, RN  Anticoagulation Clinic  11/8/2021    _______________________________________________________________________     Anticoagulation Episode Summary     Current INR goal:  2.0-3.0   TTR:  53.8 % (1 y)   Target end date:  10/15/2024   Send INR reminders to:  [facility]    Indications    History of DVT (deep vein thrombosis)-multiple [Z86.718]  Chronic deep vein thrombosis (DVT) of proximal vein of both lower extremities (H) [I82.5Y3]           Comments:           Anticoagulation Care Providers     Provider Role Specialty Phone number    Latricia Macedo PA Referring Family Medicine [phone]    Zach Arredondo MD Referring Family Medicine [phone]

## 2021-11-22 DIAGNOSIS — Z95.1 S/P CABG X 3: ICD-10-CM

## 2021-11-22 DIAGNOSIS — E78.1 HYPERTRIGLYCERIDEMIA: ICD-10-CM

## 2021-11-22 DIAGNOSIS — E11.65 TYPE 2 DIABETES MELLITUS WITH HYPERGLYCEMIA, WITHOUT LONG-TERM CURRENT USE OF INSULIN (H): ICD-10-CM

## 2021-11-22 DIAGNOSIS — E78.2 MIXED HYPERLIPIDEMIA: Primary | ICD-10-CM

## 2021-11-22 DIAGNOSIS — Z98.890 H/O CARDIAC CATHETERIZATION: ICD-10-CM

## 2021-11-22 DIAGNOSIS — I25.10 ATHEROSCLEROSIS OF NATIVE CORONARY ARTERY OF NATIVE HEART WITHOUT ANGINA PECTORIS: ICD-10-CM

## 2021-11-22 DIAGNOSIS — Z98.890 STATUS POST CORONARY ANGIOGRAM: ICD-10-CM

## 2021-11-22 DIAGNOSIS — Z95.5 HISTORY OF CORONARY ARTERY STENT PLACEMENT: ICD-10-CM

## 2021-11-22 DIAGNOSIS — I25.810 ATHEROSCLEROSIS OF CORONARY ARTERY BYPASS GRAFT OF NATIVE HEART WITHOUT ANGINA PECTORIS: ICD-10-CM

## 2021-11-22 DIAGNOSIS — I25.5 ISCHEMIC CARDIOMYOPATHY: ICD-10-CM

## 2021-11-22 RX ORDER — ROSUVASTATIN CALCIUM 20 MG/1
20 TABLET, COATED ORAL
Qty: 90 TABLET | Refills: 3 | Status: SHIPPED | OUTPATIENT
Start: 2021-11-22 | End: 2022-03-05

## 2021-11-29 ENCOUNTER — LAB (OUTPATIENT)
Dept: LAB | Facility: OTHER | Age: 86
End: 2021-11-29
Attending: FAMILY MEDICINE
Payer: MEDICARE

## 2021-11-29 ENCOUNTER — ANTICOAGULATION THERAPY VISIT (OUTPATIENT)
Dept: ANTICOAGULATION | Facility: OTHER | Age: 86
End: 2021-11-29
Attending: FAMILY MEDICINE
Payer: MEDICARE

## 2021-11-29 DIAGNOSIS — I82.5Y3 CHRONIC DEEP VEIN THROMBOSIS (DVT) OF PROXIMAL VEIN OF BOTH LOWER EXTREMITIES (H): ICD-10-CM

## 2021-11-29 DIAGNOSIS — Z86.718 HISTORY OF DVT (DEEP VEIN THROMBOSIS): Primary | ICD-10-CM

## 2021-11-29 LAB — INR BLD: 3.4 (ref 0.9–1.1)

## 2021-11-29 PROCEDURE — 85610 PROTHROMBIN TIME: CPT | Mod: ZL

## 2021-11-29 PROCEDURE — 36415 COLL VENOUS BLD VENIPUNCTURE: CPT | Mod: ZL

## 2021-11-29 NOTE — PROGRESS NOTES
ANTICOAGULATION MANAGEMENT     Sidney Barriga 89 year old male is on warfarin with supratherapeutic INR result. (Goal INR 2.0-3.0)    Recent labs: (last 7 days)     11/29/21  0915   INR 3.4*       ASSESSMENT     Source(s): Chart Review and Patient/Caregiver Call       Warfarin doses taken: Warfarin taken as instructed    Diet: No new diet changes identified    New illness, injury, or hospitalization: No    Medication/supplement changes: None noted    Signs or symptoms of bleeding or clotting: No    Previous INR: Supratherapeutic    Additional findings: None     PLAN     Recommended plan for no diet, medication or health factor changes affecting INR     Dosing Instructions: Hold dose then Decrease your warfarin dose (7% change) with next INR in 2 weeks       Summary  As of 11/29/2021    Full warfarin instructions:  11/29: Hold; Otherwise 2.5 mg every Mon, Fri; 5 mg all other days   Next INR check:  12/13/2021             Telephone call with Sidney who verbalizes understanding and agrees to plan    Lab visit scheduled    Education provided: Please call back if any changes to your diet, medications or how you've been taking warfarin, Monitoring for bleeding signs and symptoms and Monitoring for clotting signs and symptoms    Plan made per [facility]agulation protocol    Kathryn Cardenas RN  Anticoagulation Clinic  11/29/2021    _______________________________________________________________________     Anticoagulation Episode Summary     Current INR goal:  2.0-3.0   TTR:  48.0 % (1 y)   Target end date:  10/15/2024   Send INR reminders to:  [facility]    Indications    History of DVT (deep vein thrombosis)-multiple [Z86.718]  Chronic deep vein thrombosis (DVT) of proximal vein of both lower extremities (H) [I82.5Y3]           Comments:           Anticoagulation Care Providers     Provider Role Specialty Phone number    Latricia Macedo PA Referring Family Medicine [phone]    Zahc Arredondo MD  [facility] [phone]

## 2021-12-13 ENCOUNTER — LAB (OUTPATIENT)
Dept: LAB | Facility: OTHER | Age: 86
End: 2021-12-13
Attending: FAMILY MEDICINE
Payer: MEDICARE

## 2021-12-13 ENCOUNTER — ANTICOAGULATION THERAPY VISIT (OUTPATIENT)
Dept: ANTICOAGULATION | Facility: OTHER | Age: 86
End: 2021-12-13
Attending: FAMILY MEDICINE
Payer: MEDICARE

## 2021-12-13 DIAGNOSIS — Z86.718 HISTORY OF DVT (DEEP VEIN THROMBOSIS): Primary | ICD-10-CM

## 2021-12-13 DIAGNOSIS — I82.5Y3 CHRONIC DEEP VEIN THROMBOSIS (DVT) OF PROXIMAL VEIN OF BOTH LOWER EXTREMITIES (H): ICD-10-CM

## 2021-12-13 LAB — INR BLD: 3.4 (ref 0.9–1.1)

## 2021-12-13 PROCEDURE — 36415 COLL VENOUS BLD VENIPUNCTURE: CPT | Mod: ZL

## 2021-12-13 PROCEDURE — 85610 PROTHROMBIN TIME: CPT | Mod: ZL

## 2021-12-13 NOTE — PROGRESS NOTES
ANTICOAGULATION MANAGEMENT     Sidney Barriga 89 year old male is on warfarin with supratherapeutic INR result. (Goal INR 2.0-3.0)    Recent labs: (last 7 days)     12/13/21  0854   INR 3.4*       ASSESSMENT     Source(s): Chart Review and Patient/Caregiver Call       Warfarin doses taken: Warfarin taken as instructed    Diet: No new diet changes identified    New illness, injury, or hospitalization: No    Medication/supplement changes: None noted    Signs or symptoms of bleeding or clotting: No    Previous INR: Supratherapeutic    Additional findings: None     PLAN     Recommended plan for no diet, medication or health factor changes affecting INR     Dosing Instructions:  Decrease your warfarin dose (5% change) with next INR in 3 weeks       Summary  As of 12/13/2021    Full warfarin instructions:  2.5 mg every Mon, Wed, Fri; 5 mg all other days   Next INR check:  12/28/2021             Telephone call with Sidney who verbalizes understanding and agrees to plan    Lab visit scheduled    Education provided: Please call back if any changes to your diet, medications or how you've been taking warfarin, Monitoring for bleeding signs and symptoms and Monitoring for clotting signs and symptoms    Plan made per [facility]agulation protocol    Kathryn Cardenas RN  Anticoagulation Clinic  12/13/2021    _______________________________________________________________________     Anticoagulation Episode Summary     Current INR goal:  2.0-3.0   TTR:  44.2 % (1 y)   Target end date:  10/15/2024   Send INR reminders to:  [facility]    Indications    History of DVT (deep vein thrombosis)-multiple [Z86.718]  Chronic deep vein thrombosis (DVT) of proximal vein of both lower extremities (H) [I82.5Y3]           Comments:           Anticoagulation Care Providers     Provider Role Specialty Phone number    Latricia Macedo PA Referring Family Medicine [phone]    Zach Arredondo MD Referring Family Medicine  [phone]

## 2022-01-03 ENCOUNTER — LAB (OUTPATIENT)
Dept: LAB | Facility: OTHER | Age: 87
End: 2022-01-03
Attending: FAMILY MEDICINE
Payer: MEDICARE

## 2022-01-03 ENCOUNTER — ANTICOAGULATION THERAPY VISIT (OUTPATIENT)
Dept: ANTICOAGULATION | Facility: OTHER | Age: 87
End: 2022-01-03
Attending: FAMILY MEDICINE
Payer: MEDICARE

## 2022-01-03 DIAGNOSIS — Z86.718 HISTORY OF DVT (DEEP VEIN THROMBOSIS): Primary | ICD-10-CM

## 2022-01-03 DIAGNOSIS — I82.5Y3 CHRONIC DEEP VEIN THROMBOSIS (DVT) OF PROXIMAL VEIN OF BOTH LOWER EXTREMITIES (H): ICD-10-CM

## 2022-01-03 LAB — INR BLD: 1.6 (ref 0.9–1.1)

## 2022-01-03 PROCEDURE — 85610 PROTHROMBIN TIME: CPT | Mod: ZL

## 2022-01-03 PROCEDURE — 36415 COLL VENOUS BLD VENIPUNCTURE: CPT | Mod: ZL

## 2022-01-03 NOTE — PROGRESS NOTES
ANTICOAGULATION MANAGEMENT     Sidneycarol Barriga 89 year old male is on warfarin with subtherapeutic INR result. (Goal INR 2.0-3.0)    Patient moved into [facility] and states that he get his meals provided now which include more greens than he would normally have. Increased maintenance dose to accommodate.     Recent labs: (last 7 days)     01/03/22  0855   INR 1.6*       ASSESSMENT     Source(s): Chart Review and Patient/Caregiver Call       Warfarin doses taken: Warfarin taken as instructed    Diet: Increased greens/vitamin K in diet; ongoing change    New illness, injury, or hospitalization: No    Medication/supplement changes: None noted    Signs or symptoms of bleeding or clotting: No    Previous INR: Supratherapeutic    Additional findings: None     PLAN     Recommended plan for ongoing change(s) affecting INR     Dosing Instructions:  Increase your warfarin dose (9% change) with next INR in 2 weeks       Summary  As of 1/3/2022    Full warfarin instructions:  2.5 mg every Wed, Fri; 5 mg all other days   Next INR check:  1/17/2022             Telephone call with Sidney who verbalizes understanding and agrees to plan    Lab visit scheduled    Education provided: Please call back if any changes to your diet, medications or how you've been taking warfarin, Monitoring for bleeding signs and symptoms and Monitoring for clotting signs and symptoms    Plan made per [facility] anticoagulation protocol    Kathryn Cardenas RN  Anticoagulation Clinic  1/3/2022    _______________________________________________________________________     Anticoagulation Episode Summary     Current INR goal:  2.0-3.0   TTR:  47.1 % (1 y)   Target end date:  10/15/2024   Send INR reminders to:  [facility]    Indications    History of DVT (deep vein thrombosis)-multiple [Z86.718]  Chronic deep vein thrombosis (DVT) of proximal vein of both lower extremities (H) [I82.5Y3]           Comments:           Anticoagulation Care  Providers     Provider Role Specialty Phone number    Latricia Macedo PA Referring Family Medicine [phone]    Zach Arredondo MD Referring Family Medicine [phone]

## 2022-01-17 ENCOUNTER — LAB (OUTPATIENT)
Dept: LAB | Facility: OTHER | Age: 87
End: 2022-01-17
Attending: FAMILY MEDICINE
Payer: MEDICARE

## 2022-01-17 ENCOUNTER — ANTICOAGULATION THERAPY VISIT (OUTPATIENT)
Dept: ANTICOAGULATION | Facility: OTHER | Age: 87
End: 2022-01-17
Attending: FAMILY MEDICINE
Payer: MEDICARE

## 2022-01-17 DIAGNOSIS — I82.5Y3 CHRONIC DEEP VEIN THROMBOSIS (DVT) OF PROXIMAL VEIN OF BOTH LOWER EXTREMITIES (H): ICD-10-CM

## 2022-01-17 DIAGNOSIS — Z86.718 HISTORY OF DVT (DEEP VEIN THROMBOSIS): Primary | ICD-10-CM

## 2022-01-17 LAB — INR BLD: 2 (ref 0.9–1.1)

## 2022-01-17 PROCEDURE — 85610 PROTHROMBIN TIME: CPT | Mod: ZL

## 2022-01-17 PROCEDURE — 36415 COLL VENOUS BLD VENIPUNCTURE: CPT | Mod: ZL

## 2022-01-17 NOTE — PROGRESS NOTES
ANTICOAGULATION MANAGEMENT     Sidney Barriga 89 year old male is on warfarin with therapeutic INR result. (Goal INR 2.0-3.0)    Recent labs: (last 7 days)     01/17/22  0921   INR 2.0*       ASSESSMENT     Source(s): Chart Review and Patient/Caregiver Call       Warfarin doses taken: Warfarin taken as instructed    Diet: No new diet changes identified    New illness, injury, or hospitalization: No    Medication/supplement changes: None noted    Signs or symptoms of bleeding or clotting: No    Previous INR: Subtherapeutic    Additional findings: None     PLAN     Recommended plan for no diet, medication or health factor changes affecting INR     Dosing Instructions: Continue your current warfarin dose with next INR in 4 weeks       Summary  As of 1/17/2022    Full warfarin instructions:  2.5 mg every Wed, Fri; 5 mg all other days   Next INR check:  2/14/2022             Telephone call with Sidney who verbalizes understanding and agrees to plan    Lab visit scheduled    Education provided: Please call back if any changes to your diet, medications or how you've been taking warfarin, Monitoring for bleeding signs and symptoms and Monitoring for clotting signs and symptoms    Plan made per [facility] anticoagulation protocol    Kathryn Cardenas RN  Anticoagulation Clinic  1/17/2022    _______________________________________________________________________     Anticoagulation Episode Summary     Current INR goal:  2.0-3.0   TTR:  46.6 % (1 y)   Target end date:  10/15/2024   Send INR reminders to:  [facility]    Indications    History of DVT (deep vein thrombosis)-multiple [Z86.718]  Chronic deep vein thrombosis (DVT) of proximal vein of both lower extremities (H) [I82.5Y3]           Comments:           Anticoagulation Care Providers     Provider Role Specialty Phone number    Latricia Macedo PA Referring Family Medicine [phone]    Zach Arredondo MD Referring Family Medicine [phone]

## 2022-02-03 ENCOUNTER — TRANSFERRED RECORDS (OUTPATIENT)
Dept: HEALTH INFORMATION MANAGEMENT | Facility: HOSPITAL | Age: 87
End: 2022-02-03
Payer: MEDICARE

## 2022-02-03 LAB
CHOLESTEROL (EXTERNAL): 155 MG/DL
HDLC SERPL-MCNC: 40 MG/DL
LDL CHOLESTEROL CALCULATED (EXTERNAL): 96 MG/DL
NON HDL CHOLESTEROL (EXTERNAL): 115 MG/DL
TRIGLYCERIDES (EXTERNAL): 95 MG/DL

## 2022-02-07 DIAGNOSIS — E78.2 MIXED HYPERLIPIDEMIA: ICD-10-CM

## 2022-02-07 DIAGNOSIS — R06.09 DOE (DYSPNEA ON EXERTION): ICD-10-CM

## 2022-02-07 DIAGNOSIS — I50.42 CHRONIC COMBINED SYSTOLIC AND DIASTOLIC CONGESTIVE HEART FAILURE (H): ICD-10-CM

## 2022-02-07 DIAGNOSIS — I10 ESSENTIAL HYPERTENSION: ICD-10-CM

## 2022-02-07 DIAGNOSIS — Z86.718 HISTORY OF DVT (DEEP VEIN THROMBOSIS): ICD-10-CM

## 2022-02-07 DIAGNOSIS — I10 ESSENTIAL HYPERTENSION, BENIGN: ICD-10-CM

## 2022-02-07 DIAGNOSIS — I25.5 ISCHEMIC CARDIOMYOPATHY: ICD-10-CM

## 2022-02-07 DIAGNOSIS — N18.32 STAGE 3B CHRONIC KIDNEY DISEASE (H): ICD-10-CM

## 2022-02-07 DIAGNOSIS — I27.20 MILD PULMONARY HYPERTENSION (H): ICD-10-CM

## 2022-02-07 RX ORDER — WARFARIN SODIUM 5 MG/1
5 TABLET ORAL DAILY
Qty: 90 TABLET | Refills: 1 | Status: SHIPPED | OUTPATIENT
Start: 2022-02-07 | End: 2022-05-09

## 2022-02-07 RX ORDER — EZETIMIBE 10 MG/1
10 TABLET ORAL DAILY
Qty: 90 TABLET | Refills: 3 | Status: SHIPPED | OUTPATIENT
Start: 2022-02-07 | End: 2022-05-11

## 2022-02-07 RX ORDER — FUROSEMIDE 20 MG
40 TABLET ORAL DAILY
Qty: 90 TABLET | Refills: 0 | Status: CANCELLED | OUTPATIENT
Start: 2022-02-07

## 2022-02-07 NOTE — TELEPHONE ENCOUNTER
Patient called and is requesting a refill of his lasix 40 mg tablets.  He states that his dosage has been increased by a study that he is in at the [facility].  He states that his legs and ankles have been swelling, so that was the reason for the increase.  He is requesting a 90 day supply be sent to [facility] in [place].

## 2022-02-10 NOTE — TELEPHONE ENCOUNTER
Patient spoke with the [facility], and due to the increase in the swelling in his legs, they recommended he touch base with you to increase his lasix to 40 mg daily.

## 2022-02-10 NOTE — TELEPHONE ENCOUNTER
Justyn Rae, Jinny Paez LPN  It looks like patient has been following up with [facility] and has been seen multiple times.  He has not been seen for almost a year by myself.  Patient would need to discuss changes in diuretics with [facility] or be seen by myself in follow-up.     Dr. Rae       Patient contacted and notified to contact the [facility] to increase and refill his diuretics.  Patient states he will call down and see if he can get a new prescription sent to [facility] in [place] from the [facility].  JINNY DIAZ LPN

## 2022-02-14 ENCOUNTER — APPOINTMENT (OUTPATIENT)
Dept: ANTICOAGULATION | Facility: OTHER | Age: 87
End: 2022-02-14
Attending: FAMILY MEDICINE
Payer: MEDICARE

## 2022-02-14 ENCOUNTER — ANTICOAGULATION THERAPY VISIT (OUTPATIENT)
Dept: ANTICOAGULATION | Facility: OTHER | Age: 87
End: 2022-02-14

## 2022-02-14 ENCOUNTER — LAB (OUTPATIENT)
Dept: LAB | Facility: OTHER | Age: 87
End: 2022-02-14
Attending: FAMILY MEDICINE
Payer: MEDICARE

## 2022-02-14 DIAGNOSIS — I82.5Y3 CHRONIC DEEP VEIN THROMBOSIS (DVT) OF PROXIMAL VEIN OF BOTH LOWER EXTREMITIES (H): ICD-10-CM

## 2022-02-14 DIAGNOSIS — E11.65 TYPE 2 DIABETES MELLITUS WITH HYPERGLYCEMIA, WITHOUT LONG-TERM CURRENT USE OF INSULIN (H): Primary | ICD-10-CM

## 2022-02-14 DIAGNOSIS — Z86.718 HISTORY OF DVT (DEEP VEIN THROMBOSIS): Primary | ICD-10-CM

## 2022-02-14 DIAGNOSIS — E11.65 TYPE 2 DIABETES MELLITUS WITH HYPERGLYCEMIA, WITHOUT LONG-TERM CURRENT USE OF INSULIN (H): ICD-10-CM

## 2022-02-14 LAB
ANION GAP SERPL CALCULATED.3IONS-SCNC: 5 MMOL/L (ref 3–14)
BUN SERPL-MCNC: 41 MG/DL (ref 7–30)
CALCIUM SERPL-MCNC: 9.4 MG/DL (ref 8.5–10.1)
CHLORIDE BLD-SCNC: 106 MMOL/L (ref 94–109)
CO2 SERPL-SCNC: 28 MMOL/L (ref 20–32)
CREAT SERPL-MCNC: 1.38 MG/DL (ref 0.66–1.25)
EST. AVERAGE GLUCOSE BLD GHB EST-MCNC: 128 MG/DL
GFR SERPL CREATININE-BSD FRML MDRD: 49 ML/MIN/1.73M2
GLUCOSE BLD-MCNC: 149 MG/DL (ref 70–99)
HBA1C MFR BLD: 6.1 % (ref 0–5.6)
HOLD SPECIMEN: NORMAL
HOLD SPECIMEN: NORMAL
INR BLD: 2.8 (ref 0.9–1.1)
POTASSIUM BLD-SCNC: 4.2 MMOL/L (ref 3.4–5.3)
SODIUM SERPL-SCNC: 139 MMOL/L (ref 133–144)

## 2022-02-14 PROCEDURE — 85610 PROTHROMBIN TIME: CPT | Mod: ZL

## 2022-02-14 PROCEDURE — 82310 ASSAY OF CALCIUM: CPT | Mod: ZL

## 2022-02-14 PROCEDURE — 36415 COLL VENOUS BLD VENIPUNCTURE: CPT | Mod: ZL

## 2022-02-14 PROCEDURE — 83036 HEMOGLOBIN GLYCOSYLATED A1C: CPT | Mod: ZL

## 2022-02-14 NOTE — PROGRESS NOTES
ANTICOAGULATION MANAGEMENT     Sidney Barriga 89 year old male is on warfarin with therapeutic INR result. (Goal INR 2.0-3.0)    Recent labs: (last 7 days)     02/14/22  1007   INR 2.8*       ASSESSMENT     Source(s): Chart Review and Patient/Caregiver Call       Warfarin doses taken: Warfarin taken as instructed    Diet: No new diet changes identified    New illness, injury, or hospitalization: No    Medication/supplement changes: None noted    Signs or symptoms of bleeding or clotting: No    Previous INR: Therapeutic last visit; previously outside of goal range    Additional findings: None     PLAN     Recommended plan for no diet, medication or health factor changes affecting INR     Dosing Instructions: Continue your current warfarin dose with next INR in 2 weeks   Patient has appointment with PCP this day.    Summary  As of 2/14/2022    Full warfarin instructions:  2.5 mg every Wed, Fri; 5 mg all other days   Next INR check:  3/1/2022             Telephone call with Sidney who verbalizes understanding and agrees to plan    Lab visit scheduled    Education provided: Please call back if any changes to your diet, medications or how you've been taking warfarin    Plan made per [facility] anticoagulation protocol    Siri Jay RN  Anticoagulation Clinic  2/14/2022    _______________________________________________________________________     Anticoagulation Episode Summary     Current INR goal:  2.0-3.0   TTR:  46.6 % (1 y)   Target end date:  10/15/2024   Send INR reminders to:  [facility]    Indications    History of DVT (deep vein thrombosis)-multiple [Z86.718]  Chronic deep vein thrombosis (DVT) of proximal vein of both lower extremities (H) [I82.5Y3]           Comments:           Anticoagulation Care Providers     Provider Role Specialty Phone number    Latricia Macedo PA Referring Family Medicine [phone]    Zach Arredondo MD Referring Family Medicine [phone]

## 2022-02-24 NOTE — PROGRESS NOTES
Assessment & Plan     Type 2 diabetes mellitus with hyperglycemia, without long-term current use of insulin (H)  Doing great.  Sugars always good.  Update labs.  No change in cares.      Essential hypertension, benign  Stable.     Atherosclerosis of native coronary artery of native heart without angina pectoris  Stable.  No concerns.  Update carvedilol dosing and lasix dosing increased at [facility].      Stage 3b chronic kidney disease (H)  Stable.  Update when due next check.  Creatinine looking quite good.  Reviewed with him.                     No follow-ups on file.    Zach Arredondo MD  [facility]   Sidney is a 89 year old who presents for the following health issues     HPI     Diabetes Follow-up    How often are you checking your blood sugar? A few times a week  What time of day are you checking your blood sugars (select all that apply)?  Before and after meals  Have you had any blood sugars above 200?  No  Have you had any blood sugars below 70?  No    What symptoms do you notice when your blood sugar is low?  None    What concerns do you have today about your diabetes? None     Do you have any of these symptoms? (Select all that apply)  Numbness in feet and Burning in feet    Have you had a diabetic eye exam in the last 12 months? No        BP Readings from Last 2 Encounters:   03/01/22 110/60   07/20/21 98/60     Hemoglobin A1C POCT (%)   Date Value   03/04/2021 5.5   10/20/2020 5.6     Hemoglobin A1C (%)   Date Value   02/14/2022 6.1 (H)     LDL Cholesterol Calculated (mg/dL)   Date Value   10/20/2020 164 (H)   01/09/2019 149 (H)               Hypertension Follow-up      Do you check your blood pressure regularly outside of the clinic? No     Are you following a low salt diet? Yes    Are your blood pressures ever more than 140 on the top number (systolic) OR more   than 90 on the bottom number (diastolic), for example 140/90? NA    Diabetic foot exam   1. foot  deformity   No  2. Current or previous foot ulceration     No  3. Current or previous pre-ulcerative calluses     Yes great toes.    4. previous partial amputation of one or both feet or complete amputation of one foot     No  5. peripheral neuropathy with evidence of callus formation     No  6. poor circulation     No  Approval given for 3 pairs of diabetic shoes and inserts if patient desires.        Review of Systems   Constitutional, HEENT, cardiovascular, pulmonary, gi and gu systems are negative, except as otherwise noted.      Objective    /60   Pulse 68   Wt 74.4 kg (164 lb)   SpO2 99%   BMI 23.53 kg/m    Body mass index is 23.53 kg/m .  Physical Exam   GENERAL: healthy, alert and no distress  NECK: no adenopathy, no asymmetry, masses, or scars and thyroid normal to palpation  RESP: lungs clear to auscultation - no rales, rhonchi or wheezes  CV: regular rate and rhythm, normal S1 S2, no S3 or S4, no murmur, click or rub, no peripheral edema and peripheral pulses strong  ABDOMEN: soft, nontender, no hepatosplenomegaly, no masses and bowel sounds normal  MS: no gross musculoskeletal defects noted, no edema    Labs reviewed in detail from [facility] and from us.  His BNP was up at [facility] and we reviewed.

## 2022-03-01 ENCOUNTER — LAB (OUTPATIENT)
Dept: LAB | Facility: OTHER | Age: 87
End: 2022-03-01
Attending: FAMILY MEDICINE
Payer: MEDICARE

## 2022-03-01 ENCOUNTER — OFFICE VISIT (OUTPATIENT)
Dept: FAMILY MEDICINE | Facility: OTHER | Age: 87
End: 2022-03-01
Attending: FAMILY MEDICINE
Payer: MEDICARE

## 2022-03-01 ENCOUNTER — ANTICOAGULATION THERAPY VISIT (OUTPATIENT)
Dept: ANTICOAGULATION | Facility: OTHER | Age: 87
End: 2022-03-01
Attending: FAMILY MEDICINE
Payer: MEDICARE

## 2022-03-01 VITALS
WEIGHT: 164 LBS | DIASTOLIC BLOOD PRESSURE: 60 MMHG | HEART RATE: 68 BPM | OXYGEN SATURATION: 99 % | SYSTOLIC BLOOD PRESSURE: 110 MMHG | BODY MASS INDEX: 23.53 KG/M2

## 2022-03-01 DIAGNOSIS — I10 ESSENTIAL HYPERTENSION, BENIGN: ICD-10-CM

## 2022-03-01 DIAGNOSIS — I82.5Y3 CHRONIC DEEP VEIN THROMBOSIS (DVT) OF PROXIMAL VEIN OF BOTH LOWER EXTREMITIES (H): ICD-10-CM

## 2022-03-01 DIAGNOSIS — I25.10 ATHEROSCLEROSIS OF NATIVE CORONARY ARTERY OF NATIVE HEART WITHOUT ANGINA PECTORIS: ICD-10-CM

## 2022-03-01 DIAGNOSIS — N18.32 STAGE 3B CHRONIC KIDNEY DISEASE (H): ICD-10-CM

## 2022-03-01 DIAGNOSIS — E11.65 TYPE 2 DIABETES MELLITUS WITH HYPERGLYCEMIA, WITHOUT LONG-TERM CURRENT USE OF INSULIN (H): Primary | ICD-10-CM

## 2022-03-01 LAB — INR BLD: 2.2 (ref 0.9–1.1)

## 2022-03-01 PROCEDURE — 99214 OFFICE O/P EST MOD 30 MIN: CPT | Performed by: FAMILY MEDICINE

## 2022-03-01 PROCEDURE — G0463 HOSPITAL OUTPT CLINIC VISIT: HCPCS

## 2022-03-01 PROCEDURE — 85610 PROTHROMBIN TIME: CPT | Mod: ZL

## 2022-03-01 PROCEDURE — 36416 COLLJ CAPILLARY BLOOD SPEC: CPT | Mod: ZL

## 2022-03-01 RX ORDER — CARVEDILOL 25 MG/1
25 TABLET ORAL 2 TIMES DAILY WITH MEALS
COMMUNITY
End: 2022-04-13

## 2022-03-01 ASSESSMENT — ANXIETY QUESTIONNAIRES
1. FEELING NERVOUS, ANXIOUS, OR ON EDGE: SEVERAL DAYS
3. WORRYING TOO MUCH ABOUT DIFFERENT THINGS: SEVERAL DAYS
5. BEING SO RESTLESS THAT IT IS HARD TO SIT STILL: NOT AT ALL
IF YOU CHECKED OFF ANY PROBLEMS ON THIS QUESTIONNAIRE, HOW DIFFICULT HAVE THESE PROBLEMS MADE IT FOR YOU TO DO YOUR WORK, TAKE CARE OF THINGS AT HOME, OR GET ALONG WITH OTHER PEOPLE: NOT DIFFICULT AT ALL
2. NOT BEING ABLE TO STOP OR CONTROL WORRYING: NOT AT ALL
7. FEELING AFRAID AS IF SOMETHING AWFUL MIGHT HAPPEN: NOT AT ALL
6. BECOMING EASILY ANNOYED OR IRRITABLE: NOT AT ALL
GAD7 TOTAL SCORE: 2

## 2022-03-01 ASSESSMENT — PATIENT HEALTH QUESTIONNAIRE - PHQ9
SUM OF ALL RESPONSES TO PHQ QUESTIONS 1-9: 2
5. POOR APPETITE OR OVEREATING: NOT AT ALL

## 2022-03-01 ASSESSMENT — PAIN SCALES - GENERAL: PAINLEVEL: NO PAIN (0)

## 2022-03-01 NOTE — PROGRESS NOTES
ANTICOAGULATION FOLLOW-UP CLINIC VISIT    Patient Name:  Sidney Barriga  Date:  3/1/2022  Contact Type:  Telephone    SUBJECTIVE:  Patient Findings     Comments:  Denies changes.         Clinical Outcomes     Negatives:  Major bleeding event, Thromboembolic event, Anticoagulation-related hospital admission, Anticoagulation-related ED visit, Anticoagulation-related fatality    Comments:  Denies changes.            OBJECTIVE    Recent labs: (last 7 days)     22  0922   INR 2.2*       ASSESSMENT / PLAN  INR assessment THER    Recheck INR In: 4 WEEKS    INR Location Outside lab      Anticoagulation Summary  As of 3/1/2022    INR goal:  2.0-3.0   TTR:  46.6 % (1 y)   INR used for dosin.2 (3/1/2022)   Warfarin maintenance plan:  2.5 mg (5 mg x 0.5) every Wed, Fri; 5 mg (5 mg x 1) all other days   Full warfarin instructions:  2.5 mg every Wed, Fri; 5 mg all other days   Weekly warfarin total:  30 mg   No change documented:  Barbie Hylton RN   Plan last modified:  Kathryn Cardenas RN (1/3/2022)   Next INR check:  3/30/2022   Priority:  Maintenance   Target end date:  10/15/2024    Indications    History of DVT (deep vein thrombosis)-multiple [Z86.718]  Chronic deep vein thrombosis (DVT) of proximal vein of both lower extremities (H) [I82.5Y3]             Anticoagulation Episode Summary     INR check location:      Preferred lab:      Send INR reminders to:  [facility]    Comments:        Anticoagulation Care Providers     Provider Role Specialty Phone number    Latricia Macedo PA Referring Family Medicine [phone]    Zach Arredondo MD Referring Family Medicine [phone]            See the Encounter Report to view Anticoagulation Flowsheet and Dosing Calendar (Go to Encounters tab in chart review, and find the Anticoagulation Therapy Visit)        Barbie Hylton, RN

## 2022-03-01 NOTE — PATIENT INSTRUCTIONS
Patient Education     Diabetes: Activity Tips    Being more active can help you manage your diabetes. The tips on this sheet can help you get the most from your exercise. They can also help you stay safe.   Staying active   It s important for adults to spend less time sitting and being inactive. This is especially true if you have type 2 diabetes. When you are sitting for long periods of time, get up for short sessions of light activity every 30 minutes.   Aim for at least 150 minutes a week of exercise or physical activity. That's about 30 minutes a day 5 to 7 days a week. Don t let more than 2 days go by without being active. Break up your daily activity into 10-minute blocks. Or choose a daily schedule that works for you. Make your goal 30 minutes of daily physical activity.   Benefit from briskness  Brisk activity gets your heart beating faster. This can help make you more fit, lose extra weight, and manage your blood sugar level. Try brisk walking. Or try swimming or bike riding if you have foot or leg problems. You can break up your exercise into chunks throughout the day. Work up to at least 30 minutes of steady, brisk exercise on most days.   Warm up and cool down  Warming up and cooling down reduce your risk for injury. This also helps limit muscle soreness. Do a mild version of your activity for 5 minutes before and after your routine. You can also learn stretches that will help keep your muscles loose. Your healthcare provider may show you good ways to warm up and stretch.   Do the talk-sing test  The talk-sing test is a simple way to tell how hard you re exercising. If you can talk while exercising, you re in a safe range. If you re out of breath, slow down. If you can carry a tune, it s time to  the pace. Walk up a hill. Add more resistance on your stationary bike. Or swim faster.   What about eating?  You may be told to plan your exercise for 1 to 2 hours after a meal. In most cases, you don t  need to eat while being active. Test your blood sugar before exercising if you take insulin or medicine that can cause low blood sugar. And carry a fast-acting sugar that will raise your blood sugar level quickly. This includes glucose tablets or glucose gel in a tube. Use it if you feel low blood sugar symptoms.   Safety tips  These tips can help you stay safe as you become fit:    Exercise with a friend or carry a cell phone if you have one.    Carry or wear ID such as a necklace or bracelet that says you have diabetes.    Use the correct footwear and safety equipment for your activity.    Drink water before, during, and after exercise.    Dress for the weather.    Don t exercise in very hot or very cold weather.    Don t exercise if you are sick.    Test your blood sugar before and after you exercise if you are told to do so. Have a small carbohydrate snack if your blood sugar is low before you start exercising.   When to stop exercising and call your healthcare provider  Stop exercising and call your healthcare provider right away if you notice any of the following:     Pain, pressure, tightness, or heaviness in the chest    Pain or heaviness in the neck, shoulders, back, arms, legs, or feet    Unusual shortness of breath    Dizziness or lightheadedness    Unusually rapid or slow pulse    Increased joint or muscle pain    Nausea or vomiting  [employer] last reviewed this educational content on 11/1/2018 2000-2021 [employer]. All rights reserved. This information is not intended as a substitute for professional medical care. Always follow your healthcare professional's instructions.

## 2022-03-02 ASSESSMENT — ANXIETY QUESTIONNAIRES: GAD7 TOTAL SCORE: 2

## 2022-03-04 DIAGNOSIS — Z98.890 H/O CARDIAC CATHETERIZATION: ICD-10-CM

## 2022-03-04 DIAGNOSIS — I10 ESSENTIAL HYPERTENSION, BENIGN: ICD-10-CM

## 2022-03-04 DIAGNOSIS — E78.2 MIXED HYPERLIPIDEMIA: ICD-10-CM

## 2022-03-04 DIAGNOSIS — R06.09 DOE (DYSPNEA ON EXERTION): ICD-10-CM

## 2022-03-04 DIAGNOSIS — Z98.890 STATUS POST CORONARY ANGIOGRAM: ICD-10-CM

## 2022-03-04 DIAGNOSIS — I25.810 ATHEROSCLEROSIS OF CORONARY ARTERY BYPASS GRAFT OF NATIVE HEART WITHOUT ANGINA PECTORIS: ICD-10-CM

## 2022-03-04 DIAGNOSIS — I10 ESSENTIAL HYPERTENSION: ICD-10-CM

## 2022-03-04 DIAGNOSIS — E11.65 TYPE 2 DIABETES MELLITUS WITH HYPERGLYCEMIA, WITHOUT LONG-TERM CURRENT USE OF INSULIN (H): ICD-10-CM

## 2022-03-04 DIAGNOSIS — Z95.1 S/P CABG X 3: ICD-10-CM

## 2022-03-04 DIAGNOSIS — Z95.5 HISTORY OF CORONARY ARTERY STENT PLACEMENT: ICD-10-CM

## 2022-03-04 DIAGNOSIS — I27.20 MILD PULMONARY HYPERTENSION (H): ICD-10-CM

## 2022-03-04 DIAGNOSIS — I25.10 ATHEROSCLEROSIS OF NATIVE CORONARY ARTERY OF NATIVE HEART WITHOUT ANGINA PECTORIS: ICD-10-CM

## 2022-03-04 DIAGNOSIS — I50.42 CHRONIC COMBINED SYSTOLIC AND DIASTOLIC CONGESTIVE HEART FAILURE (H): ICD-10-CM

## 2022-03-04 DIAGNOSIS — N18.32 STAGE 3B CHRONIC KIDNEY DISEASE (H): ICD-10-CM

## 2022-03-04 DIAGNOSIS — I71.21 ASCENDING AORTIC ANEURYSM (H): Primary | ICD-10-CM

## 2022-03-04 DIAGNOSIS — I25.5 ISCHEMIC CARDIOMYOPATHY: ICD-10-CM

## 2022-03-04 DIAGNOSIS — E78.1 HYPERTRIGLYCERIDEMIA: ICD-10-CM

## 2022-03-04 NOTE — TELEPHONE ENCOUNTER
furosemide (LASIX) 20 MG tablet      Last Written Prescription Date:  2-15-21  Last Fill Quantity: 90,   # refills: 3  Last Office Visit: 4-12-21  Future Office visit:       Dr. Rae- Patient reports he is taking 40 mg daily. Please advise.   Thanks.     PAC/HUC- patient needs to be seen. Please call to schedule.

## 2022-03-04 NOTE — TELEPHONE ENCOUNTER
Spoke to Sidney. He made an appt with Dr Rae.  He is out of [facility]. He wondered if this could be refilled now that he has an appt. Please advise. Thank you!

## 2022-03-05 RX ORDER — FUROSEMIDE 20 MG
40 TABLET ORAL DAILY
Qty: 180 TABLET | Refills: 0 | Status: SHIPPED | OUTPATIENT
Start: 2022-03-05 | End: 2022-03-05

## 2022-03-05 RX ORDER — FUROSEMIDE 40 MG
40 TABLET ORAL DAILY
Qty: 90 TABLET | Refills: 0 | Status: SHIPPED | OUTPATIENT
Start: 2022-03-05 | End: 2022-07-25

## 2022-03-05 RX ORDER — ROSUVASTATIN CALCIUM 20 MG/1
20 TABLET, COATED ORAL
Qty: 90 TABLET | Refills: 3 | Status: SHIPPED | OUTPATIENT
Start: 2022-03-07 | End: 2023-04-17

## 2022-03-30 ENCOUNTER — ANTICOAGULATION THERAPY VISIT (OUTPATIENT)
Dept: ANTICOAGULATION | Facility: OTHER | Age: 87
End: 2022-03-30
Attending: FAMILY MEDICINE
Payer: MEDICARE

## 2022-03-30 ENCOUNTER — LAB (OUTPATIENT)
Dept: LAB | Facility: OTHER | Age: 87
End: 2022-03-30
Attending: FAMILY MEDICINE
Payer: MEDICARE

## 2022-03-30 DIAGNOSIS — I82.5Y3 CHRONIC DEEP VEIN THROMBOSIS (DVT) OF PROXIMAL VEIN OF BOTH LOWER EXTREMITIES (H): ICD-10-CM

## 2022-03-30 DIAGNOSIS — Z86.718 HISTORY OF DVT (DEEP VEIN THROMBOSIS): Primary | ICD-10-CM

## 2022-03-30 LAB — INR BLD: 2.5 (ref 0.9–1.1)

## 2022-03-30 PROCEDURE — 36416 COLLJ CAPILLARY BLOOD SPEC: CPT | Mod: ZL

## 2022-03-30 PROCEDURE — 85610 PROTHROMBIN TIME: CPT | Mod: ZL

## 2022-03-30 NOTE — PROGRESS NOTES
ANTICOAGULATION FOLLOW-UP CLINIC VISIT    Patient Name:  Sidney Barriga  Date:  3/30/2022  Contact Type:  Telephone Results reviewed, dosages reviewed, and recheck discussed.    SUBJECTIVE:  Patient Findings         Clinical Outcomes     Negatives:  Major bleeding event, Thromboembolic event, Anticoagulation-related hospital admission, Anticoagulation-related ED visit, Anticoagulation-related fatality           OBJECTIVE    Recent labs: (last 7 days)     22  0858   INR 2.5*       ASSESSMENT / PLAN  INR assessment THER    Recheck INR In: 6 WEEKS    INR Location Outside lab      Anticoagulation Summary  As of 3/30/2022    INR goal:  2.0-3.0   TTR:  46.6 % (1 y)   INR used for dosin.5 (3/30/2022)   Warfarin maintenance plan:  2.5 mg (5 mg x 0.5) every Wed, Fri; 5 mg (5 mg x 1) all other days   Full warfarin instructions:  2.5 mg every Wed, Fri; 5 mg all other days   Weekly warfarin total:  30 mg   No change documented:  Barbie Hylton RN   Plan last modified:  Kathryn Cardenas RN (1/3/2022)   Next INR check:  2022   Priority:  Maintenance   Target end date:  10/15/2024    Indications    History of DVT (deep vein thrombosis)-multiple [Z86.718]  Chronic deep vein thrombosis (DVT) of proximal vein of both lower extremities (H) [I82.5Y3]             Anticoagulation Episode Summary     INR check location:      Preferred lab:      Send INR reminders to:  [facility]    Comments:        Anticoagulation Care Providers     Provider Role Specialty Phone number    Latricia Macedo PA Referring Family Medicine [phone]    Zach Arredondo MD Referring Family Medicine [phone]            See the Encounter Report to view Anticoagulation Flowsheet and Dosing Calendar (Go to Encounters tab in chart review, and find the Anticoagulation Therapy Visit)        Barbie Hylton, RN

## 2022-03-31 ENCOUNTER — IMMUNIZATION (OUTPATIENT)
Dept: FAMILY MEDICINE | Facility: OTHER | Age: 87
End: 2022-03-31
Attending: FAMILY MEDICINE
Payer: MEDICARE

## 2022-03-31 ENCOUNTER — TRANSFERRED RECORDS (OUTPATIENT)
Dept: HEALTH INFORMATION MANAGEMENT | Facility: HOSPITAL | Age: 87
End: 2022-03-31

## 2022-03-31 LAB — RETINOPATHY: NEGATIVE

## 2022-03-31 PROCEDURE — 0053A COVID-19,PF,PFIZER (12+ YRS): CPT

## 2022-04-08 DIAGNOSIS — I82.5Y3 CHRONIC DEEP VEIN THROMBOSIS (DVT) OF PROXIMAL VEIN OF BOTH LOWER EXTREMITIES (H): ICD-10-CM

## 2022-04-08 DIAGNOSIS — Z86.718 HISTORY OF DVT (DEEP VEIN THROMBOSIS): ICD-10-CM

## 2022-04-13 ENCOUNTER — OFFICE VISIT (OUTPATIENT)
Dept: CARDIOLOGY | Facility: OTHER | Age: 87
End: 2022-04-13
Attending: INTERNAL MEDICINE
Payer: MEDICARE

## 2022-04-13 VITALS
OXYGEN SATURATION: 100 % | TEMPERATURE: 97.6 F | DIASTOLIC BLOOD PRESSURE: 58 MMHG | BODY MASS INDEX: 23.66 KG/M2 | WEIGHT: 164.9 LBS | HEART RATE: 55 BPM | SYSTOLIC BLOOD PRESSURE: 100 MMHG

## 2022-04-13 DIAGNOSIS — R06.09 DOE (DYSPNEA ON EXERTION): ICD-10-CM

## 2022-04-13 DIAGNOSIS — I49.8 VENTRICULAR BIGEMINY: ICD-10-CM

## 2022-04-13 DIAGNOSIS — I27.20 MILD PULMONARY HYPERTENSION (H): ICD-10-CM

## 2022-04-13 DIAGNOSIS — Z98.890 STATUS POST CORONARY ANGIOGRAM: ICD-10-CM

## 2022-04-13 DIAGNOSIS — I05.0 MODERATE MITRAL STENOSIS: ICD-10-CM

## 2022-04-13 DIAGNOSIS — I25.810 ATHEROSCLEROSIS OF CORONARY ARTERY BYPASS GRAFT OF NATIVE HEART WITHOUT ANGINA PECTORIS: ICD-10-CM

## 2022-04-13 DIAGNOSIS — I50.42 CHRONIC COMBINED SYSTOLIC AND DIASTOLIC CONGESTIVE HEART FAILURE (H): ICD-10-CM

## 2022-04-13 DIAGNOSIS — Z95.1 S/P CABG X 3: ICD-10-CM

## 2022-04-13 DIAGNOSIS — I25.5 ISCHEMIC CARDIOMYOPATHY: ICD-10-CM

## 2022-04-13 DIAGNOSIS — J44.9 CHRONIC OBSTRUCTIVE PULMONARY DISEASE, UNSPECIFIED COPD TYPE (H): Primary | ICD-10-CM

## 2022-04-13 DIAGNOSIS — I49.3 FREQUENT PVCS: ICD-10-CM

## 2022-04-13 DIAGNOSIS — Z86.718 HISTORY OF DVT (DEEP VEIN THROMBOSIS): ICD-10-CM

## 2022-04-13 DIAGNOSIS — J44.9 COPD, MILD (H): ICD-10-CM

## 2022-04-13 DIAGNOSIS — I25.10 ATHEROSCLEROSIS OF NATIVE CORONARY ARTERY OF NATIVE HEART WITHOUT ANGINA PECTORIS: ICD-10-CM

## 2022-04-13 DIAGNOSIS — I35.0 MODERATE AORTIC STENOSIS: ICD-10-CM

## 2022-04-13 DIAGNOSIS — I48.92 ATRIAL FLUTTER, UNSPECIFIED TYPE (H): ICD-10-CM

## 2022-04-13 DIAGNOSIS — E78.2 MIXED HYPERLIPIDEMIA: ICD-10-CM

## 2022-04-13 DIAGNOSIS — Z86.2 HX OF SARCOIDOSIS: ICD-10-CM

## 2022-04-13 DIAGNOSIS — R07.9 CHEST PAIN, UNSPECIFIED TYPE: ICD-10-CM

## 2022-04-13 DIAGNOSIS — E78.1 HYPERTRIGLYCERIDEMIA: ICD-10-CM

## 2022-04-13 DIAGNOSIS — Z87.891 HISTORY OF TOBACCO ABUSE: ICD-10-CM

## 2022-04-13 DIAGNOSIS — Z95.5 HISTORY OF CORONARY ARTERY STENT PLACEMENT: ICD-10-CM

## 2022-04-13 DIAGNOSIS — I49.9 IRREGULAR HEARTBEAT: ICD-10-CM

## 2022-04-13 DIAGNOSIS — E11.65 TYPE 2 DIABETES MELLITUS WITH HYPERGLYCEMIA, WITHOUT LONG-TERM CURRENT USE OF INSULIN (H): ICD-10-CM

## 2022-04-13 DIAGNOSIS — I71.21 ASCENDING AORTIC ANEURYSM (H): ICD-10-CM

## 2022-04-13 DIAGNOSIS — Z98.890 H/O CARDIAC CATHETERIZATION: ICD-10-CM

## 2022-04-13 DIAGNOSIS — I10 ESSENTIAL HYPERTENSION, BENIGN: ICD-10-CM

## 2022-04-13 DIAGNOSIS — N18.32 STAGE 3B CHRONIC KIDNEY DISEASE (H): ICD-10-CM

## 2022-04-13 PROCEDURE — G0463 HOSPITAL OUTPT CLINIC VISIT: HCPCS

## 2022-04-13 PROCEDURE — 99214 OFFICE O/P EST MOD 30 MIN: CPT | Performed by: INTERNAL MEDICINE

## 2022-04-13 RX ORDER — ALBUTEROL SULFATE 90 UG/1
1-2 AEROSOL, METERED RESPIRATORY (INHALATION) EVERY 4 HOURS PRN
Qty: 18 G | Refills: 3 | Status: SHIPPED | OUTPATIENT
Start: 2022-04-13 | End: 2022-12-09

## 2022-04-13 RX ORDER — CLOPIDOGREL BISULFATE 75 MG/1
75 TABLET ORAL DAILY
Qty: 90 TABLET | Refills: 3 | Status: SHIPPED | OUTPATIENT
Start: 2022-04-13 | End: 2023-04-17

## 2022-04-13 RX ORDER — NITROGLYCERIN 0.4 MG/1
TABLET SUBLINGUAL
Qty: 25 TABLET | Refills: 3 | Status: SHIPPED | OUTPATIENT
Start: 2022-04-13 | End: 2023-04-17

## 2022-04-13 RX ORDER — CARVEDILOL 25 MG/1
25 TABLET ORAL 2 TIMES DAILY WITH MEALS
Qty: 180 TABLET | Refills: 3 | Status: SHIPPED | OUTPATIENT
Start: 2022-04-13 | End: 2023-04-17

## 2022-04-13 ASSESSMENT — PAIN SCALES - GENERAL: PAINLEVEL: MILD PAIN (2)

## 2022-04-13 NOTE — NURSING NOTE
"Chief Complaint   Patient presents with     Follow Up       Initial /58 (BP Location: Right arm)   Pulse 55   Temp 97.6  F (36.4  C) (Tympanic)   Wt 74.8 kg (164 lb 14.4 oz)   SpO2 100%   BMI 23.66 kg/m   Estimated body mass index is 23.66 kg/m  as calculated from the following:    Height as of 7/20/21: 1.778 m (5' 10\").    Weight as of this encounter: 74.8 kg (164 lb 14.4 oz).  Medication Reconciliation: complete  Orin Brooks LPN            "

## 2022-04-13 NOTE — PATIENT INSTRUCTIONS
Thank you for allowing Dr. Rae and our  team to participate in your care. Please call our office at [phone] with scheduling questions or if you need to cancel or change your appointment. With any other questions or concerns you may call Orin cardiology nurse at [phone].       If you experience chest pain, chest pressure, chest tightness, shortness of breath, fainting, lightheadedness, nausea, vomiting, or other concerning symptoms, please report to the Emergency Department or call 911. These symptoms may be emergent, and best treated in the Emergency Department.      Follow up in 1 year    You will have an echocardiogram performed.  This is an ultrasound of the heart, that evaluates heart function.  The hospital scheduling department will call to schedule you for this test.     Referral to Cardiac rehab someone will call you from this department to schedule.     You will be scheduled for a stress test to evaluate the vessels which supply blood to the heart to rule out the possibility of blockages. You will be called by the hospital scheduling department to schedule this appointment.    Refill's today Carvedilol and albuterol inhaler, Plavix, nitroglycerin tabs.

## 2022-04-13 NOTE — PROGRESS NOTES
[facility] HEART CARE   CARDIOLOGY PROGRESS NOTE     Chief Complaint   Patient presents with     Follow Up          Diagnosis:  1.  RIVAS 2/2 CHF, pulm htn, mild COPD, severe diffusion defect on 11/18/20, and DVT/PE???.  2.  CABG x3 on 4/29/2014-LIMA to LAD, SVG to OM, and second OM.  3.  CKD-3.  4.  CHF-systolic, 30-35% on 12/9/20. Diastolic grade 1 on 11/1/2007. Unchanged on 2/11/21.  5.  Ischemic cardiomyopathy.  6.  Moderate MV and AV stenosis on cardiac cath on 1/15/21.   7.  H/O DVT with h/o IVC filter.  8.  Hypomagnesia.  9.  Hypertension-controlled.  10.  Hypertriglyceridemia.  11.  H/O tobacco abuse quitting in 1978.  12.  H/O cardiac cath x4 on 7/17/2007, 4/20//14, 2019, and 1/15/21.  13.  H/O coronary stent placement on 7/17/2007 x2 stents to the LAD and in 2019 in [place] to unknown vessel.  14.  H/O sarcoidosis in 1982.  15.  DM-2-controlled.   16.  Mild COPD on 11/18/2020.  17.  WMA on 12/9/20.  18.  Right lung nodule x2 on 2/12/2021.  19.  Concern for TAAA at 4.3 cm on 5/15/2021.  20.  Concern for AAA r/o on ultrasound from 2/12/2021.  21.  Mild pulmonary hypertension on his cardiac cath at the [facility] on 1/15/2021.      Assessment/Plan:    1.  We will plan for an echocardiogram.  Patient has a history of severely depressed EF, grade 1 diastolic dysfunction, moderate AS/MS and an ascending aortic aneurysm.  2.  Exertional chest pain: Patient endorses intrascapular discomfort with activity.  The symptoms are comparable to symptoms he had in the past resulting CABG/bypass.  3.  Severe systolic heart failure: Patient would like to pursue cardiac rehab.  Referral to cardiac rehab placed.  We will plan for repeat echocardiogram.  4.  Medication refill.  We will plan refill albuterol, Coreg, nitro, and Plavix.  5.  Currently in a study at [place].  6.  Follow-up in 1 year.  Patient has been referred to cardiac rehab for severe CHF.  Stress test and echo ordered.  Meds refilled as  requested    Interval history:  Duane had a cardiac catheterization completed at the [facility].  He had a cardiac catheterization on 1/15/2021. He did have coronary disease but nothing that would require stenting.  It appears he had appropriate blood flow to all vessels.  More recently, he had been dyspneic on exertion with intrascapular discomfort.  In the past, when he had stenting/bypass, he had intrascapular discomfort which was his anginal equivalent.  Based on his symptoms, I suggested he have a Cardiolite stress test which he was agreeable.  He is a good looking 89-year-old gentleman who is in good shape.  His mind is sharp.  We will also plan for repeat echocardiogram.  He has a history of both moderate aortic and mitral stenosis.  He continues to be dyspneic on exertion.  He also has history of systolic and diastolic dysfunction.  Heart function has been 30-35%.  His echo from 5/25/2021 showed an EF of 25%.  There is also concern for low flow, low gradient aortic stenosis on echo through [place] on 5/25/2021.  He went on to have a stress echo which was previously ordered by myself prior to this testing but never completed.  Stress echo showed moderate to severe aortic stenosis.  Mean gradient 35 mmHg, aortic valve area of 1.36 cm, and an index of 0.73 cm .  He is wanting to go to cardiac rehab related to his reduced EF.  Also, he is needing medications refilled.  Overall, he does not have any significant issues or concerns.        HPI:    Mr. Barriga is being seen by cardiology to establish care.  He has a history of coronary disease with both stenting and CABG x3.  His wife passed away on 9/1/2020, suddenly from cancer.  He does not plan to go back to [place] and is establishing care locally.     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     Sidney is here to establish care.  He has a history of CAD with stenting and bypass. He previously obtained his cardiac care through [place], [place].  He states that dating back in 1982, he was diagnosed with sarcoidosis.  More recently, he had a cardiac catheterization on 7/17/2007 with history of MI with stenting x2 placed to the p-mLAD.  He went on to have CABG x3 on 4/29/2014 in [place].  He had LIMA to LAD, SVG to OM1 and OM 2.  At that time, his EF was reported to be 30% with ischemic cardiomyopathy.  More recently, he reports having had a cardiac catheterization once again in [place] with stenting to unknown vessels in 2019.  His symptoms in the past have been exertional dyspnea with intrascapular discomfort.  He states he has been having similar symptoms for the last 6 months but to a lesser degree.  Risk factors include history of tobacco abuse at approximately a half a pack a day until 1978, hypertension, hyperlipidemia, DM-2, family history, and diet.     He reportedly has a history of ischemic cardiomyopathy.  EF on 8/12/2010 was 40%.  Recently, while in [place] at time of bypass on 4/29/2014, his EF was 30%.  His echo on 11/1/2017 showed an EF of 40-45% with grade 1 diastolic dysfunction.  There was evidence for wall motion normalities as well as mild left atrial enlargement.  He has not had any swelling to his legs, presently on Lasix 20 mg daily.  He states he does not have issues with fluid overload.     He has been on Coumadin for extended period time.  He describes having had multiple recurrent DVT's with a IVC filter in place.  He is currently on Coumadin with management through the Coumadin clinic.     He has a history of hyperlipidemia.  His most recent lipid panel was on 10/20/2020 showing a total  "cholesterol of 248 and LDL of 164.  HDL was 46.  He has not been able to tolerate Lipitor or Crestor in the past.  He has been on Zetia.  On 12/7/2020,  he was started on Crestor 20 mg twice a week.  If he is able to tolerate this dosage, would increase as able. He states he has been on \"every statin with issues with all of them\".     Patient is known to have mild COPD with history of tobacco abuse.  PFT's on 11/18/2020 support mild COPD with severe diffusion defect.      Relevant testing:  [facility] on 3/1/21:  Underlying rhythm was sinus.  Hrt rate ranged from 50 bpm, maximum heart rate of 197 bmp, averaging 72 bmp.  No significant bradycardia, pauses, 2nd degree Mobitz type II or 3rd degree heart block.  No atrial fibrillation was identified on this study.  x0 triggered events and x0 diary entries.  x18 runs of VT lasting to 11 beats with a maximum heart rate of 197 bmp.    x25 runs of SVT lasting up to 15 beats with a maximum heart rate of 156 bmp.  Rare, less than 1% of PAC's, atrial couplets, atrial triplets, ventricular couplets and ventricular triplets.  Frequent PVC's at 15.9%.  + episodes of ventricular bigeminy lasting up to 18 min's and 9 sec's.  + episodes of ventricular trigeminy lasting up to 30 mins and 5 sec's.    Stress echo at [place] on 5/25/2021:  1. AT REST:   2. Estimated left ventricular ejection fraction 25%   3. Stroke volume index 45 ml/m^2, aortic valve mean systolic gradient 20 mm Hg, aortic valve   area 1.16 cm^2, aortic valve area index 0.62 cm^2/m^2   4. STRESS TEST:   5. Dobutamine was infused from 5 mcg/kg/min to 20.0 mcg/kg/min.   6. PEAK STRESS:   7. Estimated left ventricular ejection fraction range 30% - 35%.   8. Stroke volume index 55 ml/m^2, aortic valve mean systolic gradient 35 mm Hg, aortic valve  area 1.36 cm^2, aortic valve area index 0.73 cm^2/m^2       US aorta on 2/11/21:  No abdominal aortic aneurysm.    US carotids on 2/11/21:  No hemodynamically significant stenoses are " identified in either carotid bifurcation.    ECHO on 2/11/21:  No pericardial effusion is present.  Moderately (EF 30-35%) reduced left ventricular function is present.  The right ventricle is normal size.  Global right ventricular function is normal.  Mild left atrial enlargement is present.  Mild mitral insufficiency is present.  Severe aortic valve calcification is present.  The peak aortic velocity is 3.02 m/sec.  The mean gradient across the aortic valve is 18.4 mmHg.  Moderate aortic stenosis is present.  Right ventricular systolic pressure is 44 mmHg above the right atrial pressure.  Mild tricuspid insufficiency is present.  Mild pulmonic insufficiency is present.  Ascending aorta 4.1 cm.  Sinuses of Valsalva 4.3 cm.    CTA chest on 2/11/21:  2 small nodules are identified in the right lung.  Follow-up CT scan in 6 months time is recommended. The ascending aorta measures 4.2 cm in maximal transverse diameter.    Cardiac cath on 1/15/21:    Right sided filling pressures are normal.    Mild elevated pulmonary hypertension.    Left sided filling pressures are moderately elevated.    Left ventricular filling pressures are moderately elevated.    Normal cardiac output level.    Moderate mitral valve stenosis (mean gradient 8.4 mm Hg, 2.2 cm2)    Moderate aortic valve stenosis (mean gradient 17 mm Hg, 1.2 cm2)    Native three vessel CAD    >>> Moderate 50% proximal RCA stenosis [ungrafted vessel]    >>> Mimimal LMCA stenosis    >>> 100% Ostial LAD stenosis [LIMA-LAD widely patent]    >>> Proximal 50% LCx and 90% RI stenoses [Sequential SVG-RI-OM patent with focal 60% ISR in jump segment of SVG]     Suggest medical treatment at this time.  Neither the aortic valve nor mitral valve require intervention at this time.  The proximal RCA stenosis is unlikely be physiologically significant. The LAD territory is infarcted but the LIMA-LAD remains widely patent.  There is 70% ISR of SVG in jump segment but the OM is  receiving brisk DAVID-3 flow via the native coronary (only 50% proximal stenosis).    Cath on 7/17/2007:  1. Severe, single-vessel coronary artery disease. Chronic proximal-to-mild occlusion of the left anterior descending coronary artery with distal right-to-left collaterals.  2. Cypher drug-eluting stenting of the [id number] segment of the left anterior descending.    Stress test on 12/15/20:     The nuclear stress test is abnormal.     There is a large area of a severe degree of infarction in the entire apical and anteroseptal segment(s) of the left ventricle.     Left ventricular function is moderately reduced.     The left ventricular ejection fraction at rest is 44%.  The left ventricular ejection fraction at stress is 35%.     There is no prior study for comparison.    ECHO on 12/9/20:  No pericardial effusion is present.  Left ventricular size is normal.  Anterior wall hypokinesis is present.  Apical wall hypokinesis is present.  Moderately (EF 30-35%) reduced left ventricular function is present.  The right ventricle is normal size.  Global right ventricular function is normal.  Mild mitral annular calcification is present.  Mild mitral insufficiency is present.  Moderate aortic valve calcification is present.  Trace aortic insufficiency is present.  The Aortic valve has significant calcification of valve leaflets with poor  mobility. Our measured values I feel underestimate the amount of stenosis. By  visual estimate would put at least moderate Aortic stenosis.  Trace tricuspid insufficiency is present.  Right ventricular systolic pressure is 6 mmHg above the right atrial pressure.  The peak aortic velocity is 2.57 m/sec.  The mean gradient across the aortic valve is 15.5 mmHg.        ICD-10-CM    1. Chronic obstructive pulmonary disease, unspecified COPD type (H)  J44.9 albuterol (PROAIR HFA/PROVENTIL HFA/VENTOLIN HFA) 108 (90 Base) MCG/ACT inhaler   2. Atherosclerosis of coronary artery bypass graft  of native heart without angina pectoris  I25.810 nitroGLYcerin (NITROSTAT) 0.4 MG sublingual tablet     clopidogrel (PLAVIX) 75 MG tablet   3. 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, SVG to first OM and second OM  Z95.1 nitroGLYcerin (NITROSTAT) 0.4 MG sublingual tablet     clopidogrel (PLAVIX) 75 MG tablet   4. Ischemic cardiomyopathy  I25.5 carvedilol (COREG) 25 MG tablet     nitroGLYcerin (NITROSTAT) 0.4 MG sublingual tablet     clopidogrel (PLAVIX) 75 MG tablet     NM Lexiscan stress test     Echocardiogram Complete   5. H/O cardiac catheterization x3 on 7/17/2007, 4/20/2014, and 2019  Z98.890 nitroGLYcerin (NITROSTAT) 0.4 MG sublingual tablet     clopidogrel (PLAVIX) 75 MG tablet     NM Lexiscan stress test   6. History of coronary artery stent placement on 7/17/2007 x2 stents to the LAD and in 2019 in [place] to unknown vessel  Z95.5 nitroGLYcerin (NITROSTAT) 0.4 MG sublingual tablet     clopidogrel (PLAVIX) 75 MG tablet     NM Lexiscan stress test   7. Chronic combined systolic and diastolic congestive heart failure (H)  I50.42 carvedilol (COREG) 25 MG tablet     NM Lexiscan stress test     Cardiac Rehab Referral     Echocardiogram Complete   8. Atrial flutter, unspecified type (H)  I48.92 carvedilol (COREG) 25 MG tablet   9. Essential hypertension, benign  I10 carvedilol (COREG) 25 MG tablet   10. Frequent PVCs  I49.3 carvedilol (COREG) 25 MG tablet   11. Irregular heartbeat  I49.9 carvedilol (COREG) 25 MG tablet   12. Ventricular bigeminy  I49.8 carvedilol (COREG) 25 MG tablet   13. Status post coronary angiogram x4 on 7/17/2007, 4/20/2014, 2019, and 1/15/2021  Z98.890 nitroGLYcerin (NITROSTAT) 0.4 MG sublingual tablet     clopidogrel (PLAVIX) 75 MG tablet     NM Lexiscan stress test   14. Hx of sarcoidosis in 1982  Z86.2    15. Stage 3b chronic kidney disease (H)  N18.32    16. Ascending aortic aneurysm (H)  I71.2 carvedilol (COREG) 25 MG tablet     Echocardiogram Complete   17. Atherosclerosis of  native coronary artery of native heart without angina pectoris  I25.10 nitroGLYcerin (NITROSTAT) 0.4 MG sublingual tablet     clopidogrel (PLAVIX) 75 MG tablet   18. Mild pulmonary hypertension (H)  I27.20 Echocardiogram Complete   19. Moderate aortic stenosis  I35.0    20. Moderate mitral stenosis on his cardiac cath on 1/15/2021  I05.0 Echocardiogram Complete   21. Hypertriglyceridemia  E78.1    22. Mixed hyperlipidemia  E78.2    23. COPD, mild (H) on 11/18/2020  J44.9    24. RIVAS (dyspnea on exertion)  R06.00 Echocardiogram Complete   25. Type 2 diabetes mellitus with hyperglycemia, without long-term current use of insulin (H)  E11.65    26. History of tobacco abuse quitting in 1978  Z87.891    27. History of DVT (deep vein thrombosis)-multiple  Z86.718    28. Chest pain, unspecified type  R07.9 carvedilol (COREG) 25 MG tablet     nitroGLYcerin (NITROSTAT) 0.4 MG sublingual tablet     clopidogrel (PLAVIX) 75 MG tablet     NM Lexiscan stress test     Echocardiogram Complete       Past Medical History:   Diagnosis Date     Acute thromboembolism of deep veins of lower extremity (H)     No Comments Provided     Benign essential hypertension     No Comments Provided     Cancer (H)     skin     Congestive heart failure (H)      COPD (chronic obstructive pulmonary disease) (H)      Coronary atherosclerosis     No Comments Provided     Osteoarthrosis     No Comments Provided     Other and unspecified hyperlipidemia     No Comments Provided     Other specified forms of chronic ischemic heart disease     No Comments Provided     Sarcoidosis     No Comments Provided     Type 2 or unspecified type diabetes mellitus     No Comments Provided       Past Surgical History:   Procedure Laterality Date     ANGIOPLASTY  01/01/2019    stents     CARDIAC SURGERY  01/01/2014    heart bypass     COLONOSCOPY  01/01/2019     CV CORONARY ANGIOGRAM N/A 1/15/2021    Procedure: CV CORONARY ANGIOGRAM;  Surgeon: Charlie Harris MD;   Location:  HEART CARDIAC CATH LAB     CV LEFT HEART CATH N/A 1/15/2021    Procedure: Left Heart Cath;  Surgeon: Charlie Harris MD;  Location:  HEART CARDIAC CATH LAB     CV RIGHT HEART CATH MEASUREMENTS RECORDED N/A 1/15/2021    Procedure: CV RIGHT HEART CATH;  Surgeon: Charlie aHrris MD;  Location:  HEART CARDIAC CATH LAB     OTHER SURGICAL HISTORY      [id number],BIOPSY LUNG OR [facility]       Allergies   Allergen Reactions     Simvastatin Itching     Isosorbide Visual Disturbance     LIGHT HEADED,ALMOST PASSED OUT         Current Outpatient Medications   Medication Sig Dispense Refill     albuterol (PROAIR HFA/PROVENTIL HFA/VENTOLIN HFA) 108 (90 Base) MCG/ACT inhaler Inhale 1-2 puffs into the lungs every 4 hours as needed for shortness of breath / dyspnea or wheezing 18 g 3     blood glucose (NO BRAND SPECIFIED) lancets standard Use to test blood sugar 1 times daily or as directed. 1 each 3     blood glucose (NO BRAND SPECIFIED) test strip Use to test blood sugar 1 times daily or as directed. 90 strip 3     blood glucose monitoring (NO BRAND SPECIFIED) meter device kit Use to test blood sugar 1 times daily or as directed. 1 kit 0     Calcium Carbonate-Vit D-Min (CALCIUM 600+D PLUS MINERALS) 600-400 MG-UNIT TABS Take 1 tablet by mouth       carvedilol (COREG) 25 MG tablet Take 1 tablet (25 mg) by mouth 2 times daily (with meals) 180 tablet 3     CINNAMON PO Take 500 mg by mouth daily        clopidogrel (PLAVIX) 75 MG tablet Take 1 tablet (75 mg) by mouth daily 90 tablet 3     Coenzyme Q10 (CO Q-10) 200 MG CAPS Take 1 capsule by mouth daily       cyanocobalamin (VITAMIN B-12) 500 MCG SUBL sublingual tablet Place 500 mcg under the tongue daily       ezetimibe (ZETIA) 10 MG tablet Take 1 tablet (10 mg) by mouth daily 90 tablet 3     furosemide (LASIX) 40 MG tablet Take 1 tablet (40 mg) by mouth daily 90 tablet 0     lisinopril (ZESTRIL) 10 MG tablet Take 1 tablet (10 mg) by mouth daily  90 tablet 3     Lutein 40 MG CAPS Take 1 tablet by mouth       LUTEIN PO Take 1 tablet by mouth daily       nitroGLYcerin (NITROSTAT) 0.4 MG sublingual tablet For chest pain place 1 tablet under the tongue every 5 minutes for 3 doses. If symptoms persist 5 minutes after 1st dose call 911. 25 tablet 3     Omega-3 Fatty Acids (FISH OIL) 1200 MG capsule Take 1 capsule by mouth daily       rosuvastatin (CRESTOR) 20 MG tablet Take 1 tablet (20 mg) by mouth twice a week 90 tablet 3     vitamin C (ASCORBIC ACID) 500 MG tablet Take 1,000 mg by mouth daily       Vitamin D3 (CHOLECALCIFEROL) 125 MCG (5000 UT) tablet Take by mouth daily       warfarin ANTICOAGULANT (COUMADIN) 5 MG tablet Take 1 tablet (5 mg) by mouth daily 90 tablet 1     warfarin ANTICOAGULANT (COUMADIN) 5 MG tablet Take 5 mg by mouth         Social History     Socioeconomic History     Marital status:      Spouse name: Not on file     Number of children: Not on file     Years of education: Not on file     Highest education level: Not on file   Occupational History     Not on file   Tobacco Use     Smoking status: Former Smoker     Packs/day: 0.50     Years: 24.00     Pack years: 12.00     Start date: 1954     Quit date: 1978     Years since quittin.3     Smokeless tobacco: Never Used   Substance and Sexual Activity     Alcohol use: Yes     Alcohol/week: 5.0 standard drinks     Types: 3 Cans of beer, 2 Shots of liquor per week     Drug use: Unknown     Types: Other     Comment: Drug use: No     Sexual activity: Not on file   Other Topics Concern     Parent/sibling w/ CABG, MI or angioplasty before 65F 55M? Not Asked   Social History Narrative     Not on file     Social Determinants of Health     Financial Resource Strain: Not on file   Food Insecurity: Not on file   Transportation Needs: Not on file   Physical Activity: Not on file   Stress: Not on file   Social Connections: Not on file   Intimate Partner Violence: Not on file   Housing  Stability: Not on file       LAB RESULTS:   Orders Only on 01/21/2021   Component Date Value Ref Range Status     Sodium 01/21/2021 141  133 - 144 mmol/L Final     Potassium 01/21/2021 4.7  3.4 - 5.3 mmol/L Final     Chloride 01/21/2021 107  94 - 109 mmol/L Final     Carbon Dioxide 01/21/2021 29  20 - 32 mmol/L Final     Anion Gap 01/21/2021 5  3 - 14 mmol/L Final     Glucose 01/21/2021 125* 70 - 99 mg/dL Final     Urea Nitrogen 01/21/2021 26  7 - 30 mg/dL Final     Creatinine 01/21/2021 1.40* 0.66 - 1.25 mg/dL Final     GFR Estimate 01/21/2021 44* >60 mL/min/[1.73_m2] Final     GFR Estimate If Black 01/21/2021 51* >60 mL/min/[1.73_m2] Final     Calcium 01/21/2021 9.9  8.5 - 10.1 mg/dL Final     WBC 01/21/2021 6.7  4.0 - 11.0 10e9/L Final     RBC Count 01/21/2021 3.63* 4.4 - 5.9 10e12/L Final     Hemoglobin 01/21/2021 11.3* 13.3 - 17.7 g/dL Final     Hematocrit 01/21/2021 34.9* 40.0 - 53.0 % Final     MCV 01/21/2021 96  78 - 100 fl Final     MCH 01/21/2021 31.1  26.5 - 33.0 pg Final     MCHC 01/21/2021 32.4  31.5 - 36.5 g/dL Final     RDW 01/21/2021 12.2  10.0 - 15.0 % Final     Platelet Count 01/21/2021 163  150 - 450 10e9/L Final     INR Point of Care 01/21/2021 2.9* 0.86 - 1.14 Final     Capillary Blood Collection 01/21/2021 Capillary collection performed   Final   Office Visit on 01/12/2021   Component Date Value Ref Range Status     WBC 01/12/2021 6.4  4.0 - 11.0 10e9/L Final     RBC Count 01/12/2021 3.72* 4.4 - 5.9 10e12/L Final     Hemoglobin 01/12/2021 11.7* 13.3 - 17.7 g/dL Final     Hematocrit 01/12/2021 35.3* 40.0 - 53.0 % Final     MCV 01/12/2021 95  78 - 100 fl Final     MCH 01/12/2021 31.5  26.5 - 33.0 pg Final     MCHC 01/12/2021 33.1  31.5 - 36.5 g/dL Final     RDW 01/12/2021 12.2  10.0 - 15.0 % Final     Platelet Count 01/12/2021 162  150 - 450 10e9/L Final     % Neutrophils 01/12/2021 62.1  % Final     % Lymphocytes 01/12/2021 19.7  % Final     % Monocytes 01/12/2021 13.5  % Final     % Eosinophils  01/12/2021 4.2  % Final     % Basophils 01/12/2021 0.5  % Final     Absolute Neutrophil 01/12/2021 4.0  1.6 - 8.3 10e9/L Final     Absolute Lymphocytes 01/12/2021 1.3  0.8 - 5.3 10e9/L Final     Absolute Monocytes 01/12/2021 0.9  0.0 - 1.3 10e9/L Final     Absolute Eosinophils 01/12/2021 0.3  0.0 - 0.7 10e9/L Final     Absolute Basophils 01/12/2021 0.0  0.0 - 0.2 10e9/L Final     Diff Method 01/12/2021 Automated Method   Final     Sodium 01/12/2021 142  133 - 144 mmol/L Final     Potassium 01/12/2021 4.4  3.4 - 5.3 mmol/L Final     Chloride 01/12/2021 109  94 - 109 mmol/L Final     Carbon Dioxide 01/12/2021 29  20 - 32 mmol/L Final     Anion Gap 01/12/2021 4  3 - 14 mmol/L Final     Glucose 01/12/2021 100* 70 - 99 mg/dL Final     Urea Nitrogen 01/12/2021 28  7 - 30 mg/dL Final     Creatinine 01/12/2021 1.14  0.66 - 1.25 mg/dL Final     GFR Estimate 01/12/2021 57* >60 mL/min/[1.73_m2] Final     GFR Estimate If Black 01/12/2021 66  >60 mL/min/[1.73_m2] Final     Calcium 01/12/2021 9.6  8.5 - 10.1 mg/dL Final     INR Point of Care 01/12/2021 3.8* 0.86 - 1.14 Final   Office Visit on 01/11/2021   Component Date Value Ref Range Status     COVID-19 Virus PCR to [facility] - So* 01/11/2021 Nasopharyngeal   Final     COVID-19 Virus PCR to [facility] - Re* 01/11/2021 Not Detected   Final   Orders Only on 12/01/2020   Component Date Value Ref Range Status     INR 12/01/2020 2.65* 0.86 - 1.14 Final   Orders Only on 12/01/2020   Component Date Value Ref Range Status     WBC 12/01/2020 5.5  4.0 - 11.0 10e9/L Final     RBC Count 12/01/2020 3.98* 4.4 - 5.9 10e12/L Final     Hemoglobin 12/01/2020 12.5* 13.3 - 17.7 g/dL Final     Hematocrit 12/01/2020 38.4* 40.0 - 53.0 % Final     MCV 12/01/2020 97  78 - 100 fl Final     MCH 12/01/2020 31.4  26.5 - 33.0 pg Final     MCHC 12/01/2020 32.6  31.5 - 36.5 g/dL Final     RDW 12/01/2020 12.4  10.0 - 15.0 % Final     Platelet Count 12/01/2020 141* 150 - 450 10e9/L Final     Diff Method 12/01/2020  Automated Method   Final     % Neutrophils 12/01/2020 63.5  % Final     % Lymphocytes 12/01/2020 21.9  % Final     % Monocytes 12/01/2020 12.2  % Final     % Eosinophils 12/01/2020 1.8  % Final     % Basophils 12/01/2020 0.4  % Final     % Immature Granulocytes 12/01/2020 0.2  % Final     Nucleated RBCs 12/01/2020 0  0 /100 Final     Absolute Neutrophil 12/01/2020 3.5  1.6 - 8.3 10e9/L Final     Absolute Lymphocytes 12/01/2020 1.2  0.8 - 5.3 10e9/L Final     Absolute Monocytes 12/01/2020 0.7  0.0 - 1.3 10e9/L Final     Absolute Eosinophils 12/01/2020 0.1  0.0 - 0.7 10e9/L Final     Absolute Basophils 12/01/2020 0.0  0.0 - 0.2 10e9/L Final     Abs Immature Granulocytes 12/01/2020 0.0  0 - 0.4 10e9/L Final     Absolute Nucleated RBC 12/01/2020 0.0   Final        Review of systems: Negative except that which was noted in the HPI.    Physical examination:  /58 (BP Location: Right arm)   Pulse 55   Temp 97.6  F (36.4  C) (Tympanic)   Wt 74.8 kg (164 lb 14.4 oz)   SpO2 100%   BMI 23.66 kg/m      GENERAL APPEARANCE: healthy, alert and no distress  HEENT: no icterus, no xanthelasmas, normal pupil size and reaction, no cyanosis.  NECK: no adenopathy, no asymmetry, masses.  CHEST: lungs clear to auscultation - no rales, rhonchi or wheezes, no use of accessory muscles, no retractions, respirations are unlabored, normal respiratory rate  CARDIOVASCULAR: regular rhythm, normal S1 with physiologic split S2, no S3 or S4 but with systolic crescendo decrescendo murmur, no click or rub  ABDOMEN: soft, non tender, bowel sounds normal  EXTREMITIES: no clubbing, cyanosis or edema  NEURO: alert and oriented normal speech, and affect  VASC: No vascular bruits heard.  SKIN: no ecchymoses, no rashes        Thank you for allowing me to participate in the care of your patient. Please do not hesitate to contact me if you have any questions.     Justyn Rae, DO

## 2022-04-26 ENCOUNTER — HOSPITAL ENCOUNTER (OUTPATIENT)
Dept: CARDIOLOGY | Facility: HOSPITAL | Age: 87
Setting detail: NUCLEAR MEDICINE
Discharge: HOME | End: 2022-04-26
Attending: INTERNAL MEDICINE
Payer: MEDICARE

## 2022-04-26 ENCOUNTER — HOSPITAL ENCOUNTER (OUTPATIENT)
Dept: NUCLEAR MEDICINE | Facility: HOSPITAL | Age: 87
Setting detail: NUCLEAR MEDICINE
Discharge: HOME | End: 2022-04-26
Attending: INTERNAL MEDICINE
Payer: MEDICARE

## 2022-04-26 DIAGNOSIS — I25.5 ISCHEMIC CARDIOMYOPATHY: ICD-10-CM

## 2022-04-26 DIAGNOSIS — Z98.890 H/O CARDIAC CATHETERIZATION: ICD-10-CM

## 2022-04-26 DIAGNOSIS — R07.9 CHEST PAIN, UNSPECIFIED TYPE: ICD-10-CM

## 2022-04-26 DIAGNOSIS — Z98.890 STATUS POST CORONARY ANGIOGRAM: ICD-10-CM

## 2022-04-26 DIAGNOSIS — Z95.5 HISTORY OF CORONARY ARTERY STENT PLACEMENT: ICD-10-CM

## 2022-04-26 DIAGNOSIS — I50.42 CHRONIC COMBINED SYSTOLIC AND DIASTOLIC CONGESTIVE HEART FAILURE (H): ICD-10-CM

## 2022-04-26 PROCEDURE — 250N000011 HC RX IP 250 OP 636: Performed by: INTERNAL MEDICINE

## 2022-04-26 PROCEDURE — A9500 TC99M SESTAMIBI: HCPCS | Performed by: RADIOLOGY

## 2022-04-26 PROCEDURE — 343N000001 HC RX 343: Performed by: RADIOLOGY

## 2022-04-26 PROCEDURE — 93017 CV STRESS TEST TRACING ONLY: CPT

## 2022-04-26 PROCEDURE — 93016 CV STRESS TEST SUPVJ ONLY: CPT | Performed by: INTERNAL MEDICINE

## 2022-04-26 PROCEDURE — 93018 CV STRESS TEST I&R ONLY: CPT | Performed by: INTERNAL MEDICINE

## 2022-04-26 PROCEDURE — 78452 HT MUSCLE IMAGE SPECT MULT: CPT | Mod: MG

## 2022-04-26 RX ORDER — AMINOPHYLLINE 25 MG/ML
INJECTION, SOLUTION INTRAVENOUS
Status: DISCONTINUED
Start: 2022-04-26 | End: 2022-04-26 | Stop reason: WASHOUT

## 2022-04-26 RX ORDER — REGADENOSON 0.08 MG/ML
0.4 INJECTION, SOLUTION INTRAVENOUS ONCE
Status: COMPLETED | OUTPATIENT
Start: 2022-04-26 | End: 2022-04-26

## 2022-04-26 RX ADMIN — REGADENOSON 0.4 MG: 0.08 INJECTION, SOLUTION INTRAVENOUS at 09:06

## 2022-04-26 RX ADMIN — Medication 10.1 MILLICURIE: at 06:46

## 2022-04-26 RX ADMIN — Medication 31.2 MILLICURIE: at 09:06

## 2022-04-27 LAB
CV BLOOD PRESSURE: 37 MMHG
CV STRESS MAX HR HE: 90
NUC STRESS EJECTION FRACTION: 38 %
RATE PRESSURE PRODUCT: 8820
STRESS ECHO BASELINE DIASTOLIC HE: 54
STRESS ECHO BASELINE HR: 60 BPM
STRESS ECHO BASELINE SYSTOLIC BP: 104
STRESS ECHO CALCULATED PERCENT HR: 69 %
STRESS ECHO LAST STRESS DIASTOLIC BP: 50
STRESS ECHO LAST STRESS SYSTOLIC BP: 98
STRESS ECHO TARGET HR: 130

## 2022-05-02 ENCOUNTER — TELEPHONE (OUTPATIENT)
Dept: CARDIAC REHAB | Facility: HOSPITAL | Age: 87
End: 2022-05-02
Payer: MEDICARE

## 2022-05-02 NOTE — TELEPHONE ENCOUNTER
Patient was called to discuss Phase II Cardiac Rehab. Due to patient's recent medication change, he will not be able to start CR until he is stable for 6 weeks with no medication changes. Patient was notified he will be able to start CR on May 25th provided there are no more cardiac medication changes. Patient will be called prior to that time to schedule initial evaluation.

## 2022-05-05 ENCOUNTER — HOSPITAL ENCOUNTER (OUTPATIENT)
Dept: CARDIOLOGY | Facility: HOSPITAL | Age: 87
Discharge: HOME | End: 2022-05-05
Attending: INTERNAL MEDICINE | Admitting: INTERNAL MEDICINE
Payer: MEDICARE

## 2022-05-05 DIAGNOSIS — I27.20 MILD PULMONARY HYPERTENSION (H): ICD-10-CM

## 2022-05-05 DIAGNOSIS — I71.21 ASCENDING AORTIC ANEURYSM (H): ICD-10-CM

## 2022-05-05 DIAGNOSIS — R07.9 CHEST PAIN, UNSPECIFIED TYPE: ICD-10-CM

## 2022-05-05 DIAGNOSIS — R06.09 DOE (DYSPNEA ON EXERTION): ICD-10-CM

## 2022-05-05 DIAGNOSIS — I05.0 MODERATE MITRAL STENOSIS: ICD-10-CM

## 2022-05-05 DIAGNOSIS — I50.42 CHRONIC COMBINED SYSTOLIC AND DIASTOLIC CONGESTIVE HEART FAILURE (H): ICD-10-CM

## 2022-05-05 DIAGNOSIS — I25.5 ISCHEMIC CARDIOMYOPATHY: ICD-10-CM

## 2022-05-05 LAB — LVEF ECHO: NORMAL

## 2022-05-05 PROCEDURE — 93306 TTE W/DOPPLER COMPLETE: CPT

## 2022-05-09 DIAGNOSIS — Z86.718 HISTORY OF DVT (DEEP VEIN THROMBOSIS): ICD-10-CM

## 2022-05-09 DIAGNOSIS — I35.0 NONRHEUMATIC AORTIC VALVE STENOSIS: ICD-10-CM

## 2022-05-09 DIAGNOSIS — R93.1 REGIONAL WALL MOTION ABNORMALITY OF HEART: ICD-10-CM

## 2022-05-09 DIAGNOSIS — I50.42 CHRONIC COMBINED SYSTOLIC AND DIASTOLIC CONGESTIVE HEART FAILURE (H): ICD-10-CM

## 2022-05-09 DIAGNOSIS — I05.0 MODERATE MITRAL STENOSIS: ICD-10-CM

## 2022-05-09 DIAGNOSIS — I25.5 ISCHEMIC CARDIOMYOPATHY: ICD-10-CM

## 2022-05-09 DIAGNOSIS — I27.20 MILD PULMONARY HYPERTENSION (H): ICD-10-CM

## 2022-05-09 DIAGNOSIS — R06.09 DOE (DYSPNEA ON EXERTION): Primary | ICD-10-CM

## 2022-05-09 DIAGNOSIS — I35.0 MODERATE AORTIC STENOSIS: ICD-10-CM

## 2022-05-09 DIAGNOSIS — I71.21 ASCENDING AORTIC ANEURYSM (H): ICD-10-CM

## 2022-05-09 RX ORDER — WARFARIN SODIUM 5 MG/1
5 TABLET ORAL DAILY
Qty: 90 TABLET | Refills: 3 | Status: SHIPPED | OUTPATIENT
Start: 2022-05-09 | End: 2023-06-28

## 2022-05-11 ENCOUNTER — LAB (OUTPATIENT)
Dept: LAB | Facility: OTHER | Age: 87
End: 2022-05-11
Payer: MEDICARE

## 2022-05-11 ENCOUNTER — ANTICOAGULATION THERAPY VISIT (OUTPATIENT)
Dept: ANTICOAGULATION | Facility: OTHER | Age: 87
End: 2022-05-11
Attending: FAMILY MEDICINE
Payer: MEDICARE

## 2022-05-11 DIAGNOSIS — Z86.718 HISTORY OF DVT (DEEP VEIN THROMBOSIS): Primary | ICD-10-CM

## 2022-05-11 DIAGNOSIS — I82.5Y3 CHRONIC DEEP VEIN THROMBOSIS (DVT) OF PROXIMAL VEIN OF BOTH LOWER EXTREMITIES (H): ICD-10-CM

## 2022-05-11 DIAGNOSIS — E78.2 MIXED HYPERLIPIDEMIA: ICD-10-CM

## 2022-05-11 LAB — INR BLD: 2.4 (ref 0.9–1.1)

## 2022-05-11 PROCEDURE — 36416 COLLJ CAPILLARY BLOOD SPEC: CPT | Mod: ZL

## 2022-05-11 PROCEDURE — 85610 PROTHROMBIN TIME: CPT | Mod: ZL

## 2022-05-11 RX ORDER — EZETIMIBE 10 MG/1
10 TABLET ORAL DAILY
Qty: 90 TABLET | Refills: 3 | Status: SHIPPED | OUTPATIENT
Start: 2022-05-11 | End: 2023-04-17

## 2022-05-11 NOTE — PROGRESS NOTES
ANTICOAGULATION FOLLOW-UP CLINIC VISIT    Patient Name:  Sidney Barriga  Date:  2022  Contact Type:  Telephone    SUBJECTIVE:  Patient Findings         Clinical Outcomes     Negatives:  Major bleeding event, Thromboembolic event, Anticoagulation-related hospital admission, Anticoagulation-related ED visit, Anticoagulation-related fatality           OBJECTIVE    Recent labs: (last 7 days)     22  0852   INR 2.4*       ASSESSMENT / PLAN  INR assessment THER    Recheck INR In: 6 WEEKS    INR Location Outside lab NA     Anticoagulation Summary  As of 2022    INR goal:  2.0-3.0   TTR:  46.7 % (1 y)   INR used for dosin.4 (2022)   Warfarin maintenance plan:  2.5 mg (5 mg x 0.5) every Wed, Fri; 5 mg (5 mg x 1) all other days   Full warfarin instructions:  2.5 mg every Wed, Fri; 5 mg all other days   Weekly warfarin total:  30 mg   No change documented:  Violeta Andrade RN   Plan last modified:  Kathryn Cardenas RN (1/3/2022)   Next INR check:  2022   Priority:  Maintenance   Target end date:  10/15/2024    Indications    History of DVT (deep vein thrombosis)-multiple [Z86.718]  Chronic deep vein thrombosis (DVT) of proximal vein of both lower extremities (H) [I82.5Y3]             Anticoagulation Episode Summary     INR check location:      Preferred lab:      Send INR reminders to:  [facility]    Comments:        Anticoagulation Care Providers     Provider Role Specialty Phone number    Latricia Macedo PA Referring Family Medicine [phone]    Zach Arredondo MD Referring Family Medicine [phone]            See the Encounter Report to view Anticoagulation Flowsheet and Dosing Calendar (Go to Encounters tab in chart review, and find the Anticoagulation Therapy Visit)    No changes.   Violeta Andrade, AUNG                  Pt was unable to get CT  scan done because pt insurances does not cover it. Mother needs a call back on what to do next

## 2022-06-13 ENCOUNTER — TELEPHONE (OUTPATIENT)
Dept: CARDIAC REHAB | Facility: HOSPITAL | Age: 87
End: 2022-06-13
Payer: MEDICARE

## 2022-06-13 NOTE — TELEPHONE ENCOUNTER
Patient was notified that with his increase in EF to greater than 35%, he no longer meets medicare criteria for Phase II Cardiac Rehab. Patient was looking forward to initiating CR, but understands. Staff will contact patient upon a new referral if and when his EF should drop to less than 35% again.  Thania Addison, RT on 6/13/2022 at 2:25 PM

## 2022-06-22 ENCOUNTER — LAB (OUTPATIENT)
Dept: LAB | Facility: OTHER | Age: 87
End: 2022-06-22
Payer: MEDICARE

## 2022-06-22 ENCOUNTER — ANTICOAGULATION THERAPY VISIT (OUTPATIENT)
Dept: ANTICOAGULATION | Facility: OTHER | Age: 87
End: 2022-06-22
Attending: FAMILY MEDICINE
Payer: MEDICARE

## 2022-06-22 DIAGNOSIS — I82.5Y3 CHRONIC DEEP VEIN THROMBOSIS (DVT) OF PROXIMAL VEIN OF BOTH LOWER EXTREMITIES (H): ICD-10-CM

## 2022-06-22 DIAGNOSIS — Z86.718 HISTORY OF DVT (DEEP VEIN THROMBOSIS): Primary | ICD-10-CM

## 2022-06-22 LAB — INR BLD: 3 (ref 0.9–1.1)

## 2022-06-22 PROCEDURE — 85610 PROTHROMBIN TIME: CPT | Mod: ZL

## 2022-06-22 PROCEDURE — 36416 COLLJ CAPILLARY BLOOD SPEC: CPT | Mod: ZL

## 2022-06-22 NOTE — PROGRESS NOTES
ANTICOAGULATION MANAGEMENT     Sidney Barriga 89 year old male is on warfarin with therapeutic INR result. (Goal INR 2.0-3.0)    Recent labs: (last 7 days)     06/22/22  0849   INR 3.0*       ASSESSMENT       Source(s): Chart Review and Patient/Caregiver Call       Warfarin doses taken: Warfarin taken as instructed    Diet: No new diet changes identified    New illness, injury, or hospitalization: No    Medication/supplement changes: None noted    Signs or symptoms of bleeding or clotting: No    Previous INR: Therapeutic last 2(+) visits    Additional findings: None       PLAN     Recommended plan for no diet, medication or health factor changes affecting INR     Dosing Instructions: continue your current warfarin dose with next INR in 6 weeks       Summary  As of 6/22/2022    Full warfarin instructions:  2.5 mg every Wed, Fri; 5 mg all other days   Next INR check:  8/3/2022             Telephone call with Sidney who verbalizes understanding and agrees to plan    Lab visit scheduled    Education provided: Please call back if any changes to your diet, medications or how you've been taking warfarin    Plan made per [facility] anticoagulation protocol    Violeta Andrade RN  Anticoagulation Clinic  6/22/2022    _______________________________________________________________________     Anticoagulation Episode Summary     Current INR goal:  2.0-3.0   TTR:  53.2 % (1 y)   Target end date:  10/15/2024   Send INR reminders to:  [facility]    Indications    History of DVT (deep vein thrombosis)-multiple [Z86.718]  Chronic deep vein thrombosis (DVT) of proximal vein of both lower extremities (H) [I82.5Y3]           Comments:           Anticoagulation Care Providers     Provider Role Specialty Phone number    Latricia Macedo PA Referring Family Medicine [phone]    Zcah Arredondo MD Referring Family Medicine [phone]

## 2022-07-25 DIAGNOSIS — I10 ESSENTIAL HYPERTENSION: ICD-10-CM

## 2022-07-25 DIAGNOSIS — I50.42 CHRONIC COMBINED SYSTOLIC AND DIASTOLIC CONGESTIVE HEART FAILURE (H): ICD-10-CM

## 2022-07-25 DIAGNOSIS — I27.20 MILD PULMONARY HYPERTENSION (H): ICD-10-CM

## 2022-07-25 DIAGNOSIS — I25.5 ISCHEMIC CARDIOMYOPATHY: ICD-10-CM

## 2022-07-25 DIAGNOSIS — I10 ESSENTIAL HYPERTENSION, BENIGN: ICD-10-CM

## 2022-07-25 DIAGNOSIS — N18.32 STAGE 3B CHRONIC KIDNEY DISEASE (H): ICD-10-CM

## 2022-07-25 DIAGNOSIS — R06.09 DOE (DYSPNEA ON EXERTION): ICD-10-CM

## 2022-07-25 DIAGNOSIS — E11.65 TYPE 2 DIABETES MELLITUS WITH HYPERGLYCEMIA, WITHOUT LONG-TERM CURRENT USE OF INSULIN (H): Primary | ICD-10-CM

## 2022-07-25 RX ORDER — FUROSEMIDE 40 MG
TABLET ORAL
Qty: 90 TABLET | Refills: 3 | Status: SHIPPED | OUTPATIENT
Start: 2022-07-25 | End: 2023-04-17

## 2022-07-25 RX ORDER — LISINOPRIL 10 MG/1
TABLET ORAL
Qty: 90 TABLET | Refills: 3 | Status: SHIPPED | OUTPATIENT
Start: 2022-07-25 | End: 2023-04-17

## 2022-08-03 ENCOUNTER — LAB (OUTPATIENT)
Dept: LAB | Facility: OTHER | Age: 87
End: 2022-08-03
Payer: MEDICARE

## 2022-08-03 ENCOUNTER — ANTICOAGULATION THERAPY VISIT (OUTPATIENT)
Dept: ANTICOAGULATION | Facility: OTHER | Age: 87
End: 2022-08-03
Attending: FAMILY MEDICINE
Payer: MEDICARE

## 2022-08-03 DIAGNOSIS — I82.5Y3 CHRONIC DEEP VEIN THROMBOSIS (DVT) OF PROXIMAL VEIN OF BOTH LOWER EXTREMITIES (H): ICD-10-CM

## 2022-08-03 DIAGNOSIS — Z86.718 HISTORY OF DVT (DEEP VEIN THROMBOSIS): Primary | ICD-10-CM

## 2022-08-03 LAB — INR BLD: 3.9 (ref 0.9–1.1)

## 2022-08-03 PROCEDURE — 85610 PROTHROMBIN TIME: CPT | Mod: ZL

## 2022-08-03 PROCEDURE — 36416 COLLJ CAPILLARY BLOOD SPEC: CPT | Mod: ZL

## 2022-08-03 NOTE — PROGRESS NOTES
ANTICOAGULATION MANAGEMENT     Sidney Barriga 89 year old male is on warfarin with supratherapeutic INR result. (Goal INR 2.0-3.0)    Recent labs: (last 7 days)     08/03/22  0855   INR 3.9*       ASSESSMENT       Source(s): Chart Review and Patient/Caregiver Call       Warfarin doses taken: Warfarin taken as instructed    Diet: No new diet changes identified    New illness, injury, or hospitalization: Yes: had swollen ankle for a week    Medication/supplement changes: took Motrin x 4 days for swollen ankle pain    Signs or symptoms of bleeding or clotting: No    Previous INR: Therapeutic last 2(+) visits    Additional findings: None       PLAN     Recommended plan for temporary change(s) affecting INR     Dosing Instructions: hold dose then continue your current warfarin dose with next INR in 2 weeks       Summary  As of 8/3/2022    Full warfarin instructions:  8/3: Hold; Otherwise 2.5 mg every Wed, Sat; 5 mg all other days   Next INR check:  8/17/2022             Telephone call with Sidney who verbalizes understanding and agrees to plan    Lab visit scheduled    Education provided: None required    Plan made per [facility] anticoagulation protocol    Lizbeth Ely, RN  Anticoagulation Clinic  8/3/2022    _______________________________________________________________________     Anticoagulation Episode Summary     Current INR goal:  2.0-3.0   TTR:  53.2 % (1 y)   Target end date:  Indefinite   Send INR reminders to:  [facility]    Indications    History of DVT (deep vein thrombosis)-multiple [Z86.718]  Chronic deep vein thrombosis (DVT) of proximal vein of both lower extremities (H) [I82.5Y3]           Comments:           Anticoagulation Care Providers     Provider Role Specialty Phone number    Zach Arredondo MD Referring Family Medicine [phone]

## 2022-08-04 DIAGNOSIS — Z86.718 PERSONAL HISTORY OF DVT (DEEP VEIN THROMBOSIS): Primary | ICD-10-CM

## 2022-08-17 ENCOUNTER — LAB (OUTPATIENT)
Dept: LAB | Facility: OTHER | Age: 87
End: 2022-08-17
Payer: MEDICARE

## 2022-08-17 ENCOUNTER — ANTICOAGULATION THERAPY VISIT (OUTPATIENT)
Dept: ANTICOAGULATION | Facility: OTHER | Age: 87
End: 2022-08-17
Attending: FAMILY MEDICINE
Payer: MEDICARE

## 2022-08-17 DIAGNOSIS — I82.5Y3 CHRONIC DEEP VEIN THROMBOSIS (DVT) OF PROXIMAL VEIN OF BOTH LOWER EXTREMITIES (H): ICD-10-CM

## 2022-08-17 DIAGNOSIS — Z86.718 HISTORY OF DVT (DEEP VEIN THROMBOSIS): Primary | ICD-10-CM

## 2022-08-17 DIAGNOSIS — Z86.718 PERSONAL HISTORY OF DVT (DEEP VEIN THROMBOSIS): ICD-10-CM

## 2022-08-17 LAB — INR BLD: 2.9 (ref 0.9–1.1)

## 2022-08-17 PROCEDURE — 85610 PROTHROMBIN TIME: CPT | Mod: ZL

## 2022-08-17 PROCEDURE — 36416 COLLJ CAPILLARY BLOOD SPEC: CPT | Mod: ZL

## 2022-08-17 NOTE — PROGRESS NOTES
ANTICOAGULATION MANAGEMENT     Sidney Barriga 89 year old male is on warfarin with therapeutic INR result. (Goal INR 2.0-3.0)    Recent labs: (last 7 days)     08/17/22  0855   INR 2.9*       ASSESSMENT       Source(s): Chart Review and Patient/Caregiver Call       Warfarin doses taken: Warfarin taken as instructed    Diet: No new diet changes identified    New illness, injury, or hospitalization: No    Medication/supplement changes: None noted    Signs or symptoms of bleeding or clotting: No    Previous INR: Supratherapeutic    Additional findings: None       PLAN     Recommended plan for no diet, medication or health factor changes affecting INR     Dosing Instructions: Continue your current warfarin dose with next INR in 3 weeks       Summary  As of 8/17/2022    Full warfarin instructions:  2.5 mg every Wed, Sat; 5 mg all other days   Next INR check:  9/6/2022             Telephone call with Sidney who verbalizes understanding and agrees to plan    Lab visit scheduled    Education provided: None required    Plan made per [facility] anticoagulation protocol    Lizbeth Ely, RN  Anticoagulation Clinic  8/17/2022    _______________________________________________________________________     Anticoagulation Episode Summary     Current INR goal:  2.0-3.0   TTR:  53.6 % (1 y)   Target end date:  Indefinite   Send INR reminders to:  [facility]    Indications    History of DVT (deep vein thrombosis)-multiple [Z86.718]  Chronic deep vein thrombosis (DVT) of proximal vein of both lower extremities (H) [I82.5Y3]  Personal history of DVT (deep vein thrombosis) [Z86.718]           Comments:           Anticoagulation Care Providers     Provider Role Specialty Phone number    Zach Arredondo MD Referring Family Medicine [phone]    Latricia Macedo PA Referring Family Medicine [phone]

## 2022-08-31 NOTE — PROGRESS NOTES
Assessment & Plan     Type 2 diabetes mellitus with hyperglycemia, without long-term current use of insulin (H)  Doing well.  No new issues or concerns.  Update labs.    - Albumin Random Urine Quantitative with Creat Ratio; Future  - Hemoglobin A1c; Future  - Basic metabolic panel; Future  - CO FOOT EXAM NO CHARGE    Chronic combined systolic and diastolic congestive heart failure (H)  Stable.  He is waiting on [place] in terms of a TAVR.  They have been in contact with his son as recently as 8/29.  I showed him this in care everywhere.    - CBC with Platelets & Differential; Future    Chronic obstructive pulmonary disease, unspecified COPD type (H)  Stable.  Some sob ongoing with exertion.      Essential hypertension, benign  Stable.  No change.                     No follow-ups on file.    Zach Arredondo MD  [facility]   Sidney is a 89 year old, presenting for the following health issues:  Diabetes      HPI     Diabetes Follow-up    How often are you checking your blood sugar? A few times a week  What time of day are you checking your blood sugars (select all that apply)?  Before and after meals  Have you had any blood sugars above 200?  No  Have you had any blood sugars below 70?  No    What symptoms do you notice when your blood sugar is low?  None    What concerns do you have today about your diabetes? None     Do you have any of these symptoms? (Select all that apply)  Numbness in feet      BP Readings from Last 2 Encounters:   09/06/22 110/58   04/13/22 100/58     Hemoglobin A1C (%)   Date Value   02/14/2022 6.1 (H)   03/04/2021 5.5   10/20/2020 5.6     LDL Cholesterol Calculated (mg/dL)   Date Value   10/20/2020 164 (H)   01/09/2019 149 (H)         Hypertension Follow-up      Do you check your blood pressure regularly outside of the clinic? No     Are you following a low salt diet? Yes    Are your blood pressures ever more than 140 on the top number (systolic) OR  more   than 90 on the bottom number (diastolic), for example 140/90? NA    Diabetic foot exam   1. foot deformity   No  2. Current or previous foot ulceration     No  3. Current or previous pre-ulcerative calluses     No  4. previous partial amputation of one or both feet or complete amputation of one foot     No  5. 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 poor circulation     No  Approval given for 3 pairs of diabetic shoes and inserts if patient desires.        Review of Systems   Constitutional, HEENT, cardiovascular, pulmonary, gi and gu systems are negative, except as otherwise noted.      Objective    /58   Pulse 66   Wt 73.9 kg (163 lb)   SpO2 98%   BMI 23.39 kg/m    Body mass index is 23.39 kg/m .  Physical Exam   GENERAL: healthy, alert and no distress  NECK: no adenopathy, no asymmetry, masses, or scars and thyroid normal to palpation  RESP: lungs clear to auscultation - no rales, rhonchi or wheezes  CV: regular rates and rhythm, normal S1 S2, no S3 or S4, grade 3/6 systolic murmur heard best over the base, peripheral pulses strong and no peripheral edema  ABDOMEN: soft, nontender, no hepatosplenomegaly, no masses and bowel sounds normal  MS: no gross musculoskeletal defects noted, no edema    Full labs above pending.                  .  ..

## 2022-09-06 ENCOUNTER — LAB (OUTPATIENT)
Dept: LAB | Facility: OTHER | Age: 87
End: 2022-09-06
Attending: FAMILY MEDICINE
Payer: MEDICARE

## 2022-09-06 ENCOUNTER — ANTICOAGULATION THERAPY VISIT (OUTPATIENT)
Dept: ANTICOAGULATION | Facility: OTHER | Age: 87
End: 2022-09-06
Attending: FAMILY MEDICINE
Payer: MEDICARE

## 2022-09-06 ENCOUNTER — OFFICE VISIT (OUTPATIENT)
Dept: FAMILY MEDICINE | Facility: OTHER | Age: 87
End: 2022-09-06
Attending: FAMILY MEDICINE
Payer: MEDICARE

## 2022-09-06 VITALS
DIASTOLIC BLOOD PRESSURE: 58 MMHG | WEIGHT: 163 LBS | BODY MASS INDEX: 23.39 KG/M2 | SYSTOLIC BLOOD PRESSURE: 110 MMHG | HEART RATE: 66 BPM | OXYGEN SATURATION: 98 %

## 2022-09-06 DIAGNOSIS — I50.42 CHRONIC COMBINED SYSTOLIC AND DIASTOLIC CONGESTIVE HEART FAILURE (H): ICD-10-CM

## 2022-09-06 DIAGNOSIS — I82.5Y3 CHRONIC DEEP VEIN THROMBOSIS (DVT) OF PROXIMAL VEIN OF BOTH LOWER EXTREMITIES (H): ICD-10-CM

## 2022-09-06 DIAGNOSIS — I10 ESSENTIAL HYPERTENSION, BENIGN: ICD-10-CM

## 2022-09-06 DIAGNOSIS — Z86.718 HISTORY OF DVT (DEEP VEIN THROMBOSIS): Primary | ICD-10-CM

## 2022-09-06 DIAGNOSIS — J44.9 CHRONIC OBSTRUCTIVE PULMONARY DISEASE, UNSPECIFIED COPD TYPE (H): ICD-10-CM

## 2022-09-06 DIAGNOSIS — E11.65 TYPE 2 DIABETES MELLITUS WITH HYPERGLYCEMIA, WITHOUT LONG-TERM CURRENT USE OF INSULIN (H): Primary | ICD-10-CM

## 2022-09-06 DIAGNOSIS — E11.65 TYPE 2 DIABETES MELLITUS WITH HYPERGLYCEMIA, WITHOUT LONG-TERM CURRENT USE OF INSULIN (H): ICD-10-CM

## 2022-09-06 DIAGNOSIS — Z86.718 PERSONAL HISTORY OF DVT (DEEP VEIN THROMBOSIS): ICD-10-CM

## 2022-09-06 LAB
ANION GAP SERPL CALCULATED.3IONS-SCNC: 6 MMOL/L (ref 3–14)
BASOPHILS # BLD AUTO: 0 10E3/UL (ref 0–0.2)
BASOPHILS NFR BLD AUTO: 0 %
BUN SERPL-MCNC: 39 MG/DL (ref 7–30)
CALCIUM SERPL-MCNC: 9.5 MG/DL (ref 8.5–10.1)
CHLORIDE BLD-SCNC: 104 MMOL/L (ref 94–109)
CO2 SERPL-SCNC: 27 MMOL/L (ref 20–32)
CREAT SERPL-MCNC: 1.64 MG/DL (ref 0.66–1.25)
CREAT UR-MCNC: 110 MG/DL
EOSINOPHIL # BLD AUTO: 0.2 10E3/UL (ref 0–0.7)
EOSINOPHIL NFR BLD AUTO: 3 %
ERYTHROCYTE [DISTWIDTH] IN BLOOD BY AUTOMATED COUNT: 12.9 % (ref 10–15)
EST. AVERAGE GLUCOSE BLD GHB EST-MCNC: 114 MG/DL
GFR SERPL CREATININE-BSD FRML MDRD: 39 ML/MIN/1.73M2
GLUCOSE BLD-MCNC: 114 MG/DL (ref 70–99)
HBA1C MFR BLD: 5.6 % (ref 0–5.6)
HCT VFR BLD AUTO: 34.6 % (ref 40–53)
HGB BLD-MCNC: 11.5 G/DL (ref 13.3–17.7)
INR BLD: 2.5 (ref 0.9–1.1)
LYMPHOCYTES # BLD AUTO: 1.3 10E3/UL (ref 0.8–5.3)
LYMPHOCYTES NFR BLD AUTO: 20 %
MCH RBC QN AUTO: 31.7 PG (ref 26.5–33)
MCHC RBC AUTO-ENTMCNC: 33.2 G/DL (ref 31.5–36.5)
MCV RBC AUTO: 95 FL (ref 78–100)
MICROALBUMIN UR-MCNC: 7 MG/L
MICROALBUMIN/CREAT UR: 6.36 MG/G CR (ref 0–17)
MONOCYTES # BLD AUTO: 0.8 10E3/UL (ref 0–1.3)
MONOCYTES NFR BLD AUTO: 12 %
NEUTROPHILS # BLD AUTO: 4.1 10E3/UL (ref 1.6–8.3)
NEUTROPHILS NFR BLD AUTO: 64 %
PLATELET # BLD AUTO: 129 10E3/UL (ref 150–450)
POTASSIUM BLD-SCNC: 4.3 MMOL/L (ref 3.4–5.3)
RBC # BLD AUTO: 3.63 10E6/UL (ref 4.4–5.9)
SODIUM SERPL-SCNC: 137 MMOL/L (ref 133–144)
WBC # BLD AUTO: 6.4 10E3/UL (ref 4–11)

## 2022-09-06 PROCEDURE — 80048 BASIC METABOLIC PNL TOTAL CA: CPT | Mod: ZL

## 2022-09-06 PROCEDURE — 85610 PROTHROMBIN TIME: CPT | Mod: ZL

## 2022-09-06 PROCEDURE — 82043 UR ALBUMIN QUANTITATIVE: CPT | Mod: ZL

## 2022-09-06 PROCEDURE — G0463 HOSPITAL OUTPT CLINIC VISIT: HCPCS

## 2022-09-06 PROCEDURE — 99214 OFFICE O/P EST MOD 30 MIN: CPT | Performed by: FAMILY MEDICINE

## 2022-09-06 PROCEDURE — 85025 COMPLETE CBC W/AUTO DIFF WBC: CPT | Mod: ZL

## 2022-09-06 PROCEDURE — 36415 COLL VENOUS BLD VENIPUNCTURE: CPT | Mod: ZL

## 2022-09-06 PROCEDURE — 83036 HEMOGLOBIN GLYCOSYLATED A1C: CPT | Mod: ZL

## 2022-09-06 ASSESSMENT — PAIN SCALES - GENERAL: PAINLEVEL: NO PAIN (0)

## 2022-09-06 NOTE — PROGRESS NOTES
ANTICOAGULATION MANAGEMENT     Sidney Barriga 89 year old male is on warfarin with therapeutic INR result. (Goal INR 2.0-3.0)    Recent labs: (last 7 days)     09/06/22  0856   INR 2.5*       ASSESSMENT       Source(s): Chart Review and Patient/Caregiver Call       Warfarin doses taken: Warfarin taken as instructed    Diet: No new diet changes identified    New illness, injury, or hospitalization: No    Medication/supplement changes: None noted    Signs or symptoms of bleeding or clotting: No    Previous INR: Therapeutic last 2(+) visits    Additional findings: None       PLAN     Recommended plan for no diet, medication or health factor changes affecting INR     Dosing Instructions: Continue your current warfarin dose with next INR in 4 weeks       Summary  As of 9/6/2022    Full warfarin instructions:  2.5 mg every Wed, Sat; 5 mg all other days   Next INR check:  10/4/2022             Telephone call with Sidney who verbalizes understanding and agrees to plan    Lab visit scheduled    Education provided: Please call back if any changes to your diet, medications or how you've been taking warfarin    Plan made per [facility] anticoagulation protocol    Yolande Preston RN  Anticoagulation Clinic  9/6/2022    _______________________________________________________________________     Anticoagulation Episode Summary     Current INR goal:  2.0-3.0   TTR:  55.8 % (1 y)   Target end date:  Indefinite   Send INR reminders to:  [facility]    Indications    History of DVT (deep vein thrombosis)-multiple [Z86.718]  Chronic deep vein thrombosis (DVT) of proximal vein of both lower extremities (H) [I82.5Y3]  Personal history of DVT (deep vein thrombosis) [Z86.718]           Comments:           Anticoagulation Care Providers     Provider Role Specialty Phone number    Zach Arredondo MD Referring Family Medicine [phone]    Latricia Macedo PA Referring Family Medicine [phone]

## 2022-09-06 NOTE — NURSING NOTE
"Chief Complaint   Patient presents with     Diabetes       Initial /58   Pulse 66   Wt 73.9 kg (163 lb)   SpO2 98%   BMI 23.39 kg/m   Estimated body mass index is 23.39 kg/m  as calculated from the following:    Height as of 7/20/21: 1.778 m (5' 10\").    Weight as of this encounter: 73.9 kg (163 lb).  Medication Reconciliation: complete  Griselda Peck LPN  "

## 2022-09-19 ENCOUNTER — IMMUNIZATION (OUTPATIENT)
Dept: FAMILY MEDICINE | Facility: OTHER | Age: 87
End: 2022-09-19
Attending: FAMILY MEDICINE
Payer: MEDICARE

## 2022-09-19 DIAGNOSIS — Z23 IMMUNIZATION DUE: Primary | ICD-10-CM

## 2022-09-19 PROCEDURE — 91312 COVID-19,PF,PFIZER BOOSTER BIVALENT: CPT

## 2022-09-19 PROCEDURE — 0124A COVID-19,PF,PFIZER BOOSTER BIVALENT: CPT

## 2022-10-04 ENCOUNTER — LAB (OUTPATIENT)
Dept: LAB | Facility: OTHER | Age: 87
End: 2022-10-04
Payer: MEDICARE

## 2022-10-04 ENCOUNTER — ANTICOAGULATION THERAPY VISIT (OUTPATIENT)
Dept: ANTICOAGULATION | Facility: OTHER | Age: 87
End: 2022-10-04
Attending: FAMILY MEDICINE
Payer: MEDICARE

## 2022-10-04 DIAGNOSIS — I82.5Y3 CHRONIC DEEP VEIN THROMBOSIS (DVT) OF PROXIMAL VEIN OF BOTH LOWER EXTREMITIES (H): ICD-10-CM

## 2022-10-04 DIAGNOSIS — Z86.718 HISTORY OF DVT (DEEP VEIN THROMBOSIS): Primary | ICD-10-CM

## 2022-10-04 DIAGNOSIS — Z86.718 PERSONAL HISTORY OF DVT (DEEP VEIN THROMBOSIS): ICD-10-CM

## 2022-10-04 LAB — INR BLD: 2.5 (ref 0.9–1.1)

## 2022-10-04 PROCEDURE — 36416 COLLJ CAPILLARY BLOOD SPEC: CPT | Mod: ZL

## 2022-10-04 PROCEDURE — 85610 PROTHROMBIN TIME: CPT | Mod: ZL

## 2022-10-04 NOTE — PROGRESS NOTES
ANTICOAGULATION MANAGEMENT     Sidney Barriga 89 year old male is on warfarin with therapeutic INR result. (Goal INR 2.0-3.0)    Recent labs: (last 7 days)     10/04/22  0829   INR 2.5*       ASSESSMENT       Source(s): Chart Review and Patient/Caregiver Call       Warfarin doses taken: Warfarin taken as instructed    Diet: No new diet changes identified    New illness, injury, or hospitalization: No    Medication/supplement changes: None noted    Signs or symptoms of bleeding or clotting: No    Previous INR: Therapeutic last 2(+) visits    Additional findings: None       PLAN     Recommended plan for no diet, medication or health factor changes affecting INR     Dosing Instructions: Continue your current warfarin dose with next INR in 6 weeks       Summary  As of 10/4/2022    Full warfarin instructions:  2.5 mg every Wed, Sat; 5 mg all other days   Next INR check:  11/15/2022             Telephone call with Sidney who verbalizes understanding and agrees to plan    Lab visit scheduled    Education provided: None required    Plan made per [facility] anticoagulation protocol    Lizbeth Ely RN  Anticoagulation Clinic  10/4/2022    _______________________________________________________________________     Anticoagulation Episode Summary     Current INR goal:  2.0-3.0   TTR:  59.4 % (1 y)   Target end date:  Indefinite   Send INR reminders to:  [facility]    Indications    History of DVT (deep vein thrombosis)-multiple [Z86.718]  Chronic deep vein thrombosis (DVT) of proximal vein of both lower extremities (H) [I82.5Y3]  Personal history of DVT (deep vein thrombosis) [Z86.718]           Comments:           Anticoagulation Care Providers     Provider Role Specialty Phone number    Zach Arredondo MD Referring Family Medicine [phone]    Latricia Macedo PA Referring Family Medicine [phone]

## 2022-11-15 ENCOUNTER — LAB (OUTPATIENT)
Dept: LAB | Facility: OTHER | Age: 87
End: 2022-11-15
Payer: MEDICARE

## 2022-11-15 ENCOUNTER — ANTICOAGULATION THERAPY VISIT (OUTPATIENT)
Dept: ANTICOAGULATION | Facility: OTHER | Age: 87
End: 2022-11-15
Attending: FAMILY MEDICINE
Payer: MEDICARE

## 2022-11-15 ENCOUNTER — TELEPHONE (OUTPATIENT)
Dept: FAMILY MEDICINE | Facility: OTHER | Age: 87
End: 2022-11-15

## 2022-11-15 DIAGNOSIS — I82.5Y3 CHRONIC DEEP VEIN THROMBOSIS (DVT) OF PROXIMAL VEIN OF BOTH LOWER EXTREMITIES (H): ICD-10-CM

## 2022-11-15 DIAGNOSIS — Z86.718 HISTORY OF DVT (DEEP VEIN THROMBOSIS): Primary | ICD-10-CM

## 2022-11-15 DIAGNOSIS — Z86.718 PERSONAL HISTORY OF DVT (DEEP VEIN THROMBOSIS): ICD-10-CM

## 2022-11-15 LAB — INR BLD: 3.1 (ref 0.9–1.1)

## 2022-11-15 PROCEDURE — 85610 PROTHROMBIN TIME: CPT | Mod: ZL

## 2022-11-15 PROCEDURE — 36416 COLLJ CAPILLARY BLOOD SPEC: CPT | Mod: ZL

## 2022-11-15 NOTE — PROGRESS NOTES
ANTICOAGULATION MANAGEMENT     Sidney Barriga 89 year old male is on warfarin with supratherapeutic INR result. (Goal INR 2.0-3.0)    Recent labs: (last 7 days)     11/15/22  0829   INR 3.1*       ASSESSMENT       Source(s): Chart Review and Patient/Caregiver Call       Warfarin doses taken: Warfarin taken as instructed    Diet: No new diet changes identified    New illness, injury, or hospitalization: No    Medication/supplement changes: None noted    Signs or symptoms of bleeding or clotting: No    Previous INR: Therapeutic last 2(+) visits    Additional findings: Patient has an upcoming angiography on 11/30/22-Patient has an upcoming TAVR on 12/6/22- patient verbalized that he has not got any warfarin instructions for the procedure on 12/6/22. Writer will attempt to call [facility] to see if I can get any information.        PLAN     Recommended plan for no diet, medication or health factor changes affecting INR     Dosing Instructions: Continue your current warfarin dose with next INR in 1 weeks       Summary  As of 11/15/2022    Full warfarin instructions:  2.5 mg every Wed, Sat; 5 mg all other days; Starting 11/15/2022   Next INR check:  11/22/2022             Telephone call with Sidney who verbalizes understanding and agrees to plan    Lab visit scheduled    Education provided: Please call back if any changes to your diet, medications or how you've been taking warfarin    Plan made per [facility] anticoagulation protocol    Yolande Preston, RN  Anticoagulation Clinic  11/15/2022    _______________________________________________________________________     Anticoagulation Episode Summary     Current INR goal:  2.0-3.0   TTR:  69.0 % (1 y)   Target end date:  Indefinite   Send INR reminders to:  [facility]    Indications    History of DVT (deep vein thrombosis)-multiple [Z86.718]  Chronic deep vein thrombosis (DVT) of proximal vein of both lower extremities (H) [I82.5Y3]  Personal history of DVT  (deep vein thrombosis) [Z86.718]           Comments:           Anticoagulation Care Providers     Provider Role Specialty Phone number    Zach Arredondo MD Referring Family Medicine [phone]    Latricia Macedo PA Referring Family Medicine [phone]

## 2022-11-15 NOTE — TELEPHONE ENCOUNTER
Called [facility] to see if they had any specific warfarin instructions for the patient for his angiography/angiogram on 11/30 and his TAVR on 12/6/22. Patient verbalized that he did not get any instructions for his warfarin yet for his angiogram or TAVR procedure on 11/30 and12/6/22. Talked to the [facility] on the cardiac surgery unit and she said that she will have one of the nurses call me back since they did not have any notes in yet. Patient is on warfarin and plavix. Yolande Preston RN on 11/15/2022 at 1:56 PM

## 2022-11-16 NOTE — TELEPHONE ENCOUNTER
Call back from Yadira at [facility] ([phone]) regarding patient's upcoming procedures - angiography on 11/30 and TAVR on 12/6. INR should be 1.6 or lower for both procedures, with no lovenox bridging needed. Plavix should be held the day of the TAVR procedure only, otherwise no change needed.    Phone call to patient to clarify plan of care. He will attend appt on 11/22 and will discuss further at that time.    MATILDA JAIMES RN ....................  11/16/2022   10:21 AM

## 2022-11-22 ENCOUNTER — LAB (OUTPATIENT)
Dept: LAB | Facility: OTHER | Age: 87
End: 2022-11-22
Payer: MEDICARE

## 2022-11-22 ENCOUNTER — ANTICOAGULATION THERAPY VISIT (OUTPATIENT)
Dept: ANTICOAGULATION | Facility: OTHER | Age: 87
End: 2022-11-22
Attending: FAMILY MEDICINE
Payer: MEDICARE

## 2022-11-22 DIAGNOSIS — I82.5Y3 CHRONIC DEEP VEIN THROMBOSIS (DVT) OF PROXIMAL VEIN OF BOTH LOWER EXTREMITIES (H): ICD-10-CM

## 2022-11-22 DIAGNOSIS — Z86.718 HISTORY OF DVT (DEEP VEIN THROMBOSIS): Primary | ICD-10-CM

## 2022-11-22 DIAGNOSIS — Z86.718 PERSONAL HISTORY OF DVT (DEEP VEIN THROMBOSIS): ICD-10-CM

## 2022-11-22 LAB — INR BLD: 2.6 (ref 0.9–1.1)

## 2022-11-22 PROCEDURE — 85610 PROTHROMBIN TIME: CPT | Mod: ZL

## 2022-11-22 PROCEDURE — 36415 COLL VENOUS BLD VENIPUNCTURE: CPT | Mod: ZL

## 2022-11-22 NOTE — PROGRESS NOTES
ANTICOAGULATION MANAGEMENT     Sidney LAURITA Barriga 89 year old male is on warfarin with therapeutic INR result. (Goal INR 2.0-3.0)    Recent labs: (last 7 days)     11/22/22  0839   INR 2.6*       ASSESSMENT       Source(s): Chart Review and Patient/Caregiver Call       Warfarin doses taken: Warfarin taken as instructed    Diet: No new diet changes identified    New illness, injury, or hospitalization: No    Medication/supplement changes: Will have a 3 day hold of warfarin before both procedures on 11/30 and 12/6/22    Signs or symptoms of bleeding or clotting: No    Previous INR: Supratherapeutic    Additional findings: Upcoming surgery/procedure 11/30 and 12/6/22-needs INR at 1.6 or lower with no bridging- instruction gave to use by the [facility] ([facility]       PLAN     Recommended plan for temporary change(s) affecting INR     Dosing Instructions: Continue your current warfarin dose with next INR in 3 weeks       Summary  As of 11/22/2022    Full warfarin instructions:  11/27: Hold; 11/28: Hold; 11/29: Hold; 11/30: 5 mg; 12/3: Hold; 12/4: Hold; 12/5: Hold; 12/6: 7.5 mg; Otherwise 2.5 mg every Wed, Sat; 5 mg all other days; Starting 11/22/2022   Next INR check:  12/13/2022             Telephone call with Sidney who verbalizes understanding and agrees to plan    Lab visit scheduled    Education provided: Please call back if any changes to your diet, medications or how you've been taking warfarin    Plan made per [facility] anticoagulation protocol    Yolande Preston RN  Anticoagulation Clinic  11/22/2022    _______________________________________________________________________     Anticoagulation Episode Summary     Current INR goal:  2.0-3.0   TTR:  70.5 % (1 y)   Target end date:  Indefinite   Send INR reminders to:  [facility]    Indications    History of DVT (deep vein thrombosis)-multiple [Z86.718]  Chronic deep vein thrombosis (DVT) of proximal vein of both lower extremities (H)  [I82.5Y3]  Personal history of DVT (deep vein thrombosis) [Z86.718]           Comments:           Anticoagulation Care Providers     Provider Role Specialty Phone number    Zach Arredondo MD Referring Family Medicine [phone]    Latricia Macedo PA Referring Family Medicine [phone]

## 2022-11-29 ENCOUNTER — TELEPHONE (OUTPATIENT)
Dept: FAMILY MEDICINE | Facility: OTHER | Age: 87
End: 2022-11-29

## 2022-11-29 NOTE — TELEPHONE ENCOUNTER
11:13 AM    Reason for Call: Phone Call    Description: Yadira carrasco/ [facility] ([phone]) called stating that the patient had a CT scan 11-28-22 but was ready this morning (11-29-22) and a 3mm nodule was found in the left upper lobe of the lung. As per radiology recommendation, they recommend a follow up CT in 12 months. At that time, if it's unchanged then no further follow up will be required. Results are in Care Everywhere.  Please call Yadira with any questions.     Was an appointment offered for this call? No  If yes : Appointment type              Date    Preferred method for responding to this message: Telephone Call  What is your phone number ? [phone]    If we cannot reach you directly, may we leave a detailed response at the number you provided? Yes    Can this message wait until your PCP/provider returns, if available today? Not applicable, provider in clinic today    Megan Iqbal

## 2022-12-08 ENCOUNTER — TRANSFERRED RECORDS (OUTPATIENT)
Dept: HEALTH INFORMATION MANAGEMENT | Facility: CLINIC | Age: 87
End: 2022-12-08

## 2022-12-08 NOTE — PROGRESS NOTES
Assessment & Plan     S/P TAVR (transcatheter aortic valve replacement)  Doing well.  Feels a little better except for the diffuse rash he got which is slowly improving.  See below.  Update labs as recommended in the discharge summary.   Increase back to 25 bid of the coreg, first back up at night and then in the am if tolerated.  He will do this over a few days.     - Reflex INR; Future    Severe aortic stenosis  S/p TAVR.      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  He got that inpatient to his understanding and I am attempting to verify as it's not clear in the computer.  .  It is very slowly improving.  Sent steroid to use if ongoing.  He will watch it fade away otherwise.  I am reaching out to cardiology to get an alternative to amox for SBE with any dental procedure, etc.    - CBC with platelets and differential; Future  - Basic metabolic panel; Future  - predniSONE (DELTASONE) 10 MG tablet; Take 1 tablet (10 mg) by mouth daily           MED REC REQUIRED  Post Medication Reconciliation Status:  Discharge medications reconciled, continue medications without change        No follow-ups on file.    Zach Arredondo MD  [facility]   Sidney is a 89 year old, presenting for the following health issues:  Hospital F/U      [facility]       Hospital Follow-up Visit:    Hospital/Nursing Home/IP Rehab Facility: [place]  Date of Admission: 12/6/22  Date of Discharge: 12/8/[birth year]  Reason(s) for Admission: stenosis aortic valve     Was your hospitalization related to COVID-19? No   Problems taking medications regularly:  None  Medication changes since discharge: None  Problems adhering to non-medication therapy:  None    Summary of hospitalization:  CareEverywhere information obtained and reviewed  Diagnostic Tests/Treatments reviewed.  Follow up needed: none  Other Healthcare Providers Involved in Patient s Care:         None  Update since discharge: improved.         Plan of care communicated with  "patient                 Review of Systems   Constitutional, HEENT, cardiovascular, pulmonary, gi and gu systems are negative, except as otherwise noted.      Objective    /60 (BP Location: Left arm, Patient Position: Sitting, Cuff Size: Adult Regular)   Pulse 77   Temp 97.1  F (36.2  C) (Tympanic)   Ht 1.778 m (5' 10\")   Wt 75.5 kg (166 lb 8 oz)   SpO2 99%   BMI 23.89 kg/m    Body mass index is 23.89 kg/m .  Physical Exam   GENERAL: healthy, alert and no distress  NECK: no adenopathy, no asymmetry, masses, or scars and thyroid normal to palpation  RESP: lungs clear to auscultation - no rales, rhonchi or wheezes  CV: regular rates and rhythm, normal S1 S2, no S3 or S4, grade 1-2/6 systolic murmur heard best over the base, peripheral pulses strong and no peripheral edema  ABDOMEN: soft, nontender, no hepatosplenomegaly, no masses and bowel sounds normal  MS: no gross musculoskeletal defects noted, no edema  SKIN: diffuse macular rash c/w a drug rash.         Bmp and cbc pending.      Sent to Dr. Rae to get suggestion for alternative for SBE should the need arise, though again I cannot confirm he had amox inpatient he was certain that he did.              "

## 2022-12-09 DIAGNOSIS — J44.9 CHRONIC OBSTRUCTIVE PULMONARY DISEASE, UNSPECIFIED COPD TYPE (H): ICD-10-CM

## 2022-12-09 RX ORDER — ALBUTEROL SULFATE 90 UG/1
1-2 AEROSOL, METERED RESPIRATORY (INHALATION) EVERY 4 HOURS PRN
Qty: 18 G | Refills: 3 | Status: SHIPPED | OUTPATIENT
Start: 2022-12-09 | End: 2024-02-27

## 2022-12-09 NOTE — TELEPHONE ENCOUNTER
albuterol (PROAIR HFA/PROVENTIL HFA/VENTOLIN HFA) 108 (90 Base) MCG/ACT inhaler 18 g 3 4/13/2022     Last seen in Cardiology here 04/13/22 Dr Rae patient.

## 2022-12-12 ENCOUNTER — OFFICE VISIT (OUTPATIENT)
Dept: FAMILY MEDICINE | Facility: OTHER | Age: 87
End: 2022-12-12
Attending: FAMILY MEDICINE
Payer: MEDICARE

## 2022-12-12 ENCOUNTER — APPOINTMENT (OUTPATIENT)
Dept: LAB | Facility: OTHER | Age: 87
End: 2022-12-12
Attending: FAMILY MEDICINE
Payer: MEDICARE

## 2022-12-12 ENCOUNTER — ANTICOAGULATION THERAPY VISIT (OUTPATIENT)
Dept: ANTICOAGULATION | Facility: OTHER | Age: 87
End: 2022-12-12
Attending: FAMILY MEDICINE
Payer: MEDICARE

## 2022-12-12 ENCOUNTER — TELEPHONE (OUTPATIENT)
Dept: CARDIAC REHAB | Facility: HOSPITAL | Age: 87
End: 2022-12-12

## 2022-12-12 VITALS
WEIGHT: 166.5 LBS | HEIGHT: 70 IN | SYSTOLIC BLOOD PRESSURE: 110 MMHG | TEMPERATURE: 97.1 F | OXYGEN SATURATION: 99 % | HEART RATE: 77 BPM | BODY MASS INDEX: 23.84 KG/M2 | DIASTOLIC BLOOD PRESSURE: 60 MMHG

## 2022-12-12 DIAGNOSIS — Z86.718 HISTORY OF DVT (DEEP VEIN THROMBOSIS): Primary | ICD-10-CM

## 2022-12-12 DIAGNOSIS — I82.5Y3 CHRONIC DEEP VEIN THROMBOSIS (DVT) OF PROXIMAL VEIN OF BOTH LOWER EXTREMITIES (H): ICD-10-CM

## 2022-12-12 DIAGNOSIS — I35.0 SEVERE AORTIC STENOSIS: ICD-10-CM

## 2022-12-12 DIAGNOSIS — Z86.718 PERSONAL HISTORY OF DVT (DEEP VEIN THROMBOSIS): ICD-10-CM

## 2022-12-12 DIAGNOSIS — Z95.2 S/P TAVR (TRANSCATHETER AORTIC VALVE REPLACEMENT): Primary | ICD-10-CM

## 2022-12-12 DIAGNOSIS — L27.0 DRUG RASH: ICD-10-CM

## 2022-12-12 PROBLEM — N18.4 STAGE 4 CHRONIC KIDNEY DISEASE (H): Status: ACTIVE | Noted: 2022-12-05

## 2022-12-12 LAB
ANION GAP SERPL CALCULATED.3IONS-SCNC: 14 MMOL/L (ref 7–15)
BASOPHILS # BLD AUTO: 0 10E3/UL (ref 0–0.2)
BASOPHILS NFR BLD AUTO: 0 %
BUN SERPL-MCNC: 44.5 MG/DL (ref 8–23)
CALCIUM SERPL-MCNC: 9.8 MG/DL (ref 8.2–9.6)
CHLORIDE SERPL-SCNC: 103 MMOL/L (ref 98–107)
CREAT SERPL-MCNC: 1.72 MG/DL (ref 0.67–1.17)
DEPRECATED HCO3 PLAS-SCNC: 25 MMOL/L (ref 22–29)
EOSINOPHIL # BLD AUTO: 0.4 10E3/UL (ref 0–0.7)
EOSINOPHIL NFR BLD AUTO: 5 %
ERYTHROCYTE [DISTWIDTH] IN BLOOD BY AUTOMATED COUNT: 13.2 % (ref 10–15)
GFR SERPL CREATININE-BSD FRML MDRD: 37 ML/MIN/1.73M2
GLUCOSE SERPL-MCNC: 141 MG/DL (ref 70–99)
HCT VFR BLD AUTO: 32.6 % (ref 40–53)
HGB BLD-MCNC: 10.5 G/DL (ref 13.3–17.7)
INR BLD: 1.7 (ref 0.9–1.1)
LYMPHOCYTES # BLD AUTO: 1.1 10E3/UL (ref 0.8–5.3)
LYMPHOCYTES NFR BLD AUTO: 15 %
MCH RBC QN AUTO: 31.2 PG (ref 26.5–33)
MCHC RBC AUTO-ENTMCNC: 32.2 G/DL (ref 31.5–36.5)
MCV RBC AUTO: 97 FL (ref 78–100)
MONOCYTES # BLD AUTO: 0.8 10E3/UL (ref 0–1.3)
MONOCYTES NFR BLD AUTO: 11 %
NEUTROPHILS # BLD AUTO: 5 10E3/UL (ref 1.6–8.3)
NEUTROPHILS NFR BLD AUTO: 69 %
PLATELET # BLD AUTO: 147 10E3/UL (ref 150–450)
POTASSIUM SERPL-SCNC: 4.5 MMOL/L (ref 3.4–5.3)
RBC # BLD AUTO: 3.37 10E6/UL (ref 4.4–5.9)
SODIUM SERPL-SCNC: 142 MMOL/L (ref 136–145)
WBC # BLD AUTO: 7.2 10E3/UL (ref 4–11)

## 2022-12-12 PROCEDURE — 99496 TRANSJ CARE MGMT HIGH F2F 7D: CPT | Performed by: FAMILY MEDICINE

## 2022-12-12 PROCEDURE — 85610 PROTHROMBIN TIME: CPT | Mod: ZL | Performed by: FAMILY MEDICINE

## 2022-12-12 PROCEDURE — 80048 BASIC METABOLIC PNL TOTAL CA: CPT | Mod: ZL | Performed by: FAMILY MEDICINE

## 2022-12-12 PROCEDURE — 36415 COLL VENOUS BLD VENIPUNCTURE: CPT | Mod: ZL | Performed by: FAMILY MEDICINE

## 2022-12-12 PROCEDURE — 85025 COMPLETE CBC W/AUTO DIFF WBC: CPT | Mod: ZL | Performed by: FAMILY MEDICINE

## 2022-12-12 PROCEDURE — G0463 HOSPITAL OUTPT CLINIC VISIT: HCPCS

## 2022-12-12 PROCEDURE — 99214 OFFICE O/P EST MOD 30 MIN: CPT | Performed by: FAMILY MEDICINE

## 2022-12-12 RX ORDER — AMOXICILLIN 500 MG/1
2000 CAPSULE ORAL
COMMUNITY
Start: 2022-12-08 | End: 2023-04-17

## 2022-12-12 RX ORDER — PREDNISONE 10 MG/1
10 TABLET ORAL DAILY
Qty: 6 TABLET | Refills: 0 | Status: SHIPPED | OUTPATIENT
Start: 2022-12-12 | End: 2023-08-14

## 2022-12-12 ASSESSMENT — PAIN SCALES - GENERAL: PAINLEVEL: NO PAIN (0)

## 2022-12-12 NOTE — LETTER
December 12, 2022      Sidney Barriga  [address] [address]        Dear ,    We are writing to inform you of your test results.  Stable overall with hgb slightly lower.  4 week cbc recheck to ensure stability.        Resulted Orders   CBC with platelets and differential   Result Value Ref Range    WBC Count 7.2 4.0 - 11.0 10e3/uL    RBC Count 3.37 (L) 4.40 - 5.90 10e6/uL    Hemoglobin 10.5 (L) 13.3 - 17.7 g/dL    Hematocrit 32.6 (L) 40.0 - 53.0 %    MCV 97 78 - 100 fL    MCH 31.2 26.5 - 33.0 pg    MCHC 32.2 31.5 - 36.5 g/dL    RDW 13.2 10.0 - 15.0 %    Platelet Count 147 (L) 150 - 450 10e3/uL    % Neutrophils 69 %    % Lymphocytes 15 %    % Monocytes 11 %    % Eosinophils 5 %    % Basophils 0 %    Absolute Neutrophils 5.0 1.6 - 8.3 10e3/uL    Absolute Lymphocytes 1.1 0.8 - 5.3 10e3/uL    Absolute Monocytes 0.8 0.0 - 1.3 10e3/uL    Absolute Eosinophils 0.4 0.0 - 0.7 10e3/uL    Absolute Basophils 0.0 0.0 - 0.2 10e3/uL       If you have any questions or concerns, please call the clinic at the number listed above.       Sincerely,      Zach Arredondo MD

## 2022-12-12 NOTE — PROGRESS NOTES
ANTICOAGULATION MANAGEMENT     Sidney Barriga 89 year old male is on warfarin with subtherapeutic INR result. (Goal INR 2.0-3.0)    Recent labs: (last 7 days)     12/12/22  1333   INR 1.7*       ASSESSMENT       Source(s): Chart Review and Patient/Caregiver Call       Warfarin doses taken: Warfarin taken as instructed    Diet: No new diet changes identified    New illness, injury, or hospitalization: Yes: Post TAVR    Medication/supplement changes: None noted    Signs or symptoms of bleeding or clotting: No    Previous INR: Therapeutic last visit; previously outside of goal range    Additional findings: None       PLAN     Recommended plan for temporary change(s) affecting INR     Dosing Instructions: Continue your current warfarin dose with next INR in 1 week       Summary  As of 12/12/2022    Full warfarin instructions:  2.5 mg every Wed, Sat; 5 mg all other days; Starting 12/12/2022   Next INR check:  12/19/2022             Telephone call with Sidney who verbalizes understanding and agrees to plan    Lab visit scheduled    Education provided: None required and Please call back if any changes to your diet, medications or how you've been taking warfarin    Plan made per [facility] anticoagulation protocol    Piedad Carver RN  ....................  12/12/2022   2:40 PM  Anticoagulation Clinic  12/12/2022    _______________________________________________________________________     Anticoagulation Episode Summary     Current INR goal:  2.0-3.0   TTR:  74.2 % (1 y)   Target end date:  Indefinite   Send INR reminders to:  [facility]    Indications    History of DVT (deep vein thrombosis)-multiple [Z86.718]  Chronic deep vein thrombosis (DVT) of proximal vein of both lower extremities (H) [I82.5Y3]  Personal history of DVT (deep vein thrombosis) [Z86.8]           Comments:           Anticoagulation Care Providers     Provider Role Specialty Phone number    Zach Arredondo MD Referring Family Medicine  [phone]    Latricia Macedo PA [facility] [phone]

## 2022-12-12 NOTE — TELEPHONE ENCOUNTER
12/12: Pt was called to discuss Phase II cardiac rehab following their cardiac intervention at [facility] on Dec 6th, 2022. Pt is willing to participate in rehab program at the beginning of the New Year. Staff will call pt in the early month of January to schedule Phase II rehab.     Ck

## 2022-12-19 ENCOUNTER — LAB (OUTPATIENT)
Dept: LAB | Facility: OTHER | Age: 87
End: 2022-12-19
Payer: MEDICARE

## 2022-12-19 ENCOUNTER — ANTICOAGULATION THERAPY VISIT (OUTPATIENT)
Dept: ANTICOAGULATION | Facility: OTHER | Age: 87
End: 2022-12-19
Attending: FAMILY MEDICINE
Payer: MEDICARE

## 2022-12-19 DIAGNOSIS — Z86.718 PERSONAL HISTORY OF DVT (DEEP VEIN THROMBOSIS): ICD-10-CM

## 2022-12-19 DIAGNOSIS — I82.5Y3 CHRONIC DEEP VEIN THROMBOSIS (DVT) OF PROXIMAL VEIN OF BOTH LOWER EXTREMITIES (H): ICD-10-CM

## 2022-12-19 DIAGNOSIS — Z86.718 HISTORY OF DVT (DEEP VEIN THROMBOSIS): Primary | ICD-10-CM

## 2022-12-19 LAB — INR BLD: 2.1 (ref 0.9–1.1)

## 2022-12-19 PROCEDURE — 36416 COLLJ CAPILLARY BLOOD SPEC: CPT | Mod: ZL

## 2022-12-19 PROCEDURE — 85610 PROTHROMBIN TIME: CPT | Mod: ZL

## 2022-12-19 NOTE — PROGRESS NOTES
ANTICOAGULATION MANAGEMENT     Sidney Barriga 89 year old male is on warfarin with therapeutic INR result. (Goal INR 2.0-3.0)    Recent labs: (last 7 days)     12/19/22  0926   INR 2.1*       ASSESSMENT       Source(s): Chart Review and Patient/Caregiver Call       Warfarin doses taken: Warfarin taken as instructed    Diet: No new diet changes identified    New illness, injury, or hospitalization: No    Medication/supplement changes: did not take prednisone that was prescribed last week says his symptoms resolved on their own    Signs or symptoms of bleeding or clotting: No    Previous INR: Subtherapeutic    Additional findings: None       PLAN     Recommended plan for no diet, medication or health factor changes affecting INR     Dosing Instructions: Continue your current warfarin dose with next INR in 4 weeks       Summary  As of 12/19/2022    Full warfarin instructions:  2.5 mg every Wed, Sat; 5 mg all other days; Starting 12/19/2022   Next INR check:  1/16/2023             Telephone call with Sidney who verbalizes understanding and agrees to plan    Lab visit scheduled    Education provided:     None required    Plan made per [facility] anticoagulation protocol    Lizbeth Ely, RN  Anticoagulation Clinic  12/19/2022    _______________________________________________________________________     Anticoagulation Episode Summary     Current INR goal:  2.0-3.0   TTR:  74.4 % (1 y)   Target end date:  Indefinite   Send INR reminders to:  [facility]    Indications    History of DVT (deep vein thrombosis)-multiple [Z28.718]  Chronic deep vein thrombosis (DVT) of proximal vein of both lower extremities (H) [I82.5Y3]  Personal history of DVT (deep vein thrombosis) [Z86.718]           Comments:           Anticoagulation Care Providers     Provider Role Specialty Phone number    Zach Arredondo MD Referring Family Medicine [phone]

## 2023-01-10 DIAGNOSIS — Z95.2 S/P TAVR (TRANSCATHETER AORTIC VALVE REPLACEMENT): Primary | ICD-10-CM

## 2023-01-19 ENCOUNTER — ANTICOAGULATION THERAPY VISIT (OUTPATIENT)
Dept: ANTICOAGULATION | Facility: OTHER | Age: 88
End: 2023-01-19
Attending: FAMILY MEDICINE
Payer: MEDICARE

## 2023-01-19 ENCOUNTER — LAB (OUTPATIENT)
Dept: LAB | Facility: OTHER | Age: 88
End: 2023-01-19
Payer: MEDICARE

## 2023-01-19 DIAGNOSIS — I82.5Y3 CHRONIC DEEP VEIN THROMBOSIS (DVT) OF PROXIMAL VEIN OF BOTH LOWER EXTREMITIES (H): ICD-10-CM

## 2023-01-19 DIAGNOSIS — Z86.718 HISTORY OF DVT (DEEP VEIN THROMBOSIS): Primary | ICD-10-CM

## 2023-01-19 DIAGNOSIS — Z86.718 PERSONAL HISTORY OF DVT (DEEP VEIN THROMBOSIS): ICD-10-CM

## 2023-01-19 LAB
ANION GAP SERPL CALCULATED.3IONS-SCNC: 12 MMOL/L (ref 7–15)
BUN SERPL-MCNC: 44.5 MG/DL (ref 8–23)
CALCIUM SERPL-MCNC: 9.7 MG/DL (ref 8.2–9.6)
CHLORIDE SERPL-SCNC: 102 MMOL/L (ref 98–107)
CREAT SERPL-MCNC: 1.84 MG/DL (ref 0.67–1.17)
DEPRECATED HCO3 PLAS-SCNC: 25 MMOL/L (ref 22–29)
GFR SERPL CREATININE-BSD FRML MDRD: 34 ML/MIN/1.73M2
GLUCOSE SERPL-MCNC: 141 MG/DL (ref 70–99)
INR BLD: 3.4 (ref 0.9–1.1)
POTASSIUM SERPL-SCNC: 4.5 MMOL/L (ref 3.4–5.3)
SODIUM SERPL-SCNC: 139 MMOL/L (ref 136–145)

## 2023-01-19 PROCEDURE — 36415 COLL VENOUS BLD VENIPUNCTURE: CPT | Mod: ZL

## 2023-01-19 PROCEDURE — 85610 PROTHROMBIN TIME: CPT | Mod: ZL

## 2023-01-19 PROCEDURE — 80048 BASIC METABOLIC PNL TOTAL CA: CPT | Mod: ZL

## 2023-01-19 NOTE — PROGRESS NOTES
ANTICOAGULATION MANAGEMENT     Sidney Barriga 89 year old male is on warfarin with supratherapeutic INR result. (Goal INR 2.0-3.0)    Recent labs: (last 7 days)     01/19/23  0941   INR 3.4*       ASSESSMENT       Source(s): Chart Review and Patient/Caregiver Call       Warfarin doses taken: Warfarin taken as instructed    Diet: No new diet changes identified    New illness, injury, or hospitalization: No    Medication/supplement changes: None noted    Signs or symptoms of bleeding or clotting: No    Previous INR: Therapeutic last visit; previously outside of goal range    Additional findings: None       PLAN     Recommended plan for no diet, medication or health factor changes affecting INR     Dosing Instructions: hold dose then continue your current warfarin dose with next INR in 10 days       Summary  As of 1/19/2023    Full warfarin instructions:  1/19: 2.5 mg; Otherwise 2.5 mg every Wed, Sat; 5 mg all other days   Next INR check:  1/30/2023             Telephone call with Sidney who verbalizes understanding and agrees to plan    Lab visit scheduled    Education provided:     None required    Plan made per [facility] anticoagulation protocol    Lizbeth Ely RN  Anticoagulation Clinic  1/19/2023    _______________________________________________________________________     Anticoagulation Episode Summary     Current INR goal:  2.0-3.0   TTR:  76.9 % (1 y)   Target end date:  Indefinite   Send INR reminders to:  [facility]    Indications    History of DVT (deep vein thrombosis)-multiple [Z86.718]  Chronic deep vein thrombosis (DVT) of proximal vein of both lower extremities (H) [I82.5Y3]  Personal history of DVT (deep vein thrombosis) [Z86.718]           Comments:           Anticoagulation Care Providers     Provider Role Specialty Phone number    Zach Arredondo MD Referring Family Medicine [phone]

## 2023-01-24 ENCOUNTER — HOSPITAL ENCOUNTER (OUTPATIENT)
Dept: CARDIAC REHAB | Facility: HOSPITAL | Age: 88
Setting detail: THERAPIES SERIES
Discharge: HOME | End: 2023-01-24
Attending: FAMILY MEDICINE
Payer: MEDICARE

## 2023-01-24 PROCEDURE — 93798 PHYS/QHP OP CAR RHAB W/ECG: CPT

## 2023-01-24 PROCEDURE — 999N000109 HC STATISTIC OP CR VISIT

## 2023-01-24 NOTE — PROGRESS NOTES
Assessment & Plan     Type 2 diabetes mellitus with hyperglycemia, without long-term current use of insulin (H)  Update and follow.  Excellent control.  This note should serve as verification about his diabetes with cardiac rehab as well.    - Hemoglobin A1c; Future  - Hemoglobin A1c    S/P TAVR (transcatheter aortic valve replacement)  Stable.     Chronic combined systolic and diastolic congestive heart failure (H)  Stable.  No change in volume status with dose reduction of the lasix.  Update bmp to ensure creatinine stabilized.    - Basic metabolic panel; Future  - Basic metabolic panel    Essential hypertension, benign  Stable.      Atrial flutter, unspecified type (H)  Stable.  He continues on the coumadin.      Chronic deep vein thrombosis (DVT) of proximal vein of both lower extremities (H)  Coumadin.    - INR point of care ( [facility] clinic ONLY)      30 minutes spent on the date of the encounter doing chart review, patient visit and documentation            No follow-ups on file.    Zach Arredondo MD  [facility]   Sidney is a 89 year old, presenting for the following health issues:  Recheck Medication      HPI     Medication Followup of lasix     Taking Medication as prescribed: yes-20 mg daily     Side Effects:  none    Medication Helping Symptoms:  not applicable        Review of Systems   Constitutional, HEENT, cardiovascular, pulmonary, gi and gu systems are negative, except as otherwise noted.      Objective    /58   Pulse 64   Wt 74.8 kg (165 lb)   SpO2 99%   BMI 23.68 kg/m    Body mass index is 23.68 kg/m .  Physical Exam   GENERAL: healthy, alert and no distress  NECK: no adenopathy, no asymmetry, masses, or scars and thyroid normal to palpation  RESP: lungs clear to auscultation - no rales, rhonchi or wheezes  CV: regular rates and rhythm, normal S1 S2, no S3 or S4, grade 1-2/6 systolic murmur heard best over the base, peripheral pulses strong and no  peripheral edema  ABDOMEN: soft, nontender, no hepatosplenomegaly, no masses and bowel sounds normal  MS: no gross musculoskeletal defects noted, trace edema at baseline        Bmp pending.

## 2023-01-25 ENCOUNTER — OFFICE VISIT (OUTPATIENT)
Dept: FAMILY MEDICINE | Facility: OTHER | Age: 88
End: 2023-01-25
Attending: FAMILY MEDICINE
Payer: MEDICARE

## 2023-01-25 VITALS
BODY MASS INDEX: 23.68 KG/M2 | SYSTOLIC BLOOD PRESSURE: 106 MMHG | HEART RATE: 64 BPM | OXYGEN SATURATION: 99 % | WEIGHT: 165 LBS | DIASTOLIC BLOOD PRESSURE: 58 MMHG

## 2023-01-25 DIAGNOSIS — E11.65 TYPE 2 DIABETES MELLITUS WITH HYPERGLYCEMIA, WITHOUT LONG-TERM CURRENT USE OF INSULIN (H): Primary | ICD-10-CM

## 2023-01-25 DIAGNOSIS — I50.42 CHRONIC COMBINED SYSTOLIC AND DIASTOLIC CONGESTIVE HEART FAILURE (H): ICD-10-CM

## 2023-01-25 DIAGNOSIS — I82.5Y3 CHRONIC DEEP VEIN THROMBOSIS (DVT) OF PROXIMAL VEIN OF BOTH LOWER EXTREMITIES (H): ICD-10-CM

## 2023-01-25 DIAGNOSIS — I10 ESSENTIAL HYPERTENSION, BENIGN: ICD-10-CM

## 2023-01-25 DIAGNOSIS — I48.92 ATRIAL FLUTTER, UNSPECIFIED TYPE (H): ICD-10-CM

## 2023-01-25 DIAGNOSIS — Z95.2 S/P TAVR (TRANSCATHETER AORTIC VALVE REPLACEMENT): ICD-10-CM

## 2023-01-25 LAB — INR BLD: 1.8 (ref 0.9–1.1)

## 2023-01-25 PROCEDURE — G0463 HOSPITAL OUTPT CLINIC VISIT: HCPCS

## 2023-01-25 PROCEDURE — 83036 HEMOGLOBIN GLYCOSYLATED A1C: CPT | Mod: ZL | Performed by: FAMILY MEDICINE

## 2023-01-25 PROCEDURE — 36415 COLL VENOUS BLD VENIPUNCTURE: CPT | Mod: ZL | Performed by: FAMILY MEDICINE

## 2023-01-25 PROCEDURE — 80048 BASIC METABOLIC PNL TOTAL CA: CPT | Mod: ZL | Performed by: FAMILY MEDICINE

## 2023-01-25 PROCEDURE — 85610 PROTHROMBIN TIME: CPT | Mod: ZL | Performed by: FAMILY MEDICINE

## 2023-01-25 PROCEDURE — 99214 OFFICE O/P EST MOD 30 MIN: CPT | Performed by: FAMILY MEDICINE

## 2023-01-25 NOTE — LETTER
January 26, 2023      Sidney LAURITA Barriga  [address] [address]        Dear ,    We are writing to inform you of your test results.    Kidneys improved and stable.  Recommend the lasix stay the same for another month and see me again on 3/6/23 where I will do the same labs to make sure we are still getting adequate improvement from the water pill.  This new lower dose has stabilized that creatinine increasing and we will recheck at your next appointment to see if it has continued to trend down, which I think it might.      Resulted Orders   Basic metabolic panel   Result Value Ref Range    Sodium 139 136 - 145 mmol/L    Potassium 4.7 3.4 - 5.3 mmol/L    Chloride 104 98 - 107 mmol/L    Carbon Dioxide (CO2) 22 22 - 29 mmol/L    Anion Gap 13 7 - 15 mmol/L    Urea Nitrogen 40.7 (H) 8.0 - 23.0 mg/dL    Creatinine 1.68 (H) 0.67 - 1.17 mg/dL    Calcium 9.7 (H) 8.2 - 9.6 mg/dL    Glucose 100 (H) 70 - 99 mg/dL    GFR Estimate 38 (L) >60 mL/min/1.73m2      Comment:      eGFR calculated using 2021 CKD-EPI equation.   Hemoglobin A1c   Result Value Ref Range    Estimated Average Glucose 128 mg/dL    Hemoglobin A1C 6.1 (H) <5.7 %      Comment:      Normal <5.7%   Prediabetes 5.7-6.4%    Diabetes 6.5% or higher     Note: Adopted from ADA consensus guidelines.       If you have any questions or concerns, please call the clinic at the number listed above.       Sincerely,      Zach Arredondo MD

## 2023-01-26 LAB
ANION GAP SERPL CALCULATED.3IONS-SCNC: 13 MMOL/L (ref 7–15)
BUN SERPL-MCNC: 40.7 MG/DL (ref 8–23)
CALCIUM SERPL-MCNC: 9.7 MG/DL (ref 8.2–9.6)
CHLORIDE SERPL-SCNC: 104 MMOL/L (ref 98–107)
CREAT SERPL-MCNC: 1.68 MG/DL (ref 0.67–1.17)
DEPRECATED HCO3 PLAS-SCNC: 22 MMOL/L (ref 22–29)
EST. AVERAGE GLUCOSE BLD GHB EST-MCNC: 128 MG/DL
GFR SERPL CREATININE-BSD FRML MDRD: 38 ML/MIN/1.73M2
GLUCOSE SERPL-MCNC: 100 MG/DL (ref 70–99)
HBA1C MFR BLD: 6.1 %
POTASSIUM SERPL-SCNC: 4.7 MMOL/L (ref 3.4–5.3)
SODIUM SERPL-SCNC: 139 MMOL/L (ref 136–145)

## 2023-01-27 ENCOUNTER — HOSPITAL ENCOUNTER (OUTPATIENT)
Dept: CARDIAC REHAB | Facility: HOSPITAL | Age: 88
Setting detail: THERAPIES SERIES
Discharge: HOME | End: 2023-01-27
Attending: FAMILY MEDICINE
Payer: MEDICARE

## 2023-01-27 LAB — GLUCOSE BLDC GLUCOMTR-MCNC: 134 MG/DL (ref 70–99)

## 2023-01-27 PROCEDURE — 999N000109 HC STATISTIC OP CR VISIT

## 2023-01-27 PROCEDURE — 93798 PHYS/QHP OP CAR RHAB W/ECG: CPT

## 2023-01-30 ENCOUNTER — LAB (OUTPATIENT)
Dept: LAB | Facility: OTHER | Age: 88
End: 2023-01-30
Attending: INTERNAL MEDICINE
Payer: MEDICARE

## 2023-01-30 ENCOUNTER — HOSPITAL ENCOUNTER (OUTPATIENT)
Dept: CARDIAC REHAB | Facility: HOSPITAL | Age: 88
Setting detail: THERAPIES SERIES
Discharge: HOME | End: 2023-01-30
Attending: FAMILY MEDICINE
Payer: MEDICARE

## 2023-01-30 ENCOUNTER — ANTICOAGULATION THERAPY VISIT (OUTPATIENT)
Dept: ANTICOAGULATION | Facility: OTHER | Age: 88
End: 2023-01-30
Attending: FAMILY MEDICINE
Payer: MEDICARE

## 2023-01-30 ENCOUNTER — OFFICE VISIT (OUTPATIENT)
Dept: CARDIOLOGY | Facility: OTHER | Age: 88
End: 2023-01-30
Attending: INTERNAL MEDICINE
Payer: MEDICARE

## 2023-01-30 VITALS
HEIGHT: 70 IN | BODY MASS INDEX: 23.19 KG/M2 | TEMPERATURE: 97.4 F | SYSTOLIC BLOOD PRESSURE: 113 MMHG | DIASTOLIC BLOOD PRESSURE: 70 MMHG | OXYGEN SATURATION: 99 % | WEIGHT: 162 LBS | HEART RATE: 70 BPM

## 2023-01-30 DIAGNOSIS — Z95.5 HISTORY OF CORONARY ARTERY STENT PLACEMENT: ICD-10-CM

## 2023-01-30 DIAGNOSIS — R06.09 DOE (DYSPNEA ON EXERTION): ICD-10-CM

## 2023-01-30 DIAGNOSIS — Z98.890 H/O CARDIAC CATHETERIZATION: ICD-10-CM

## 2023-01-30 DIAGNOSIS — Z86.718 HISTORY OF DVT (DEEP VEIN THROMBOSIS): Primary | ICD-10-CM

## 2023-01-30 DIAGNOSIS — I82.5Y3 CHRONIC DEEP VEIN THROMBOSIS (DVT) OF PROXIMAL VEIN OF BOTH LOWER EXTREMITIES (H): ICD-10-CM

## 2023-01-30 DIAGNOSIS — Z95.1 S/P CABG X 3: ICD-10-CM

## 2023-01-30 DIAGNOSIS — I35.0 AORTIC STENOSIS, SEVERE: ICD-10-CM

## 2023-01-30 DIAGNOSIS — Z95.3 S/P TAVR (TRANSCATHETER AORTIC VALVE REPLACEMENT), BIOPROSTHETIC: Primary | ICD-10-CM

## 2023-01-30 DIAGNOSIS — Z86.718 PERSONAL HISTORY OF DVT (DEEP VEIN THROMBOSIS): ICD-10-CM

## 2023-01-30 LAB
GLUCOSE BLDC GLUCOMTR-MCNC: 104 MG/DL (ref 70–99)
GLUCOSE BLDC GLUCOMTR-MCNC: 106 MG/DL (ref 70–99)
INR BLD: 2.8 (ref 0.9–1.1)

## 2023-01-30 PROCEDURE — 99213 OFFICE O/P EST LOW 20 MIN: CPT | Performed by: INTERNAL MEDICINE

## 2023-01-30 PROCEDURE — G0463 HOSPITAL OUTPT CLINIC VISIT: HCPCS | Mod: 25

## 2023-01-30 PROCEDURE — 36416 COLLJ CAPILLARY BLOOD SPEC: CPT | Mod: ZL

## 2023-01-30 PROCEDURE — 85610 PROTHROMBIN TIME: CPT | Mod: ZL

## 2023-01-30 PROCEDURE — G0463 HOSPITAL OUTPT CLINIC VISIT: HCPCS

## 2023-01-30 PROCEDURE — 999N000109 HC STATISTIC OP CR VISIT

## 2023-01-30 PROCEDURE — 93798 PHYS/QHP OP CAR RHAB W/ECG: CPT | Mod: KX

## 2023-01-30 ASSESSMENT — PAIN SCALES - GENERAL: PAINLEVEL: NO PAIN (0)

## 2023-01-30 NOTE — PROGRESS NOTES
ANTICOAGULATION MANAGEMENT     Sidney Barriga 89 year old male is on warfarin with therapeutic INR result. (Goal INR 2.0-3.0)    Recent labs: (last 7 days)     01/30/23  1414   INR 2.8*       ASSESSMENT       Source(s): Chart Review and Patient/Caregiver Call       Warfarin doses taken: Warfarin taken as instructed    Diet: No new diet changes identified    New illness, injury, or hospitalization: No    Medication/supplement changes: None noted    Signs or symptoms of bleeding or clotting: No    Previous INR: Subtherapeutic    Additional findings: None       PLAN     Recommended plan for no diet, medication or health factor changes affecting INR     Dosing Instructions: Continue your current warfarin dose with next INR in 2 weeks       Summary  As of 1/30/2023    Full warfarin instructions:  2.5 mg every Wed, Sat; 5 mg all other days   Next INR check:  2/13/2023             Telephone call with Sidney who verbalizes understanding and agrees to plan    Lab visit scheduled    Education provided:     None required    Plan made per [facility] anticoagulation protocol    Lizbeth Ely, RN  Anticoagulation Clinic  1/30/2023    _______________________________________________________________________     Anticoagulation Episode Summary     Current INR goal:  2.0-3.0   TTR:  76.0 % (1 y)   Target end date:  Indefinite   Send INR reminders to:  [facility]    Indications    History of DVT (deep vein thrombosis)-multiple [Z86.718]  Chronic deep vein thrombosis (DVT) of proximal vein of both lower extremities (H) [I82.5Y3]  Personal history of DVT (deep vein thrombosis) [Z86.718]           Comments:           Anticoagulation Care Providers     Provider Role Specialty Phone number    Zach Arredondo MD Referring Family Medicine [phone]

## 2023-01-30 NOTE — PROGRESS NOTES
[facility] HEART CARE   CARDIOLOGY PROGRESS NOTE     Chief Complaint   Patient presents with     Follow Up          Diagnosis:  1.  RIVAS 2/2 CHF, pulm htn, mild COPD, severe diffusion defect on 11/18/20, and DVT/PE???  2.  CABGx3, 4/29/2014-LIMA to LAD, SVG to OM, and second OM.  3.  CKD-3.  4.  CHF-systolic, ischemic. 35-40% on 5/5/22. 30-35% on 12/9/20. Diastolic grade 1 on 11/1/2007. Unchanged on 2/11/21.  5.  TAVR on 12/6/22, [place]. 29 mm Patricio S3.  6.  MV and AV-moderate stenosis on cardiac cath on 1/15/21.   7.  H/O DVT with h/o IVC filter.  8.  Hypomagnesia.  9.  Hypertension-controlled.  10.  Hypertriglyceridemia-controlled.  11.  Tobacco abuse quitting in 1978.  12.  Cardiac cath x5 on 7/17/2007, 4/20//14, 2019, 1/15/21, and 11/30/22.  13.  Coronary stent placement on 7/17/2007 x2 stents to the LAD and in 2019 in [place] to unknown vessel.  14.  H/O sarcoidosis in 1982.  15.  DM-2-controlled.   16.  COPD-mild on 11/18/2020.  17.  WMA on 12/9/20.  18.  Right lung nodule x2 on 2/12/2021.  19.  Concern for TAAA at 4.3 cm on 5/15/2021.  20.  Concern for AAA r/o on ultrasound from 2/12/2021.  21.  Mild pulmonary hypertension on his cardiac cath at the [facility] on 1/15/2021.      Assessment/Plan:    1.  Severe aortic stenosis: Status post TAVR at [place].  Patient was following up locally but connected independently with [place].  Apparently, there is a lot of work-up that is in progress.  Patient describes ambulatory monitoring which he has not received the results.  They are concerned with heart block following device placement.  If needs pacemaker, states would have this placed at [place].  He has not been given the card on his device or other instructions.  He is undergoing a study.  There are many loose ends. He has not had an echocardiogram after the procedure.  At this point, he plans to follow-up with [place] as there are many loose ends.  Patient aware that he needs to take amoxicillin 2 g, 30 to 60  minutes prior to dental procedures and daily 81 million aspirin.  Also, it was suggested he have an echocardiogram following valve placement.    Interval history:  Duane had a cardiac catheterization completed at the [facility].  He had a cardiac catheterization on 1/15/2021. He did have coronary disease but nothing that would require stenting.  It appears he had appropriate blood flow to all vessels.  He has been followed for aortic stenosis.  He had an echocardiogram on 5/5/2022.  This showed moderate to severe aortic stenosis.  He was sent a letter.  He is to have repeat echocardiogram in the future.  However, he ended up at [place].  He is part of a research study.  He had TAVR at [place] on 12/6/2022.  He has not received any instructions afterwards.  He had a monitor that was placed as there is concern for heart block.  He has not received those results as well.  He has not had a follow-up echocardiogram or instructions afterward.  He has not been given a card describing his device.  He feels there is a lot of loose ends.  He feels communication was poor.  If he needs a pacemaker, he would have it at [place].  Because of his loose ends, he plans to follow-up with [place].      HPI:    Mr. Barriga is being seen by cardiology to establish care.  He has a history of coronary disease with both stenting and CABG x3.  His wife passed away on 9/1/2020, suddenly from cancer.  He does not plan to go back to [place] and is establishing care locally.     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     Sidney is here to establish care.  He has a history of CAD with stenting and bypass. He  previously obtained his cardiac care through [place], [place].  He states that dating back in 1982, he was diagnosed with sarcoidosis.  More recently, he had a cardiac catheterization on 7/17/2007 with history of MI with stenting x2 placed to the p-mLAD.  He went on to have CABG x3 on 4/29/2014 in [place].  He had LIMA to LAD, SVG to OM1 and OM 2.  At that time, his EF was reported to be 30% with ischemic cardiomyopathy.  More recently, he reports having had a cardiac catheterization once again in [place] with stenting to unknown vessels in 2019.  His symptoms in the past have been exertional dyspnea with intrascapular discomfort.  He states he has been having similar symptoms for the last 6 months but to a lesser degree.  Risk factors include history of tobacco abuse at approximately a half a pack a day until 1978, hypertension, hyperlipidemia, DM-2, family history, and diet.     He reportedly has a history of ischemic cardiomyopathy.  EF on 8/12/2010 was 40%.  Recently, while in [place] at time of bypass on 4/29/2014, his EF was 30%.  His echo on 11/1/2017 showed an EF of 40-45% with grade 1 diastolic dysfunction.  There was evidence for wall motion normalities as well as mild left atrial enlargement.  He has not had any swelling to his legs, presently on Lasix 20 mg daily.  He states he does not have issues with fluid overload.     He has been on Coumadin for extended period time.  He describes having had multiple recurrent DVT's with a IVC filter in place.  He is currently on Coumadin with management through the Coumadin clinic.     He has a history of hyperlipidemia.  His most recent lipid panel was on 10/20/2020 showing a total cholesterol of 248 and LDL of 164.  HDL was 46.  He has not been able to tolerate Lipitor or Crestor in the past.  He has been on Zetia.  On 12/7/2020,  he was started on Crestor 20 mg twice a week.  If he is able to tolerate this dosage, would increase as able. He states he has  "been on \"every statin with issues with all of them\".     Patient is known to have mild COPD with history of tobacco abuse.  PFT's on 11/18/2020 support mild COPD with severe diffusion defect.      Relevant testing:  Cardiac cath on 11/30/22:  The left main coronary artery is 30% obstructed by multiple discrete lesions.   The proximal left anterior descending artery is 100% obstructed by a discrete lesion. The distal segment is not visible.   The proximal circumflex artery is 40% obstructed by multiple discrete lesions. The distal segment is normal size.   The ramus intermedius segment is 100% obstructed by a discrete lesion. Fills retrogradely from graft.   The proximal right coronary artery is 50% obstructed by a discrete lesion. The distal segment is normal size.   The distal right coronary artery is 30% obstructed by multiple discrete lesions. The distal segment is normal size.   GRAFT ANGIOGRAPHY SUMMARY   Single body vein bypass graft to the Ramus Intermedius Segment. Graft Appears normal.   Single body left internal mammary graft to the Middle Left Anterior Descending Artery.   GRAFT ANGIOGRAPHY SUMMARY   Single body vein bypass graft to the Ramus Intermedius Segment. Graft Appears normal.   Single body left internal mammary graft to the Middle Left Anterior Descending Artery.        ECHO on 5/5/22:  The visual ejection fraction is 35-40%. Moderate diffuse hypokinesis is present.  Right ventricular function, chamber size, wall motion, and thickness are  normal.  The calculated aortic valve area is 0.94 cm2.The mean gradient across the AV is 22mmHg, the peak velocity is 36 mmHg.  Mild mitral insufficiency is present. Mild tricuspid insufficiency is present.  The inferior vena cava cannot be assessed.   Ascending aorta 4.2 cm.  No pericardial effusion is present.    Stress test on 4/26/22:  There is a large area of a severe   degree of infarction in the anterior, apical, inferior and septal   segment(s) of the " left ventricle. The left ventricular ejection fraction   at rest is 37%.  The left ventricular ejection fraction at stress is 38%.       [place]io on 3/1/21:  Underlying rhythm was sinus.  Hrt rate ranged from 50 bpm, maximum heart rate of 197 bmp, averaging 72 bmp.  No significant bradycardia, pauses, 2nd degree Mobitz type II or 3rd degree heart block.  No atrial fibrillation was identified on this study.  x0 triggered events and x0 diary entries.  x18 runs of VT lasting to 11 beats with a maximum heart rate of 197 bmp.    x25 runs of SVT lasting up to 15 beats with a maximum heart rate of 156 bmp.  Rare, less than 1% of PAC's, atrial couplets, atrial triplets, ventricular couplets and ventricular triplets.  Frequent PVC's at 15.9%.  + episodes of ventricular bigeminy lasting up to 18 min's and 9 sec's.  + episodes of ventricular trigeminy lasting up to 30 mins and 5 sec's.    Stress echo at [place] on 5/25/2021:  1. AT REST:   2. Estimated left ventricular ejection fraction 25%   3. Stroke volume index 45 ml/m^2, aortic valve mean systolic gradient 20 mm Hg, aortic valve   area 1.16 cm^2, aortic valve area index 0.62 cm^2/m^2   4. STRESS TEST:   5. Dobutamine was infused from 5 mcg/kg/min to 20.0 mcg/kg/min.   6. PEAK STRESS:   7. Estimated left ventricular ejection fraction range 30% - 35%.   8. Stroke volume index 55 ml/m^2, aortic valve mean systolic gradient 35 mm Hg, aortic valve  area 1.36 cm^2, aortic valve area index 0.73 cm^2/m^2       US aorta on 2/11/21:  No abdominal aortic aneurysm.    US carotids on 2/11/21:  No hemodynamically significant stenoses are identified in either carotid bifurcation.    ECHO on 2/11/21:  No pericardial effusion is present.  Moderately (EF 30-35%) reduced left ventricular function is present.  The right ventricle is normal size.  Global right ventricular function is normal.  Mild left atrial enlargement is present.  Mild mitral insufficiency is present.  Severe aortic valve  calcification is present.  The peak aortic velocity is 3.02 m/sec.  The mean gradient across the aortic valve is 18.4 mmHg.  Moderate aortic stenosis is present.  Right ventricular systolic pressure is 44 mmHg above the right atrial pressure.  Mild tricuspid insufficiency is present.  Mild pulmonic insufficiency is present.  Ascending aorta 4.1 cm.  Sinuses of Valsalva 4.3 cm.    CTA chest on 2/11/21:  2 small nodules are identified in the right lung.  Follow-up CT scan in 6 months time is recommended. The ascending aorta measures 4.2 cm in maximal transverse diameter.    Cardiac cath on 1/15/21:    Right sided filling pressures are normal.    Mild elevated pulmonary hypertension.    Left sided filling pressures are moderately elevated.    Left ventricular filling pressures are moderately elevated.    Normal cardiac output level.    Moderate mitral valve stenosis (mean gradient 8.4 mm Hg, 2.2 cm2)    Moderate aortic valve stenosis (mean gradient 17 mm Hg, 1.2 cm2)    Native three vessel CAD    >>> Moderate 50% proximal RCA stenosis [ungrafted vessel]    >>> Mimimal LMCA stenosis    >>> 100% Ostial LAD stenosis [LIMA-LAD widely patent]    >>> Proximal 50% LCx and 90% RI stenoses [Sequential SVG-RI-OM patent with focal 60% ISR in jump segment of SVG]     Suggest medical treatment at this time.  Neither the aortic valve nor mitral valve require intervention at this time.  The proximal RCA stenosis is unlikely be physiologically significant. The LAD territory is infarcted but the LIMA-LAD remains widely patent.  There is 70% ISR of SVG in jump segment but the OM is receiving brisk DAVID-3 flow via the native coronary (only 50% proximal stenosis).    Cath on 7/17/2007:  1. Severe, single-vessel coronary artery disease. Chronic proximal-to-mild occlusion of the left anterior descending coronary artery with distal right-to-left collaterals.  2. Cypher drug-eluting stenting of the [id number] segment of the left anterior  descending.    Stress test on 12/15/20:     The nuclear stress test is abnormal.     There is a large area of a severe degree of infarction in the entire apical and anteroseptal segment(s) of the left ventricle.     Left ventricular function is moderately reduced.     The left ventricular ejection fraction at rest is 44%.  The left ventricular ejection fraction at stress is 35%.     There is no prior study for comparison.    ECHO on 12/9/20:  No pericardial effusion is present.  Left ventricular size is normal.  Anterior wall hypokinesis is present.  Apical wall hypokinesis is present.  Moderately (EF 30-35%) reduced left ventricular function is present.  The right ventricle is normal size.  Global right ventricular function is normal.  Mild mitral annular calcification is present.  Mild mitral insufficiency is present.  Moderate aortic valve calcification is present.  Trace aortic insufficiency is present.  The Aortic valve has significant calcification of valve leaflets with poor  mobility. Our measured values I feel underestimate the amount of stenosis. By  visual estimate would put at least moderate Aortic stenosis.  Trace tricuspid insufficiency is present.  Right ventricular systolic pressure is 6 mmHg above the right atrial pressure.  The peak aortic velocity is 2.57 m/sec.  The mean gradient across the aortic valve is 15.5 mmHg.        ICD-10-CM    1. S/p TAVR (transcatheter aortic valve replacement), bioprosthetic on 12/6/2022 with a 29 mm IMAN 3 valve  Z95.3       2. H/O aortic stenosis, severe  I35.0       3. RIVAS (dyspnea on exertion)  R06.09       4. H/O cardiac catheterization x3 on 7/17/2007, 4/20/2014, and 2019  Z98.890       5. History of coronary artery stent placement on 7/17/2007 x2 stents to the LAD and in 2019 in [place] to unknown vessel  Z95.5       6. 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, SVG to first OM and second OM  Z95.1           Past Medical History:   Diagnosis Date     Acute  thromboembolism of deep veins of lower extremity (H)     No Comments Provided     Benign essential hypertension     No Comments Provided     Cancer (H)     skin     Congestive heart failure (H)      COPD (chronic obstructive pulmonary disease) (H)      Coronary atherosclerosis     No Comments Provided     Osteoarthrosis     No Comments Provided     Other and unspecified hyperlipidemia     No Comments Provided     Other specified forms of chronic ischemic heart disease     No Comments Provided     Sarcoidosis     No Comments Provided     Type 2 or unspecified type diabetes mellitus     No Comments Provided       Past Surgical History:   Procedure Laterality Date     ANGIOPLASTY  01/01/2019    stents     CARDIAC SURGERY  01/01/2014    heart bypass     COLONOSCOPY  01/01/2019     CV CORONARY ANGIOGRAM N/A 1/15/2021    Procedure: CV CORONARY ANGIOGRAM;  Surgeon: Charlie Harris MD;  Location:  HEART CARDIAC CATH LAB     CV LEFT HEART CATH N/A 1/15/2021    Procedure: Left Heart Cath;  Surgeon: Charlie Harris MD;  Location:  HEART CARDIAC CATH LAB     CV RIGHT HEART CATH MEASUREMENTS RECORDED N/A 1/15/2021    Procedure: CV RIGHT HEART CATH;  Surgeon: Charlie Harris MD;  Location:  HEART CARDIAC CATH LAB     OTHER SURGICAL HISTORY      [id number],BIOPSY LUNG OR [facility]ICAL [facility]ING       Allergies   Allergen Reactions     Simvastatin Itching     Isosorbide Visual Disturbance     LIGHT HEADED,ALMOST PASSED OUT         Current Outpatient Medications   Medication Sig Dispense Refill     albuterol (PROAIR HFA/PROVENTIL HFA/VENTOLIN HFA) 108 (90 Base) MCG/ACT inhaler Inhale 1-2 puffs into the lungs every 4 hours as needed for shortness of breath / dyspnea or wheezing 18 g 3     amoxicillin (AMOXIL) 500 MG capsule Take 2,000 mg by mouth       blood glucose (NO BRAND SPECIFIED) lancets standard Use to test blood sugar 1 times daily or as directed. 1 each 3     blood glucose (NO BRAND SPECIFIED) test strip  Use to test blood sugar 1 times daily or as directed. 90 strip 3     blood glucose monitoring (NO BRAND SPECIFIED) meter device kit Use to test blood sugar 1 times daily or as directed. 1 kit 0     Calcium Carbonate-Vit D-Min (CALCIUM 600+D PLUS MINERALS) 600-400 MG-UNIT TABS Take 1 tablet by mouth       carvedilol (COREG) 25 MG tablet Take 1 tablet (25 mg) by mouth 2 times daily (with meals) 180 tablet 3     CINNAMON PO Take 500 mg by mouth daily        clopidogrel (PLAVIX) 75 MG tablet Take 1 tablet (75 mg) by mouth daily 90 tablet 3     Coenzyme Q10 (CO Q-10) 200 MG CAPS Take 1 capsule by mouth daily       cyanocobalamin (VITAMIN B-12) 500 MCG SUBL sublingual tablet Place 500 mcg under the tongue daily       ezetimibe (ZETIA) 10 MG tablet Take 1 tablet (10 mg) by mouth daily 90 tablet 3     furosemide (LASIX) 40 MG tablet Take 1 tablet by mouth once daily (Patient taking differently: Take 20 mg by mouth daily) 90 tablet 3     lisinopril (ZESTRIL) 10 MG tablet Take 1 tablet by mouth once daily 90 tablet 3     LUTEIN PO Take 1 tablet by mouth daily       nitroGLYcerin (NITROSTAT) 0.4 MG sublingual tablet For chest pain place 1 tablet under the tongue every 5 minutes for 3 doses. If symptoms persist 5 minutes after 1st dose call 911. 25 tablet 3     Omega-3 Fatty Acids (FISH OIL) 1200 MG capsule Take 1 capsule by mouth daily       predniSONE (DELTASONE) 10 MG tablet Take 1 tablet (10 mg) by mouth daily 6 tablet 0     rosuvastatin (CRESTOR) 20 MG tablet Take 1 tablet (20 mg) by mouth twice a week 90 tablet 3     vitamin C (ASCORBIC ACID) 500 MG tablet Take 1,000 mg by mouth daily       Vitamin D3 (CHOLECALCIFEROL) 125 MCG (5000 UT) tablet Take by mouth daily       warfarin ANTICOAGULANT (COUMADIN) 5 MG tablet Take 1 tablet (5 mg) by mouth daily (Patient taking differently: Take 5 mg by mouth daily 2.5 mg on Wednesday and Saturday) 90 tablet 3       Social History     Socioeconomic History     Marital status:       Spouse name: Not on file     Number of children: Not on file     Years of education: Not on file     Highest education level: Not on file   Occupational History     Not on file   Tobacco Use     Smoking status: Former     Packs/day: 0.50     Years: 24.00     Pack years: 12.00     Types: Cigarettes     Start date: 1954     Quit date: 1978     Years since quittin.1     Smokeless tobacco: Never   Substance and Sexual Activity     Alcohol use: Yes     Alcohol/week: 5.0 standard drinks     Types: 3 Cans of beer, 2 Shots of liquor per week     Drug use: Unknown     Types: Other     Comment: Drug use: No     Sexual activity: Not on file   Other Topics Concern     Parent/sibling w/ CABG, MI or angioplasty before 65F 55M? Not Asked   Social History Narrative     Not on file     Social Determinants of Health     Financial Resource Strain: Not on file   Food Insecurity: Not on file   Transportation Needs: Not on file   Physical Activity: Not on file   Stress: Not on file   Social Connections: Not on file   Intimate Partner Violence: Not on file   Housing Stability: Not on file       LAB RESULTS:   Orders Only on 2021   Component Date Value Ref Range Status     Sodium 2021 141  133 - 144 mmol/L Final     Potassium 2021 4.7  3.4 - 5.3 mmol/L Final     Chloride 2021 107  94 - 109 mmol/L Final     Carbon Dioxide 2021 29  20 - 32 mmol/L Final     Anion Gap 2021 5  3 - 14 mmol/L Final     Glucose 2021 125* 70 - 99 mg/dL Final     Urea Nitrogen 2021 26  7 - 30 mg/dL Final     Creatinine 2021 1.40* 0.66 - 1.25 mg/dL Final     GFR Estimate 2021 44* >60 mL/min/[1.73_m2] Final     GFR Estimate If Black 2021 51* >60 mL/min/[1.73_m2] Final     Calcium 2021 9.9  8.5 - 10.1 mg/dL Final     WBC 2021 6.7  4.0 - 11.0 10e9/L Final     RBC Count 2021 3.63* 4.4 - 5.9 10e12/L Final     Hemoglobin 2021 11.3* 13.3 - 17.7 g/dL Final      Hematocrit 01/21/2021 34.9* 40.0 - 53.0 % Final     MCV 01/21/2021 96  78 - 100 fl Final     MCH 01/21/2021 31.1  26.5 - 33.0 pg Final     MCHC 01/21/2021 32.4  31.5 - 36.5 g/dL Final     RDW 01/21/2021 12.2  10.0 - 15.0 % Final     Platelet Count 01/21/2021 163  150 - 450 10e9/L Final     INR Point of Care 01/21/2021 2.9* 0.86 - 1.14 Final     Capillary Blood Collection 01/21/2021 Capillary collection performed   Final   Office Visit on 01/12/2021   Component Date Value Ref Range Status     WBC 01/12/2021 6.4  4.0 - 11.0 10e9/L Final     RBC Count 01/12/2021 3.72* 4.4 - 5.9 10e12/L Final     Hemoglobin 01/12/2021 11.7* 13.3 - 17.7 g/dL Final     Hematocrit 01/12/2021 35.3* 40.0 - 53.0 % Final     MCV 01/12/2021 95  78 - 100 fl Final     MCH 01/12/2021 31.5  26.5 - 33.0 pg Final     MCHC 01/12/2021 33.1  31.5 - 36.5 g/dL Final     RDW 01/12/2021 12.2  10.0 - 15.0 % Final     Platelet Count 01/12/2021 162  150 - 450 10e9/L Final     % Neutrophils 01/12/2021 62.1  % Final     % Lymphocytes 01/12/2021 19.7  % Final     % Monocytes 01/12/2021 13.5  % Final     % Eosinophils 01/12/2021 4.2  % Final     % Basophils 01/12/2021 0.5  % Final     Absolute Neutrophil 01/12/2021 4.0  1.6 - 8.3 10e9/L Final     Absolute Lymphocytes 01/12/2021 1.3  0.8 - 5.3 10e9/L Final     Absolute Monocytes 01/12/2021 0.9  0.0 - 1.3 10e9/L Final     Absolute Eosinophils 01/12/2021 0.3  0.0 - 0.7 10e9/L Final     Absolute Basophils 01/12/2021 0.0  0.0 - 0.2 10e9/L Final     Diff Method 01/12/2021 Automated Method   Final     Sodium 01/12/2021 142  133 - 144 mmol/L Final     Potassium 01/12/2021 4.4  3.4 - 5.3 mmol/L Final     Chloride 01/12/2021 109  94 - 109 mmol/L Final     Carbon Dioxide 01/12/2021 29  20 - 32 mmol/L Final     Anion Gap 01/12/2021 4  3 - 14 mmol/L Final     Glucose 01/12/2021 100* 70 - 99 mg/dL Final     Urea Nitrogen 01/12/2021 28  7 - 30 mg/dL Final     Creatinine 01/12/2021 1.14  0.66 - 1.25 mg/dL Final     GFR Estimate  01/12/2021 57* >60 mL/min/[1.73_m2] Final     GFR Estimate If Black 01/12/2021 66  >60 mL/min/[1.73_m2] Final     Calcium 01/12/2021 9.6  8.5 - 10.1 mg/dL Final     INR Point of Care 01/12/2021 3.8* 0.86 - 1.14 Final   Office Visit on 01/11/2021   Component Date Value Ref Range Status     COVID-19 Virus PCR to [facility] - So* 01/11/2021 Nasopharyngeal   Final     COVID-19 Virus PCR to [facility] - Re* 01/11/2021 Not Detected   Final   Orders Only on 12/01/2020   Component Date Value Ref Range Status     INR 12/01/2020 2.65* 0.86 - 1.14 Final   Orders Only on 12/01/2020   Component Date Value Ref Range Status     WBC 12/01/2020 5.5  4.0 - 11.0 10e9/L Final     RBC Count 12/01/2020 3.98* 4.4 - 5.9 10e12/L Final     Hemoglobin 12/01/2020 12.5* 13.3 - 17.7 g/dL Final     Hematocrit 12/01/2020 38.4* 40.0 - 53.0 % Final     MCV 12/01/2020 97  78 - 100 fl Final     MCH 12/01/2020 31.4  26.5 - 33.0 pg Final     MCHC 12/01/2020 32.6  31.5 - 36.5 g/dL Final     RDW 12/01/2020 12.4  10.0 - 15.0 % Final     Platelet Count 12/01/2020 141* 150 - 450 10e9/L Final     Diff Method 12/01/2020 Automated Method   Final     % Neutrophils 12/01/2020 63.5  % Final     % Lymphocytes 12/01/2020 21.9  % Final     % Monocytes 12/01/2020 12.2  % Final     % Eosinophils 12/01/2020 1.8  % Final     % Basophils 12/01/2020 0.4  % Final     % Immature Granulocytes 12/01/2020 0.2  % Final     Nucleated RBCs 12/01/2020 0  0 /100 Final     Absolute Neutrophil 12/01/2020 3.5  1.6 - 8.3 10e9/L Final     Absolute Lymphocytes 12/01/2020 1.2  0.8 - 5.3 10e9/L Final     Absolute Monocytes 12/01/2020 0.7  0.0 - 1.3 10e9/L Final     Absolute Eosinophils 12/01/2020 0.1  0.0 - 0.7 10e9/L Final     Absolute Basophils 12/01/2020 0.0  0.0 - 0.2 10e9/L Final     Abs Immature Granulocytes 12/01/2020 0.0  0 - 0.4 10e9/L Final     Absolute Nucleated RBC 12/01/2020 0.0   Final        Review of systems: Negative except that which was noted in the HPI.    Physical  "examination:  /70   Pulse 70   Temp 97.4  F (36.3  C) (Tympanic)   Ht 1.778 m (5' 10\")   Wt 73.5 kg (162 lb)   SpO2 99%   BMI 23.24 kg/m      GENERAL APPEARANCE: healthy, alert and no distress  HEENT: no icterus, no xanthelasmas, normal pupil size and reaction, no cyanosis.  NECK: no adenopathy, no asymmetry, masses.  CHEST: lungs clear to auscultation - no rales, rhonchi or wheezes, no use of accessory muscles, no retractions, respirations are unlabored, normal respiratory rate  CARDIOVASCULAR: regular rhythm, normal S1 with physiologic split S2, no S3 or S4 but with systolic crescendo decrescendo murmur, no click or rub  ABDOMEN: soft, non tender, bowel sounds normal  EXTREMITIES: no clubbing, cyanosis or edema  NEURO: alert and oriented normal speech, and affect  VASC: No vascular bruits heard.  SKIN: no ecchymoses, no rashes.    Total time spent on day of visit, including review of tests, obtaining/reviewing separately obtained history, ordering medications/tests/procedures, communicating with PCP/consultants, and documenting in electronic medical record: 20 minutes.           Thank you for allowing me to participate in the care of your patient. Please do not hesitate to contact me if you have any questions.     Justyn Rae, DO              "

## 2023-01-30 NOTE — PATIENT INSTRUCTIONS
Dr. Geiger will not complete this form. Will send to Dr. Brody to complete   Thank you for allowing Dr. Rae and our  team to participate in your care. Please call our office at [phone] with scheduling questions or if you need to cancel or change your appointment. With any other questions or concerns you may call [facility] cardiology nurse at [phone].       If you experience chest pain, chest pressure, chest tightness, shortness of breath, fainting, lightheadedness, nausea, vomiting, or other concerning symptoms, please report to the Emergency Department or call 911. These symptoms may be emergent, and best treated in the Emergency Department.    Follow as needed.            240

## 2023-02-03 ENCOUNTER — HOSPITAL ENCOUNTER (OUTPATIENT)
Dept: CARDIAC REHAB | Facility: HOSPITAL | Age: 88
Setting detail: THERAPIES SERIES
Discharge: HOME | End: 2023-02-03
Attending: FAMILY MEDICINE
Payer: MEDICARE

## 2023-02-03 LAB
GLUCOSE BLDC GLUCOMTR-MCNC: 123 MG/DL (ref 70–99)
GLUCOSE BLDC GLUCOMTR-MCNC: 96 MG/DL (ref 70–99)

## 2023-02-03 PROCEDURE — 999N000109 HC STATISTIC OP CR VISIT

## 2023-02-03 PROCEDURE — 93798 PHYS/QHP OP CAR RHAB W/ECG: CPT

## 2023-02-06 ENCOUNTER — HOSPITAL ENCOUNTER (OUTPATIENT)
Dept: CARDIAC REHAB | Facility: HOSPITAL | Age: 88
Setting detail: THERAPIES SERIES
Discharge: HOME | End: 2023-02-06
Attending: FAMILY MEDICINE
Payer: MEDICARE

## 2023-02-06 LAB
GLUCOSE BLDC GLUCOMTR-MCNC: 102 MG/DL (ref 70–99)
GLUCOSE BLDC GLUCOMTR-MCNC: 127 MG/DL (ref 70–99)

## 2023-02-06 PROCEDURE — 999N000109 HC STATISTIC OP CR VISIT

## 2023-02-06 PROCEDURE — 93798 PHYS/QHP OP CAR RHAB W/ECG: CPT | Mod: KX

## 2023-02-10 ENCOUNTER — HOSPITAL ENCOUNTER (OUTPATIENT)
Dept: CARDIAC REHAB | Facility: HOSPITAL | Age: 88
Setting detail: THERAPIES SERIES
Discharge: HOME | End: 2023-02-10
Attending: FAMILY MEDICINE
Payer: MEDICARE

## 2023-02-10 LAB
GLUCOSE BLDC GLUCOMTR-MCNC: 102 MG/DL (ref 70–99)
GLUCOSE BLDC GLUCOMTR-MCNC: 155 MG/DL (ref 70–99)

## 2023-02-10 PROCEDURE — 93798 PHYS/QHP OP CAR RHAB W/ECG: CPT | Mod: KX

## 2023-02-10 PROCEDURE — 999N000109 HC STATISTIC OP CR VISIT

## 2023-02-13 ENCOUNTER — HOSPITAL ENCOUNTER (OUTPATIENT)
Dept: CARDIAC REHAB | Facility: HOSPITAL | Age: 88
Setting detail: THERAPIES SERIES
Discharge: HOME | End: 2023-02-13
Attending: FAMILY MEDICINE
Payer: MEDICARE

## 2023-02-13 ENCOUNTER — ANTICOAGULATION THERAPY VISIT (OUTPATIENT)
Dept: ANTICOAGULATION | Facility: OTHER | Age: 88
End: 2023-02-13
Attending: FAMILY MEDICINE
Payer: MEDICARE

## 2023-02-13 ENCOUNTER — LAB (OUTPATIENT)
Dept: LAB | Facility: OTHER | Age: 88
End: 2023-02-13
Payer: MEDICARE

## 2023-02-13 DIAGNOSIS — I82.5Y3 CHRONIC DEEP VEIN THROMBOSIS (DVT) OF PROXIMAL VEIN OF BOTH LOWER EXTREMITIES (H): ICD-10-CM

## 2023-02-13 DIAGNOSIS — Z86.718 HISTORY OF DVT (DEEP VEIN THROMBOSIS): Primary | ICD-10-CM

## 2023-02-13 DIAGNOSIS — Z86.718 PERSONAL HISTORY OF DVT (DEEP VEIN THROMBOSIS): ICD-10-CM

## 2023-02-13 LAB
GLUCOSE BLDC GLUCOMTR-MCNC: 123 MG/DL (ref 70–99)
GLUCOSE BLDC GLUCOMTR-MCNC: 127 MG/DL (ref 70–99)
INR BLD: 2.5 (ref 0.9–1.1)

## 2023-02-13 PROCEDURE — 85610 PROTHROMBIN TIME: CPT | Mod: ZL

## 2023-02-13 PROCEDURE — 93798 PHYS/QHP OP CAR RHAB W/ECG: CPT

## 2023-02-13 PROCEDURE — 36415 COLL VENOUS BLD VENIPUNCTURE: CPT | Mod: ZL

## 2023-02-13 PROCEDURE — 999N000109 HC STATISTIC OP CR VISIT

## 2023-02-13 NOTE — PROGRESS NOTES
ANTICOAGULATION MANAGEMENT     Sidney Barriga 89 year old male is on warfarin with therapeutic INR result. (Goal INR 2.0-3.0)    Recent labs: (last 7 days)     02/13/23  1407   INR 2.5*       ASSESSMENT       Source(s): Chart Review and Patient/Caregiver Call       Warfarin doses taken: Warfarin taken as instructed    Diet: No new diet changes identified    New illness, injury, or hospitalization: No    Medication/supplement changes: None noted    Signs or symptoms of bleeding or clotting: No    Previous INR: Therapeutic last visit; previously outside of goal range    Additional findings: None       PLAN     Recommended plan for no diet, medication or health factor changes affecting INR     Dosing Instructions: Continue your current warfarin dose with next INR in 4 weeks       Summary  As of 2/13/2023    Full warfarin instructions:  2.5 mg every Wed, Sat; 5 mg all other days   Next INR check:  3/13/2023             Telephone call with Sidney who verbalizes understanding and agrees to plan    Lab visit scheduled    Education provided:     None required    Plan made per [facility] anticoagulation protocol    Lizbeth Ely RN  Anticoagulation Clinic  2/13/2023    _______________________________________________________________________     Anticoagulation Episode Summary     Current INR goal:  2.0-3.0   TTR:  76.0 % (1 y)   Target end date:  Indefinite   Send INR reminders to:  [facility]    Indications    History of DVT (deep vein thrombosis)-multiple [Z86.718]  Chronic deep vein thrombosis (DVT) of proximal vein of both lower extremities (H) [I82.5Y3]  Personal history of DVT (deep vein thrombosis) [Z86.718]           Comments:           Anticoagulation Care Providers     Provider Role Specialty Phone number    Zach Arredondo MD Referring Family Medicine [phone]

## 2023-02-17 ENCOUNTER — HOSPITAL ENCOUNTER (OUTPATIENT)
Dept: CARDIAC REHAB | Facility: HOSPITAL | Age: 88
Setting detail: THERAPIES SERIES
Discharge: HOME | End: 2023-02-17
Attending: FAMILY MEDICINE
Payer: MEDICARE

## 2023-02-17 LAB
GLUCOSE BLDC GLUCOMTR-MCNC: 112 MG/DL (ref 70–99)
GLUCOSE BLDC GLUCOMTR-MCNC: 159 MG/DL (ref 70–99)

## 2023-02-17 PROCEDURE — 93798 PHYS/QHP OP CAR RHAB W/ECG: CPT | Mod: KX

## 2023-02-17 PROCEDURE — 999N000109 HC STATISTIC OP CR VISIT

## 2023-02-24 ENCOUNTER — HOSPITAL ENCOUNTER (OUTPATIENT)
Dept: CARDIAC REHAB | Facility: HOSPITAL | Age: 88
Setting detail: THERAPIES SERIES
Discharge: HOME | End: 2023-02-24
Attending: FAMILY MEDICINE
Payer: MEDICARE

## 2023-02-24 LAB
GLUCOSE BLDC GLUCOMTR-MCNC: 104 MG/DL (ref 70–99)
GLUCOSE BLDC GLUCOMTR-MCNC: 154 MG/DL (ref 70–99)

## 2023-02-24 PROCEDURE — 93798 PHYS/QHP OP CAR RHAB W/ECG: CPT | Mod: KX

## 2023-02-24 PROCEDURE — 999N000109 HC STATISTIC OP CR VISIT: Mod: KX

## 2023-02-27 ENCOUNTER — HOSPITAL ENCOUNTER (OUTPATIENT)
Dept: CARDIAC REHAB | Facility: HOSPITAL | Age: 88
Setting detail: THERAPIES SERIES
Discharge: HOME | End: 2023-02-27
Attending: FAMILY MEDICINE
Payer: MEDICARE

## 2023-02-27 LAB
GLUCOSE BLDC GLUCOMTR-MCNC: 103 MG/DL (ref 70–99)
GLUCOSE BLDC GLUCOMTR-MCNC: 142 MG/DL (ref 70–99)

## 2023-02-27 PROCEDURE — 999N000109 HC STATISTIC OP CR VISIT

## 2023-02-27 PROCEDURE — 93798 PHYS/QHP OP CAR RHAB W/ECG: CPT | Mod: KX

## 2023-03-03 ENCOUNTER — HOSPITAL ENCOUNTER (OUTPATIENT)
Dept: CARDIAC REHAB | Facility: HOSPITAL | Age: 88
Setting detail: THERAPIES SERIES
Discharge: HOME | End: 2023-03-03
Attending: FAMILY MEDICINE
Payer: MEDICARE

## 2023-03-03 LAB
GLUCOSE BLDC GLUCOMTR-MCNC: 114 MG/DL (ref 70–99)
GLUCOSE BLDC GLUCOMTR-MCNC: 148 MG/DL (ref 70–99)

## 2023-03-03 PROCEDURE — 93798 PHYS/QHP OP CAR RHAB W/ECG: CPT | Mod: KX

## 2023-03-03 PROCEDURE — 999N000109 HC STATISTIC OP CR VISIT: Mod: KX

## 2023-03-06 ENCOUNTER — OFFICE VISIT (OUTPATIENT)
Dept: FAMILY MEDICINE | Facility: OTHER | Age: 88
End: 2023-03-06
Attending: FAMILY MEDICINE
Payer: MEDICARE

## 2023-03-06 ENCOUNTER — HOSPITAL ENCOUNTER (OUTPATIENT)
Dept: CARDIAC REHAB | Facility: HOSPITAL | Age: 88
Setting detail: THERAPIES SERIES
Discharge: HOME | End: 2023-03-06
Attending: FAMILY MEDICINE
Payer: MEDICARE

## 2023-03-06 VITALS
OXYGEN SATURATION: 100 % | DIASTOLIC BLOOD PRESSURE: 52 MMHG | SYSTOLIC BLOOD PRESSURE: 106 MMHG | HEART RATE: 76 BPM | WEIGHT: 168 LBS | BODY MASS INDEX: 24.11 KG/M2

## 2023-03-06 DIAGNOSIS — I35.0 AORTIC STENOSIS, SEVERE: ICD-10-CM

## 2023-03-06 DIAGNOSIS — Z95.3 S/P TAVR (TRANSCATHETER AORTIC VALVE REPLACEMENT), BIOPROSTHETIC: ICD-10-CM

## 2023-03-06 DIAGNOSIS — E11.65 TYPE 2 DIABETES MELLITUS WITH HYPERGLYCEMIA, WITHOUT LONG-TERM CURRENT USE OF INSULIN (H): Primary | ICD-10-CM

## 2023-03-06 DIAGNOSIS — I10 ESSENTIAL HYPERTENSION, BENIGN: ICD-10-CM

## 2023-03-06 DIAGNOSIS — N18.4 STAGE 4 CHRONIC KIDNEY DISEASE (H): ICD-10-CM

## 2023-03-06 DIAGNOSIS — I25.810 ATHEROSCLEROSIS OF CORONARY ARTERY BYPASS GRAFT OF NATIVE HEART WITHOUT ANGINA PECTORIS: ICD-10-CM

## 2023-03-06 LAB
ALBUMIN SERPL BCG-MCNC: 3.9 G/DL (ref 3.5–5.2)
ALP SERPL-CCNC: 55 U/L (ref 40–129)
ALT SERPL W P-5'-P-CCNC: 11 U/L (ref 10–50)
ANION GAP SERPL CALCULATED.3IONS-SCNC: 12 MMOL/L (ref 7–15)
AST SERPL W P-5'-P-CCNC: 19 U/L (ref 10–50)
BILIRUB SERPL-MCNC: 0.3 MG/DL
BUN SERPL-MCNC: 40.9 MG/DL (ref 8–23)
CALCIUM SERPL-MCNC: 9.9 MG/DL (ref 8.2–9.6)
CHLORIDE SERPL-SCNC: 105 MMOL/L (ref 98–107)
CHOLEST SERPL-MCNC: 202 MG/DL
CREAT SERPL-MCNC: 1.69 MG/DL (ref 0.67–1.17)
CREAT UR-MCNC: 111.3 MG/DL
DEPRECATED HCO3 PLAS-SCNC: 25 MMOL/L (ref 22–29)
EST. AVERAGE GLUCOSE BLD GHB EST-MCNC: 123 MG/DL
GFR SERPL CREATININE-BSD FRML MDRD: 38 ML/MIN/1.73M2
GLUCOSE BLDC GLUCOMTR-MCNC: 118 MG/DL (ref 70–99)
GLUCOSE BLDC GLUCOMTR-MCNC: 129 MG/DL (ref 70–99)
GLUCOSE SERPL-MCNC: 179 MG/DL (ref 70–99)
HBA1C MFR BLD: 5.9 %
HDLC SERPL-MCNC: 34 MG/DL
LDLC SERPL CALC-MCNC: 139 MG/DL
MICROALBUMIN UR-MCNC: <12 MG/L
MICROALBUMIN/CREAT UR: NORMAL MG/G{CREAT}
NONHDLC SERPL-MCNC: 168 MG/DL
POTASSIUM SERPL-SCNC: 4.5 MMOL/L (ref 3.4–5.3)
PROT SERPL-MCNC: 7.1 G/DL (ref 6.4–8.3)
SODIUM SERPL-SCNC: 142 MMOL/L (ref 136–145)
T4 FREE SERPL-MCNC: 1.31 NG/DL (ref 0.9–1.7)
TRIGL SERPL-MCNC: 145 MG/DL
TSH SERPL DL<=0.005 MIU/L-ACNC: 4.63 UIU/ML (ref 0.3–4.2)

## 2023-03-06 PROCEDURE — 82570 ASSAY OF URINE CREATININE: CPT | Mod: ZL | Performed by: FAMILY MEDICINE

## 2023-03-06 PROCEDURE — 80053 COMPREHEN METABOLIC PANEL: CPT | Mod: ZL | Performed by: FAMILY MEDICINE

## 2023-03-06 PROCEDURE — 999N000109 HC STATISTIC OP CR VISIT: Mod: KX

## 2023-03-06 PROCEDURE — 84439 ASSAY OF FREE THYROXINE: CPT | Mod: ZL | Performed by: FAMILY MEDICINE

## 2023-03-06 PROCEDURE — 83036 HEMOGLOBIN GLYCOSYLATED A1C: CPT | Mod: ZL | Performed by: FAMILY MEDICINE

## 2023-03-06 PROCEDURE — 36415 COLL VENOUS BLD VENIPUNCTURE: CPT | Mod: ZL | Performed by: FAMILY MEDICINE

## 2023-03-06 PROCEDURE — 80061 LIPID PANEL: CPT | Mod: ZL | Performed by: FAMILY MEDICINE

## 2023-03-06 PROCEDURE — 93798 PHYS/QHP OP CAR RHAB W/ECG: CPT | Mod: KX

## 2023-03-06 PROCEDURE — G0463 HOSPITAL OUTPT CLINIC VISIT: HCPCS | Mod: 25

## 2023-03-06 PROCEDURE — 84443 ASSAY THYROID STIM HORMONE: CPT | Mod: ZL | Performed by: FAMILY MEDICINE

## 2023-03-06 PROCEDURE — G0463 HOSPITAL OUTPT CLINIC VISIT: HCPCS

## 2023-03-06 PROCEDURE — 99214 OFFICE O/P EST MOD 30 MIN: CPT | Performed by: FAMILY MEDICINE

## 2023-03-06 ASSESSMENT — PAIN SCALES - GENERAL: PAINLEVEL: NO PAIN (0)

## 2023-03-06 NOTE — PROGRESS NOTES
Assessment & Plan     Type 2 diabetes mellitus with hyperglycemia, without long-term current use of insulin (H)  Doing well. Update labs.    - Comprehensive metabolic panel; Future  - Lipid Profile; Future  - TSH with free T4 reflex; Future  - Hemoglobin A1c; Future  - Albumin Random Urine Quantitative with Creat Ratio; Future    Essential hypertension, benign  Stable.  No change.      Atherosclerosis of coronary artery bypass graft of native heart without angina pectoris  Reviewed.  He stopped the crestor thinking it might harm the kidneys but we will monitor and follow.     Stage 4 chronic kidney disease (H)  Update.  Likely due to lasix/lisinopril overall.  He is swelling more with the lower lasix dose.  We will see if we can go back up to 20.      H/O aortic stenosis, severe  S/p TAVR doing great in rehab.      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                     No follow-ups on file.    Zach Arredondo MD  [facility]   Sidney is a 89 year old, presenting for the following health issues:  Diabetes      HPI     Diabetes Follow-up    How often are you checking your blood sugar? A few times a month  What time of day are you checking your blood sugars (select all that apply)?  Before and after meals  Have you had any blood sugars above 200?  No  Have you had any blood sugars below 70?  No    What symptoms do you notice when your blood sugar is low?  None    What concerns do you have today about your diabetes? None     Do you have any of these symptoms? (Select all that apply)  Numbness in feet    Have you had a diabetic eye exam in the last 12 months? No          Hyperlipidemia Follow-Up      Are you regularly taking any medication or supplement to lower your cholesterol?   No-stopped crestor     Are you having muscle aches or other side effects that you think could be caused by your  cholesterol lowering medication?  No    Hypertension Follow-up      Do you check your blood pressure regularly outside of the clinic? No     Are you following a low salt diet? Yes    Are your blood pressures ever more than 140 on the top number (systolic) OR more   than 90 on the bottom number (diastolic), for example 140/90? NA    BP Readings from Last 2 Encounters:   03/06/23 106/52   01/30/23 113/70     Hemoglobin A1C (%)   Date Value   01/25/2023 6.1 (H)   09/06/2022 5.6   03/04/2021 5.5   10/20/2020 5.6     LDL Cholesterol Calculated (mg/dL)   Date Value   10/20/2020 164 (H)   01/09/2019 149 (H)     Medication Followup of lasix     Taking Medication as prescribed: yes-20 mg     Side Effects:  None    Medication Helping Symptoms:  not applicable             Review of Systems   Constitutional, HEENT, cardiovascular, pulmonary, gi and gu systems are negative, except as otherwise noted.      Objective    /52   Pulse 76   Wt 76.2 kg (168 lb)   SpO2 100%   BMI 24.11 kg/m    Body mass index is 24.11 kg/m .  Physical Exam   GENERAL: healthy, alert and no distress  NECK: no adenopathy, no asymmetry, masses, or scars and thyroid normal to palpation  RESP: lungs clear to auscultation - no rales, rhonchi or wheezes  CV: regular rates and rhythm, normal S1 S2, no S3 or S4, grade 1-2/6 systolic murmur heard best over the base, peripheral pulses strong and no peripheral edema  ABDOMEN: soft, nontender, no hepatosplenomegaly, no masses and bowel sounds normal  MS: no gross musculoskeletal defects noted, no edema    Full labs pending.

## 2023-03-06 NOTE — LETTER
March 7, 2023      Sidney LAURITA Barriga  [address] [address]        Dear ,    We are writing to inform you of your test results.    Labs are nice and stable all around.  The kidney testing is the same, and all is stable with the others.  The thyroid is slightly borderline but stable.  Suggest another check in 3 months.  For the swelling, recommend a visit with Dr. Rae.  If we need to adjust his water pills we need to be very careful with his kidneys, and cardiology can help manage that effectively.    Resulted Orders   Comprehensive metabolic panel   Result Value Ref Range    Sodium 142 136 - 145 mmol/L    Potassium 4.5 3.4 - 5.3 mmol/L    Chloride 105 98 - 107 mmol/L    Carbon Dioxide (CO2) 25 22 - 29 mmol/L    Anion Gap 12 7 - 15 mmol/L    Urea Nitrogen 40.9 (H) 8.0 - 23.0 mg/dL    Creatinine 1.69 (H) 0.67 - 1.17 mg/dL    Calcium 9.9 (H) 8.2 - 9.6 mg/dL    Glucose 179 (H) 70 - 99 mg/dL    Alkaline Phosphatase 55 40 - 129 U/L    AST 19 10 - 50 U/L    ALT 11 10 - 50 U/L    Protein Total 7.1 6.4 - 8.3 g/dL    Albumin 3.9 3.5 - 5.2 g/dL    Bilirubin Total 0.3 <=1.2 mg/dL    GFR Estimate 38 (L) >60 mL/min/1.73m2      Comment:      eGFR calculated using 2021 CKD-EPI equation.   Lipid Profile   Result Value Ref Range    Cholesterol 202 (H) <200 mg/dL    Triglycerides 145 <150 mg/dL    Direct Measure HDL 34 (L) >=40 mg/dL    LDL Cholesterol Calculated 139 (H) <=100 mg/dL    Non HDL Cholesterol 168 (H) <130 mg/dL    Narrative    Cholesterol  Desirable:  <200 mg/dL    Triglycerides  Normal:  Less than 150 mg/dL  Borderline High:  150-199 mg/dL  High:  200-499 mg/dL  Very High:  Greater than or equal to 500 mg/dL    Direct Measure HDL  Female:  Greater than or equal to 50 mg/dL   Male:  Greater than or equal to 40 mg/dL    LDL Cholesterol  Desirable:  <100mg/dL  Above Desirable:  100-129 mg/dL   Borderline High:  130-159 mg/dL   High:  160-189 mg/dL   Very High:  >= 190 mg/dL    Non HDL  Cholesterol  Desirable:  130 mg/dL  Above Desirable:  130-159 mg/dL  Borderline High:  160-189 mg/dL  High:  190-219 mg/dL  Very High:  Greater than or equal to 220 mg/dL   TSH with free T4 reflex   Result Value Ref Range    TSH 4.63 (H) 0.30 - 4.20 uIU/mL   Hemoglobin A1c   Result Value Ref Range    Estimated Average Glucose 123 mg/dL    Hemoglobin A1C 5.9 (H) <5.7 %      Comment:      Normal <5.7%   Prediabetes 5.7-6.4%    Diabetes 6.5% or higher     Note: Adopted from ADA consensus guidelines.   Albumin Random Urine Quantitative with Creat Ratio   Result Value Ref Range    Creatinine Urine mg/dL 111.3 mg/dL      Comment:      The reference ranges have not been established in urine creatinine. The results should be integrated into the clinical context for interpretation.    Albumin Urine mg/L <12.0 mg/L      Comment:      The reference ranges have not been established in urine albumin. The results should be integrated into the clinical context for interpretation.    Albumin Urine mg/g Cr        Comment:      Unable to calculate, urine albumin and/or urine creatinine is outside detectable limits.  Microalbuminuria is defined as an albumin:creatinine ratio of 17 to 299 for males and 25 to 299 for females. A ratio of albumin:creatinine of 300 or higher is indicative of overt proteinuria.  Due to biologic variability, positive results should be confirmed by a second, first-morning random or 24-hour timed urine specimen. If there is discrepancy, a third specimen is recommended. When 2 out of 3 results are in the microalbuminuria range, this is evidence for incipient nephropathy and warrants increased efforts at glucose control, blood pressure control, and institution of therapy with an angiotensin-converting-enzyme (ACE) inhibitor (if the patient can tolerate it).     T4 free   Result Value Ref Range    Free T4 1.31 0.90 - 1.70 ng/dL       If you have any questions or concerns, please call the clinic at the number  listed above.       Sincerely,      Zach Arredondo MD

## 2023-03-10 ENCOUNTER — HOSPITAL ENCOUNTER (OUTPATIENT)
Dept: CARDIAC REHAB | Facility: HOSPITAL | Age: 88
Setting detail: THERAPIES SERIES
Discharge: HOME | End: 2023-03-10
Attending: FAMILY MEDICINE
Payer: MEDICARE

## 2023-03-10 LAB
GLUCOSE BLDC GLUCOMTR-MCNC: 100 MG/DL (ref 70–99)
GLUCOSE BLDC GLUCOMTR-MCNC: 139 MG/DL (ref 70–99)

## 2023-03-10 PROCEDURE — 999N000109 HC STATISTIC OP CR VISIT: Mod: KX

## 2023-03-10 PROCEDURE — 93798 PHYS/QHP OP CAR RHAB W/ECG: CPT | Mod: KX

## 2023-03-13 ENCOUNTER — LAB (OUTPATIENT)
Dept: LAB | Facility: OTHER | Age: 88
End: 2023-03-13
Payer: MEDICARE

## 2023-03-13 ENCOUNTER — HOSPITAL ENCOUNTER (OUTPATIENT)
Dept: CARDIAC REHAB | Facility: HOSPITAL | Age: 88
Setting detail: THERAPIES SERIES
Discharge: HOME | End: 2023-03-13
Attending: FAMILY MEDICINE
Payer: MEDICARE

## 2023-03-13 ENCOUNTER — ANTICOAGULATION THERAPY VISIT (OUTPATIENT)
Dept: ANTICOAGULATION | Facility: OTHER | Age: 88
End: 2023-03-13
Attending: FAMILY MEDICINE
Payer: MEDICARE

## 2023-03-13 DIAGNOSIS — Z86.718 PERSONAL HISTORY OF DVT (DEEP VEIN THROMBOSIS): ICD-10-CM

## 2023-03-13 DIAGNOSIS — I82.5Y3 CHRONIC DEEP VEIN THROMBOSIS (DVT) OF PROXIMAL VEIN OF BOTH LOWER EXTREMITIES (H): ICD-10-CM

## 2023-03-13 DIAGNOSIS — Z86.718 HISTORY OF DVT (DEEP VEIN THROMBOSIS): Primary | ICD-10-CM

## 2023-03-13 LAB
GLUCOSE BLDC GLUCOMTR-MCNC: 127 MG/DL (ref 70–99)
GLUCOSE BLDC GLUCOMTR-MCNC: 94 MG/DL (ref 70–99)
INR BLD: 2.6 (ref 0.9–1.1)

## 2023-03-13 PROCEDURE — 93798 PHYS/QHP OP CAR RHAB W/ECG: CPT | Mod: KX

## 2023-03-13 PROCEDURE — 36416 COLLJ CAPILLARY BLOOD SPEC: CPT | Mod: ZL

## 2023-03-13 PROCEDURE — 85610 PROTHROMBIN TIME: CPT | Mod: ZL

## 2023-03-13 PROCEDURE — 999N000109 HC STATISTIC OP CR VISIT: Mod: KX

## 2023-03-13 NOTE — PROGRESS NOTES
ANTICOAGULATION MANAGEMENT     Sidney Barriga 89 year old male is on warfarin with therapeutic INR result. (Goal INR 2.0-3.0)    Recent labs: (last 7 days)     03/13/23  1408   INR 2.6*       ASSESSMENT       Source(s): Chart Review and Patient/Caregiver Call       Warfarin doses taken: Warfarin taken as instructed    Diet: No new diet changes identified    New illness, injury, or hospitalization: No    Medication/supplement changes: None noted    Signs or symptoms of bleeding or clotting: No    Previous INR: Therapeutic last 2(+) visits    Additional findings: None         PLAN     Recommended plan for no diet, medication or health factor changes affecting INR     Dosing Instructions: Continue your current warfarin dose with next INR in 5 weeks       Summary  As of 3/13/2023    Full warfarin instructions:  2.5 mg every Wed, Sat; 5 mg all other days   Next INR check:  4/17/2023             Telephone call with Sidney who verbalizes understanding and agrees to plan    Lab visit scheduled    Education provided:     None required    Plan made per [facility] anticoagulation protocol    Lizbeth Ely RN  Anticoagulation Clinic  3/13/2023    _______________________________________________________________________     Anticoagulation Episode Summary     Current INR goal:  2.0-3.0   TTR:  76.0 % (1 y)   Target end date:  Indefinite   Send INR reminders to:  [facility]    Indications    History of DVT (deep vein thrombosis)-multiple [Z86.718]  Chronic deep vein thrombosis (DVT) of proximal vein of both lower extremities (H) [I82.5Y3]  Personal history of DVT (deep vein thrombosis) [Z86.718]           Comments:           Anticoagulation Care Providers     Provider Role Specialty Phone number    Zach Arredondo MD Referring Family Medicine [phone]

## 2023-03-17 ENCOUNTER — HOSPITAL ENCOUNTER (OUTPATIENT)
Dept: CARDIAC REHAB | Facility: HOSPITAL | Age: 88
Setting detail: THERAPIES SERIES
Discharge: HOME | End: 2023-03-17
Attending: FAMILY MEDICINE
Payer: MEDICARE

## 2023-03-17 LAB
GLUCOSE BLDC GLUCOMTR-MCNC: 110 MG/DL (ref 70–99)
GLUCOSE BLDC GLUCOMTR-MCNC: 139 MG/DL (ref 70–99)

## 2023-03-17 PROCEDURE — 999N000109 HC STATISTIC OP CR VISIT: Mod: KX

## 2023-03-17 PROCEDURE — 93798 PHYS/QHP OP CAR RHAB W/ECG: CPT | Mod: KX

## 2023-03-20 ENCOUNTER — HOSPITAL ENCOUNTER (OUTPATIENT)
Dept: CARDIAC REHAB | Facility: HOSPITAL | Age: 88
Setting detail: THERAPIES SERIES
Discharge: HOME | End: 2023-03-20
Attending: FAMILY MEDICINE
Payer: MEDICARE

## 2023-03-20 LAB
GLUCOSE BLDC GLUCOMTR-MCNC: 109 MG/DL (ref 70–99)
GLUCOSE BLDC GLUCOMTR-MCNC: 136 MG/DL (ref 70–99)

## 2023-03-20 PROCEDURE — 93798 PHYS/QHP OP CAR RHAB W/ECG: CPT

## 2023-03-20 PROCEDURE — 999N000109 HC STATISTIC OP CR VISIT: Mod: KX

## 2023-03-24 ENCOUNTER — HOSPITAL ENCOUNTER (OUTPATIENT)
Dept: CARDIAC REHAB | Facility: HOSPITAL | Age: 88
Setting detail: THERAPIES SERIES
Discharge: HOME | End: 2023-03-24
Attending: FAMILY MEDICINE
Payer: MEDICARE

## 2023-03-24 LAB
GLUCOSE BLDC GLUCOMTR-MCNC: 119 MG/DL (ref 70–99)
GLUCOSE BLDC GLUCOMTR-MCNC: 148 MG/DL (ref 70–99)

## 2023-03-24 PROCEDURE — 93798 PHYS/QHP OP CAR RHAB W/ECG: CPT | Mod: KX

## 2023-03-24 PROCEDURE — 999N000109 HC STATISTIC OP CR VISIT: Mod: KX

## 2023-03-27 ENCOUNTER — HOSPITAL ENCOUNTER (OUTPATIENT)
Dept: CARDIAC REHAB | Facility: HOSPITAL | Age: 88
Setting detail: THERAPIES SERIES
Discharge: HOME | End: 2023-03-27
Attending: FAMILY MEDICINE
Payer: MEDICARE

## 2023-03-27 LAB
GLUCOSE BLDC GLUCOMTR-MCNC: 136 MG/DL (ref 70–99)
GLUCOSE BLDC GLUCOMTR-MCNC: 99 MG/DL (ref 70–99)

## 2023-03-27 PROCEDURE — 93798 PHYS/QHP OP CAR RHAB W/ECG: CPT

## 2023-03-27 PROCEDURE — 999N000109 HC STATISTIC OP CR VISIT

## 2023-03-31 ENCOUNTER — HOSPITAL ENCOUNTER (OUTPATIENT)
Dept: CARDIAC REHAB | Facility: HOSPITAL | Age: 88
Setting detail: THERAPIES SERIES
Discharge: HOME | End: 2023-03-31
Attending: FAMILY MEDICINE
Payer: MEDICARE

## 2023-03-31 PROCEDURE — 93798 PHYS/QHP OP CAR RHAB W/ECG: CPT

## 2023-03-31 PROCEDURE — 999N000109 HC STATISTIC OP CR VISIT: Mod: KX

## 2023-04-03 ENCOUNTER — HOSPITAL ENCOUNTER (OUTPATIENT)
Dept: CARDIAC REHAB | Facility: HOSPITAL | Age: 88
Setting detail: THERAPIES SERIES
Discharge: HOME | End: 2023-04-03
Attending: FAMILY MEDICINE
Payer: MEDICARE

## 2023-04-03 PROCEDURE — 93798 PHYS/QHP OP CAR RHAB W/ECG: CPT | Mod: KX

## 2023-04-03 PROCEDURE — 999N000109 HC STATISTIC OP CR VISIT: Mod: KX

## 2023-04-05 ENCOUNTER — HOSPITAL ENCOUNTER (OUTPATIENT)
Dept: CARDIAC REHAB | Facility: HOSPITAL | Age: 88
Setting detail: THERAPIES SERIES
Discharge: HOME | End: 2023-04-05
Attending: FAMILY MEDICINE
Payer: MEDICARE

## 2023-04-05 ENCOUNTER — HOSPITAL ENCOUNTER (OUTPATIENT)
Dept: CARDIOLOGY | Facility: HOSPITAL | Age: 88
Discharge: HOME | End: 2023-04-05
Attending: INTERNAL MEDICINE | Admitting: STUDENT IN AN ORGANIZED HEALTH CARE EDUCATION/TRAINING PROGRAM
Payer: MEDICARE

## 2023-04-05 DIAGNOSIS — R93.1 REGIONAL WALL MOTION ABNORMALITY OF HEART: ICD-10-CM

## 2023-04-05 DIAGNOSIS — I35.0 MODERATE AORTIC STENOSIS: ICD-10-CM

## 2023-04-05 DIAGNOSIS — I71.21 ASCENDING AORTIC ANEURYSM (H): ICD-10-CM

## 2023-04-05 DIAGNOSIS — I50.42 CHRONIC COMBINED SYSTOLIC AND DIASTOLIC CONGESTIVE HEART FAILURE (H): ICD-10-CM

## 2023-04-05 DIAGNOSIS — R06.09 DOE (DYSPNEA ON EXERTION): ICD-10-CM

## 2023-04-05 DIAGNOSIS — I27.20 MILD PULMONARY HYPERTENSION (H): ICD-10-CM

## 2023-04-05 DIAGNOSIS — I35.0 NONRHEUMATIC AORTIC VALVE STENOSIS: ICD-10-CM

## 2023-04-05 DIAGNOSIS — I25.5 ISCHEMIC CARDIOMYOPATHY: ICD-10-CM

## 2023-04-05 DIAGNOSIS — I05.0 MODERATE MITRAL STENOSIS: ICD-10-CM

## 2023-04-05 LAB — LVEF ECHO: NORMAL

## 2023-04-05 PROCEDURE — 93798 PHYS/QHP OP CAR RHAB W/ECG: CPT | Mod: KX

## 2023-04-05 PROCEDURE — 93306 TTE W/DOPPLER COMPLETE: CPT

## 2023-04-05 PROCEDURE — 999N000109 HC STATISTIC OP CR VISIT: Mod: KX

## 2023-04-06 ENCOUNTER — TRANSFERRED RECORDS (OUTPATIENT)
Dept: HEALTH INFORMATION MANAGEMENT | Facility: CLINIC | Age: 88
End: 2023-04-06
Payer: MEDICARE

## 2023-04-06 LAB — RETINOPATHY: NEGATIVE

## 2023-04-10 ENCOUNTER — HOSPITAL ENCOUNTER (OUTPATIENT)
Dept: CARDIAC REHAB | Facility: HOSPITAL | Age: 88
Setting detail: THERAPIES SERIES
Discharge: HOME | End: 2023-04-10
Attending: FAMILY MEDICINE
Payer: MEDICARE

## 2023-04-10 LAB
GLUCOSE BLDC GLUCOMTR-MCNC: 104 MG/DL (ref 70–99)
GLUCOSE BLDC GLUCOMTR-MCNC: 109 MG/DL (ref 70–99)
GLUCOSE BLDC GLUCOMTR-MCNC: 123 MG/DL (ref 70–99)
GLUCOSE BLDC GLUCOMTR-MCNC: 129 MG/DL (ref 70–99)
GLUCOSE BLDC GLUCOMTR-MCNC: 137 MG/DL (ref 70–99)
GLUCOSE BLDC GLUCOMTR-MCNC: 144 MG/DL (ref 70–99)
GLUCOSE BLDC GLUCOMTR-MCNC: 158 MG/DL (ref 70–99)

## 2023-04-10 PROCEDURE — 999N000109 HC STATISTIC OP CR VISIT

## 2023-04-10 PROCEDURE — 93798 PHYS/QHP OP CAR RHAB W/ECG: CPT | Mod: KX

## 2023-04-12 LAB — GLUCOSE BLDC GLUCOMTR-MCNC: 126 MG/DL (ref 70–99)

## 2023-04-14 ENCOUNTER — HOSPITAL ENCOUNTER (OUTPATIENT)
Dept: CARDIAC REHAB | Facility: HOSPITAL | Age: 88
Setting detail: THERAPIES SERIES
Discharge: HOME | End: 2023-04-14
Attending: FAMILY MEDICINE
Payer: MEDICARE

## 2023-04-14 PROCEDURE — 999N000109 HC STATISTIC OP CR VISIT: Mod: KX

## 2023-04-14 PROCEDURE — 93798 PHYS/QHP OP CAR RHAB W/ECG: CPT | Mod: KX

## 2023-04-14 NOTE — PROGRESS NOTES
[facility] HEART CARE   CARDIOLOGY PROGRESS NOTE     Chief Complaint   Patient presents with     Follow Up     1 year follow up           Diagnosis:  1.  RIVAS 2/2 CHF, pulm htn, mild COPD, severe diffusion defect on 11/18/20, and DVT/PE???  2.  CABGx3, 4/29/2014-LIMA to LAD, SVG to OM, and second OM.  3.  CKD-4.  4.  CHF-systolic, ischemic. 30-35% on 4/5/22. 35-40% on 5/5/22. 30-35% on 12/9/20. Diastolic grade 1 on 11/1/2007. Unchanged on 2/11/21.  5.  TAVR on 12/6/22, [place]. 29 mm Patricio S3.  6.  MV and AV-moderate stenosis on cardiac cath on 1/15/21.   7.  H/O DVT with h/o IVC filter.  8.  Hypomagnesia.  9.  Hypertension-controlled.  10.  Hypertriglyceridemia-controlled.  11.  Tobacco abuse quitting in 1978.  12.  Cardiac cath x5 on 7/17/2007, 4/20//14, 2019, 1/15/21, and 11/30/22.  13.  Coronary stent placement on 7/17/2007 x2 stents to the LAD and in 2019 in [place] to unknown vessel.  14.  H/O sarcoidosis in 1982.  15.  DM-2-controlled.   16.  COPD-mild on 11/18/2020.  17.  WMA on 12/9/20.  18.  Right lung nodule x2 on 2/12/2021.  19.  Concern for TAAA at 4.3 cm on 5/15/2021.  20.  Concern for AAA r/o on ultrasound from 2/12/2021.  21.  Mild pulmonary hypertension on his cardiac cath at the [facility] on 1/15/2021.      Assessment/Plan:    1. AS: Status post TAVR at [place], on 12/6/2022. Patient was following up locally but connected independently with [place].  Suggested he follow-up with [place] as there was a lot of loose ends.  He has not done so.  We did review his most recent echocardiogram from 4/5/2023.  It shows his valve is functioning well without issues.  I was able to find MCOT completed at [place] which does not show any need for pacemaker.  He is doing cardiac rehab and plans to do 36 sessions.  He should follow-up with [place] to tighten up these loose ends.  We also discussed the need to take amoxicillin 2 g, 30 to 60 minutes prior to dental procedures and Plavix in place of aspirin.   2.  CHF:  Reviewed his echocardiogram.  EF dropped slightly to 30-35%, from 35-40%.  Currently on Lasix 20 with progressive edema in the evening.  Will increase Lasix to 30 mg taking 1-1/2 of the 20 mg as he was previously on 40 mg daily.  The dose was reduced as there was concerns about affecting kidney function.  Continue lisinopril and Coreg.  3.  CAD: History of CABG and stenting.  No anginal symptoms.  Suggested he take Crestor 20 mg daily.  Was stopped as there was concern it was affecting his kidneys.  Patient encouraged to restart it.  In addition continue on Plavix and will refill sublingual nitro as needed for chest pain.  4.  Hypertension: Controlled.  No changes.  5.  Follow-up 1 year or sooner if issues    Interval history:  Duane had a cardiac catheterization completed at the [facility].  He had a cardiac catheterization on 1/15/2021. He did have coronary disease but nothing that would require stenting. It appears he had appropriate blood flow to all vessels.  He has been followed for aortic stenosis.  He had an echocardiogram on 5/5/2022.  This showed moderate to severe aortic stenosis.  He was sent a letter.  He is to have repeat echocardiogram in the future.  However, he ended up at [place].  He is part of a research study.  He had TAVR at [place] on 12/6/2022.  He has not received any instructions afterwards.  He had a monitor that was placed as there is concern for heart block.  I reviewed this monitor and there was no evidence of heart block, per their report.  We reviewed his echocardiogram completed here.  His EF is 30-35%, previously 35-40%.  He also has grade 2 diastolic dysfunction but his valve appears to be functioning well.  He is wanting Lasix increased from 20 to 30 mg daily.  This is because he has worsening edema.  Previously, he was on 40 mg of Lasix and his edema was controlled but his kidney function was getting worse.  He stopped Crestor as he was concerned it is affecting his  kidneys.  He was encouraged to continue it as he has a history of stenting and bypass.  He is needing occasions refilled and these were completed.  He has no other issues or concerns.  Follow-up in 1 year or sooner with issues.    HPI:    Mr. Barriga is being seen by cardiology to establish care.  He has a history of coronary disease with both stenting and CABG x3.  His wife passed away on 9/1/2020, suddenly from cancer.  He does not plan to go back to [place] and is establishing care locally.     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     Sidney is here to establish care.  He has a history of CAD with stenting and bypass. He previously obtained his cardiac care through [place], [place].  He states that dating back in 1982, he was diagnosed with sarcoidosis.  More recently, he had a cardiac catheterization on 7/17/2007 with history of MI with stenting x2 placed to the p-mLAD.  He went on to have CABG x3 on 4/29/2014 in [place].  He had LIMA to LAD, SVG to OM1 and OM 2.  At that time, his EF was reported to be 30% with ischemic cardiomyopathy.  More recently, he reports having had a cardiac catheterization once again in [place] with stenting to unknown vessels in 2019.  His symptoms in the past have been exertional dyspnea with intrascapular discomfort.  He states he has been having similar symptoms for the last 6 months but to a lesser degree.  Risk factors include history of tobacco abuse at approximately a half a pack a day until 1978, hypertension, hyperlipidemia, DM-2, family history, and diet.     He reportedly has a history of ischemic cardiomyopathy.  EF on 8/12/2010  "was 40%.  Recently, while in [place] at time of bypass on 4/29/2014, his EF was 30%.  His echo on 11/1/2017 showed an EF of 40-45% with grade 1 diastolic dysfunction.  There was evidence for wall motion normalities as well as mild left atrial enlargement.  He has not had any swelling to his legs, presently on Lasix 20 mg daily.  He states he does not have issues with fluid overload.     He has been on Coumadin for extended period time.  He describes having had multiple recurrent DVT's with a IVC filter in place.  He is currently on Coumadin with management through the Coumadin clinic.     He has a history of hyperlipidemia.  His most recent lipid panel was on 10/20/2020 showing a total cholesterol of 248 and LDL of 164.  HDL was 46.  He has not been able to tolerate Lipitor or Crestor in the past.  He has been on Zetia.  On 12/7/2020,  he was started on Crestor 20 mg twice a week.  If he is able to tolerate this dosage, would increase as able. He states he has been on \"every statin with issues with all of them\".     Patient is known to have mild COPD with history of tobacco abuse.  PFT's on 11/18/2020 support mild COPD with severe diffusion defect.      Relevant testing:  Echocardiogram in 4/5/2023:  Left ventricular function is decreased. The ejection fraction is 30-35%  (moderately reduced). Severe diffuse hypokinesis is present.   Grade II or moderate diastolic dysfunction.  The right ventricle is normal size. Global right ventricular function is normal.  The 29 mm Patricio III TAVR is well-seated. Leaflet opening is not clearly  visualized. There is trace paravalvular regurgitation. There is no valvular  regurgitation. The Vmax is 2.1 m/s, the mean gradient is 10 mmHg, the  dimensionless index is 0.50, and the acceleration time is 120 ms.  This study was compared with the study from 05/05/2022. The AV gradient is  lower. There is no significant change in the biventricular function.    MAURICIOOT at [place] on 12/8/22:  1. " "The patient was monitored from 12/8/2022 to 1/6/2023 with a total monitoring time of 28 days 19 hr 54 min. The baseline rhythm was sinus with a first-degree AV delay and a left bundle-branch block. The heart rate varied from 55 bpm to 93 bpm. The   average heart rate was 67 bpm.   2. There were 35,812 PVCs seen singly, paired, and in bigeminy and trigeminy with a PVC burden of 1.26%. There were 6 ventricular tachycardia events with greater than 3 beats with the longest duration being 8 beats. The maximum VT rate was 183 bpm.   3. There were 15,775 PACs seen singly with a PAC burden of <1%. There were 19 runs of supraventricular tachycardia observed with the longest duration being 39 beats. The maximum SVT rate was 125 bpm.   4. The patient reported 26 symptomatic events of \"dizziness.\" During these events, the rhythm was sinus with a first-degree AV delay and a left bundle-branch block. The heart rate varied from 64 bpm to 92 bpm. At and/or around these times, PVCs were seen    singly and in bigeminy and trigeminy.       Cardiac cath on 11/30/22:  The left main coronary artery is 30% obstructed by multiple discrete lesions.   The proximal left anterior descending artery is 100% obstructed by a discrete lesion. The distal segment is not visible.   The proximal circumflex artery is 40% obstructed by multiple discrete lesions. The distal segment is normal size.   The ramus intermedius segment is 100% obstructed by a discrete lesion. Fills retrogradely from graft.   The proximal right coronary artery is 50% obstructed by a discrete lesion. The distal segment is normal size.   The distal right coronary artery is 30% obstructed by multiple discrete lesions. The distal segment is normal size.   GRAFT ANGIOGRAPHY SUMMARY   Single body vein bypass graft to the Ramus Intermedius Segment. Graft Appears normal.   Single body left internal mammary graft to the Middle Left Anterior Descending Artery.   GRAFT ANGIOGRAPHY SUMMARY "   Single body vein bypass graft to the Ramus Intermedius Segment. Graft Appears normal.   Single body left internal mammary graft to the Middle Left Anterior Descending Artery.      ECHO on 5/5/22:  The visual ejection fraction is 35-40%. Moderate diffuse hypokinesis is present.  Right ventricular function, chamber size, wall motion, and thickness are  normal.  The calculated aortic valve area is 0.94 cm2.The mean gradient across the AV is 22mmHg, the peak velocity is 36 mmHg.  Mild mitral insufficiency is present. Mild tricuspid insufficiency is present.  The inferior vena cava cannot be assessed.   Ascending aorta 4.2 cm.  No pericardial effusion is present.    Stress test on 4/26/22:  There is a large area of a severe   degree of infarction in the anterior, apical, inferior and septal   segment(s) of the left ventricle. The left ventricular ejection fraction   at rest is 37%.  The left ventricular ejection fraction at stress is 38%.       Zio on 3/1/21:  Underlying rhythm was sinus.  Hrt rate ranged from 50 bpm, maximum heart rate of 197 bmp, averaging 72 bmp.  No significant bradycardia, pauses, 2nd degree Mobitz type II or 3rd degree heart block.  No atrial fibrillation was identified on this study.  x0 triggered events and x0 diary entries.  x18 runs of VT lasting to 11 beats with a maximum heart rate of 197 bmp.    x25 runs of SVT lasting up to 15 beats with a maximum heart rate of 156 bmp.  Rare, less than 1% of PAC's, atrial couplets, atrial triplets, ventricular couplets and ventricular triplets.  Frequent PVC's at 15.9%.  + episodes of ventricular bigeminy lasting up to 18 min's and 9 sec's.  + episodes of ventricular trigeminy lasting up to 30 mins and 5 sec's.    Stress echo at [place] on 5/25/2021:  1. AT REST:   2. Estimated left ventricular ejection fraction 25%   3. Stroke volume index 45 ml/m^2, aortic valve mean systolic gradient 20 mm Hg, aortic valve   area 1.16 cm^2, aortic valve area index 0.62  cm^2/m^2   4. STRESS TEST:   5. Dobutamine was infused from 5 mcg/kg/min to 20.0 mcg/kg/min.   6. PEAK STRESS:   7. Estimated left ventricular ejection fraction range 30% - 35%.   8. Stroke volume index 55 ml/m^2, aortic valve mean systolic gradient 35 mm Hg, aortic valve  area 1.36 cm^2, aortic valve area index 0.73 cm^2/m^2       US aorta on 2/11/21:  No abdominal aortic aneurysm.    US carotids on 2/11/21:  No hemodynamically significant stenoses are identified in either carotid bifurcation.    ECHO on 2/11/21:  No pericardial effusion is present.  Moderately (EF 30-35%) reduced left ventricular function is present.  The right ventricle is normal size.  Global right ventricular function is normal.  Mild left atrial enlargement is present.  Mild mitral insufficiency is present.  Severe aortic valve calcification is present.  The peak aortic velocity is 3.02 m/sec.  The mean gradient across the aortic valve is 18.4 mmHg.  Moderate aortic stenosis is present.  Right ventricular systolic pressure is 44 mmHg above the right atrial pressure.  Mild tricuspid insufficiency is present.  Mild pulmonic insufficiency is present.  Ascending aorta 4.1 cm.  Sinuses of Valsalva 4.3 cm.    CTA chest on 2/11/21:  2 small nodules are identified in the right lung.  Follow-up CT scan in 6 months time is recommended. The ascending aorta measures 4.2 cm in maximal transverse diameter.    Cardiac cath on 1/15/21:    Right sided filling pressures are normal.    Mild elevated pulmonary hypertension.    Left sided filling pressures are moderately elevated.    Left ventricular filling pressures are moderately elevated.    Normal cardiac output level.    Moderate mitral valve stenosis (mean gradient 8.4 mm Hg, 2.2 cm2)    Moderate aortic valve stenosis (mean gradient 17 mm Hg, 1.2 cm2)    Native three vessel CAD    >>> Moderate 50% proximal RCA stenosis [ungrafted vessel]    >>> Mimimal LMCA stenosis    >>> 100% Ostial LAD stenosis [LIMA-LAD  widely patent]    >>> Proximal 50% LCx and 90% RI stenoses [Sequential SVG-RI-OM patent with focal 60% ISR in jump segment of SVG]     Suggest medical treatment at this time.  Neither the aortic valve nor mitral valve require intervention at this time.  The proximal RCA stenosis is unlikely be physiologically significant. The LAD territory is infarcted but the LIMA-LAD remains widely patent.  There is 70% ISR of SVG in jump segment but the OM is receiving brisk DAVID-3 flow via the native coronary (only 50% proximal stenosis).    Cath on 7/17/2007:  1. Severe, single-vessel coronary artery disease. Chronic proximal-to-mild occlusion of the left anterior descending coronary artery with distal right-to-left collaterals.  2. Cypher drug-eluting stenting of the [id number] segment of the left anterior descending.    Stress test on 12/15/20:     The nuclear stress test is abnormal.     There is a large area of a severe degree of infarction in the entire apical and anteroseptal segment(s) of the left ventricle.     Left ventricular function is moderately reduced.     The left ventricular ejection fraction at rest is 44%.  The left ventricular ejection fraction at stress is 35%.     There is no prior study for comparison.    ECHO on 12/9/20:  No pericardial effusion is present.  Left ventricular size is normal.  Anterior wall hypokinesis is present.  Apical wall hypokinesis is present.  Moderately (EF 30-35%) reduced left ventricular function is present.  The right ventricle is normal size.  Global right ventricular function is normal.  Mild mitral annular calcification is present.  Mild mitral insufficiency is present.  Moderate aortic valve calcification is present.  Trace aortic insufficiency is present.  The Aortic valve has significant calcification of valve leaflets with poor  mobility. Our measured values I feel underestimate the amount of stenosis. By  visual estimate would put at least moderate Aortic  stenosis.  Trace tricuspid insufficiency is present.  Right ventricular systolic pressure is 6 mmHg above the right atrial pressure.  The peak aortic velocity is 2.57 m/sec.  The mean gradient across the aortic valve is 15.5 mmHg.        ICD-10-CM    1. Atherosclerosis of native coronary artery of native heart without angina pectoris  I25.10 EKG 12-lead complete w/read - (Clinic Performed)     EKG 12-lead complete w/read - (Clinic Performed)     clopidogrel (PLAVIX) 75 MG tablet     nitroGLYcerin (NITROSTAT) 0.4 MG sublingual tablet      2. Essential hypertension, benign  I10 EKG 12-lead complete w/read - (Clinic Performed)     EKG 12-lead complete w/read - (Clinic Performed)     carvedilol (COREG) 25 MG tablet     furosemide (LASIX) 20 MG tablet     lisinopril (ZESTRIL) 10 MG tablet      3. History of coronary artery stent placement on 7/17/2007 x2 stents to the LAD and in 2019 in [place] to unknown vessel  Z95.5 EKG 12-lead complete w/read - (Clinic Performed)     EKG 12-lead complete w/read - (Clinic Performed)     clopidogrel (PLAVIX) 75 MG tablet     nitroGLYcerin (NITROSTAT) 0.4 MG sublingual tablet      4. 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, SVG to first OM and second OM  Z95.1 EKG 12-lead complete w/read - (Clinic Performed)     EKG 12-lead complete w/read - (Clinic Performed)     clopidogrel (PLAVIX) 75 MG tablet     nitroGLYcerin (NITROSTAT) 0.4 MG sublingual tablet      5. Ischemic cardiomyopathy  I25.5 carvedilol (COREG) 25 MG tablet     furosemide (LASIX) 20 MG tablet     clopidogrel (PLAVIX) 75 MG tablet     lisinopril (ZESTRIL) 10 MG tablet     nitroGLYcerin (NITROSTAT) 0.4 MG sublingual tablet      6. Chronic combined systolic and diastolic congestive heart failure (H)  I50.42 carvedilol (COREG) 25 MG tablet     furosemide (LASIX) 20 MG tablet     lisinopril (ZESTRIL) 10 MG tablet      7. Atrial flutter, unspecified type (H)  I48.92 carvedilol (COREG) 25 MG tablet      8. Frequent PVCs  I49.3  carvedilol (COREG) 25 MG tablet      9. Irregular heartbeat  I49.9 carvedilol (COREG) 25 MG tablet      10. Ventricular bigeminy  I49.8 carvedilol (COREG) 25 MG tablet      11. Aneurysm of ascending aorta without rupture (H)  I71.21 carvedilol (COREG) 25 MG tablet      12. Chest pain, unspecified type  R07.9 carvedilol (COREG) 25 MG tablet     clopidogrel (PLAVIX) 75 MG tablet     nitroGLYcerin (NITROSTAT) 0.4 MG sublingual tablet     rosuvastatin (CRESTOR) 20 MG tablet      13. Essential hypertension  I10 furosemide (LASIX) 20 MG tablet     lisinopril (ZESTRIL) 10 MG tablet      14. RIVAS (dyspnea on exertion)  R06.09 furosemide (LASIX) 20 MG tablet      15. Mild pulmonary hypertension (H)  I27.20 furosemide (LASIX) 20 MG tablet      16. Stage 3b chronic kidney disease (H)  N18.32 furosemide (LASIX) 20 MG tablet     lisinopril (ZESTRIL) 10 MG tablet      17. Mixed hyperlipidemia  E78.2 ezetimibe (ZETIA) 10 MG tablet     rosuvastatin (CRESTOR) 20 MG tablet      18. Atherosclerosis of coronary artery bypass graft of native heart without angina pectoris  I25.810 clopidogrel (PLAVIX) 75 MG tablet     nitroGLYcerin (NITROSTAT) 0.4 MG sublingual tablet     rosuvastatin (CRESTOR) 20 MG tablet      19. H/O cardiac catheterization x3 on 7/17/2007, 4/20/2014, and 2019  Z98.890 clopidogrel (PLAVIX) 75 MG tablet     nitroGLYcerin (NITROSTAT) 0.4 MG sublingual tablet      20. Status post coronary angiogram x4 on 7/17/2007, 4/20/2014, 2019, and 1/15/2021  Z98.890 clopidogrel (PLAVIX) 75 MG tablet     nitroGLYcerin (NITROSTAT) 0.4 MG sublingual tablet     rosuvastatin (CRESTOR) 20 MG tablet      21. Type 2 diabetes mellitus with hyperglycemia, without long-term current use of insulin (H)  E11.65 lisinopril (ZESTRIL) 10 MG tablet     rosuvastatin (CRESTOR) 20 MG tablet      22. S/p TAVR (transcatheter aortic valve replacement), bioprosthetic on 12/6/2022 with a 29 mm IMAN 3 valve  Z95.3 amoxicillin (AMOXIL) 500 MG capsule           Past Medical History:   Diagnosis Date     Acute thromboembolism of deep veins of lower extremity (H)     No Comments Provided     Benign essential hypertension     No Comments Provided     Cancer (H)     skin     Congestive heart failure (H)      COPD (chronic obstructive pulmonary disease) (H)      Coronary atherosclerosis     No Comments Provided     Osteoarthrosis     No Comments Provided     Other and unspecified hyperlipidemia     No Comments Provided     Other specified forms of chronic ischemic heart disease     No Comments Provided     Sarcoidosis     No Comments Provided     Type 2 or unspecified type diabetes mellitus     No Comments Provided       Past Surgical History:   Procedure Laterality Date     ANGIOPLASTY  01/01/2019    stents     CARDIAC SURGERY  01/01/2014    heart bypass     COLONOSCOPY  01/01/2019     CV CORONARY ANGIOGRAM N/A 1/15/2021    Procedure: CV CORONARY ANGIOGRAM;  Surgeon: Charlie Harris MD;  Location:  HEART CARDIAC CATH LAB     CV LEFT HEART CATH N/A 1/15/2021    Procedure: Left Heart Cath;  Surgeon: Charlie Harris MD;  Location:  HEART CARDIAC CATH LAB     CV RIGHT HEART CATH MEASUREMENTS RECORDED N/A 1/15/2021    Procedure: CV RIGHT HEART CATH;  Surgeon: Charlie Harris MD;  Location:  HEART CARDIAC CATH LAB     OTHER SURGICAL HISTORY      [id number],BIOPSY LUNG OR [facility]       Allergies   Allergen Reactions     Simvastatin Itching     Isosorbide Visual Disturbance     LIGHT HEADED,ALMOST PASSED OUT         Current Outpatient Medications   Medication Sig Dispense Refill     albuterol (PROAIR HFA/PROVENTIL HFA/VENTOLIN HFA) 108 (90 Base) MCG/ACT inhaler Inhale 1-2 puffs into the lungs every 4 hours as needed for shortness of breath / dyspnea or wheezing 18 g 3     amoxicillin (AMOXIL) 500 MG capsule Tk 2 grams 30-60 minutes prior to dental procedures/cleanings. 4 capsule 3     blood glucose (NO BRAND SPECIFIED) lancets standard Use to  test blood sugar 1 times daily or as directed. 1 each 3     blood glucose (NO BRAND SPECIFIED) test strip Use to test blood sugar 1 times daily or as directed. 90 strip 3     blood glucose monitoring (NO BRAND SPECIFIED) meter device kit Use to test blood sugar 1 times daily or as directed. 1 kit 0     Calcium Carbonate-Vit D-Min (CALCIUM 600+D PLUS MINERALS) 600-400 MG-UNIT TABS Take 1 tablet by mouth       carvedilol (COREG) 25 MG tablet Take 1 tablet (25 mg) by mouth 2 times daily (with meals) 180 tablet 3     CINNAMON PO Take 500 mg by mouth daily        clopidogrel (PLAVIX) 75 MG tablet Take 1 tablet (75 mg) by mouth daily 90 tablet 3     Coenzyme Q10 (CO Q-10) 200 MG CAPS Take 1 capsule by mouth daily       cyanocobalamin (VITAMIN B-12) 500 MCG SUBL sublingual tablet Place 500 mcg under the tongue daily       ezetimibe (ZETIA) 10 MG tablet Take 1 tablet (10 mg) by mouth daily 90 tablet 3     furosemide (LASIX) 20 MG tablet Take 1.5 tablets (30 mg) by mouth daily 135 tablet 3     lisinopril (ZESTRIL) 10 MG tablet Take 1 tablet (10 mg) by mouth daily 90 tablet 3     LUTEIN PO Take 1 tablet by mouth daily       nitroGLYcerin (NITROSTAT) 0.4 MG sublingual tablet For chest pain place 1 tablet under the tongue every 5 minutes for 3 doses. If symptoms persist 5 minutes after 1st dose call 911. 25 tablet 3     Omega-3 Fatty Acids (FISH OIL) 1200 MG capsule Take 1 capsule by mouth daily       predniSONE (DELTASONE) 10 MG tablet Take 1 tablet (10 mg) by mouth daily 6 tablet 0     rosuvastatin (CRESTOR) 20 MG tablet Take 1 tablet (20 mg) by mouth daily 90 tablet 3     vitamin C (ASCORBIC ACID) 500 MG tablet Take 1,000 mg by mouth daily       Vitamin D3 (CHOLECALCIFEROL) 125 MCG (5000 UT) tablet Take by mouth daily       warfarin ANTICOAGULANT (COUMADIN) 5 MG tablet Take 1 tablet (5 mg) by mouth daily (Patient taking differently: Take 5 mg by mouth daily 2.5 mg on Wednesday and Saturday) 90 tablet 3       Social History      Socioeconomic History     Marital status:      Spouse name: Not on file     Number of children: Not on file     Years of education: Not on file     Highest education level: Not on file   Occupational History     Not on file   Tobacco Use     Smoking status: Former     Packs/day: 0.50     Years: 24.00     Pack years: 12.00     Types: Cigarettes     Start date: 1954     Quit date: 1978     Years since quittin.3     Smokeless tobacco: Never   Vaping Use     Vaping status: Not on file   Substance and Sexual Activity     Alcohol use: Yes     Alcohol/week: 5.0 standard drinks of alcohol     Types: 3 Cans of beer, 2 Shots of liquor per week     Drug use: Unknown     Types: Other     Comment: Drug use: No     Sexual activity: Not on file   Other Topics Concern     Parent/sibling w/ CABG, MI or angioplasty before 65F 55M? Not Asked   Social History Narrative     Not on file     Social Determinants of Health     Financial Resource Strain: Not on file   Food Insecurity: Not on file   Transportation Needs: Not on file   Physical Activity: Not on file   Stress: Not on file   Social Connections: Not on file   Intimate Partner Violence: Not on file   Housing Stability: Not on file       LAB RESULTS:   Orders Only on 2021   Component Date Value Ref Range Status     Sodium 2021 141  133 - 144 mmol/L Final     Potassium 2021 4.7  3.4 - 5.3 mmol/L Final     Chloride 2021 107  94 - 109 mmol/L Final     Carbon Dioxide 2021 29  20 - 32 mmol/L Final     Anion Gap 2021 5  3 - 14 mmol/L Final     Glucose 2021 125* 70 - 99 mg/dL Final     Urea Nitrogen 2021 26  7 - 30 mg/dL Final     Creatinine 2021 1.40* 0.66 - 1.25 mg/dL Final     GFR Estimate 2021 44* >60 mL/min/[1.73_m2] Final     GFR Estimate If Black 2021 51* >60 mL/min/[1.73_m2] Final     Calcium 2021 9.9  8.5 - 10.1 mg/dL Final     WBC 2021 6.7  4.0 - 11.0 10e9/L Final     RBC Count  01/21/2021 3.63* 4.4 - 5.9 10e12/L Final     Hemoglobin 01/21/2021 11.3* 13.3 - 17.7 g/dL Final     Hematocrit 01/21/2021 34.9* 40.0 - 53.0 % Final     MCV 01/21/2021 96  78 - 100 fl Final     MCH 01/21/2021 31.1  26.5 - 33.0 pg Final     MCHC 01/21/2021 32.4  31.5 - 36.5 g/dL Final     RDW 01/21/2021 12.2  10.0 - 15.0 % Final     Platelet Count 01/21/2021 163  150 - 450 10e9/L Final     INR Point of Care 01/21/2021 2.9* 0.86 - 1.14 Final     Capillary Blood Collection 01/21/2021 Capillary collection performed   Final   Office Visit on 01/12/2021   Component Date Value Ref Range Status     WBC 01/12/2021 6.4  4.0 - 11.0 10e9/L Final     RBC Count 01/12/2021 3.72* 4.4 - 5.9 10e12/L Final     Hemoglobin 01/12/2021 11.7* 13.3 - 17.7 g/dL Final     Hematocrit 01/12/2021 35.3* 40.0 - 53.0 % Final     MCV 01/12/2021 95  78 - 100 fl Final     MCH 01/12/2021 31.5  26.5 - 33.0 pg Final     MCHC 01/12/2021 33.1  31.5 - 36.5 g/dL Final     RDW 01/12/2021 12.2  10.0 - 15.0 % Final     Platelet Count 01/12/2021 162  150 - 450 10e9/L Final     % Neutrophils 01/12/2021 62.1  % Final     % Lymphocytes 01/12/2021 19.7  % Final     % Monocytes 01/12/2021 13.5  % Final     % Eosinophils 01/12/2021 4.2  % Final     % Basophils 01/12/2021 0.5  % Final     Absolute Neutrophil 01/12/2021 4.0  1.6 - 8.3 10e9/L Final     Absolute Lymphocytes 01/12/2021 1.3  0.8 - 5.3 10e9/L Final     Absolute Monocytes 01/12/2021 0.9  0.0 - 1.3 10e9/L Final     Absolute Eosinophils 01/12/2021 0.3  0.0 - 0.7 10e9/L Final     Absolute Basophils 01/12/2021 0.0  0.0 - 0.2 10e9/L Final     Diff Method 01/12/2021 Automated Method   Final     Sodium 01/12/2021 142  133 - 144 mmol/L Final     Potassium 01/12/2021 4.4  3.4 - 5.3 mmol/L Final     Chloride 01/12/2021 109  94 - 109 mmol/L Final     Carbon Dioxide 01/12/2021 29  20 - 32 mmol/L Final     Anion Gap 01/12/2021 4  3 - 14 mmol/L Final     Glucose 01/12/2021 100* 70 - 99 mg/dL Final     Urea Nitrogen  01/12/2021 28  7 - 30 mg/dL Final     Creatinine 01/12/2021 1.14  0.66 - 1.25 mg/dL Final     GFR Estimate 01/12/2021 57* >60 mL/min/[1.73_m2] Final     GFR Estimate If Black 01/12/2021 66  >60 mL/min/[1.73_m2] Final     Calcium 01/12/2021 9.6  8.5 - 10.1 mg/dL Final     INR Point of Care 01/12/2021 3.8* 0.86 - 1.14 Final   Office Visit on 01/11/2021   Component Date Value Ref Range Status     COVID-19 Virus PCR to [facility] - So* 01/11/2021 Nasopharyngeal   Final     COVID-19 Virus PCR to [facility] - Re* 01/11/2021 Not Detected   Final   Orders Only on 12/01/2020   Component Date Value Ref Range Status     INR 12/01/2020 2.65* 0.86 - 1.14 Final   Orders Only on 12/01/2020   Component Date Value Ref Range Status     WBC 12/01/2020 5.5  4.0 - 11.0 10e9/L Final     RBC Count 12/01/2020 3.98* 4.4 - 5.9 10e12/L Final     Hemoglobin 12/01/2020 12.5* 13.3 - 17.7 g/dL Final     Hematocrit 12/01/2020 38.4* 40.0 - 53.0 % Final     MCV 12/01/2020 97  78 - 100 fl Final     MCH 12/01/2020 31.4  26.5 - 33.0 pg Final     MCHC 12/01/2020 32.6  31.5 - 36.5 g/dL Final     RDW 12/01/2020 12.4  10.0 - 15.0 % Final     Platelet Count 12/01/2020 141* 150 - 450 10e9/L Final     Diff Method 12/01/2020 Automated Method   Final     % Neutrophils 12/01/2020 63.5  % Final     % Lymphocytes 12/01/2020 21.9  % Final     % Monocytes 12/01/2020 12.2  % Final     % Eosinophils 12/01/2020 1.8  % Final     % Basophils 12/01/2020 0.4  % Final     % Immature Granulocytes 12/01/2020 0.2  % Final     Nucleated RBCs 12/01/2020 0  0 /100 Final     Absolute Neutrophil 12/01/2020 3.5  1.6 - 8.3 10e9/L Final     Absolute Lymphocytes 12/01/2020 1.2  0.8 - 5.3 10e9/L Final     Absolute Monocytes 12/01/2020 0.7  0.0 - 1.3 10e9/L Final     Absolute Eosinophils 12/01/2020 0.1  0.0 - 0.7 10e9/L Final     Absolute Basophils 12/01/2020 0.0  0.0 - 0.2 10e9/L Final     Abs Immature Granulocytes 12/01/2020 0.0  0 - 0.4 10e9/L Final     Absolute Nucleated RBC 12/01/2020 0.0  "  Final        Review of systems: Negative except that which was noted in the HPI.    Physical examination:  /64   Pulse 66   Temp 97.3  F (36.3  C) (Tympanic)   Ht 1.778 m (5' 10\")   Wt 71.7 kg (158 lb)   SpO2 99%   BMI 22.67 kg/m      GENERAL APPEARANCE: healthy, alert and no distress  HEENT: no icterus, no xanthelasmas, normal pupil size and reaction, no cyanosis.  NECK: no adenopathy, no asymmetry, masses.  CHEST: lungs clear to auscultation - no rales, rhonchi or wheezes, no use of accessory muscles, no retractions, respirations are unlabored, normal respiratory rate  CARDIOVASCULAR: regular rhythm, normal S1 with physiologic split S2, no S3 or S4 but with systolic crescendo decrescendo murmur, no click or rub  ABDOMEN: soft, non tender, bowel sounds normal  EXTREMITIES: no clubbing, cyanosis or edema      Total time spent on day of visit, including review of tests, obtaining/reviewing separately obtained history, ordering medications/tests/procedures, communicating with PCP/consultants, and documenting in electronic medical record: 32 minutes.           Thank you for allowing me to participate in the care of your patient. Please do not hesitate to contact me if you have any questions.     Justyn Rae, DO              "

## 2023-04-17 ENCOUNTER — OFFICE VISIT (OUTPATIENT)
Dept: CARDIOLOGY | Facility: OTHER | Age: 88
End: 2023-04-17
Attending: INTERNAL MEDICINE
Payer: MEDICARE

## 2023-04-17 ENCOUNTER — LAB (OUTPATIENT)
Dept: LAB | Facility: OTHER | Age: 88
End: 2023-04-17
Attending: FAMILY MEDICINE
Payer: MEDICARE

## 2023-04-17 ENCOUNTER — HOSPITAL ENCOUNTER (OUTPATIENT)
Dept: CARDIAC REHAB | Facility: HOSPITAL | Age: 88
Setting detail: THERAPIES SERIES
Discharge: HOME | End: 2023-04-17
Attending: FAMILY MEDICINE
Payer: MEDICARE

## 2023-04-17 ENCOUNTER — ANTICOAGULATION THERAPY VISIT (OUTPATIENT)
Dept: ANTICOAGULATION | Facility: OTHER | Age: 88
End: 2023-04-17
Attending: FAMILY MEDICINE
Payer: MEDICARE

## 2023-04-17 VITALS
SYSTOLIC BLOOD PRESSURE: 104 MMHG | OXYGEN SATURATION: 99 % | BODY MASS INDEX: 22.62 KG/M2 | WEIGHT: 158 LBS | HEIGHT: 70 IN | DIASTOLIC BLOOD PRESSURE: 64 MMHG | TEMPERATURE: 97.3 F | HEART RATE: 66 BPM

## 2023-04-17 DIAGNOSIS — I48.92 ATRIAL FLUTTER, UNSPECIFIED TYPE (H): ICD-10-CM

## 2023-04-17 DIAGNOSIS — I49.9 IRREGULAR HEART RHYTHM: Primary | ICD-10-CM

## 2023-04-17 DIAGNOSIS — I49.8 VENTRICULAR BIGEMINY: ICD-10-CM

## 2023-04-17 DIAGNOSIS — R07.9 CHEST PAIN, UNSPECIFIED TYPE: ICD-10-CM

## 2023-04-17 DIAGNOSIS — I49.3 FREQUENT PVCS: ICD-10-CM

## 2023-04-17 DIAGNOSIS — Z98.890 STATUS POST CORONARY ANGIOGRAM: ICD-10-CM

## 2023-04-17 DIAGNOSIS — I25.810 ATHEROSCLEROSIS OF CORONARY ARTERY BYPASS GRAFT OF NATIVE HEART WITHOUT ANGINA PECTORIS: ICD-10-CM

## 2023-04-17 DIAGNOSIS — I50.42 CHRONIC COMBINED SYSTOLIC AND DIASTOLIC CONGESTIVE HEART FAILURE (H): ICD-10-CM

## 2023-04-17 DIAGNOSIS — Z95.3 S/P TAVR (TRANSCATHETER AORTIC VALVE REPLACEMENT), BIOPROSTHETIC: ICD-10-CM

## 2023-04-17 DIAGNOSIS — I10 ESSENTIAL HYPERTENSION: ICD-10-CM

## 2023-04-17 DIAGNOSIS — N18.32 STAGE 3B CHRONIC KIDNEY DISEASE (H): ICD-10-CM

## 2023-04-17 DIAGNOSIS — I71.21 ANEURYSM OF ASCENDING AORTA WITHOUT RUPTURE (H): ICD-10-CM

## 2023-04-17 DIAGNOSIS — Z86.718 PERSONAL HISTORY OF DVT (DEEP VEIN THROMBOSIS): ICD-10-CM

## 2023-04-17 DIAGNOSIS — Z95.5 HISTORY OF CORONARY ARTERY STENT PLACEMENT: ICD-10-CM

## 2023-04-17 DIAGNOSIS — I82.5Y3 CHRONIC DEEP VEIN THROMBOSIS (DVT) OF PROXIMAL VEIN OF BOTH LOWER EXTREMITIES (H): ICD-10-CM

## 2023-04-17 DIAGNOSIS — I27.20 MILD PULMONARY HYPERTENSION (H): ICD-10-CM

## 2023-04-17 DIAGNOSIS — R06.09 DOE (DYSPNEA ON EXERTION): ICD-10-CM

## 2023-04-17 DIAGNOSIS — I25.5 ISCHEMIC CARDIOMYOPATHY: ICD-10-CM

## 2023-04-17 DIAGNOSIS — I25.10 ATHEROSCLEROSIS OF NATIVE CORONARY ARTERY OF NATIVE HEART WITHOUT ANGINA PECTORIS: Primary | ICD-10-CM

## 2023-04-17 DIAGNOSIS — E11.65 TYPE 2 DIABETES MELLITUS WITH HYPERGLYCEMIA, WITHOUT LONG-TERM CURRENT USE OF INSULIN (H): ICD-10-CM

## 2023-04-17 DIAGNOSIS — I49.9 IRREGULAR HEARTBEAT: ICD-10-CM

## 2023-04-17 DIAGNOSIS — Z95.1 S/P CABG X 3: ICD-10-CM

## 2023-04-17 DIAGNOSIS — E78.2 MIXED HYPERLIPIDEMIA: ICD-10-CM

## 2023-04-17 DIAGNOSIS — Z98.890 H/O CARDIAC CATHETERIZATION: ICD-10-CM

## 2023-04-17 DIAGNOSIS — I10 ESSENTIAL HYPERTENSION, BENIGN: ICD-10-CM

## 2023-04-17 DIAGNOSIS — Z86.718 HISTORY OF DVT (DEEP VEIN THROMBOSIS): Primary | ICD-10-CM

## 2023-04-17 LAB
GLUCOSE BLDC GLUCOMTR-MCNC: 112 MG/DL (ref 70–99)
GLUCOSE BLDC GLUCOMTR-MCNC: 81 MG/DL (ref 70–99)
INR BLD: 2.5 (ref 0.9–1.1)

## 2023-04-17 PROCEDURE — 93798 PHYS/QHP OP CAR RHAB W/ECG: CPT | Mod: KX

## 2023-04-17 PROCEDURE — 93010 ELECTROCARDIOGRAM REPORT: CPT | Performed by: INTERNAL MEDICINE

## 2023-04-17 PROCEDURE — G0463 HOSPITAL OUTPT CLINIC VISIT: HCPCS

## 2023-04-17 PROCEDURE — 85610 PROTHROMBIN TIME: CPT | Mod: ZL

## 2023-04-17 PROCEDURE — 999N000109 HC STATISTIC OP CR VISIT: Mod: KX

## 2023-04-17 PROCEDURE — G0463 HOSPITAL OUTPT CLINIC VISIT: HCPCS | Mod: 25

## 2023-04-17 PROCEDURE — 99214 OFFICE O/P EST MOD 30 MIN: CPT | Mod: 25 | Performed by: INTERNAL MEDICINE

## 2023-04-17 PROCEDURE — 36416 COLLJ CAPILLARY BLOOD SPEC: CPT | Mod: ZL

## 2023-04-17 RX ORDER — CARVEDILOL 25 MG/1
25 TABLET ORAL 2 TIMES DAILY WITH MEALS
Qty: 180 TABLET | Refills: 3 | Status: SHIPPED | OUTPATIENT
Start: 2023-04-17 | End: 2023-08-14

## 2023-04-17 RX ORDER — FUROSEMIDE 20 MG
30 TABLET ORAL DAILY
Qty: 135 TABLET | Refills: 3 | Status: SHIPPED | OUTPATIENT
Start: 2023-04-17 | End: 2023-08-14

## 2023-04-17 RX ORDER — NITROGLYCERIN 0.4 MG/1
TABLET SUBLINGUAL
Qty: 25 TABLET | Refills: 3 | Status: SHIPPED | OUTPATIENT
Start: 2023-04-17 | End: 2024-02-27

## 2023-04-17 RX ORDER — CLOPIDOGREL BISULFATE 75 MG/1
75 TABLET ORAL DAILY
Qty: 90 TABLET | Refills: 3 | Status: SHIPPED | OUTPATIENT
Start: 2023-04-17 | End: 2024-03-27

## 2023-04-17 RX ORDER — EZETIMIBE 10 MG/1
10 TABLET ORAL DAILY
Qty: 90 TABLET | Refills: 3 | Status: SHIPPED | OUTPATIENT
Start: 2023-04-17 | End: 2023-08-14

## 2023-04-17 RX ORDER — ROSUVASTATIN CALCIUM 20 MG/1
20 TABLET, COATED ORAL DAILY
Qty: 90 TABLET | Refills: 3
Start: 2023-04-17 | End: 2023-06-27

## 2023-04-17 RX ORDER — LISINOPRIL 10 MG/1
10 TABLET ORAL DAILY
Qty: 90 TABLET | Refills: 3 | Status: SHIPPED | OUTPATIENT
Start: 2023-04-17 | End: 2023-07-24

## 2023-04-17 RX ORDER — AMOXICILLIN 500 MG/1
CAPSULE ORAL
Qty: 4 CAPSULE | Refills: 3 | Status: SHIPPED | OUTPATIENT
Start: 2023-04-17 | End: 2023-08-14

## 2023-04-17 ASSESSMENT — PAIN SCALES - GENERAL: PAINLEVEL: NO PAIN (0)

## 2023-04-17 NOTE — PATIENT INSTRUCTIONS
Thank you for allowing Dr. Rae and our  team to participate in your care. Please call our office at [phone] with scheduling questions or if you need to cancel or change your appointment. With any other questions or concerns you may call [facility] cardiology nurse at [phone].       If you experience chest pain, chest pressure, chest tightness, shortness of breath, fainting, lightheadedness, nausea, vomiting, or other concerning symptoms, please report to the Emergency Department or call 911. These symptoms may be emergent, and best treated in the Emergency Department.

## 2023-04-17 NOTE — PROGRESS NOTES
ANTICOAGULATION MANAGEMENT     Sidney Barriga 89 year old male is on warfarin with therapeutic INR result. (Goal INR 2.0-3.0)    Recent labs: (last 7 days)     04/17/23  1013   INR 2.5*       ASSESSMENT       Source(s): Chart Review and Patient/Caregiver Call       Warfarin doses taken: Warfarin taken as instructed    Diet: No new diet changes identified    Medication/supplement changes: None noted    New illness, injury, or hospitalization: No    Signs or symptoms of bleeding or clotting: No    Previous INR: Therapeutic last 2(+) visits    Additional findings: None         PLAN     Recommended plan for no diet, medication or health factor changes affecting INR     Dosing Instructions: Continue your current warfarin dose with next INR in 5 weeks       Summary  As of 4/17/2023    Full warfarin instructions:  2.5 mg every Wed, Sat; 5 mg all other days   Next INR check:  5/22/2023             Telephone call with Sidney who verbalizes understanding and agrees to plan    Lab visit scheduled    Education provided:     None required    Plan made per [facility] anticoagulation protocol    Lizbeth Ely RN  Anticoagulation Clinic  4/17/2023    _______________________________________________________________________     Anticoagulation Episode Summary     Current INR goal:  2.0-3.0   TTR:  76.0 % (1 y)   Target end date:  Indefinite   Send INR reminders to:  [facility]    Indications    History of DVT (deep vein thrombosis)-multiple [Z86.718]  Chronic deep vein thrombosis (DVT) of proximal vein of both lower extremities (H) [I82.5Y3]  Personal history of DVT (deep vein thrombosis) [Z86.718]           Comments:           Anticoagulation Care Providers     Provider Role Specialty Phone number    Zach Arredondo MD Referring Family Medicine [phone]

## 2023-04-19 ENCOUNTER — HOSPITAL ENCOUNTER (OUTPATIENT)
Dept: CARDIOLOGY | Facility: HOSPITAL | Age: 88
Discharge: HOME | End: 2023-04-19
Attending: INTERNAL MEDICINE | Admitting: INTERNAL MEDICINE
Payer: MEDICARE

## 2023-04-19 DIAGNOSIS — I49.9 IRREGULAR HEART RHYTHM: ICD-10-CM

## 2023-04-19 PROCEDURE — 93248 EXT ECG>7D<15D REV&INTERPJ: CPT | Performed by: INTERNAL MEDICINE

## 2023-04-19 PROCEDURE — 93246 EXT ECG>7D<15D RECORDING: CPT

## 2023-04-19 NOTE — PROGRESS NOTES
Zio patch placed on patient in outpatient appointment today. Patient was instructed to wear patch for 14 days. Skin was prepped and patch was placed following [employer] guidelines .     Patient was instructed to push button when symptomatic and fill out diary. Patient was instructed not to submerse in water and no swimming, saunas, or hot tubs. Showers may be taken keeping back towards the water. If the area DOES become wet pat it dry with a cloth. If skin irritation occurs patient was instructed to remove patch and contact [employer].    Patient understands we do not receive results until they mail patch back inside the box. Staff reminded patient of outside billing notice which was explained to patient during the scheduling process of this monitor. Patient also instructed to contact [employer] with any questions [phone].    Patient had no further questions and agreed with plan. Patient was sent home with the box, information pamphlet and booklet to franki any incidents in.

## 2023-04-21 ENCOUNTER — HOSPITAL ENCOUNTER (OUTPATIENT)
Dept: CARDIAC REHAB | Facility: HOSPITAL | Age: 88
Setting detail: THERAPIES SERIES
Discharge: HOME | End: 2023-04-21
Attending: FAMILY MEDICINE
Payer: MEDICARE

## 2023-04-21 PROCEDURE — 93798 PHYS/QHP OP CAR RHAB W/ECG: CPT | Mod: KX

## 2023-04-21 PROCEDURE — 999N000109 HC STATISTIC OP CR VISIT: Mod: KX

## 2023-04-24 ENCOUNTER — HOSPITAL ENCOUNTER (OUTPATIENT)
Dept: CARDIAC REHAB | Facility: HOSPITAL | Age: 88
Setting detail: THERAPIES SERIES
Discharge: HOME | End: 2023-04-24
Attending: FAMILY MEDICINE
Payer: MEDICARE

## 2023-04-24 PROCEDURE — 93798 PHYS/QHP OP CAR RHAB W/ECG: CPT | Mod: KX

## 2023-04-24 PROCEDURE — 999N000109 HC STATISTIC OP CR VISIT: Mod: KX

## 2023-04-25 LAB
GLUCOSE BLDC GLUCOMTR-MCNC: 111 MG/DL (ref 70–99)
GLUCOSE BLDC GLUCOMTR-MCNC: 114 MG/DL (ref 70–99)
GLUCOSE BLDC GLUCOMTR-MCNC: 115 MG/DL (ref 70–99)
GLUCOSE BLDC GLUCOMTR-MCNC: 123 MG/DL (ref 70–99)

## 2023-04-28 ENCOUNTER — HOSPITAL ENCOUNTER (OUTPATIENT)
Dept: CARDIAC REHAB | Facility: HOSPITAL | Age: 88
Setting detail: THERAPIES SERIES
Discharge: HOME | End: 2023-04-28
Attending: FAMILY MEDICINE
Payer: MEDICARE

## 2023-04-28 PROCEDURE — 999N000109 HC STATISTIC OP CR VISIT: Mod: KX

## 2023-04-28 PROCEDURE — 93798 PHYS/QHP OP CAR RHAB W/ECG: CPT | Mod: KX

## 2023-05-01 ENCOUNTER — HOSPITAL ENCOUNTER (OUTPATIENT)
Dept: CARDIAC REHAB | Facility: HOSPITAL | Age: 88
Setting detail: THERAPIES SERIES
Discharge: HOME | End: 2023-05-01
Attending: FAMILY MEDICINE
Payer: MEDICARE

## 2023-05-01 PROCEDURE — 93798 PHYS/QHP OP CAR RHAB W/ECG: CPT | Mod: KX

## 2023-05-01 PROCEDURE — 999N000109 HC STATISTIC OP CR VISIT: Mod: KX

## 2023-05-04 LAB
GLUCOSE BLDC GLUCOMTR-MCNC: 132 MG/DL (ref 70–99)
GLUCOSE BLDC GLUCOMTR-MCNC: 134 MG/DL (ref 70–99)
GLUCOSE BLDC GLUCOMTR-MCNC: 155 MG/DL (ref 70–99)
GLUCOSE BLDC GLUCOMTR-MCNC: 91 MG/DL (ref 70–99)

## 2023-05-08 ENCOUNTER — HOSPITAL ENCOUNTER (OUTPATIENT)
Dept: CARDIAC REHAB | Facility: HOSPITAL | Age: 88
Setting detail: THERAPIES SERIES
Discharge: HOME | End: 2023-05-08
Attending: FAMILY MEDICINE
Payer: MEDICARE

## 2023-05-08 PROCEDURE — 999N000109 HC STATISTIC OP CR VISIT: Mod: KX

## 2023-05-08 PROCEDURE — 93798 PHYS/QHP OP CAR RHAB W/ECG: CPT | Mod: KX

## 2023-05-09 LAB
GLUCOSE BLDC GLUCOMTR-MCNC: 101 MG/DL (ref 70–99)
GLUCOSE BLDC GLUCOMTR-MCNC: 102 MG/DL (ref 70–99)

## 2023-05-12 ENCOUNTER — TELEPHONE (OUTPATIENT)
Dept: CARDIOLOGY | Facility: OTHER | Age: 88
End: 2023-05-12

## 2023-05-12 ENCOUNTER — HOSPITAL ENCOUNTER (OUTPATIENT)
Dept: CARDIAC REHAB | Facility: HOSPITAL | Age: 88
Setting detail: THERAPIES SERIES
Discharge: HOME | End: 2023-05-12
Attending: FAMILY MEDICINE
Payer: MEDICARE

## 2023-05-12 PROCEDURE — 999N000109 HC STATISTIC OP CR VISIT: Mod: KX

## 2023-05-12 PROCEDURE — 93798 PHYS/QHP OP CAR RHAB W/ECG: CPT | Mod: KX

## 2023-05-15 ENCOUNTER — HOSPITAL ENCOUNTER (OUTPATIENT)
Dept: CARDIAC REHAB | Facility: HOSPITAL | Age: 88
Setting detail: THERAPIES SERIES
Discharge: HOME | End: 2023-05-15
Attending: FAMILY MEDICINE
Payer: MEDICARE

## 2023-05-15 PROCEDURE — 999N000109 HC STATISTIC OP CR VISIT: Mod: KX

## 2023-05-15 PROCEDURE — 93798 PHYS/QHP OP CAR RHAB W/ECG: CPT | Mod: KX

## 2023-05-19 ENCOUNTER — HOSPITAL ENCOUNTER (OUTPATIENT)
Dept: CARDIAC REHAB | Facility: HOSPITAL | Age: 88
Setting detail: THERAPIES SERIES
Discharge: HOME | End: 2023-05-19
Attending: FAMILY MEDICINE
Payer: MEDICARE

## 2023-05-19 PROCEDURE — 93798 PHYS/QHP OP CAR RHAB W/ECG: CPT | Mod: KX

## 2023-05-19 PROCEDURE — 999N000109 HC STATISTIC OP CR VISIT: Mod: KX

## 2023-05-22 ENCOUNTER — HOSPITAL ENCOUNTER (OUTPATIENT)
Dept: CARDIAC REHAB | Facility: HOSPITAL | Age: 88
Setting detail: THERAPIES SERIES
Discharge: HOME | End: 2023-05-22
Attending: FAMILY MEDICINE
Payer: MEDICARE

## 2023-05-22 ENCOUNTER — APPOINTMENT (OUTPATIENT)
Dept: ANTICOAGULATION | Facility: OTHER | Age: 88
End: 2023-05-22
Attending: FAMILY MEDICINE
Payer: MEDICARE

## 2023-05-22 PROCEDURE — 999N000109 HC STATISTIC OP CR VISIT

## 2023-05-22 PROCEDURE — 93798 PHYS/QHP OP CAR RHAB W/ECG: CPT

## 2023-05-24 LAB
GLUCOSE BLDC GLUCOMTR-MCNC: 111 MG/DL (ref 70–99)
GLUCOSE BLDC GLUCOMTR-MCNC: 112 MG/DL (ref 70–99)
GLUCOSE BLDC GLUCOMTR-MCNC: 112 MG/DL (ref 70–99)
GLUCOSE BLDC GLUCOMTR-MCNC: 120 MG/DL (ref 70–99)
GLUCOSE BLDC GLUCOMTR-MCNC: 144 MG/DL (ref 70–99)
GLUCOSE BLDC GLUCOMTR-MCNC: 148 MG/DL (ref 70–99)
GLUCOSE BLDC GLUCOMTR-MCNC: 173 MG/DL (ref 70–99)
GLUCOSE BLDC GLUCOMTR-MCNC: 78 MG/DL (ref 70–99)

## 2023-05-26 ENCOUNTER — HOSPITAL ENCOUNTER (OUTPATIENT)
Dept: CARDIAC REHAB | Facility: HOSPITAL | Age: 88
Setting detail: THERAPIES SERIES
Discharge: HOME | End: 2023-05-26
Attending: FAMILY MEDICINE
Payer: MEDICARE

## 2023-05-26 PROCEDURE — 999N000109 HC STATISTIC OP CR VISIT: Mod: KX

## 2023-05-26 PROCEDURE — 93798 PHYS/QHP OP CAR RHAB W/ECG: CPT | Mod: KX

## 2023-06-02 ENCOUNTER — ANTICOAGULATION THERAPY VISIT (OUTPATIENT)
Dept: ANTICOAGULATION | Facility: OTHER | Age: 88
End: 2023-06-02
Attending: FAMILY MEDICINE
Payer: MEDICARE

## 2023-06-02 ENCOUNTER — LAB (OUTPATIENT)
Dept: LAB | Facility: OTHER | Age: 88
End: 2023-06-02
Payer: MEDICARE

## 2023-06-02 ENCOUNTER — HOSPITAL ENCOUNTER (OUTPATIENT)
Dept: CARDIAC REHAB | Facility: HOSPITAL | Age: 88
Setting detail: THERAPIES SERIES
Discharge: HOME | End: 2023-06-02
Attending: FAMILY MEDICINE
Payer: MEDICARE

## 2023-06-02 DIAGNOSIS — Z86.718 HISTORY OF DVT (DEEP VEIN THROMBOSIS): Primary | ICD-10-CM

## 2023-06-02 DIAGNOSIS — I82.5Y3 CHRONIC DEEP VEIN THROMBOSIS (DVT) OF PROXIMAL VEIN OF BOTH LOWER EXTREMITIES (H): ICD-10-CM

## 2023-06-02 DIAGNOSIS — Z86.718 PERSONAL HISTORY OF DVT (DEEP VEIN THROMBOSIS): ICD-10-CM

## 2023-06-02 LAB
GLUCOSE BLDC GLUCOMTR-MCNC: 112 MG/DL (ref 70–99)
GLUCOSE BLDC GLUCOMTR-MCNC: 122 MG/DL (ref 70–99)
INR BLD: 2.2 (ref 0.9–1.1)

## 2023-06-02 PROCEDURE — 999N000109 HC STATISTIC OP CR VISIT: Mod: KX

## 2023-06-02 PROCEDURE — 85610 PROTHROMBIN TIME: CPT | Mod: ZL

## 2023-06-02 PROCEDURE — 93798 PHYS/QHP OP CAR RHAB W/ECG: CPT | Mod: KX

## 2023-06-02 PROCEDURE — 36416 COLLJ CAPILLARY BLOOD SPEC: CPT | Mod: ZL

## 2023-06-02 NOTE — PROGRESS NOTES
ANTICOAGULATION MANAGEMENT     Sidney Barriga 89 year old male is on warfarin with therapeutic INR result. (Goal INR 2.0-3.0)    Recent labs: (last 7 days)     06/02/23  1415   INR 2.2*       ASSESSMENT       Source(s): Chart Review and Patient/Caregiver Call       Warfarin doses taken: Warfarin taken as instructed    Diet: No new diet changes identified    Medication/supplement changes: None noted    New illness, injury, or hospitalization: No    Signs or symptoms of bleeding or clotting: No    Previous result: Therapeutic last 2(+) visits    Additional findings: None         PLAN     Recommended plan for no diet, medication or health factor changes affecting INR     Dosing Instructions: Continue your current warfarin dose with next INR in 6 weeks       Summary  As of 6/2/2023    Full warfarin instructions:  2.5 mg every Wed, Sat; 5 mg all other days   Next INR check:  7/14/2023             Telephone call with Sidney who verbalizes understanding and agrees to plan    Lab visit scheduled    Education provided:     None required    Plan made per [facility] anticoagulation protocol    Lizbeth Ely RN  Anticoagulation Clinic  6/2/2023    _______________________________________________________________________     Anticoagulation Episode Summary     Current INR goal:  2.0-3.0   TTR:  76.0 % (1 y)   Target end date:  Indefinite   Send INR reminders to:  [facility]    Indications    History of DVT (deep vein thrombosis)-multiple [Z86.718]  Chronic deep vein thrombosis (DVT) of proximal vein of both lower extremities (H) [I82.5Y3]  Personal history of DVT (deep vein thrombosis) [Z86.718]           Comments:           Anticoagulation Care Providers     Provider Role Specialty Phone number    Zach Arredondo MD Referring Family Medicine [phone]

## 2023-06-05 ENCOUNTER — HOSPITAL ENCOUNTER (OUTPATIENT)
Dept: CARDIAC REHAB | Facility: HOSPITAL | Age: 88
Setting detail: THERAPIES SERIES
Discharge: HOME | End: 2023-06-05
Attending: FAMILY MEDICINE
Payer: MEDICARE

## 2023-06-05 LAB
GLUCOSE BLDC GLUCOMTR-MCNC: 139 MG/DL (ref 70–99)
GLUCOSE BLDC GLUCOMTR-MCNC: 95 MG/DL (ref 70–99)

## 2023-06-05 PROCEDURE — 93798 PHYS/QHP OP CAR RHAB W/ECG: CPT | Mod: KX

## 2023-06-05 PROCEDURE — 999N000109 HC STATISTIC OP CR VISIT: Mod: KX

## 2023-06-12 LAB
GLUCOSE BLDC GLUCOMTR-MCNC: 154 MG/DL (ref 70–99)
GLUCOSE BLDC GLUCOMTR-MCNC: 154 MG/DL (ref 70–99)

## 2023-06-27 ENCOUNTER — TELEPHONE (OUTPATIENT)
Dept: CARDIOLOGY | Facility: OTHER | Age: 88
End: 2023-06-27

## 2023-06-27 DIAGNOSIS — E78.2 MIXED HYPERLIPIDEMIA: ICD-10-CM

## 2023-06-27 DIAGNOSIS — I25.810 ATHEROSCLEROSIS OF CORONARY ARTERY BYPASS GRAFT OF NATIVE HEART WITHOUT ANGINA PECTORIS: ICD-10-CM

## 2023-06-27 DIAGNOSIS — E11.65 TYPE 2 DIABETES MELLITUS WITH HYPERGLYCEMIA, WITHOUT LONG-TERM CURRENT USE OF INSULIN (H): ICD-10-CM

## 2023-06-27 DIAGNOSIS — Z98.890 STATUS POST CORONARY ANGIOGRAM: ICD-10-CM

## 2023-06-27 DIAGNOSIS — Z86.718 HISTORY OF DVT (DEEP VEIN THROMBOSIS): ICD-10-CM

## 2023-06-27 DIAGNOSIS — R07.9 CHEST PAIN, UNSPECIFIED TYPE: ICD-10-CM

## 2023-06-27 RX ORDER — ROSUVASTATIN CALCIUM 20 MG/1
20 TABLET, COATED ORAL
Qty: 30 TABLET | Refills: 3 | Status: SHIPPED | OUTPATIENT
Start: 2023-06-29 | End: 2024-03-27

## 2023-06-27 NOTE — TELEPHONE ENCOUNTER
"Received call from patient. He is only taking his crestor 2 times weekly. According to note Sidney was advised should be taking daily.         Also he would like to know if he can start lasix differently. He stated can I take the  \" 20 mg one day and 40 mg the next and will this be same as taking the 30 mg daily due to when he splits them the 20 mg pill shatters.\"     Please advise.   "

## 2023-06-27 NOTE — TELEPHONE ENCOUNTER
Warfarin 5mg      Last Written Prescription Date:  5/9/2022  Last Fill Quantity: 90,   # refills: 3  Last Office Visit: 4/17/2023  Future Office visit:    Next 5 appointments (look out 90 days)    Sep 07, 2023  9:45 AM  (Arrive by 9:30 AM)  [facility] [address] [facility] [address]  [phone]           Routing refill request to provider for review/approval because:  Drug not on the [facility], [facility] or [facility] refill protocol or controlled substance

## 2023-06-28 RX ORDER — WARFARIN SODIUM 5 MG/1
TABLET ORAL
Qty: 90 TABLET | Refills: 0 | Status: SHIPPED | OUTPATIENT
Start: 2023-06-28 | End: 2023-09-22

## 2023-06-28 NOTE — TELEPHONE ENCOUNTER
Noted, will update the directions on Crestor to twice a week.  Yes, he could try taking Lasix 20 mg alternating with 40 mg every other day.     Dr. Rae

## 2023-06-29 RX ORDER — ROSUVASTATIN CALCIUM 20 MG/1
TABLET, COATED ORAL
Qty: 25 TABLET | Refills: 0 | OUTPATIENT
Start: 2023-06-29

## 2023-07-14 ENCOUNTER — APPOINTMENT (OUTPATIENT)
Dept: ANTICOAGULATION | Facility: OTHER | Age: 88
End: 2023-07-14
Attending: FAMILY MEDICINE
Payer: MEDICARE

## 2023-07-15 ENCOUNTER — HOSPITAL ENCOUNTER (EMERGENCY)
Facility: HOSPITAL | Age: 88
Discharge: HOME | End: 2023-07-15
Attending: PHYSICIAN ASSISTANT | Admitting: PHYSICIAN ASSISTANT
Payer: MEDICARE

## 2023-07-15 ENCOUNTER — APPOINTMENT (OUTPATIENT)
Dept: CT IMAGING | Facility: HOSPITAL | Age: 88
End: 2023-07-15
Attending: STUDENT IN AN ORGANIZED HEALTH CARE EDUCATION/TRAINING PROGRAM
Payer: MEDICARE

## 2023-07-15 VITALS
DIASTOLIC BLOOD PRESSURE: 69 MMHG | TEMPERATURE: 98.7 F | HEART RATE: 72 BPM | HEIGHT: 70 IN | WEIGHT: 158.07 LBS | RESPIRATION RATE: 16 BRPM | BODY MASS INDEX: 22.63 KG/M2 | SYSTOLIC BLOOD PRESSURE: 122 MMHG | OXYGEN SATURATION: 98 %

## 2023-07-15 DIAGNOSIS — R31.0 GROSS HEMATURIA: ICD-10-CM

## 2023-07-15 DIAGNOSIS — N32.89 BLADDER MASS: ICD-10-CM

## 2023-07-15 LAB
ALBUMIN SERPL BCG-MCNC: 4 G/DL (ref 3.5–5.2)
ALBUMIN UR-MCNC: ABNORMAL G/DL
ALP SERPL-CCNC: 54 U/L (ref 40–129)
ALT SERPL W P-5'-P-CCNC: 12 U/L (ref 0–70)
ANION GAP SERPL CALCULATED.3IONS-SCNC: 14 MMOL/L (ref 7–15)
APPEARANCE UR: ABNORMAL
AST SERPL W P-5'-P-CCNC: 21 U/L (ref 0–45)
BASOPHILS # BLD AUTO: 0 10E3/UL (ref 0–0.2)
BASOPHILS NFR BLD AUTO: 0 %
BILIRUB SERPL-MCNC: 0.5 MG/DL
BILIRUB UR QL STRIP: ABNORMAL
BUN SERPL-MCNC: 54.9 MG/DL (ref 8–23)
CALCIUM SERPL-MCNC: 9.9 MG/DL (ref 8.2–9.6)
CHLORIDE SERPL-SCNC: 102 MMOL/L (ref 98–107)
COLOR UR AUTO: ABNORMAL
CREAT SERPL-MCNC: 1.83 MG/DL (ref 0.67–1.17)
DEPRECATED HCO3 PLAS-SCNC: 22 MMOL/L (ref 22–29)
EOSINOPHIL # BLD AUTO: 0.2 10E3/UL (ref 0–0.7)
EOSINOPHIL NFR BLD AUTO: 2 %
ERYTHROCYTE [DISTWIDTH] IN BLOOD BY AUTOMATED COUNT: 13.2 % (ref 10–15)
GFR SERPL CREATININE-BSD FRML MDRD: 34 ML/MIN/1.73M2
GLUCOSE SERPL-MCNC: 91 MG/DL (ref 70–99)
GLUCOSE UR STRIP-MCNC: ABNORMAL MG/DL
HCT VFR BLD AUTO: 33.9 % (ref 40–53)
HGB BLD-MCNC: 11.2 G/DL (ref 13.3–17.7)
HGB UR QL STRIP: ABNORMAL
HOLD SPECIMEN: NORMAL
HOLD SPECIMEN: NORMAL
IMM GRANULOCYTES # BLD: 0 10E3/UL
IMM GRANULOCYTES NFR BLD: 0 %
INR PPP: 3.38 (ref 0.85–1.15)
KETONES UR STRIP-MCNC: ABNORMAL MG/DL
LEUKOCYTE ESTERASE UR QL STRIP: ABNORMAL
LYMPHOCYTES # BLD AUTO: 1.2 10E3/UL (ref 0.8–5.3)
LYMPHOCYTES NFR BLD AUTO: 17 %
MCH RBC QN AUTO: 31.5 PG (ref 26.5–33)
MCHC RBC AUTO-ENTMCNC: 33 G/DL (ref 31.5–36.5)
MCV RBC AUTO: 95 FL (ref 78–100)
MONOCYTES # BLD AUTO: 0.9 10E3/UL (ref 0–1.3)
MONOCYTES NFR BLD AUTO: 12 %
NEUTROPHILS # BLD AUTO: 5 10E3/UL (ref 1.6–8.3)
NEUTROPHILS NFR BLD AUTO: 69 %
NITRATE UR QL: ABNORMAL
NRBC # BLD AUTO: 0 10E3/UL
NRBC BLD AUTO-RTO: 0 /100
PH UR STRIP: ABNORMAL [PH]
PLATELET # BLD AUTO: 95 10E3/UL (ref 150–450)
POTASSIUM SERPL-SCNC: 4.5 MMOL/L (ref 3.4–5.3)
PROT SERPL-MCNC: 7.2 G/DL (ref 6.4–8.3)
RBC # BLD AUTO: 3.56 10E6/UL (ref 4.4–5.9)
RBC URINE: >182 /HPF
SODIUM SERPL-SCNC: 138 MMOL/L (ref 136–145)
SP GR UR STRIP: ABNORMAL
SQUAMOUS EPITHELIAL: 0 /HPF
UROBILINOGEN UR STRIP-MCNC: ABNORMAL MG/DL
WBC # BLD AUTO: 7.3 10E3/UL (ref 4–11)
WBC URINE: >182 /HPF

## 2023-07-15 PROCEDURE — 88108 CYTOPATH CONCENTRATE TECH: CPT | Mod: TC | Performed by: STUDENT IN AN ORGANIZED HEALTH CARE EDUCATION/TRAINING PROGRAM

## 2023-07-15 PROCEDURE — 99214 OFFICE O/P EST MOD 30 MIN: CPT | Performed by: PHYSICIAN ASSISTANT

## 2023-07-15 PROCEDURE — 85610 PROTHROMBIN TIME: CPT | Performed by: PHYSICIAN ASSISTANT

## 2023-07-15 PROCEDURE — 85025 COMPLETE CBC W/AUTO DIFF WBC: CPT | Performed by: PHYSICIAN ASSISTANT

## 2023-07-15 PROCEDURE — 88108 CYTOPATH CONCENTRATE TECH: CPT | Mod: 26 | Performed by: PATHOLOGY

## 2023-07-15 PROCEDURE — 81001 URINALYSIS AUTO W/SCOPE: CPT | Performed by: PHYSICIAN ASSISTANT

## 2023-07-15 PROCEDURE — G1010 CDSM STANSON: HCPCS

## 2023-07-15 PROCEDURE — 250N000011 HC RX IP 250 OP 636: Performed by: RADIOLOGY

## 2023-07-15 PROCEDURE — 36415 COLL VENOUS BLD VENIPUNCTURE: CPT | Performed by: PHYSICIAN ASSISTANT

## 2023-07-15 PROCEDURE — G0463 HOSPITAL OUTPT CLINIC VISIT: HCPCS | Mod: 25

## 2023-07-15 PROCEDURE — 87086 URINE CULTURE/COLONY COUNT: CPT | Performed by: PHYSICIAN ASSISTANT

## 2023-07-15 PROCEDURE — 80053 COMPREHEN METABOLIC PANEL: CPT | Performed by: PHYSICIAN ASSISTANT

## 2023-07-15 RX ORDER — IOPAMIDOL 755 MG/ML
87 INJECTION, SOLUTION INTRAVASCULAR ONCE
Status: COMPLETED | OUTPATIENT
Start: 2023-07-15 | End: 2023-07-15

## 2023-07-15 RX ADMIN — IOPAMIDOL 87 ML: 755 INJECTION, SOLUTION INTRAVENOUS at 13:47

## 2023-07-15 ASSESSMENT — ACTIVITIES OF DAILY LIVING (ADL)
ADLS_ACUITY_SCORE: 35
ADLS_ACUITY_SCORE: 35

## 2023-07-15 NOTE — ED TRIAGE NOTES
Burning and blood with urinating. No fever no pain unless he urinating.  Some clots but able to pass

## 2023-07-15 NOTE — ED TRIAGE NOTES
Pt presents with c/o hematuria and burning with urination. Pt states urine comes out slowly and is passing blood clots. Sx started Thursday evening. Little bit of back pain but denies abdomen pain. States that it happened a week ago but went away right away. No otc meds.

## 2023-07-15 NOTE — DISCHARGE INSTRUCTIONS
It was a pleasure taking care of you today. We saw you for painless blood in urine. We recommend that you hold a dose of warfarin tomorrow for your symptoms. Please follow up with a urologist in 1 week for a recheck.    Please return to the emergency department if you develop symptoms like lower abdominal pain, vomiting, fever, or if your symptoms persist or get worse. Take care. Feel better.     What to expect when you have contrast    During your exam, we will inject  contrast  into your vein or artery. (Contrast is a clear liquid with iodine in it. It shows up on X-rays.)    You may feel warm or hot. You may have a metal taste in your mouth and a slight upset stomach. You may also feel pressure near the kidneys and bladder. These effects will last about 1 to 3 minutes.    Please tell us if you have:   Sneezing    Itching   Hives    Swelling in the face   A hoarse voice   Breathing problems   Other new symptoms    Serious problems are rare.  They may include:   Irregular heartbeat    Seizures   Kidney failure             Tissue damage   Shock     Death    If you have any problems during the exam, we  will treat them right away.    When you get home    Call your hospital if you have any new symptoms in the next 2 days, like hives or swelling. (Phone numbers are at the bottom of this page.) Or call your family doctor.     If you have wheezing or trouble breathing, call 911.    Self-care  -Drink at least 4-8extra glasses of water today and tomorrow.  This reduces the stress on your kidneys.  -Keep taking your regular medicines.    The contrast will pass out of your body in your  Urine(pee). This will happen in the next 24 hours. You  will not feel this. Your urine will not  change color.    If you have kidney problems or take metformin    Drink 4 to 8 large glasses of water for the next  2 days, if you are not on a fluid restriction.    ?Special instructions: see above    I have read and understand the above  information.    Patient Sign Here:______________________________________Date:________Time:______    Staff Sign Here:________________________________________Date:_______Time:______      Radiology Departments:     ?[facility]: [phone] ?[place]: [phone]     ?[place]: [phone] ?[facility]:[phone]      ?[place]: [phone]  ?[place]: [phone]  ?[place]:[phone]    ?[facility]:[phone]  ?[facility]:[phone]

## 2023-07-15 NOTE — ED PROVIDER NOTES
History     Chief Complaint   Patient presents with     Hematuria     HPI  Sidney Barriga is a 89 year old male on chronic anticoagulation with coumadin and plavix, h/o atrial flutter, severe aortic stenosis s/p TAVR 6 months ago at [place], ischemic cardiomyopathy, CAD s/p CABG and stents, h/o DVTS, who presents to urgent care with c/o hematuria and urinary frequency x 3 days. States he is urinating every 1.5 hours and passes a large clot each time. Bladder pressure is relieved with passing the clot. Denies fevers/chills, flank pain or abdominal pain. States he has been feeling lightheaded today. Notes he had hematuria initially about 1.5 weeks ago which lasted 1 day and resolve do it's own. Also, states his INR was elevated last week and was instructed to decrease his dose of coumadin which he did. Mentions he was using a massage chair while getting a pedicure before the initial episode occurred about 1.5 weeks ago.    Allergies:  Allergies   Allergen Reactions     Simvastatin Itching     Isosorbide Nitrate Visual Disturbance     LIGHT HEADED,ALMOST PASSED OUT         Problem List:    Patient Active Problem List    Diagnosis Date Noted     Essential hypertension 04/17/2023     Priority: Medium     S/p TAVR (transcatheter aortic valve replacement), bioprosthetic on 12/6/2022 with a 29 mm IMAN 3 valve 01/30/2023     Priority: Medium     H/O aortic stenosis, severe 01/30/2023     Priority: Medium     Stage 4 chronic kidney disease (H) 12/05/2022     Priority: Medium     Personal history of DVT (deep vein thrombosis) 08/04/2022     Priority: Medium     Atrial flutter, unspecified type (H) 04/13/2022     Priority: Medium     Chronic obstructive pulmonary disease, unspecified COPD type (H) 04/13/2022     Priority: Medium     Atherosclerosis of coronary artery bypass graft of native heart without angina pectoris 11/22/2021     Priority: Medium     Chronic deep vein thrombosis (DVT) of proximal vein of both lower  extremities (H) 09/07/2021     Priority: Medium     Frequent PVCs 04/12/2021     Priority: Medium     Moderate mitral stenosis on his cardiac cath on 1/15/2021 04/12/2021     Priority: Medium     Family history of prostate cancer 03/04/2021     Priority: Medium     Ventricular bigeminy 02/21/2021     Priority: Medium     Irregular heartbeat 02/21/2021     Priority: Medium     Mild pulmonary hypertension (H) 02/15/2021     Priority: Medium     Severe aortic stenosis 02/15/2021     Priority: Medium     Ascending aortic aneurysm (H) 02/11/2021     Priority: Medium     Pulmonary nodules 02/11/2021     Priority: Medium     Stage 3b chronic kidney disease (H) 01/25/2021     Priority: Medium     Regional wall motion abnormality of heart 01/25/2021     Priority: Medium     Status post coronary angiogram x4 on 7/17/2007, 4/20/2014, 2019, and 1/15/2021 01/15/2021     Priority: Medium     Nonrheumatic aortic valve stenosis 12/22/2020     Priority: Medium     Added automatically from request for surgery [id number]       History of coronary artery stent placement on 7/17/2007 x2 stents to the LAD and in 2019 in [place] to unknown vessel 12/07/2020     Priority: Medium     RIVAS (dyspnea on exertion) 12/07/2020     Priority: Medium     Chronic combined systolic and diastolic congestive heart failure (H) 12/07/2020     Priority: Medium     Heart murmur 12/07/2020     Priority: Medium     History of DVT (deep vein thrombosis)-multiple 12/07/2020     Priority: Medium     Presence of IVC filter 12/07/2020     Priority: Medium     Nodule of right lung on CT scan from 5/12/2009 12/07/2020     Priority: Medium     Hypertriglyceridemia 12/07/2020     Priority: Medium     History of tobacco abuse quitting in 1978 12/07/2020     Priority: Medium     H/O cardiac catheterization x3 on 7/17/2007, 4/20/2014, and 2019 12/07/2020     Priority: Medium     Hx of sarcoidosis in 1982 10/15/2020     Priority: Medium     COPD, mild (H) on 11/18/2020  10/15/2020     Priority: Medium     Atherosclerosis of native coronary artery of native heart without angina pectoris 04/29/2014     Priority: Medium     Hypomagnesemia 04/29/2014     Priority: Medium     Ischemic cardiomyopathy 04/29/2014     Priority: Medium     Leukocytosis 04/29/2014     Priority: Medium     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, SVG to first OM and second OM 04/29/2014     Priority: Medium     Type 2 diabetes mellitus with hyperglycemia, without long-term current use of insulin (H) 05/03/2007     Priority: Medium     Essential hypertension, benign 05/03/2007     Priority: Medium     Mixed hyperlipidemia 05/03/2007     Priority: Medium        Past Medical History:    Past Medical History:   Diagnosis Date     Acute thromboembolism of deep veins of lower extremity (H)      Benign essential hypertension      Cancer (H)      Congestive heart failure (H)      COPD (chronic obstructive pulmonary disease) (H)      Coronary atherosclerosis      Osteoarthrosis      Other and unspecified hyperlipidemia      Other specified forms of chronic ischemic heart disease      Sarcoidosis      Type 2 or unspecified type diabetes mellitus        Past Surgical History:    Past Surgical History:   Procedure Laterality Date     ANGIOPLASTY  01/01/2019    stents     CARDIAC SURGERY  01/01/2014    heart bypass     COLONOSCOPY  01/01/2019     CV CORONARY ANGIOGRAM N/A 1/15/2021    Procedure: CV CORONARY ANGIOGRAM;  Surgeon: Charlie Harris MD;  [facility]     CV LEFT HEART CATH N/A 1/15/2021    Procedure: Left Heart Cath;  Surgeon: Charlie Harris MD;  Location: [facility]     CV RIGHT HEART CATH MEASUREMENTS RECORDED N/A 1/15/2021    Procedure: CV RIGHT HEART CATH;  Surgeon: Charlie Harris MD;  Location: [facility]     IR IVC FILTER REMOVAL  5/2/2014     OTHER SURGICAL HISTORY      [id number],BIOPSY LUNG OR [facility]       Family  History:    Family History   Problem Relation Age of Onset     Myocardial Infarction Father      Coronary Artery Disease Father      Alcoholism Mother      Hearing Loss Mother      Hyperlipidemia Mother      Coronary Artery Disease Brother      Cancer Brother      Hyperlipidemia Brother      Myocardial Infarction Brother      Obesity Brother      Kidney Disease Brother      Prostate Cancer Brother      Asthma Brother      Breast Cancer Sister      Cerebrovascular Disease Maternal Grandmother      Diabetes Paternal Grandmother      Hypertension No family hx of      Colon Cancer No family hx of      Anesthesia Reaction No family hx of      Thyroid Disease No family hx of      Genetic Disorder No family hx of        Social History:  Marital Status:   [5]  Social History     Tobacco Use     Smoking status: Former     Packs/day: 0.50     Years: 24.00     Pack years: 12.00     Types: Cigarettes     Start date: 1954     Quit date: 1978     Years since quittin.5     Smokeless tobacco: Never   Substance Use Topics     Alcohol use: Yes     Alcohol/week: 5.0 standard drinks of alcohol     Types: 3 Cans of beer, 2 Shots of liquor per week     Drug use: Unknown     Types: Other     Comment: Drug use: No        Medications:    warfarin ANTICOAGULANT (COUMADIN) 5 MG tablet  albuterol (PROAIR HFA/PROVENTIL HFA/VENTOLIN HFA) 108 (90 Base) MCG/ACT inhaler  amoxicillin (AMOXIL) 500 MG capsule  blood glucose (NO BRAND SPECIFIED) lancets standard  blood glucose (NO BRAND SPECIFIED) test strip  blood glucose monitoring (NO BRAND SPECIFIED) meter device kit  Calcium Carbonate-Vit D-Min (CALCIUM 600+D PLUS MINERALS) 600-400 MG-UNIT TABS  carvedilol (COREG) 25 MG tablet  CINNAMON PO  clopidogrel (PLAVIX) 75 MG tablet  Coenzyme Q10 (CO Q-10) 200 MG CAPS  cyanocobalamin (VITAMIN B-12) 500 MCG SUBL sublingual tablet  ezetimibe (ZETIA) 10 MG tablet  furosemide (LASIX) 20 MG tablet  lisinopril (ZESTRIL) 10 MG tablet  LUTEIN  "PO  nitroGLYcerin (NITROSTAT) 0.4 MG sublingual tablet  Omega-3 Fatty Acids (FISH OIL) 1200 MG capsule  predniSONE (DELTASONE) 10 MG tablet  rosuvastatin (CRESTOR) 20 MG tablet  vitamin C (ASCORBIC ACID) 500 MG tablet  Vitamin D3 (CHOLECALCIFEROL) 125 MCG (5000 UT) tablet          Review of Systems   All other systems reviewed and are negative.      Physical Exam   BP: 122/69  Pulse: 72  Temp: 98.7  F (37.1  C)  Resp: 16  Height: 177.8 cm (5' 10\")  Weight: 71.7 kg (158 lb 1.1 oz)  SpO2: 98 %      Physical Exam  Vitals and nursing note reviewed.   Constitutional:       General: He is not in acute distress.     Appearance: He is well-developed. He is not ill-appearing, toxic-appearing or diaphoretic.   HENT:      Head: Normocephalic and atraumatic.      Nose: Nose normal.      Mouth/Throat:      Pharynx: No oropharyngeal exudate.   Eyes:      General: No scleral icterus.        Right eye: No discharge.         Left eye: No discharge.      Conjunctiva/sclera: Conjunctivae normal.      Pupils: Pupils are equal, round, and reactive to light.   Cardiovascular:      Rate and Rhythm: Normal rate and regular rhythm.      Pulses: Normal pulses.      Heart sounds: Normal heart sounds. No murmur heard.     No friction rub. No gallop.   Pulmonary:      Effort: Pulmonary effort is normal. No respiratory distress.      Breath sounds: Normal breath sounds. No wheezing or rales.   Abdominal:      General: Bowel sounds are normal. There is no distension.      Palpations: Abdomen is soft. There is no mass.      Tenderness: There is no abdominal tenderness. There is no right CVA tenderness, left CVA tenderness, guarding or rebound.   Musculoskeletal:         General: Normal range of motion.      Cervical back: Normal range of motion and neck supple.   Lymphadenopathy:      Cervical: No cervical adenopathy.   Skin:     General: Skin is warm and dry.      Coloration: Skin is not pale.      Findings: No erythema or rash.   Neurological: "      Mental Status: He is alert and oriented to person, place, and time.      Cranial Nerves: No cranial nerve deficit.      Coordination: Coordination normal.   Psychiatric:         Behavior: Behavior normal.         Thought Content: Thought content normal.         Judgment: Judgment normal.         ED Course              ED Course as of 07/15/23 2046   Sat Jul 15, 2023   1301 Signout received.  Reevaluated patient, and he is reporting painless hematuria.  He has no flank pain.  He denies any recent weight loss.  He reports a distant history of tobacco use.  Given the painless nature of his hematuria, will plan for CT urogram to further evaluate.   1441 CT Urogram wo & w Contrast  IMPRESSION: Bladder mass posteriorly on the right suspicious for malignancy.    Cholelithiasis   1450 Re-evaluated the patient, and discussed with him the CT urogram that was strongly concerning for malignancy.  Cytology was added to the previously ordered urinary studies.  Advise close follow-up with urology.  He was agreeable to this.  All questions and concerns otherwise addressed and answered.     Procedures           Results for orders placed or performed during the hospital encounter of 07/15/23 (from the past 24 hour(s))   UA with Microscopic reflex to Culture    Specimen: Urine, Midstream   Result Value Ref Range    Color Urine Dark Red (A) Colorless, Straw, Light Yellow, Yellow    Appearance Urine Cloudy (A) Clear    Glucose Urine      Bilirubin Urine      Ketones Urine      Specific Gravity Urine      Blood Urine      pH Urine      Protein Albumin Urine      Urobilinogen Urine      Nitrite Urine      Leukocyte Esterase Urine      RBC Urine >182 (H) <=2 /HPF    WBC Urine >182 (H) <=5 /HPF    Squamous Epithelials Urine 0 <=1 /HPF    Narrative    Urine Culture ordered based on laboratory criteria   CBC with platelets differential    Narrative    The following orders were created for panel order CBC with platelets  differential.  Procedure                               Abnormality         Status                     ---------                               -----------         ------                     CBC with platelets and d...[[id number]]  Abnormal            Final result                 Please view results for these tests on the individual orders.   Comprehensive metabolic panel   Result Value Ref Range    Sodium 138 136 - 145 mmol/L    Potassium 4.5 3.4 - 5.3 mmol/L    Chloride 102 98 - 107 mmol/L    Carbon Dioxide (CO2) 22 22 - 29 mmol/L    Anion Gap 14 7 - 15 mmol/L    Urea Nitrogen 54.9 (H) 8.0 - 23.0 mg/dL    Creatinine 1.83 (H) 0.67 - 1.17 mg/dL    Calcium 9.9 (H) 8.2 - 9.6 mg/dL    Glucose 91 70 - 99 mg/dL    Alkaline Phosphatase 54 40 - 129 U/L    AST 21 0 - 45 U/L    ALT 12 0 - 70 U/L    Protein Total 7.2 6.4 - 8.3 g/dL    Albumin 4.0 3.5 - 5.2 g/dL    Bilirubin Total 0.5 <=1.2 mg/dL    GFR Estimate 34 (L) >60 mL/min/1.73m2   INR   Result Value Ref Range    INR 3.38 (H) 0.85 - 1.15   CBC with platelets and differential   Result Value Ref Range    WBC Count 7.3 4.0 - 11.0 10e3/uL    RBC Count 3.56 (L) 4.40 - 5.90 10e6/uL    Hemoglobin 11.2 (L) 13.3 - 17.7 g/dL    Hematocrit 33.9 (L) 40.0 - 53.0 %    MCV 95 78 - 100 fL    MCH 31.5 26.5 - 33.0 pg    MCHC 33.0 31.5 - 36.5 g/dL    RDW 13.2 10.0 - 15.0 %    Platelet Count 95 (L) 150 - 450 10e3/uL    % Neutrophils 69 %    % Lymphocytes 17 %    % Monocytes 12 %    % Eosinophils 2 %    % Basophils 0 %    % Immature Granulocytes 0 %    NRBCs per 100 WBC 0 <1 /100    Absolute Neutrophils 5.0 1.6 - 8.3 10e3/uL    Absolute Lymphocytes 1.2 0.8 - 5.3 10e3/uL    Absolute Monocytes 0.9 0.0 - 1.3 10e3/uL    Absolute Eosinophils 0.2 0.0 - 0.7 10e3/uL    Absolute Basophils 0.0 0.0 - 0.2 10e3/uL    Absolute Immature Granulocytes 0.0 <=0.4 10e3/uL    Absolute NRBCs 0.0 10e3/uL   Extra Tube    Narrative    The following orders were created for panel order Extra Tube.  Procedure                                Abnormality         Status                     ---------                               -----------         ------                     Extra Red Top Tube[[id number]]                               Final result               Extra Heparinized Syringe[[id number]]                        Final result                 Please view results for these tests on the individual orders.   Extra Red Top Tube   Result Value Ref Range    Hold Specimen JIC    Extra Heparinized Syringe   Result Value Ref Range    Hold Specimen JIC    CT Urogram wo & w Contrast    Narrative    PROCEDURE: CT UROGRAM WO & W CONTRAST 7/15/2023 1:52 PM    HISTORY: Painless hematuria; family history of prostate cancer    COMPARISONS: None.    Meds/Dose Given: ISOVUE 370  87mL    TECHNIQUE: CT scan of the abdomen and pelvis both with and without IV  contrast    FINDINGS: There is a calcific plaque pleural plaquing at the lung  bases.    As a small low-density lesion in the right lobe of the liver possibly  a cyst. Calcified gallstones are seen. There is no biliary ductal  enlargement.    The spleen and pancreas appear normal. The adrenal glands are normal.    There are benign appearing cysts seen in the left kidney. There are no  renal or ureteric calculi. Calyces are delicate and nondisplaced.  Ureters show no evidence of obstruction and follow normal course  bladder. There is abnormal bladder wall thickening focally posteriorly  in the bladder. 3.4 cm. The possibility of a bladder tumor should be  considered. Prostate is enlarged.    The periaortic lymph nodes are normal in caliber.    No intraperitoneal masses or inflammatory changes are noted. The  rectum appears normal. A graft or spondylolisthesis of L4 on L5.         Impression    IMPRESSION: Bladder mass posteriorly on the right suspicious for  malignancy.    Cholelithiasis    NEO PICKETT MD         SYSTEM ID:  [id number]       Medications   sodium chloride (PF) 0.9% PF flush 140 mL  (140 mLs Intravenous $Given 7/15/23 1347)   iopamidol (ISOVUE-370) solution 87 mL (87 mLs Intravenous $Given 7/15/23 1347)       Assessments & Plan (with Medical Decision Making)   Pt is hemodynamically stable. He is in NAD. He has gross hematuria with large clots visible. Post void bladder scan with 168ccs. UA positive for UTI as well as the gross hematuria as above. Discussed case with Dr. Napier in ED and given chronic anticoagulation on coumadin and plavix and degree of hematuria with large clots passing every 1.5 hours, he agrees to accept to ED for further work up. CBC, CMP, INR pending. Pt was transferred to room 1 in ED in stable condition. Care was handed over to Dr. Napier. See ED course above.     I have reviewed the nursing notes.    I have reviewed the findings, diagnosis, plan and need for follow up with the patient.    Discharge Medication List as of 7/15/2023  2:54 PM          Final diagnoses:   Bladder mass   Gross hematuria       7/15/2023   [facility]

## 2023-07-16 ENCOUNTER — HOSPITAL ENCOUNTER (INPATIENT)
Facility: HOSPITAL | Age: 88
LOS: 1 days | Discharge: ANOTHER HOSPITAL | DRG: 699 | End: 2023-07-17
Attending: STUDENT IN AN ORGANIZED HEALTH CARE EDUCATION/TRAINING PROGRAM | Admitting: INTERNAL MEDICINE
Payer: COMMERCIAL

## 2023-07-16 DIAGNOSIS — N32.89 BLADDER MASS: ICD-10-CM

## 2023-07-16 DIAGNOSIS — R31.0 GROSS HEMATURIA: ICD-10-CM

## 2023-07-16 PROBLEM — Z86.718 HISTORY OF DVT (DEEP VEIN THROMBOSIS): Status: ACTIVE | Noted: 2020-12-07

## 2023-07-16 PROBLEM — J44.9 COPD, MILD (H): Status: ACTIVE | Noted: 2020-10-15

## 2023-07-16 LAB
ANION GAP SERPL CALCULATED.3IONS-SCNC: 14 MMOL/L (ref 7–15)
BUN SERPL-MCNC: 62.4 MG/DL (ref 8–23)
CALCIUM SERPL-MCNC: 9.9 MG/DL (ref 8.2–9.6)
CHLORIDE SERPL-SCNC: 101 MMOL/L (ref 98–107)
CREAT SERPL-MCNC: 2.24 MG/DL (ref 0.67–1.17)
DEPRECATED HCO3 PLAS-SCNC: 22 MMOL/L (ref 22–29)
ERYTHROCYTE [DISTWIDTH] IN BLOOD BY AUTOMATED COUNT: 13.3 % (ref 10–15)
GFR SERPL CREATININE-BSD FRML MDRD: 27 ML/MIN/1.73M2
GLUCOSE SERPL-MCNC: 139 MG/DL (ref 70–99)
HCT VFR BLD AUTO: 31.6 % (ref 40–53)
HGB BLD-MCNC: 10.5 G/DL (ref 13.3–17.7)
HOLD SPECIMEN: 1
HOLD SPECIMEN: NORMAL
INR PPP: 3.47 (ref 0.85–1.15)
MCH RBC QN AUTO: 31.3 PG (ref 26.5–33)
MCHC RBC AUTO-ENTMCNC: 33.2 G/DL (ref 31.5–36.5)
MCV RBC AUTO: 94 FL (ref 78–100)
PLATELET # BLD AUTO: 103 10E3/UL (ref 150–450)
POTASSIUM SERPL-SCNC: 4.8 MMOL/L (ref 3.4–5.3)
RBC # BLD AUTO: 3.36 10E6/UL (ref 4.4–5.9)
SODIUM SERPL-SCNC: 137 MMOL/L (ref 136–145)
WBC # BLD AUTO: 10.9 10E3/UL (ref 4–11)

## 2023-07-16 PROCEDURE — 250N000011 HC RX IP 250 OP 636: Performed by: STUDENT IN AN ORGANIZED HEALTH CARE EDUCATION/TRAINING PROGRAM

## 2023-07-16 PROCEDURE — 3E1K78Z IRRIGATION OF GENITOURINARY TRACT USING IRRIGATING SUBSTANCE, VIA NATURAL OR ARTIFICIAL OPENING: ICD-10-PCS | Performed by: STUDENT IN AN ORGANIZED HEALTH CARE EDUCATION/TRAINING PROGRAM

## 2023-07-16 PROCEDURE — 96376 TX/PRO/DX INJ SAME DRUG ADON: CPT

## 2023-07-16 PROCEDURE — 80048 BASIC METABOLIC PNL TOTAL CA: CPT | Performed by: STUDENT IN AN ORGANIZED HEALTH CARE EDUCATION/TRAINING PROGRAM

## 2023-07-16 PROCEDURE — 258N000001 HC RX 258: Performed by: STUDENT IN AN ORGANIZED HEALTH CARE EDUCATION/TRAINING PROGRAM

## 2023-07-16 PROCEDURE — 99285 EMERGENCY DEPT VISIT HI MDM: CPT | Mod: 25

## 2023-07-16 PROCEDURE — 51702 INSERT TEMP BLADDER CATH: CPT

## 2023-07-16 PROCEDURE — 36415 COLL VENOUS BLD VENIPUNCTURE: CPT | Performed by: STUDENT IN AN ORGANIZED HEALTH CARE EDUCATION/TRAINING PROGRAM

## 2023-07-16 PROCEDURE — 51798 US URINE CAPACITY MEASURE: CPT

## 2023-07-16 PROCEDURE — 85610 PROTHROMBIN TIME: CPT | Performed by: STUDENT IN AN ORGANIZED HEALTH CARE EDUCATION/TRAINING PROGRAM

## 2023-07-16 PROCEDURE — 99223 1ST HOSP IP/OBS HIGH 75: CPT | Mod: AI | Performed by: INTERNAL MEDICINE

## 2023-07-16 PROCEDURE — 85027 COMPLETE CBC AUTOMATED: CPT | Performed by: STUDENT IN AN ORGANIZED HEALTH CARE EDUCATION/TRAINING PROGRAM

## 2023-07-16 PROCEDURE — 120N000001 HC R&B MED SURG/OB

## 2023-07-16 PROCEDURE — 96374 THER/PROPH/DIAG INJ IV PUSH: CPT

## 2023-07-16 PROCEDURE — 250N000011 HC RX IP 250 OP 636

## 2023-07-16 PROCEDURE — 250N000013 HC RX MED GY IP 250 OP 250 PS 637: Performed by: STUDENT IN AN ORGANIZED HEALTH CARE EDUCATION/TRAINING PROGRAM

## 2023-07-16 PROCEDURE — 250N000009 HC RX 250: Performed by: STUDENT IN AN ORGANIZED HEALTH CARE EDUCATION/TRAINING PROGRAM

## 2023-07-16 PROCEDURE — 99285 EMERGENCY DEPT VISIT HI MDM: CPT | Performed by: STUDENT IN AN ORGANIZED HEALTH CARE EDUCATION/TRAINING PROGRAM

## 2023-07-16 RX ORDER — ONDANSETRON 4 MG/1
4 TABLET, ORALLY DISINTEGRATING ORAL EVERY 6 HOURS PRN
Status: DISCONTINUED | OUTPATIENT
Start: 2023-07-16 | End: 2023-07-17 | Stop reason: HOSPADM

## 2023-07-16 RX ORDER — CEFTRIAXONE SODIUM 1 G/50ML
INJECTION, SOLUTION INTRAVENOUS
Status: COMPLETED
Start: 2023-07-16 | End: 2023-07-17

## 2023-07-16 RX ORDER — HYDROMORPHONE HYDROCHLORIDE 1 MG/ML
0.5 INJECTION, SOLUTION INTRAMUSCULAR; INTRAVENOUS; SUBCUTANEOUS
Status: DISCONTINUED | OUTPATIENT
Start: 2023-07-16 | End: 2023-07-16

## 2023-07-16 RX ORDER — ACETAMINOPHEN 325 MG/1
650 TABLET ORAL EVERY 6 HOURS PRN
Status: DISCONTINUED | OUTPATIENT
Start: 2023-07-16 | End: 2023-07-17

## 2023-07-16 RX ORDER — HYDROMORPHONE HYDROCHLORIDE 1 MG/ML
0.5 INJECTION, SOLUTION INTRAMUSCULAR; INTRAVENOUS; SUBCUTANEOUS ONCE
Status: COMPLETED | OUTPATIENT
Start: 2023-07-16 | End: 2023-07-16

## 2023-07-16 RX ORDER — ONDANSETRON 2 MG/ML
4 INJECTION INTRAMUSCULAR; INTRAVENOUS EVERY 6 HOURS PRN
Status: DISCONTINUED | OUTPATIENT
Start: 2023-07-16 | End: 2023-07-17 | Stop reason: HOSPADM

## 2023-07-16 RX ORDER — LIDOCAINE HYDROCHLORIDE 20 MG/ML
JELLY TOPICAL ONCE
Status: DISCONTINUED | OUTPATIENT
Start: 2023-07-16 | End: 2023-07-16

## 2023-07-16 RX ORDER — LIDOCAINE HYDROCHLORIDE 20 MG/ML
JELLY TOPICAL ONCE
Status: COMPLETED | OUTPATIENT
Start: 2023-07-16 | End: 2023-07-16

## 2023-07-16 RX ORDER — ACETAMINOPHEN 650 MG/1
650 SUPPOSITORY RECTAL EVERY 6 HOURS PRN
Status: DISCONTINUED | OUTPATIENT
Start: 2023-07-16 | End: 2023-07-17

## 2023-07-16 RX ORDER — LISINOPRIL 10 MG/1
10 TABLET ORAL DAILY
Status: DISCONTINUED | OUTPATIENT
Start: 2023-07-17 | End: 2023-07-17 | Stop reason: HOSPADM

## 2023-07-16 RX ORDER — CARVEDILOL 12.5 MG/1
25 TABLET ORAL 2 TIMES DAILY WITH MEALS
Status: DISCONTINUED | OUTPATIENT
Start: 2023-07-17 | End: 2023-07-17 | Stop reason: HOSPADM

## 2023-07-16 RX ORDER — LIDOCAINE 40 MG/G
CREAM TOPICAL
Status: DISCONTINUED | OUTPATIENT
Start: 2023-07-16 | End: 2023-07-17 | Stop reason: HOSPADM

## 2023-07-16 RX ORDER — CLOPIDOGREL BISULFATE 75 MG/1
75 TABLET ORAL DAILY
Status: DISCONTINUED | OUTPATIENT
Start: 2023-07-17 | End: 2023-07-17 | Stop reason: HOSPADM

## 2023-07-16 RX ORDER — HYDROMORPHONE HYDROCHLORIDE 1 MG/ML
0.3 INJECTION, SOLUTION INTRAMUSCULAR; INTRAVENOUS; SUBCUTANEOUS
Status: DISCONTINUED | OUTPATIENT
Start: 2023-07-16 | End: 2023-07-17

## 2023-07-16 RX ORDER — ALBUTEROL SULFATE 90 UG/1
1-2 AEROSOL, METERED RESPIRATORY (INHALATION) EVERY 4 HOURS PRN
Status: DISCONTINUED | OUTPATIENT
Start: 2023-07-16 | End: 2023-07-17 | Stop reason: HOSPADM

## 2023-07-16 RX ORDER — CEFTRIAXONE SODIUM 1 G/50ML
1 INJECTION, SOLUTION INTRAVENOUS EVERY 24 HOURS
Status: DISCONTINUED | OUTPATIENT
Start: 2023-07-16 | End: 2023-07-17 | Stop reason: HOSPADM

## 2023-07-16 RX ADMIN — SODIUM CHLORIDE 3000 ML: 900 IRRIGANT IRRIGATION at 17:52

## 2023-07-16 RX ADMIN — LIDOCAINE HYDROCHLORIDE: 20 JELLY TOPICAL at 14:16

## 2023-07-16 RX ADMIN — CEFTRIAXONE SODIUM 1 G: 1 INJECTION, SOLUTION INTRAVENOUS at 23:40

## 2023-07-16 RX ADMIN — Medication 2 MG: at 15:20

## 2023-07-16 RX ADMIN — SODIUM CHLORIDE 3000 ML: 900 IRRIGANT IRRIGATION at 15:19

## 2023-07-16 RX ADMIN — HYDROMORPHONE HYDROCHLORIDE 0.5 MG: 1 INJECTION, SOLUTION INTRAMUSCULAR; INTRAVENOUS; SUBCUTANEOUS at 15:19

## 2023-07-16 RX ADMIN — HYDROMORPHONE HYDROCHLORIDE 0.5 MG: 1 INJECTION, SOLUTION INTRAMUSCULAR; INTRAVENOUS; SUBCUTANEOUS at 19:08

## 2023-07-16 RX ADMIN — SODIUM CHLORIDE 3000 ML: 900 IRRIGANT IRRIGATION at 18:49

## 2023-07-16 ASSESSMENT — ACTIVITIES OF DAILY LIVING (ADL)
ADLS_ACUITY_SCORE: 24
ADLS_ACUITY_SCORE: 35

## 2023-07-16 NOTE — ED TRIAGE NOTES
Pt states that at about 1100 he started passing blood clots every 5 minutes from his penis. Pt states that it is now burning badly and hurts.

## 2023-07-16 NOTE — ED PROVIDER NOTES
History     Chief Complaint   Patient presents with     Abdominal Pain     HPI  Sidney Barriga is a 89 year old male with a history of type 2 diabetes, hypertension, CABG, sarcoidosis, coronary artery stent, 3B CKD, and TAVR presenting for concerns regarding bloody urine and penile pain.  The patient reports that this started earlier today.  He was seen and evaluated in the emergency department yesterday, and had CT urography that demonstrated a concerning bladder mass.    He had gross hematuria at that time, but was not describing any suprapubic pain.  He was able to void multiple times.  He reports that since then he has had blood coming out of of his penis, and associated pain with this.  He also reports lower abdominal pain.  He denies any nausea or vomiting.  No recent fevers or chills.  No lightheadedness or shortness of breath.    Allergies:  Allergies   Allergen Reactions     Simvastatin Itching     Isosorbide Nitrate Visual Disturbance     LIGHT HEADED,ALMOST PASSED OUT       Problem List:    Patient Active Problem List    Diagnosis Date Noted     Essential hypertension 04/17/2023     Priority: Medium     S/p TAVR (transcatheter aortic valve replacement), bioprosthetic on 12/6/2022 with a 29 mm IMAN 3 valve 01/30/2023     Priority: Medium     H/O aortic stenosis, severe 01/30/2023     Priority: Medium     Stage 4 chronic kidney disease (H) 12/05/2022     Priority: Medium     Personal history of DVT (deep vein thrombosis) 08/04/2022     Priority: Medium     Atrial flutter, unspecified type (H) 04/13/2022     Priority: Medium     Chronic obstructive pulmonary disease, unspecified COPD type (H) 04/13/2022     Priority: Medium     Atherosclerosis of coronary artery bypass graft of native heart without angina pectoris 11/22/2021     Priority: Medium     Chronic deep vein thrombosis (DVT) of proximal vein of both lower extremities (H) 09/07/2021     Priority: Medium     Frequent PVCs 04/12/2021      Priority: Medium     Moderate mitral stenosis on his cardiac cath on 1/15/2021 04/12/2021     Priority: Medium     Family history of prostate cancer 03/04/2021     Priority: Medium     Ventricular bigeminy 02/21/2021     Priority: Medium     Irregular heartbeat 02/21/2021     Priority: Medium     Mild pulmonary hypertension (H) 02/15/2021     Priority: Medium     Severe aortic stenosis 02/15/2021     Priority: Medium     Ascending aortic aneurysm (H) 02/11/2021     Priority: Medium     Pulmonary nodules 02/11/2021     Priority: Medium     Stage 3b chronic kidney disease (H) 01/25/2021     Priority: Medium     Regional wall motion abnormality of heart 01/25/2021     Priority: Medium     Status post coronary angiogram x4 on 7/17/2007, 4/20/2014, 2019, and 1/15/2021 01/15/2021     Priority: Medium     Nonrheumatic aortic valve stenosis 12/22/2020     Priority: Medium     Added automatically from request for surgery [id number]       History of coronary artery stent placement on 7/17/2007 x2 stents to the LAD and in 2019 in [place] to unknown vessel 12/07/2020     Priority: Medium     RIVAS (dyspnea on exertion) 12/07/2020     Priority: Medium     Chronic combined systolic and diastolic congestive heart failure (H) 12/07/2020     Priority: Medium     Heart murmur 12/07/2020     Priority: Medium     History of DVT (deep vein thrombosis)-multiple 12/07/2020     Priority: Medium     Presence of IVC filter 12/07/2020     Priority: Medium     Nodule of right lung on CT scan from 5/12/2009 12/07/2020     Priority: Medium     Hypertriglyceridemia 12/07/2020     Priority: Medium     History of tobacco abuse quitting in 1978 12/07/2020     Priority: Medium     H/O cardiac catheterization x3 on 7/17/2007, 4/20/2014, and 2019 12/07/2020     Priority: Medium     Hx of sarcoidosis in 1982 10/15/2020     Priority: Medium     COPD, mild (H) on 11/18/2020 10/15/2020     Priority: Medium     Atherosclerosis of native coronary artery of native  heart without angina pectoris 04/29/2014     Priority: Medium     Hypomagnesemia 04/29/2014     Priority: Medium     Ischemic cardiomyopathy 04/29/2014     Priority: Medium     Leukocytosis 04/29/2014     Priority: Medium     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, SVG to first OM and second OM 04/29/2014     Priority: Medium     Type 2 diabetes mellitus with hyperglycemia, without long-term current use of insulin (H) 05/03/2007     Priority: Medium     Essential hypertension, benign 05/03/2007     Priority: Medium     Mixed hyperlipidemia 05/03/2007     Priority: Medium      Past Medical History:    Past Medical History:   Diagnosis Date     Acute thromboembolism of deep veins of lower extremity (H)      Benign essential hypertension      Cancer (H)      Congestive heart failure (H)      COPD (chronic obstructive pulmonary disease) (H)      Coronary atherosclerosis      Osteoarthrosis      Other and unspecified hyperlipidemia      Other specified forms of chronic ischemic heart disease      Sarcoidosis      Type 2 or unspecified type diabetes mellitus      Past Surgical History:    Past Surgical History:   Procedure Laterality Date     ANGIOPLASTY  01/01/2019    stents     CARDIAC SURGERY  01/01/2014    heart bypass     COLONOSCOPY  01/01/2019     CV CORONARY ANGIOGRAM N/A 1/15/2021    Procedure: CV CORONARY ANGIOGRAM;  Surgeon: Charlie Harris MD;  Location: [facility]     CV LEFT HEART CATH N/A 1/15/2021    Procedure: Left Heart Cath;  Surgeon: Charlie Harris MD;  Location: [facility]     CV RIGHT HEART CATH MEASUREMENTS RECORDED N/A 1/15/2021    Procedure: CV RIGHT HEART CATH;  Surgeon: Charlie Harris MD;  Location: [facility]     IR IVC FILTER REMOVAL  5/2/2014     OTHER SURGICAL HISTORY      [id number],BIOPSY LUNG OR [facility]     Family History:    Family History   Problem Relation Age of Onset     Myocardial Infarction Father       Coronary Artery Disease Father      Alcoholism Mother      Hearing Loss Mother      Hyperlipidemia Mother      Coronary Artery Disease Brother      Cancer Brother      Hyperlipidemia Brother      Myocardial Infarction Brother      Obesity Brother      Kidney Disease Brother      Prostate Cancer Brother      Asthma Brother      Breast Cancer Sister      Cerebrovascular Disease Maternal Grandmother      Diabetes Paternal Grandmother      Hypertension No family hx of      Colon Cancer No family hx of      Anesthesia Reaction No family hx of      Thyroid Disease No family hx of      Genetic Disorder No family hx of      Social History:  Marital Status:   [5]  Social History     Tobacco Use     Smoking status: Former     Packs/day: 0.50     Years: 24.00     Pack years: 12.00     Types: Cigarettes     Start date: 1954     Quit date: 1978     Years since quittin.5     Smokeless tobacco: Never   Substance Use Topics     Alcohol use: Yes     Alcohol/week: 5.0 standard drinks of alcohol     Types: 3 Cans of beer, 2 Shots of liquor per week     Drug use: Unknown     Types: Other     Comment: Drug use: No      Medications:    warfarin ANTICOAGULANT (COUMADIN) 5 MG tablet  albuterol (PROAIR HFA/PROVENTIL HFA/VENTOLIN HFA) 108 (90 Base) MCG/ACT inhaler  amoxicillin (AMOXIL) 500 MG capsule  blood glucose (NO BRAND SPECIFIED) lancets standard  blood glucose (NO BRAND SPECIFIED) test strip  blood glucose monitoring (NO BRAND SPECIFIED) meter device kit  Calcium Carbonate-Vit D-Min (CALCIUM 600+D PLUS MINERALS) 600-400 MG-UNIT TABS  carvedilol (COREG) 25 MG tablet  CINNAMON PO  clopidogrel (PLAVIX) 75 MG tablet  Coenzyme Q10 (CO Q-10) 200 MG CAPS  cyanocobalamin (VITAMIN B-12) 500 MCG SUBL sublingual tablet  ezetimibe (ZETIA) 10 MG tablet  furosemide (LASIX) 20 MG tablet  lisinopril (ZESTRIL) 10 MG tablet  LUTEIN PO  nitroGLYcerin (NITROSTAT) 0.4 MG sublingual tablet  Omega-3 Fatty Acids (FISH OIL) 1200 MG  capsule  predniSONE (DELTASONE) 10 MG tablet  rosuvastatin (CRESTOR) 20 MG tablet  vitamin C (ASCORBIC ACID) 500 MG tablet  Vitamin D3 (CHOLECALCIFEROL) 125 MCG (5000 UT) tablet    Review of Systems   Perform see HPIed,     Physical Exam   BP: 133/92  Pulse: 79  Temp: 97.8  F (36.6  C)  Resp: 16  SpO2: 99 %    Physical Exam  General: Slightly uncomfortable upon entering the room.  Grimacing of pain at times.  Head: No trauma.  ENT: Moist mucosa.  Eyes: Sclera anicteric, conjunctiva are without pallor with preservation of the anterior rim.  Neck: No anterior adenopathy.  Cardiovascular: Regular rate and rhythm.   Pulmonary: Unlabored respirations, clear to auscultation bilaterally.  Abdomen: Soft, with tenderness to palpation of the suprapubic abdomen.  : There is gross blood at the penile meatus.  The glans is slightly tender to the touch.  Skin: Warm, dry, without diaphoresis.  Extremities: No peripheral edema.  Neuro: Alert and appropriate.  Answers questions appropriately.  Moves all 4 extremities continuously and equally.    ED Course     ED Course as of 07/16/23 1909   Sun Jul 16, 2023   1439 Patient is having a blockage proximally which was troubleshot with aspiration of bloody tinge through the catheter.    1529 Re-evaluated patient. He is still uncomfortable.  We will plan for hydromorphone for additional pain control.  Discussed the case with urology at [facility] who does not suspect that the patient will require any urological procedure at this time, and suspects that CBI will be sufficient at irrigating the patient's hematuria.  They recommended continuous bladder irrigation, and consideration of admission if indicated.   1738 Patient with persistent red tinged urine. Will continue CBI.   1853 Reevaluate patient.  He is still having pain with regular passage of clots, and is now on his third bag of 3 L, and may require admission for ongoing continuous bladder irrigation.  Hospitalist paged for  admission.   1908 Patient signed out pending his ability to tolerate CBI.     Results for orders placed or performed during the hospital encounter of 07/16/23 (from the past 24 hour(s))   [place] [facility]    Narrative    The following orders were created for panel order [place] [facility].  Procedure                               Abnormality         Status                     ---------                               -----------         ------                     Extra Blue Top Tube[[id number]]                              Final result               Extra Red Top Tube[[id number]]                               Final result               Extra Green Top (Lithium...[[id number]]                      Final result               Extra Purple Top Tube[[id number]]                            Final result               Extra Heparinized Syringe[[id number]]                        Final result                 Please view results for these tests on the individual orders.   Extra Blue Top Tube   Result Value Ref Range    Hold Specimen JIC    Extra Red Top Tube   Result Value Ref Range    Hold Specimen JIC    Extra Green Top (Lithium Heparin) Tube   Result Value Ref Range    Hold Specimen JIC    Extra Purple Top Tube   Result Value Ref Range    Hold Specimen JIC    Extra Heparinized Syringe   Result Value Ref Range    Hold Specimen 1    INR   Result Value Ref Range    INR 3.47 (H) 0.85 - 1.15   Basic metabolic panel   Result Value Ref Range    Sodium 137 136 - 145 mmol/L    Potassium 4.8 3.4 - 5.3 mmol/L    Chloride 101 98 - 107 mmol/L    Carbon Dioxide (CO2) 22 22 - 29 mmol/L    Anion Gap 14 7 - 15 mmol/L    Urea Nitrogen 62.4 (H) 8.0 - 23.0 mg/dL    Creatinine 2.24 (H) 0.67 - 1.17 mg/dL    Calcium 9.9 (H) 8.2 - 9.6 mg/dL    Glucose 139 (H) 70 - 99 mg/dL    GFR Estimate 27 (L) >60 mL/min/1.73m2   CBC with platelets   Result Value Ref Range    WBC Count 10.9 4.0 - 11.0 10e3/uL    RBC Count 3.36 (L) 4.40 - 5.90 10e6/uL    Hemoglobin 10.5 (L) 13.3 -  17.7 g/dL    Hematocrit 31.6 (L) 40.0 - 53.0 %    MCV 94 78 - 100 fL    MCH 31.3 26.5 - 33.0 pg    MCHC 33.2 31.5 - 36.5 g/dL    RDW 13.3 10.0 - 15.0 %    Platelet Count 103 (L) 150 - 450 10e3/uL     Medications   lidocaine (XYLOCAINE) 2 % external gel ( Topical Canceled Entry 7/16/23 1512)   sodium chloride 0.9% irrigation (bag) (3,000 mLs Irrigation $New Bag 7/16/23 1849)   HYDROmorphone (PF) (DILAUDID) injection 0.5 mg (0.5 mg Intravenous $Given 7/16/23 1908)   lidocaine (XYLOCAINE) 2 % external gel ( Topical $Given 7/16/23 1416)   HYDROmorphone (PF) (DILAUDID) injection 0.5 mg (0.5 mg Intravenous $Given 7/16/23 1519)   phytonadione (K1-1000) capsule 2 mg (2 mg Oral $Given 7/16/23 1520)     Assessments & Plan (with Medical Decision Making)     I have reviewed the nursing notes.    I have reviewed the findings, diagnosis, plan and need for follow up with the patient.  Medical Decision Making  The patient's presentation was of moderate complexity (an acute illness with systemic symptoms).    The patient's evaluation involved:  an assessment requiring an independent historian (see separate area of note for details)  strong consideration of a test (see separate area of note for details) that was ultimately deferred  independent interpretation of testing performed by another health professional (see separate area of note for details)    The patient's management necessitated high risk (a decision regarding hospitalization).    In summary, this is a 89-year-old male who is presenting for concerns regarding gross hematuria in the setting of a bladder malignancy.  Vitals were reviewed at the time of initial evaluation.    He has gross hematuria on exam, and I suspect that he will require placement of a three-way catheter and continuous bladder irrigation.  We will plan for placement of a three-way catheter, IV medication for analgesia, and continuous monitoring.    We have a low threshold to consult the case with urology  given the patient's new malignancy, and rapid change.  I do not require he requires further cross-sectional imaging, or ultrasonography.  We will plan for a bladder scan prior to Hernandez catheter placement.  Patient to await disposition pending his response to continuous bladder irrigation and analgesia.    We will plan for hydromorphone for pain control.  Discussed with the patient this plan and he is agreeable.  All question and concerns otherwise addressed and answered.    New Prescriptions    No medications on file     Final diagnoses:   Bladder mass   Gross hematuria     7/16/2023   [facility]     Justyn Napier MD  07/16/23 7025       Justyn Napier MD  07/16/23 [id number]

## 2023-07-17 ENCOUNTER — APPOINTMENT (OUTPATIENT)
Dept: ULTRASOUND IMAGING | Facility: HOSPITAL | Age: 88
DRG: 699 | End: 2023-07-17
Attending: INTERNAL MEDICINE
Payer: COMMERCIAL

## 2023-07-17 VITALS
DIASTOLIC BLOOD PRESSURE: 51 MMHG | HEART RATE: 62 BPM | RESPIRATION RATE: 23 BRPM | SYSTOLIC BLOOD PRESSURE: 93 MMHG | TEMPERATURE: 98.5 F | OXYGEN SATURATION: 95 %

## 2023-07-17 LAB
ABO/RH(D): NORMAL
ANION GAP SERPL CALCULATED.3IONS-SCNC: 13 MMOL/L (ref 7–15)
ANTIBODY SCREEN: NEGATIVE
BACTERIA UR CULT: NORMAL
BUN SERPL-MCNC: 62.9 MG/DL (ref 8–23)
CALCIUM SERPL-MCNC: 9.5 MG/DL (ref 8.2–9.6)
CHLORIDE SERPL-SCNC: 102 MMOL/L (ref 98–107)
CREAT SERPL-MCNC: 2.18 MG/DL (ref 0.67–1.17)
DEPRECATED HCO3 PLAS-SCNC: 21 MMOL/L (ref 22–29)
ERYTHROCYTE [DISTWIDTH] IN BLOOD BY AUTOMATED COUNT: 13.4 % (ref 10–15)
GFR SERPL CREATININE-BSD FRML MDRD: 28 ML/MIN/1.73M2
GLUCOSE BLDC GLUCOMTR-MCNC: 110 MG/DL (ref 70–99)
GLUCOSE BLDC GLUCOMTR-MCNC: 113 MG/DL (ref 70–99)
GLUCOSE SERPL-MCNC: 112 MG/DL (ref 70–99)
HCT VFR BLD AUTO: 29.7 % (ref 40–53)
HGB BLD-MCNC: 9 G/DL (ref 13.3–17.7)
HGB BLD-MCNC: 9.3 G/DL (ref 13.3–17.7)
HGB BLD-MCNC: 9.9 G/DL (ref 13.3–17.7)
INR PPP: 2.86 (ref 0.85–1.15)
LACTATE SERPL-SCNC: 0.9 MMOL/L (ref 0.7–2)
MCH RBC QN AUTO: 31.5 PG (ref 26.5–33)
MCHC RBC AUTO-ENTMCNC: 33.3 G/DL (ref 31.5–36.5)
MCV RBC AUTO: 95 FL (ref 78–100)
PLATELET # BLD AUTO: 93 10E3/UL (ref 150–450)
POTASSIUM SERPL-SCNC: 4.8 MMOL/L (ref 3.4–5.3)
RBC # BLD AUTO: 3.14 10E6/UL (ref 4.4–5.9)
SODIUM SERPL-SCNC: 136 MMOL/L (ref 136–145)
SPECIMEN EXPIRATION DATE: NORMAL
WBC # BLD AUTO: 8.2 10E3/UL (ref 4–11)

## 2023-07-17 PROCEDURE — 36415 COLL VENOUS BLD VENIPUNCTURE: CPT | Performed by: INTERNAL MEDICINE

## 2023-07-17 PROCEDURE — 85027 COMPLETE CBC AUTOMATED: CPT | Performed by: INTERNAL MEDICINE

## 2023-07-17 PROCEDURE — 85610 PROTHROMBIN TIME: CPT | Performed by: INTERNAL MEDICINE

## 2023-07-17 PROCEDURE — 85018 HEMOGLOBIN: CPT | Performed by: HOSPITALIST

## 2023-07-17 PROCEDURE — 86850 RBC ANTIBODY SCREEN: CPT | Performed by: HOSPITALIST

## 2023-07-17 PROCEDURE — 258N000003 HC RX IP 258 OP 636: Performed by: HOSPITALIST

## 2023-07-17 PROCEDURE — 86901 BLOOD TYPING SEROLOGIC RH(D): CPT | Performed by: HOSPITALIST

## 2023-07-17 PROCEDURE — 93970 EXTREMITY STUDY: CPT

## 2023-07-17 PROCEDURE — 36415 COLL VENOUS BLD VENIPUNCTURE: CPT | Performed by: HOSPITALIST

## 2023-07-17 PROCEDURE — 250N000013 HC RX MED GY IP 250 OP 250 PS 637: Performed by: INTERNAL MEDICINE

## 2023-07-17 PROCEDURE — 83605 ASSAY OF LACTIC ACID: CPT | Performed by: HOSPITALIST

## 2023-07-17 PROCEDURE — 250N000013 HC RX MED GY IP 250 OP 250 PS 637: Performed by: HOSPITALIST

## 2023-07-17 PROCEDURE — 250N000011 HC RX IP 250 OP 636: Mod: JZ | Performed by: HOSPITALIST

## 2023-07-17 PROCEDURE — 99239 HOSP IP/OBS DSCHRG MGMT >30: CPT | Performed by: HOSPITALIST

## 2023-07-17 PROCEDURE — 80048 BASIC METABOLIC PNL TOTAL CA: CPT | Performed by: INTERNAL MEDICINE

## 2023-07-17 PROCEDURE — 250N000011 HC RX IP 250 OP 636: Performed by: INTERNAL MEDICINE

## 2023-07-17 RX ORDER — ACETAMINOPHEN 325 MG/1
975 TABLET ORAL EVERY 6 HOURS
Status: DISCONTINUED | OUTPATIENT
Start: 2023-07-17 | End: 2023-07-17 | Stop reason: HOSPADM

## 2023-07-17 RX ORDER — NALOXONE HYDROCHLORIDE 0.4 MG/ML
0.2 INJECTION, SOLUTION INTRAMUSCULAR; INTRAVENOUS; SUBCUTANEOUS
Status: DISCONTINUED | OUTPATIENT
Start: 2023-07-17 | End: 2023-07-17 | Stop reason: HOSPADM

## 2023-07-17 RX ORDER — NALOXONE HYDROCHLORIDE 0.4 MG/ML
0.4 INJECTION, SOLUTION INTRAMUSCULAR; INTRAVENOUS; SUBCUTANEOUS
Status: DISCONTINUED | OUTPATIENT
Start: 2023-07-17 | End: 2023-07-17 | Stop reason: HOSPADM

## 2023-07-17 RX ORDER — IPRATROPIUM BROMIDE AND ALBUTEROL SULFATE 2.5; .5 MG/3ML; MG/3ML
3 SOLUTION RESPIRATORY (INHALATION)
Status: DISCONTINUED | OUTPATIENT
Start: 2023-07-17 | End: 2023-07-17

## 2023-07-17 RX ORDER — OXYBUTYNIN CHLORIDE 5 MG/1
5 TABLET ORAL 2 TIMES DAILY
Status: DISCONTINUED | OUTPATIENT
Start: 2023-07-17 | End: 2023-07-17 | Stop reason: HOSPADM

## 2023-07-17 RX ORDER — HYDROMORPHONE HCL IN WATER/PF 6 MG/30 ML
0.2 PATIENT CONTROLLED ANALGESIA SYRINGE INTRAVENOUS
Status: DISCONTINUED | OUTPATIENT
Start: 2023-07-17 | End: 2023-07-17 | Stop reason: HOSPADM

## 2023-07-17 RX ORDER — IPRATROPIUM BROMIDE AND ALBUTEROL SULFATE 2.5; .5 MG/3ML; MG/3ML
3 SOLUTION RESPIRATORY (INHALATION) EVERY 4 HOURS PRN
Status: DISCONTINUED | OUTPATIENT
Start: 2023-07-17 | End: 2023-07-17 | Stop reason: HOSPADM

## 2023-07-17 RX ORDER — CYCLOBENZAPRINE HYDROCHLORIDE 5 MG/1
2.5 TABLET, FILM COATED ORAL EVERY 8 HOURS PRN
Status: DISCONTINUED | OUTPATIENT
Start: 2023-07-17 | End: 2023-07-17 | Stop reason: HOSPADM

## 2023-07-17 RX ORDER — SODIUM CHLORIDE 9 MG/ML
INJECTION, SOLUTION INTRAVENOUS CONTINUOUS
Status: DISCONTINUED | OUTPATIENT
Start: 2023-07-17 | End: 2023-07-17 | Stop reason: HOSPADM

## 2023-07-17 RX ADMIN — CARVEDILOL 25 MG: 12.5 TABLET, FILM COATED ORAL at 08:15

## 2023-07-17 RX ADMIN — HYDROMORPHONE HYDROCHLORIDE 0.3 MG: 1 INJECTION, SOLUTION INTRAMUSCULAR; INTRAVENOUS; SUBCUTANEOUS at 06:44

## 2023-07-17 RX ADMIN — HYDROMORPHONE HYDROCHLORIDE 0.2 MG: 0.2 INJECTION, SOLUTION INTRAMUSCULAR; INTRAVENOUS; SUBCUTANEOUS at 16:16

## 2023-07-17 RX ADMIN — HYDROMORPHONE HYDROCHLORIDE 0.3 MG: 1 INJECTION, SOLUTION INTRAMUSCULAR; INTRAVENOUS; SUBCUTANEOUS at 00:50

## 2023-07-17 RX ADMIN — HYDROMORPHONE HYDROCHLORIDE 0.3 MG: 1 INJECTION, SOLUTION INTRAMUSCULAR; INTRAVENOUS; SUBCUTANEOUS at 03:50

## 2023-07-17 RX ADMIN — LISINOPRIL 10 MG: 10 TABLET ORAL at 08:16

## 2023-07-17 RX ADMIN — HYDROMORPHONE HYDROCHLORIDE 0.2 MG: 0.2 INJECTION, SOLUTION INTRAMUSCULAR; INTRAVENOUS; SUBCUTANEOUS at 13:52

## 2023-07-17 RX ADMIN — OXYBUTYNIN CHLORIDE 5 MG: 5 TABLET ORAL at 09:54

## 2023-07-17 RX ADMIN — ACETAMINOPHEN 975 MG: 325 TABLET, FILM COATED ORAL at 13:54

## 2023-07-17 RX ADMIN — SODIUM CHLORIDE 500 ML: 9 INJECTION, SOLUTION INTRAVENOUS at 11:32

## 2023-07-17 RX ADMIN — CYCLOBENZAPRINE HYDROCHLORIDE 2.5 MG: 5 TABLET, FILM COATED ORAL at 14:49

## 2023-07-17 ASSESSMENT — ACTIVITIES OF DAILY LIVING (ADL)
ADLS_ACUITY_SCORE: 26
ADLS_ACUITY_SCORE: 25
ADLS_ACUITY_SCORE: 26

## 2023-07-17 NOTE — PLAN OF CARE
Patient is alert and oriented, has call light within reach, makes needs known. VS and assessment as charted in flowsheets. Has continuous bladder irrigation infusing, with light pink returns, no clots noted but does have occasional bladder spasm pain. Updated MD and obtained an order for oxybutnin, which was administered. Blood pressure was trending low, as low as 72/42 manual. Dr. Walker updated, NS bolus given per orders, with no improvement in blood pressure. Patient placed in trendelenburg position with legs elevated.     Patient transferred to ICU at approximately 1200    Face to face report given with opportunity to observe patient.    Report given to AUNG Beckman RN   7/17/2023  12:00 PM

## 2023-07-17 NOTE — PLAN OF CARE
[facility] Inpatient Admission Note:    Patient admitted to 3228/3228-1 at approximately 2055 via bed accompanied by transport tech from emergency room . Report received from Parth HARTMAN RN in SBAR format at 2015 via telephone. Patient transferred to bed via slide board.. Patient is alert and oriented X 3, reports pain; rates at 2 on 0-10 scale.  Patient oriented to room, unit, hourly rounding, and plan of care. Explained admission packet and patient handbook with patient bill of rights brochure. Will continue to monitor and document as needed.     Inpatient Nursing criteria listed below was met:    Health care directives status obtained and documented: Yes    Patient identifies a surrogate decision maker: No If yes, who: N/A Contact Information:     If initial lactic acid greater than 2.0, repeat lactic acid drawn within one hour of arrival to unit: NA. If no, state reason: N/A    Clergy visit ordered if patient requests: No    Skin issues/needs documented: No    Isolation Patient: yes Education given, correct sign in place and documentation row added to PCS:  N/A    Fall Prevention Yes: Care plan updated, education given and documented, sticker and magnet in place: Yes    Care Plan initiated: Yes    Education Documented (including assessment): Yes    Patient has discharge needs : Yes If yes, please explain: Assisted living in place Follow up w/uroogy

## 2023-07-17 NOTE — H&P
[facility]    History and Physical - Hospitalist Service       Date of Admission:  7/16/2023    Assessment & Plan      Sidney Barriga is a 89 year old male admitted on 7/16/2023. He presents to ED with Gross hematuria. Found with bladder mass. On warfarin and plavix (remote h/o CABG and PCI), 6 mo ago TAVR. H/o DVT. Cardiology and urology consulted by ED. Recommended to hold warfarin and plavix. 81 mg ASA ok to substitute. Hemodynamically stable.       Hospital problems;   1.  Gross hematuria with bladder mass  -Continue bladder irrigation  -Hold warfarin and Plavix  -Urology follow-up after discharge (urologist was consulted by phone during ED stay).  -Monitor hemoglobin  -Rocephin will be continued for possible UTI    2.  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 Thomas) and it is okay to hold both for short time  - back on warfarin as soon as possible due to high risk of recurrence DVT    3. Ischemic CMP, EF 30-35% 4.2022  - not volume overloaded  - continue coreg, hold lasix and lisinopril      4. Post TAVR  - done at [facility] 12.6.2022 29 mm Patricio S3  - holding Plavix  - has f/u with [facility] 7.19.2023    5. H/o AFlutter  - nsr on admission  - will monitor    6. CKD  - monitor creatinine   - stable renal function      7. COPD   - no signs of exacerbation    Diet: Combination Diet Regular Diet Adult; Moderate Consistent Carb (60 g CHO per Meal) Diet; 2 gm NA Diet; Low Saturated Fat Diet    DVT Prophylaxis: contraindicated due to gross hematuria. Will use only SCD  Hernandez Catheter: PRESENT, indication: Gross Hematuria  Lines: None     Cardiac Monitoring: ACTIVE order. Indication: CAD, hematuria, ischemic cardiomyopathy  Code Status: No CPR- Do NOT Intubate      Clinically Significant Risk Factors Present on Admission               # Drug Induced Coagulation  Defect: home medication list includes an anticoagulant medication  # Drug Induced Platelet Defect: home medication list includes an antiplatelet medication   # Hypertension: Noted on problem list  # Chronic heart failure with reduced ejection fraction: last echo with EF <40%              Disposition Plan      Expected Discharge Date: 07/18/2023                  Daphne Rouse MD  Hospitalist Service  [facility]  Securely message with [place] (more info)  Text page via [facility] Paging/Directory     ______________________________________________________________________    Chief Complaint   Gross hematuria    History is obtained from the patient, electronic health record, emergency department physician and patient's son    History of Present Illness   Sidney Barriga is a 89 year old male who has very extensive past medical history including chronic anticoagulation, history of atrial flutter, severe aortic stenosis past TAVR December 2022, ischemic cardiomyopathy, coronary artery disease, status post CABG and PCI, 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  States he is urinating every 10 to 15 minutes now and every time he urinates he passes a clot and experiences severe pain.  He denies fever chills flank pain or abdominal pain.  Denies lightheadedness hemoptysis hematemesis or hematochezia.    Notes that he had hematuria initially about 2 weeks ago which lasted 1 day and resolved on its own.  He also stated that his INR slightly above his target last week and was instructed to decrease his dose of Coumadin which she did.  ED work-up on July 15, 2023 which included CT urogram and CT abdomen showed bladder mass posteriorly on the right suspicious for malignancy.  Because he was hemodynamically stable he was discharged back to nursing home.  Today he comes back to  emergency room because of heavy hematuria gross which started at about 11 PM.  He was in the bathroom and and he is peeing pure blood with clots every 5 minutes.  He states that it is now burning badly his penis and suprapubic area.  ED consulted urology recommendation was admitted locally and start bladder irrigation.  Bleeding stopped he needs to follow-up with urology.  ED also consulted cardiology per my request regarding the continuation of antiplatelets and warfarin.  Dr. Pearson cardiologist [facility] recommended short term holding warfarin and Plavix.  May be okay to give baby dose of aspirin.  Hospitalist was called to admit.  Patient was seen in the emergency room.  Discussed the plan with patient and son.  Both agreed.  DNR/DNI CODE STATUS confirmed.    ED test results  His creatinine slightly above normal which is 2.24 today the rest of chemistry is unremarkable.  Hemoglobin 10.5 down from 11.2 from yesterday.  White blood cell count is 10.9.  Urinalysis 7/15/2023 showed dark red urine with more than 182 white blood cells and more than 182 red blood cells.  ED started bladder irrigation.  Will admit to medical floor and continue.    Past Medical History    Past Medical History:   Diagnosis Date     Acute thromboembolism of deep veins of lower extremity (H)     No Comments Provided     Benign essential hypertension     No Comments Provided     Cancer (H)     skin     Congestive heart failure (H)      COPD (chronic obstructive pulmonary disease) (H)      Coronary atherosclerosis     No Comments Provided     Osteoarthrosis     No Comments Provided     Other and unspecified hyperlipidemia     No Comments Provided     Other specified forms of chronic ischemic heart disease     No Comments Provided     Sarcoidosis     No Comments Provided     Type 2 or unspecified type diabetes mellitus     No Comments Provided       Past Surgical History   Past Surgical History:   Procedure Laterality Date      ANGIOPLASTY  01/01/2019    stents     CARDIAC SURGERY  01/01/2014    heart bypass     COLONOSCOPY  01/01/2019     CV CORONARY ANGIOGRAM N/A 1/15/2021    Procedure: CV CORONARY ANGIOGRAM;  Surgeon: Charlie Harris MD;  Location:  HEART CARDIAC CATH LAB     CV LEFT HEART CATH N/A 1/15/2021    Procedure: Left Heart Cath;  Surgeon: Charlie Harris MD;  Location:  HEART CARDIAC CATH LAB     CV RIGHT HEART CATH MEASUREMENTS RECORDED N/A 1/15/2021    Procedure: CV RIGHT HEART CATH;  Surgeon: Charlie Harris MD;  Location:  HEART CARDIAC CATH LAB     IR IVC FILTER REMOVAL  5/2/2014     OTHER SURGICAL HISTORY      [id number],BIOPSY LUNG OR MEDIASTINUM MEDICAL IMAGING       Prior to Admission Medications   Prior to Admission Medications   Prescriptions Last Dose Informant Patient Reported? Taking?   CINNAMON PO  Self Yes No   Sig: Take 500 mg by mouth daily    Calcium Carbonate-Vit D-Min (CALCIUM 600+D PLUS MINERALS) 600-400 MG-UNIT TABS  Self Yes No   Sig: Take 1 tablet by mouth   Coenzyme Q10 (CO Q-10) 200 MG CAPS  Self Yes No   Sig: Take 1 capsule by mouth daily   LUTEIN PO  Self Yes No   Sig: Take 1 tablet by mouth daily   Omega-3 Fatty Acids (FISH OIL) 1200 MG capsule  Self Yes No   Sig: Take 1 capsule by mouth daily   Vitamin D3 (CHOLECALCIFEROL) 125 MCG (5000 UT) tablet  Self Yes No   Sig: Take by mouth daily   albuterol (PROAIR HFA/PROVENTIL HFA/VENTOLIN HFA) 108 (90 Base) MCG/ACT inhaler   No No   Sig: Inhale 1-2 puffs into the lungs every 4 hours as needed for shortness of breath / dyspnea or wheezing   amoxicillin (AMOXIL) 500 MG capsule   No No   Sig: Tk 2 grams 30-60 minutes prior to dental procedures/cleanings.   blood glucose (NO BRAND SPECIFIED) lancets standard  Self No No   Sig: Use to test blood sugar 1 times daily or as directed.   blood glucose (NO BRAND SPECIFIED) test strip  Self No No   Sig: Use to test blood sugar 1 times daily or as directed.   blood glucose monitoring (NO BRAND  SPECIFIED) meter device kit  Self No No   Sig: Use to test blood sugar 1 times daily or as directed.   carvedilol (COREG) 25 MG tablet   No No   Sig: Take 1 tablet (25 mg) by mouth 2 times daily (with meals)   clopidogrel (PLAVIX) 75 MG tablet   No No   Sig: Take 1 tablet (75 mg) by mouth daily   cyanocobalamin (VITAMIN B-12) 500 MCG SUBL sublingual tablet  Self Yes No   Sig: Place 500 mcg under the tongue daily   ezetimibe (ZETIA) 10 MG tablet   No No   Sig: Take 1 tablet (10 mg) by mouth daily   furosemide (LASIX) 20 MG tablet   No No   Sig: Take 1.5 tablets (30 mg) by mouth daily   lisinopril (ZESTRIL) 10 MG tablet   No No   Sig: Take 1 tablet (10 mg) by mouth daily   nitroGLYcerin (NITROSTAT) 0.4 MG sublingual tablet   No No   Sig: For chest pain place 1 tablet under the tongue every 5 minutes for 3 doses. If symptoms persist 5 minutes after 1st dose call 911.   predniSONE (DELTASONE) 10 MG tablet   No No   Sig: Take 1 tablet (10 mg) by mouth daily   rosuvastatin (CRESTOR) 20 MG tablet   No No   Sig: Take 1 tablet (20 mg) by mouth twice a week   vitamin C (ASCORBIC ACID) 500 MG tablet  Self Yes No   Sig: Take 1,000 mg by mouth daily   warfarin ANTICOAGULANT (COUMADIN) 5 MG tablet   No No   Sig: Take 2.5mg by oral route on Wednesdays, Saturdays, and take 5mg by oral route all other days      Facility-Administered Medications: None        Review of Systems    10 points of review of system obtained.  Pertinent positives and negatives included in history of present illness.  The rest is negative.    Physical Exam   Vital Signs: Temp: 99.1  F (37.3  C) Temp src: Tympanic BP: 109/59 Pulse: 71   Resp: 16 SpO2: 97 % O2 Device: None (Room air)    Weight: 0 lbs 0 oz    General Appearance: He is a nondistressed elderly man.  Respiratory: Clear breath sounds bilaterally.  Cardiovascular: Frequent extrasystole, mild systolic murmur no S3 no S4.  1+ bilateral lower extremity edema.  GI: Soft abdomen not tender.  :  Continuous bladder irrigation going  Skin: No rash  Psychiatric exam: Awake alert oriented x4.    Medical Decision Making       75 MINUTES SPENT BY ME on the date of service doing chart review, history, exam, documentation & further activities per the note.      Data     I have personally reviewed the following data over the past 24 hrs:    10.9  \   10.5 (L)   / 103 (L)     137 101 62.4 (H) /  139 (H)   4.8 22 2.24 (H) \       INR:  3.47 (H) PTT:  N/A   D-dimer:  N/A Fibrinogen:  N/A       Imaging results reviewed over the past 24 hrs:   No results found for this or any previous visit (from the past 24 hour(s)).

## 2023-07-17 NOTE — PROGRESS NOTES
Assessment completed via face to face with patient et via telephone w/[facility] facility staff. Per Michel, pt is a 'rent only' resident at their facility.    LOC: alert et oriented    Dx: Gross hematuria  Chronic Disease Management: COPD, HX of DVT, Type 2 Diabetes Mellitus, HTN, CKD, stage 3, S/P CABG X3    Lives with: Congregate setting  Living at:  [facility]  Transportation: YES , independently drives    Primary PCP: Zach Arredondo  Insurance:  Medicare, [employer]    Support System:  Family, facility staff  Homecare/PCA: No  /County Services:   No  [place]: YES , [employer]     How was the [facility] notification completed: In basket msg sent to [facility]    Health Care Directive: On File  Guardian: No  POA: Son/Aurelio    Pharmacy: [facility], [place]  Meds management: Facility nursing staff    Adequate Resources for needs (housing, utilities, food/med): YES  Household chores: Facility Staff, do laundry also  Work/community/social activity: YES , social mingling at meal times w/peers    ADLs: Independent  Ambulation:Independent  Falls: No  Nutrition: Independent    Equipment used: Apartment is handicapped accessible      Oxygen supplier: N/A      Does the supplier have valid oxygen orders: N/A    Mental health: No concerns voiced  Substance abuse: No  Exposure to violence/abuse: No      Able to Return to Prior Living Arrangements: YES    Choice of Vendor: TBD    Barriers: None anticipated    TRENT: Low    Plan: Return to [facility] when medically stable to do so.

## 2023-07-17 NOTE — MEDICATION SCRIBE - ADMISSION MEDICATION HISTORY
"Medication Scribe Admission Medication History    Admission medication history is complete. The information provided in this note is only as accurate as the sources available at the time of the update.    Medication reconciliation/reorder completed by provider prior to medication history? Yes     Coumadin information:  INR date: 6/2/23  INR result: 2.2  Next INR date: 7/14/23  Range: 2.0-3.0  Indication: DVT    Coumadin strength/instructions: 2.5 mg Wed, Sat; 5 mg ROW  Tablet strength: 5 mg    Time of day patient takes coumadin at home: 8 PM  Exact last dose/time patient took PTA: 7/14/23 8 pm (pt instructed by ER to skip dose on 7/15/23.     Patient's coumadin clinic facility and contact:   Fax number for discharge instructions (if applicable): NA      Information Source(s): Patient and CareEverywhere/SureScripts via in-person    Pertinent Information:     Patient manages his own meds and is a good historian. Some confusion.     Unsure what time of day he takes his supplements at home.     Amoxil- pt thought he had an allergic rxn when given IV by the [facility] in December 2022. Per chart records, pt was given cefazolin IV while inpatient (not amoxil). Ended up getting a trunk rash. Possible rxn to cefazolin. Pt was given a prescription for amoxil to take prior to dental procedures but no evidence that pt was given any while inpatient. Pt has not taken the amoxil due to fear of reaction.     Prednisone- given for drug rash in December 2022. Pt reports he was scared to take due to reading about too many adverse reactions. Has at home \"just in case\".     Lasix- unable to split in half. Has been taking 20 mg every other day alternating with 40 mg every other day. Last dose was 20 mg yesterday AM.     zetia- takes at 8 pm    crestor-thinks he takes his doses on Wednesdays and Sundays at 8 pm.     Coumadin- reports taking as prescribed and reports he was told to hold his dose starting on Saturday 7/15 while in the ER. He " reports some confusion on if he was supposed to hold his plavix as well. Unsure if he has been holding plavix as well.     Changes made to PTA medication list:    Added: None    Deleted: None    Changed: None    Medication Affordability:  Not including over the counter (OTC) medications, was there a time in the past 3 months when you did not take your medications as prescribed because of cost?: No    Allergies reviewed with patient and updates made in EHR: yes- added cefazolin.     Medication History Completed By: Marva Lorenz 7/17/2023 9:02 AM    Prior to Admission medications    Medication Sig Last Dose Taking? Auth Provider Long Term End Date   albuterol (PROAIR HFA/PROVENTIL HFA/VENTOLIN HFA) 108 (90 Base) MCG/ACT inhaler Inhale 1-2 puffs into the lungs every 4 hours as needed for shortness of breath / dyspnea or wheezing Past Month Yes Alessia Molina, CNP Yes    Calcium Carbonate-Vit D-Min (CALCIUM 600+D PLUS MINERALS) 600-400 MG-UNIT TABS Take 1 tablet by mouth daily Past Week Yes Reported, Patient     carvedilol (COREG) 25 MG tablet Take 1 tablet (25 mg) by mouth 2 times daily (with meals) 7/16/2023 at AM Yes Justyn Rae, DO Yes    cinnamon 500 MG CAPS Take 500 mg by mouth daily  Past Week Yes Reported, Patient     clopidogrel (PLAVIX) 75 MG tablet Take 1 tablet (75 mg) by mouth daily Past Week Yes Justyn Rae, DO Yes    Coenzyme Q10 (CO Q-10) 200 MG CAPS Take 1 capsule by mouth daily Past Week Yes Reported, Patient     cyanocobalamin (VITAMIN B-12) 500 MCG SUBL sublingual tablet Place 500 mcg under the tongue daily Past Week Yes Reported, Patient     ezetimibe (ZETIA) 10 MG tablet Take 1 tablet (10 mg) by mouth daily  Patient taking differently: Take 10 mg by mouth At Bedtime (8 PM) Past Week Yes Justyn Rae, DO Yes    furosemide (LASIX) 20 MG tablet Take 1.5 tablets (30 mg) by mouth daily  Patient taking differently: Take 20 mg by mouth every other day alternating with 40 mg every  other day. 7/16/2023 at AM 20 mg Yes Justyn Rae, DO Yes    lisinopril (ZESTRIL) 10 MG tablet Take 1 tablet (10 mg) by mouth daily 7/16/2023 at AM Yes Justyn Rae, DO Yes    LUTEIN PO Take 1 tablet by mouth daily Past Week Yes Reported, Patient     nitroGLYcerin (NITROSTAT) 0.4 MG sublingual tablet For chest pain place 1 tablet under the tongue every 5 minutes for 3 doses. If symptoms persist 5 minutes after 1st dose call 911. More than a month Yes Justyn Rae, DO Yes    Omega-3 Fatty Acids (FISH OIL) 1200 MG capsule Take 1 capsule by mouth daily Past Week Yes Reported, Patient     rosuvastatin (CRESTOR) 20 MG tablet Take 1 tablet (20 mg) by mouth twice a week Past Week at 2000 Yes Justyn Rae, DO Yes    vitamin C (ASCORBIC ACID) 500 MG tablet Take 1,000 mg by mouth daily Past Week Yes Reported, Patient     Vitamin D3 (CHOLECALCIFEROL) 125 MCG (5000 UT) tablet Take 1 tablet by mouth daily Past Week Yes Reported, Patient     amoxicillin (AMOXIL) 500 MG capsule Tk 2 grams 30-60 minutes prior to dental procedures/cleanings.  Patient not taking: Reported on 7/17/2023 Not Taking  Justyn Rae DO     blood glucose (NO BRAND SPECIFIED) lancets standard Use to test blood sugar 1 times daily or as directed.   Zach Arredondo MD Yes    blood glucose (NO BRAND SPECIFIED) test strip Use to test blood sugar 1 times daily or as directed.   Zach Arredondo MD Yes    blood glucose monitoring (NO BRAND SPECIFIED) meter device kit Use to test blood sugar 1 times daily or as directed.   Zach Arredondo MD Yes    predniSONE (DELTASONE) 10 MG tablet Take 1 tablet (10 mg) by mouth daily  Patient not taking: Reported on 7/17/2023 Not Taking  Zach Arredondo MD     warfarin ANTICOAGULANT (COUMADIN) 5 MG tablet Take 2.5mg by oral route on Wednesdays, Saturdays, and take 5mg by oral route all other days 7/14/2023 at 2000  Zach Arredondo MD

## 2023-07-17 NOTE — SIGNIFICANT EVENT
Patient Was discussed with [facility] regarding possible transfer urology and further intervention for his acute blood loss anemia and hematuria, new bladder mass.  Unfortunately there is no beds available at [facility].  I did call his son and update him by phone.  Also discussed patient care with Kylah Hicks where I did speak to the urologist and Dr. Garcia hospitalist and patient was accepted for transfer to higher level of care.  Patient unfortunately did have hypotension this morning blood pressure  was reported in the 60 systolic he did receive blood pressure medications this morning but also did have a recheck hemoglobin drop from 9.9---->9. he did receive 500 cc bolus of normal saline blood pressure improved to 80/43, we did place in trendelenburg.  His urine output is pink  .  I did not recheck his lactic acid which was normal.  Patient was asymptomatic.  I did move him to the ICU for close monitoring  .  We will keep checking hemoglobins every 6 hours and monitor him closely.  Patient was updated regarding plan he agrees..   Type and screen ordered  Kylah was updated   NPO

## 2023-07-17 NOTE — PLAN OF CARE
Pt was transferred to the ICU around noon due to low blood pressures. Pt is awaiting bed transfer for urology. Pt continues on CBI. Irrigated manual once while pt appeared to be having spasm. Pt had relief afterwards but no clots were evacuated. Pt blood pressures continue to be up and down.

## 2023-07-17 NOTE — PLAN OF CARE
"Patient was transferred to [facility]  at approximately 4:25 PM via [facility].   Patient status unchanged. Patient's most recent vitals: Blood pressure 92/67, pulse 75, temperature 98.5  F (36.9  C), temperature source Tympanic, resp. rate 16, SpO2 97 %.  Pt transferred with intact IV.Yes  Intact IV site at Left #20 locked. #18 infusing /hr  Pt transferred with oxygen. .No   Other LDA\"s: Hernandez catheter for CBI  Belongings were sent with Patient and Family. Pt took phone and . Hearing aids and charge. Eye glasses. Aurelio son took rest of pt belongings.  AVS and pertinent records sent with patient. .Yes   Report given to Jorge with EMS at 1615 and Dayanna at [facility] at 1634 in SBAR format.     Pt blood pressure has been up and down since transfer. Prior to EMS coming MD was notified that pts BP while sleeping was 70/40s. MD declined pressors when asked. MD new order to increase fluids to . Pt was having what seemed bladder spasms. Pt given dilaudid, flexeril, tylenol with minimal relief.    "

## 2023-07-17 NOTE — DISCHARGE SUMMARY
[facility]  Hospitalist Discharge Summary      Date of Admission:  7/16/2023  Date of Discharge:  7/17/2023  Discharging Provider: Rissa Walker DO  Discharge Service: Hospitalist Service    Discharge Diagnoses       1.  Gross hematuria with bladder mass  Acute blood anemia  Hypotension  2.  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 Ischemic CMP, EF 30-35% 4.2022  4. Post TAVR done at [facility] 12.6.2022 29 mm Patricio S3  5. H/o AFlutter  6. CKD  7. COPD     Clinically Significant Risk Factors          Follow-ups Needed After Discharge    Dr Garcia or admitting Hospitalist    Unresulted Labs Ordered in the Past 30 Days of this Admission     Date and Time Order Name Status Description    7/17/2023  9:33 AM FISH bladder cancer In process     7/15/2023  2:42 PM Cytology, non-gynecologic In process       These results will be followed up by primary team     Discharge Disposition   Transferred to [facility]   Condition at discharge: Guarded    Hospital Course    Sidney Barriga is a 89 year old male admitted on 7/16/2023. He presents to ED with Gross hematuria. Found with bladder mass.Was On warfarin and plavix (remote h/o CABG and PCI), 6 mo ago TAVR. H/o DVT. Cardiology and urology consulted by ED. Recommended to hold warfarin and plavix. 81 mg ASA ok to substitute. Patient was initially Hemodynamically stable.  On 7/ 17 patient did continue to have any hemoglobin drop from 11-9, he did have hypotensive episode blood pressure was in the 60s after receiving blood pressure medications earlier in the morning.  It was unclear if is secondary from acute blood loss anemia from earlier and blood pressure medications but we did give him 500 cc normal saline bolus blood pressure did come on.  Put him on Trendelenburg and he was asymptomatic.  I did move him to ICU for closer monitoring.  Unfortunately we were unable to get patient transferred to [facility]  where he is established and  has a follow-up with cardiology on Wednesday as their are no beds available.  Patient is also part of a cardiac study group at [place]  I did call [facility] and updated accepting hospitalist Dr. Garcia. Patient was also discussed with Urology.  .  His son has been updated by me. Patients remains with guarded prognosis        Consultations This Hospital Stay   CARE MANAGEMENT / SOCIAL WORK IP CONSULT    Code Status   Special Code  NO CPR , OK WITH INTUBATION   Time Spent on this Encounter   IRissa DO, personally saw the patient today and spent greater than 30 minutes discharging this patient.       Rissa Walker DO  [address]ENS[address]ARE UN[address]  [address]  [facility] [address]  Phone: [phone]  Fax: [phone]  ______________________________________________________________________    Physical Exam   Vital Signs: Temp: 98.5  F (36.9  C) Temp src: Tympanic BP: 128/69 Pulse: 76   Resp: 23 SpO2: 94 % O2 Device: None (Room air)    Weight: 0 lbs 0 oz   Physical Exam  Constitutional:       Comments: Frail appearing stated age male    HENT:      Right Ear: External ear normal.      Left Ear: External ear normal.      Nose: Nose normal.      Mouth/Throat:      Pharynx: Oropharynx is clear.   Cardiovascular:      Rate and Rhythm: Normal rate.   Pulmonary:      Effort: Pulmonary effort is normal.   Abdominal:      General: Abdomen is flat.   Genitourinary:     Comments: Has solorzano cath   Musculoskeletal:         General: No swelling.   Skin:     Coloration: Skin is pale. Skin is not jaundiced.   Neurological:      Mental Status: Mental status is at baseline.         Primary Care Physician   Zach Arredondo    Discharge Orders   No discharge procedures on file.    Significant Results and Procedures   Most Recent 3 CBC's:  Recent Labs   Lab Test 07/17/23  1405 07/17/23  1141 07/17/23  0518 07/16/23  1408 07/15/23  1156   WBC  --   --  8.2 10.9 7.3   HGB 9.3* 9.0* 9.9* 10.5* 11.2*   MCV  --   --  95  94 95   PLT  --   --  93* 103* 95*     Most Recent 3 BMP's:  Recent Labs   Lab Test 07/17/23  1408 07/17/23  0518 07/17/23  0402 07/16/23  1408 07/15/23  1156   NA  --  136  --  137 138   POTASSIUM  --  4.8  --  4.8 4.5   CHLORIDE  --  102  --  101 102   CO2  --  21*  --  22 22   BUN  --  62.9*  --  62.4* 54.9*   CR  --  2.18*  --  2.24* 1.83*   ANIONGAP  --  13  --  14 14   FLY  --  9.5  --  9.9* 9.9*   * 112* 110* 139* 91   ,   Results for orders placed or performed during the hospital encounter of 07/16/23   US Lower Extremity Venous Duplex Bilateral    Narrative    Exam:US LOWER EXTREMITY VENOUS DUPLEX BILATERAL    History: Recurrent deep venous thrombosis    Comparisons:none    Technique: Venous duplex ultrasonography of the bilateral lower  extremities was performed.     Findings: The common femoral veins, superficial femoral veins and  popliteal veins are fully compressible with spontaneous and  augmentable venous flow.           Impression    Impression: No evidence of deep venous thrombosis within the lower  extremities.    NEO PICKETT MD         SYSTEM ID:  [id number]       Discharge Medications   Current Discharge Medication List      CONTINUE these medications which have NOT CHANGED    Details   albuterol (PROAIR HFA/PROVENTIL HFA/VENTOLIN HFA) 108 (90 Base) MCG/ACT inhaler Inhale 1-2 puffs into the lungs every 4 hours as needed for shortness of breath / dyspnea or wheezing  Qty: 18 g, Refills: 3    Comments: Pharmacy may dispense brand covered by insurance (Proair, or proventil or ventolin or generic albuterol inhaler)  Associated Diagnoses: Chronic obstructive pulmonary disease, unspecified COPD type (H)      Calcium Carbonate-Vit D-Min (CALCIUM 600+D PLUS MINERALS) 600-400 MG-UNIT TABS Take 1 tablet by mouth daily      carvedilol (COREG) 25 MG tablet Take 1 tablet (25 mg) by mouth 2 times daily (with meals)  Qty: 180 tablet, Refills: 3    Associated Diagnoses: Essential hypertension, benign;  Ischemic cardiomyopathy; Chronic combined systolic and diastolic congestive heart failure (H); Atrial flutter, unspecified type (H); Frequent PVCs; Irregular heartbeat; Ventricular bigeminy; Aneurysm of ascending aorta without rupture (H); Chest pain, unspecified type      cinnamon 500 MG CAPS Take 500 mg by mouth daily       clopidogrel (PLAVIX) 75 MG tablet Take 1 tablet (75 mg) by mouth daily  Qty: 90 tablet, Refills: 3    Associated Diagnoses: Atherosclerosis of native coronary artery of native heart without angina pectoris; History of coronary artery stent placement; S/P CABG x 3; Ischemic cardiomyopathy; Chest pain, unspecified type; Atherosclerosis of coronary artery bypass graft of native heart without angina pectoris; H/O cardiac catheterization; Status post coronary angiogram      Coenzyme Q10 (CO Q-10) 200 MG CAPS Take 1 capsule by mouth daily      cyanocobalamin (VITAMIN B-12) 500 MCG SUBL sublingual tablet Place 500 mcg under the tongue daily      ezetimibe (ZETIA) 10 MG tablet Take 1 tablet (10 mg) by mouth daily  Qty: 90 tablet, Refills: 3    Associated Diagnoses: Mixed hyperlipidemia      furosemide (LASIX) 20 MG tablet Take 1.5 tablets (30 mg) by mouth daily  Qty: 135 tablet, Refills: 3    Associated Diagnoses: Essential hypertension, benign; Ischemic cardiomyopathy; Chronic combined systolic and diastolic congestive heart failure (H); Essential hypertension; RIVAS (dyspnea on exertion); Mild pulmonary hypertension (H); Stage 3b chronic kidney disease (H)      lisinopril (ZESTRIL) 10 MG tablet Take 1 tablet (10 mg) by mouth daily  Qty: 90 tablet, Refills: 3    Associated Diagnoses: Essential hypertension, benign; Ischemic cardiomyopathy; Chronic combined systolic and diastolic congestive heart failure (H); Essential hypertension; Stage 3b chronic kidney disease (H); Type 2 diabetes mellitus with hyperglycemia, without long-term current use of insulin (H)      LUTEIN PO Take 1 tablet by mouth daily       nitroGLYcerin (NITROSTAT) 0.4 MG sublingual tablet For chest pain place 1 tablet under the tongue every 5 minutes for 3 doses. If symptoms persist 5 minutes after 1st dose call 911.  Qty: 25 tablet, Refills: 3    Associated Diagnoses: Atherosclerosis of native coronary artery of native heart without angina pectoris; History of coronary artery stent placement; S/P CABG x 3; Ischemic cardiomyopathy; Chest pain, unspecified type; Atherosclerosis of coronary artery bypass graft of native heart without angina pectoris; H/O cardiac catheterization; Status post coronary angiogram      Omega-3 Fatty Acids (FISH OIL) 1200 MG capsule Take 1 capsule by mouth daily      rosuvastatin (CRESTOR) 20 MG tablet Take 1 tablet (20 mg) by mouth twice a week  Qty: 30 tablet, Refills: 3    Associated Diagnoses: Chest pain, unspecified type; Mixed hyperlipidemia; Atherosclerosis of coronary artery bypass graft of native heart without angina pectoris; Status post coronary angiogram; Type 2 diabetes mellitus with hyperglycemia, without long-term current use of insulin (H)      vitamin C (ASCORBIC ACID) 500 MG tablet Take 1,000 mg by mouth daily      Vitamin D3 (CHOLECALCIFEROL) 125 MCG (5000 UT) tablet Take 1 tablet by mouth daily      amoxicillin (AMOXIL) 500 MG capsule Tk 2 grams 30-60 minutes prior to dental procedures/cleanings.  Qty: 4 capsule, Refills: 3    Associated Diagnoses: S/p TAVR (transcatheter aortic valve replacement), bioprosthetic      blood glucose (NO BRAND SPECIFIED) lancets standard Use to test blood sugar 1 times daily or as directed.  Qty: 1 each, Refills: 3    Associated Diagnoses: Type 2 diabetes mellitus with hyperglycemia, without long-term current use of insulin (H)      blood glucose (NO BRAND SPECIFIED) test strip Use to test blood sugar 1 times daily or as directed.  Qty: 90 strip, Refills: 3    Associated Diagnoses: Type 2 diabetes mellitus with hyperglycemia, without long-term current use of insulin (H)       blood glucose monitoring (NO BRAND SPECIFIED) meter device kit Use to test blood sugar 1 times daily or as directed.  Qty: 1 kit, Refills: 0    Associated Diagnoses: Type 2 diabetes mellitus with hyperglycemia, without long-term current use of insulin (H)      predniSONE (DELTASONE) 10 MG tablet Take 1 tablet (10 mg) by mouth daily  Qty: 6 tablet, Refills: 0    Associated Diagnoses: Drug rash      warfarin ANTICOAGULANT (COUMADIN) 5 MG tablet Take 2.5mg by oral route on Wednesdays, Saturdays, and take 5mg by oral route all other days  Qty: 90 tablet, Refills: 0    Associated Diagnoses: History of DVT (deep vein thrombosis)           Allergies   Allergies   Allergen Reactions     Isosorbide Nitrate Visual Disturbance     LIGHT HEADED,ALMOST PASSED OUT       Zocor [Simvastatin] Itching     Ancef [Cefazolin] Rash     Patient received cefazolin IV while in the [facility] on 12/6/22. On 12/12/22 he was seen by his primary for a drug rash on his trunk. Pt thought it was from amoxicillin administered at the [facility] but the [facility] only infused cefazolin per chart records. Amoxil rx given for dental procedures pt will not take due to fear of reaction.

## 2023-07-17 NOTE — CARE PLAN
Most recent vitals: /82 (BP Location: Right arm, Patient Position: Semi-Dinero's, Cuff Size: Adult Small)   Pulse 78   Temp 98.2  F (36.8  C) (Tympanic)   Resp 28   SpO2 96%     Pain Management: 2-7/10 (sharp intermittent spasm) Dilaudid given as ordered.  LOC: A/O, calm, cooperative, pleasant  Cardiac: SR 70's, BBB, Occasional PVC's Per ICU  Respiratory: WDL  GI: WDL  : CBI (continuous bladder irrigation), pink tinged urine with periodic clots See I/O (total this shift 7300 irrrigated)  Skin Issues: As charted    IVF: SL L Forearm  ABX: Rocephin    Nutrition: 60g Carb diet  Ambulation: Strict bedrest  Safety: WDL  Face to face report given with opportunity to observe patient.    Report given to Jorge OBRIEN RN  & Dianelys FOY RN    7/17/2023  7:40 AM  Mitali Cao RN

## 2023-07-20 ENCOUNTER — TELEPHONE (OUTPATIENT)
Dept: CARDIOLOGY | Facility: OTHER | Age: 88
End: 2023-07-20
Payer: MEDICARE

## 2023-07-20 ENCOUNTER — TRANSFERRED RECORDS (OUTPATIENT)
Dept: HEALTH INFORMATION MANAGEMENT | Facility: CLINIC | Age: 88
End: 2023-07-20
Payer: MEDICARE

## 2023-07-20 NOTE — TELEPHONE ENCOUNTER
Please call Dr. Su back.  The patient is in the hospital in [place] and he has questions.          Eva Anthony on 7/20/2023 at 11:16 AM

## 2023-07-21 ENCOUNTER — LAB (OUTPATIENT)
Dept: LAB | Facility: OTHER | Age: 88
End: 2023-07-21
Payer: MEDICARE

## 2023-07-21 ENCOUNTER — ANTICOAGULATION THERAPY VISIT (OUTPATIENT)
Dept: ANTICOAGULATION | Facility: OTHER | Age: 88
End: 2023-07-21
Attending: FAMILY MEDICINE
Payer: MEDICARE

## 2023-07-21 DIAGNOSIS — Z86.718 HISTORY OF DVT (DEEP VEIN THROMBOSIS): Primary | ICD-10-CM

## 2023-07-21 DIAGNOSIS — I82.5Y3 CHRONIC DEEP VEIN THROMBOSIS (DVT) OF PROXIMAL VEIN OF BOTH LOWER EXTREMITIES (H): ICD-10-CM

## 2023-07-21 DIAGNOSIS — Z86.718 PERSONAL HISTORY OF DVT (DEEP VEIN THROMBOSIS): ICD-10-CM

## 2023-07-21 LAB
INR BLD: 1.2 (ref 0.9–1.1)
PATH REPORT.COMMENTS IMP SPEC: NORMAL
PATH REPORT.FINAL DX SPEC: NORMAL
PATH REPORT.GROSS SPEC: NORMAL
PATH REPORT.RELEVANT HX SPEC: NORMAL

## 2023-07-21 PROCEDURE — 85610 PROTHROMBIN TIME: CPT | Mod: ZL

## 2023-07-21 PROCEDURE — 36415 COLL VENOUS BLD VENIPUNCTURE: CPT | Mod: ZL

## 2023-07-21 NOTE — PROGRESS NOTES
ANTICOAGULATION MANAGEMENT     iSdney Barriga 89 year old male is on warfarin with subtherapeutic INR result. (Goal INR 2.0-3.0)    Recent labs: (last 7 days)     07/21/23  1052   INR 1.2*       ASSESSMENT       Source(s): Chart Review and Patient/Caregiver Call       Warfarin doses taken: Warfarin taken as instructed    Diet: No new diet changes identified    New illness, injury, or hospitalization: Yes: Hospitalized from 7/15/23 to 7/20/23    Medication/supplement changes: None noted    Signs or symptoms of bleeding or clotting: Yes: Gross Hematura noted at hospitalization.     Previous INR: Supratherapeutic    Additional findings: None       PLAN     Recommended plan for temporary change(s) affecting INR     Dosing Instructions: Continue your current warfarin dose with next INR in 5 days       Summary  As of 7/21/2023    Full warfarin instructions:  2.5 mg every Wed, Sat; 5 mg all other days   Next INR check:  7/24/2023             Telephone call with Sidney who verbalizes understanding and agrees to plan  Detailed voice message left for Sidney with dosing instructions and follow up date.     Lab visit scheduled    Education provided: None required    Plan made per [facility] anticoagulation protocol    Barbie Hylton, RN  Anticoagulation Clinic  7/21/2023    _______________________________________________________________________     Anticoagulation Episode Summary     Current INR goal:  2.0-3.0   TTR:  79.1 % (1 y)   Target end date:  Indefinite   Send INR reminders to:  [facility]    Indications    History of DVT (deep vein thrombosis)-multiple [Z86.718]  Chronic deep vein thrombosis (DVT) of proximal vein of both lower extremities (H) [I82.5Y3]  Personal history of DVT (deep vein thrombosis) [Z86.798]           Comments:           Anticoagulation Care Providers     Provider Role Specialty Phone number    Zach Arredondo MD Referring Family Medicine [phone]

## 2023-07-24 ENCOUNTER — ANTICOAGULATION THERAPY VISIT (OUTPATIENT)
Dept: FAMILY MEDICINE | Facility: OTHER | Age: 88
End: 2023-07-24

## 2023-07-24 ENCOUNTER — OFFICE VISIT (OUTPATIENT)
Dept: FAMILY MEDICINE | Facility: OTHER | Age: 88
End: 2023-07-24
Attending: FAMILY MEDICINE
Payer: MEDICARE

## 2023-07-24 ENCOUNTER — LAB (OUTPATIENT)
Dept: LAB | Facility: OTHER | Age: 88
End: 2023-07-24
Payer: MEDICARE

## 2023-07-24 VITALS
SYSTOLIC BLOOD PRESSURE: 106 MMHG | DIASTOLIC BLOOD PRESSURE: 62 MMHG | WEIGHT: 165 LBS | HEART RATE: 76 BPM | BODY MASS INDEX: 23.68 KG/M2 | OXYGEN SATURATION: 99 %

## 2023-07-24 DIAGNOSIS — I10 ESSENTIAL HYPERTENSION, BENIGN: ICD-10-CM

## 2023-07-24 DIAGNOSIS — Z86.718 HISTORY OF DVT (DEEP VEIN THROMBOSIS): ICD-10-CM

## 2023-07-24 DIAGNOSIS — E11.65 TYPE 2 DIABETES MELLITUS WITH HYPERGLYCEMIA, WITHOUT LONG-TERM CURRENT USE OF INSULIN (H): ICD-10-CM

## 2023-07-24 DIAGNOSIS — D69.6 THROMBOCYTOPENIA (H): ICD-10-CM

## 2023-07-24 DIAGNOSIS — R31.0 GROSS HEMATURIA: ICD-10-CM

## 2023-07-24 DIAGNOSIS — Z95.3 S/P TAVR (TRANSCATHETER AORTIC VALVE REPLACEMENT), BIOPROSTHETIC: ICD-10-CM

## 2023-07-24 DIAGNOSIS — Z86.718 HISTORY OF DVT (DEEP VEIN THROMBOSIS): Primary | ICD-10-CM

## 2023-07-24 DIAGNOSIS — Z86.718 PERSONAL HISTORY OF DVT (DEEP VEIN THROMBOSIS): ICD-10-CM

## 2023-07-24 DIAGNOSIS — R31.0 GROSS HEMATURIA: Primary | ICD-10-CM

## 2023-07-24 DIAGNOSIS — I82.5Y3 CHRONIC DEEP VEIN THROMBOSIS (DVT) OF PROXIMAL VEIN OF BOTH LOWER EXTREMITIES (H): ICD-10-CM

## 2023-07-24 PROBLEM — I95.89 CHRONIC ASYMPTOMATIC HYPOTENSION: Status: ACTIVE | Noted: 2023-07-18

## 2023-07-24 PROBLEM — I71.40 ABDOMINAL AORTIC ANEURYSM (AAA) 3.0 CM TO 5.5 CM IN DIAMETER IN MALE (H): Status: ACTIVE | Noted: 2023-07-24

## 2023-07-24 PROBLEM — D86.9 SARCOIDOSIS: Status: ACTIVE | Noted: 2023-07-24

## 2023-07-24 PROBLEM — R93.1 ABNORMAL FINDINGS ON DIAGNOSTIC IMAGING OF HEART AND CORONARY CIRCULATION: Status: ACTIVE | Noted: 2021-01-25

## 2023-07-24 PROBLEM — F17.201 TOBACCO DEPENDENCE IN REMISSION: Status: ACTIVE | Noted: 2023-07-24

## 2023-07-24 PROBLEM — N17.9 ACUTE KIDNEY INJURY SUPERIMPOSED ON CKD (H): Status: ACTIVE | Noted: 2023-07-17

## 2023-07-24 PROBLEM — N18.9 ACUTE KIDNEY INJURY SUPERIMPOSED ON CKD (H): Status: ACTIVE | Noted: 2023-07-17

## 2023-07-24 PROBLEM — R25.1 TREMOR: Status: ACTIVE | Noted: 2023-07-24

## 2023-07-24 PROBLEM — Z85.828 HISTORY OF SQUAMOUS CELL CARCINOMA OF SKIN: Status: ACTIVE | Noted: 2023-07-24

## 2023-07-24 PROBLEM — I50.9 CONGESTIVE HEART FAILURE (H): Status: ACTIVE | Noted: 2023-07-24

## 2023-07-24 PROBLEM — D62 ACUTE BLOOD LOSS ANEMIA: Status: ACTIVE | Noted: 2023-07-17

## 2023-07-24 PROBLEM — I82.409 DEEP VENOUS THROMBOSIS (H): Status: ACTIVE | Noted: 2023-07-24

## 2023-07-24 LAB
ANION GAP SERPL CALCULATED.3IONS-SCNC: 15 MMOL/L (ref 7–15)
BASOPHILS # BLD AUTO: 0 10E3/UL (ref 0–0.2)
BASOPHILS NFR BLD AUTO: 0 %
BUN SERPL-MCNC: 30.2 MG/DL (ref 8–23)
CALCIUM SERPL-MCNC: 9.7 MG/DL (ref 8.2–9.6)
CHLORIDE SERPL-SCNC: 104 MMOL/L (ref 98–107)
CREAT SERPL-MCNC: 1.51 MG/DL (ref 0.67–1.17)
DEPRECATED HCO3 PLAS-SCNC: 23 MMOL/L (ref 22–29)
EOSINOPHIL # BLD AUTO: 0.2 10E3/UL (ref 0–0.7)
EOSINOPHIL NFR BLD AUTO: 3 %
ERYTHROCYTE [DISTWIDTH] IN BLOOD BY AUTOMATED COUNT: 13.8 % (ref 10–15)
EST. AVERAGE GLUCOSE BLD GHB EST-MCNC: 108 MG/DL
GFR SERPL CREATININE-BSD FRML MDRD: 43 ML/MIN/1.73M2
GLUCOSE SERPL-MCNC: 121 MG/DL (ref 70–99)
HBA1C MFR BLD: 5.4 %
HCT VFR BLD AUTO: 31.9 % (ref 40–53)
HGB BLD-MCNC: 10.4 G/DL (ref 13.3–17.7)
IMM GRANULOCYTES # BLD: 0 10E3/UL
IMM GRANULOCYTES NFR BLD: 0 %
INR PPP: 1.32 (ref 0.85–1.15)
LYMPHOCYTES # BLD AUTO: 1.1 10E3/UL (ref 0.8–5.3)
LYMPHOCYTES NFR BLD AUTO: 18 %
MCH RBC QN AUTO: 31.3 PG (ref 26.5–33)
MCHC RBC AUTO-ENTMCNC: 32.6 G/DL (ref 31.5–36.5)
MCV RBC AUTO: 96 FL (ref 78–100)
MONOCYTES # BLD AUTO: 0.7 10E3/UL (ref 0–1.3)
MONOCYTES NFR BLD AUTO: 12 %
NEUTROPHILS # BLD AUTO: 3.9 10E3/UL (ref 1.6–8.3)
NEUTROPHILS NFR BLD AUTO: 66 %
PLATELET # BLD AUTO: 130 10E3/UL (ref 150–450)
POTASSIUM SERPL-SCNC: 4.2 MMOL/L (ref 3.4–5.3)
RBC # BLD AUTO: 3.32 10E6/UL (ref 4.4–5.9)
SODIUM SERPL-SCNC: 142 MMOL/L (ref 136–145)
WBC # BLD AUTO: 5.9 10E3/UL (ref 4–11)

## 2023-07-24 PROCEDURE — 99214 OFFICE O/P EST MOD 30 MIN: CPT | Performed by: FAMILY MEDICINE

## 2023-07-24 PROCEDURE — 99496 TRANSJ CARE MGMT HIGH F2F 7D: CPT | Performed by: FAMILY MEDICINE

## 2023-07-24 PROCEDURE — 85025 COMPLETE CBC W/AUTO DIFF WBC: CPT | Mod: ZL

## 2023-07-24 PROCEDURE — 85610 PROTHROMBIN TIME: CPT | Mod: ZL

## 2023-07-24 PROCEDURE — 82310 ASSAY OF CALCIUM: CPT | Mod: ZL

## 2023-07-24 PROCEDURE — 83036 HEMOGLOBIN GLYCOSYLATED A1C: CPT | Mod: ZL

## 2023-07-24 PROCEDURE — 36415 COLL VENOUS BLD VENIPUNCTURE: CPT | Mod: ZL

## 2023-07-24 PROCEDURE — 84443 ASSAY THYROID STIM HORMONE: CPT | Mod: ZL

## 2023-07-24 PROCEDURE — G0463 HOSPITAL OUTPT CLINIC VISIT: HCPCS | Performed by: FAMILY MEDICINE

## 2023-07-24 RX ORDER — CARVEDILOL 12.5 MG/1
12.5 TABLET ORAL 2 TIMES DAILY WITH MEALS
COMMUNITY
Start: 2023-07-20 | End: 2023-09-07

## 2023-07-24 NOTE — PROGRESS NOTES
Assessment & Plan     Gross hematuria  Not ongoing.  Back on the coumadin again.  Update and follow.  No clear cause on cysto other than his coumadin.    - CBC with platelets and differential; Future    Essential hypertension, benign  Stable.  He is off the lisinopril and feeling ok.    - Basic metabolic panel; Future    S/p TAVR (transcatheter aortic valve replacement), bioprosthetic on 12/6/2022 with a 29 mm IMAN 3 valve      Thrombocytopenia (H)  Udpate.     Type 2 diabetes mellitus with hyperglycemia, without long-term current use of insulin (H)  Update a1c.    - Hemoglobin A1c; Future    History of DVT (deep vein thrombosis)  On the coumadin.    - Reflex INR; Future           MED REC REQUIRED  Post Medication Reconciliation Status:  Discharge medications reconciled, continue medications without change        No follow-ups on file.    Zach Arredondo MD  [facility]   Sidney is a 89 year old, presenting for the following health issues:  Hospital F/U         No data to display              HPI       Hospital Follow-up Visit:    Hospital/Nursing Home/IP Rehab Facility: [facility]  Date of Admission: 7/16/23  Date of Discharge: 7/17/23  Reason(s) for Admission: hematuria with bladder mass    Was your hospitalization related to COVID-19? No   Problems taking medications regularly:  None  Medication changes since discharge: None  Problems adhering to non-medication therapy:  None    Summary of hospitalization:  CareEverywhere information obtained and reviewed  Diagnostic Tests/Treatments reviewed.  Follow up needed: none  Other Healthcare Providers Involved in Patient s Care:         None  Update since discharge: improved.         Plan of care communicated with patient                   Review of Systems   Constitutional, HEENT, cardiovascular, pulmonary, gi and gu systems are negative, except as otherwise noted.      Objective    /62   Pulse 76   Wt  74.8 kg (165 lb)   SpO2 99%   BMI 23.68 kg/m    Body mass index is 23.68 kg/m .  Physical Exam   GENERAL: healthy, alert and no distress  NECK: no adenopathy, no asymmetry, masses, or scars and thyroid normal to palpation  RESP: lungs clear to auscultation - no rales, rhonchi or wheezes  CV: regular rate and rhythm, normal S1 S2, no S3 or S4, no murmur, click or rub, no peripheral edema and peripheral pulses strong  ABDOMEN: soft, nontender, no hepatosplenomegaly, no masses and bowel sounds normal  MS: no gross musculoskeletal defects noted, no edema

## 2023-07-24 NOTE — PROGRESS NOTES
ANTICOAGULATION MANAGEMENT     Sidneycarol Barriga 89 year old male is on warfarin with subtherapeutic INR result. (Goal INR 2.0-3.0)    Recent labs: (last 7 days)     07/24/23  1025   INR 1.32*       ASSESSMENT     Source(s): Chart Review and Patient/Caregiver Call     Warfarin doses taken: Warfarin taken as instructed  Diet: No new diet changes identified  New illness, injury, or hospitalization: No  Medication/supplement changes: None noted  Signs or symptoms of bleeding or clotting: No  Previous INR: Subtherapeutic  Additional findings: None and Previous gross hematuria from bladder at 3.3     PLAN     Recommended plan for no diet, medication or health factor changes affecting INR     Dosing Instructions: Continue your current warfarin dose with next INR in 5 days       Summary  As of 7/24/2023      Full warfarin instructions:  2.5 mg every Wed, Sat; 5 mg all other days   Next INR check:  7/27/2023               Telephone call with Sidney who verbalizes understanding and agrees to plan    Lab visit scheduled    Education provided: None required and Please call back if any changes to your diet, medications or how you've been taking warfarin    Plan made per [facility] anticoagulation protocol    Barbie Hylton RN  Anticoagulation Clinic  7/24/2023    _______________________________________________________________________     Anticoagulation Episode Summary       Current INR goal:  2.0-3.0   TTR:  79.1 % (1 y)   Target end date:  Indefinite   Send INR reminders to:  [facility]    Indications    History of DVT (deep vein thrombosis)-multiple [Z82.028]  Chronic deep vein thrombosis (DVT) of proximal vein of both lower extremities (H) [I82.5Y3]  Personal history of DVT (deep vein thrombosis) [Z86.808]             Comments:               Anticoagulation Care Providers       Provider Role Specialty Phone number    Zach Arredondo MD Referring Family Medicine [phone]

## 2023-07-25 LAB
PATH REPORT.FINAL DX SPEC: NORMAL
PATH REPORT.GROSS SPEC: NORMAL
PATH REPORT.MICROSCOPIC SPEC OTHER STN: NORMAL
PATH REPORT.RELEVANT HX SPEC: NORMAL

## 2023-07-26 DIAGNOSIS — E11.65 TYPE 2 DIABETES MELLITUS WITH HYPERGLYCEMIA, WITHOUT LONG-TERM CURRENT USE OF INSULIN (H): Primary | ICD-10-CM

## 2023-07-27 ENCOUNTER — ANTICOAGULATION THERAPY VISIT (OUTPATIENT)
Dept: ANTICOAGULATION | Facility: OTHER | Age: 88
End: 2023-07-27
Attending: FAMILY MEDICINE
Payer: MEDICARE

## 2023-07-27 ENCOUNTER — LAB (OUTPATIENT)
Dept: LAB | Facility: OTHER | Age: 88
End: 2023-07-27
Payer: MEDICARE

## 2023-07-27 DIAGNOSIS — Z86.718 HISTORY OF DVT (DEEP VEIN THROMBOSIS): Primary | ICD-10-CM

## 2023-07-27 DIAGNOSIS — Z79.01 ENCOUNTER FOR MONITORING COUMADIN THERAPY: Primary | ICD-10-CM

## 2023-07-27 DIAGNOSIS — I82.5Y3 CHRONIC DEEP VEIN THROMBOSIS (DVT) OF PROXIMAL VEIN OF BOTH LOWER EXTREMITIES (H): ICD-10-CM

## 2023-07-27 DIAGNOSIS — Z51.81 ENCOUNTER FOR MONITORING COUMADIN THERAPY: Primary | ICD-10-CM

## 2023-07-27 DIAGNOSIS — Z86.718 PERSONAL HISTORY OF DVT (DEEP VEIN THROMBOSIS): ICD-10-CM

## 2023-07-27 DIAGNOSIS — E11.65 TYPE 2 DIABETES MELLITUS WITH HYPERGLYCEMIA, WITHOUT LONG-TERM CURRENT USE OF INSULIN (H): Primary | ICD-10-CM

## 2023-07-27 LAB
INR BLD: 1.5 (ref 0.9–1.1)
TSH SERPL DL<=0.005 MIU/L-ACNC: 1.66 UIU/ML (ref 0.3–4.2)

## 2023-07-27 PROCEDURE — 36415 COLL VENOUS BLD VENIPUNCTURE: CPT | Mod: ZL

## 2023-07-27 PROCEDURE — 85610 PROTHROMBIN TIME: CPT | Mod: ZL

## 2023-07-27 NOTE — PROGRESS NOTES
ANTICOAGULATION MANAGEMENT     Sidneycarol Barriga 89 year old male is on warfarin with subtherapeutic INR result. (Goal INR 2.0-3.0)    Recent labs: (last 7 days)     07/27/23  1501   INR 1.5*       ASSESSMENT     Source(s): Chart Review and Patient/Caregiver Call     Warfarin doses taken: Warfarin taken as instructed  Diet: No new diet changes identified  New illness, injury, or hospitalization: No  Medication/supplement changes: None noted  Signs or symptoms of bleeding or clotting: No  Previous INR: Subtherapeutic  Additional findings: None     PLAN     Recommended plan for no diet, medication or health factor changes affecting INR     Dosing Instructions: Continue your current warfarin dose with next INR in 1 week per patient request    Summary  As of 7/27/2023      Full warfarin instructions:  2.5 mg every Wed, Sat; 5 mg all other days   Next INR check:  8/3/2023               Telephone call with Sidney who verbalizes understanding and agrees to plan    Lab visit scheduled    Education provided: Please call back if any changes to your diet, medications or how you've been taking warfarin    Plan made per [facility] anticoagulation protocol    Yolande Preston RN  Anticoagulation Clinic  7/27/2023    _______________________________________________________________________     Anticoagulation Episode Summary       Current INR goal:  2.0-3.0   TTR:  79.0 % (1 y)   Target end date:  Indefinite   Send INR reminders to:  [facility] [place]    Indications    History of DVT (deep vein thrombosis)-multiple [Z59.718]  Chronic deep vein thrombosis (DVT) of proximal vein of both lower extremities (H) [I82.5Y3]  Personal history of DVT (deep vein thrombosis) [Z86.778]             Comments:               Anticoagulation Care Providers       Provider Role Specialty Phone number    Zach Arredondo MD Referring Family Medicine [phone]

## 2023-08-03 ENCOUNTER — LAB (OUTPATIENT)
Dept: LAB | Facility: OTHER | Age: 88
End: 2023-08-03
Payer: MEDICARE

## 2023-08-03 ENCOUNTER — ANTICOAGULATION THERAPY VISIT (OUTPATIENT)
Dept: ANTICOAGULATION | Facility: OTHER | Age: 88
End: 2023-08-03
Attending: FAMILY MEDICINE
Payer: MEDICARE

## 2023-08-03 DIAGNOSIS — Z86.718 HISTORY OF DVT (DEEP VEIN THROMBOSIS): Primary | ICD-10-CM

## 2023-08-03 DIAGNOSIS — Z86.718 PERSONAL HISTORY OF DVT (DEEP VEIN THROMBOSIS): ICD-10-CM

## 2023-08-03 DIAGNOSIS — I82.5Y3 CHRONIC DEEP VEIN THROMBOSIS (DVT) OF PROXIMAL VEIN OF BOTH LOWER EXTREMITIES (H): ICD-10-CM

## 2023-08-03 LAB — INR BLD: 2.7 (ref 0.9–1.1)

## 2023-08-03 PROCEDURE — 85610 PROTHROMBIN TIME: CPT | Mod: ZL

## 2023-08-03 PROCEDURE — 36416 COLLJ CAPILLARY BLOOD SPEC: CPT | Mod: ZL

## 2023-08-03 NOTE — PROGRESS NOTES
ANTICOAGULATION MANAGEMENT     Sidney Barriga 89 year old male is on warfarin with therapeutic INR result. (Goal INR 2.0-3.0)    Recent labs: (last 7 days)     08/03/23  1459   INR 2.7*       ASSESSMENT     Source(s): Chart Review and Patient/Caregiver Call     Warfarin doses taken: Warfarin taken as instructed  Diet: No new diet changes identified  New illness, injury, or hospitalization: No  Medication/supplement changes: None noted  Signs or symptoms of bleeding or clotting: No  Previous INR: Subtherapeutic  Additional findings: None     PLAN     Recommended plan for no diet, medication or health factor changes affecting INR     Dosing Instructions: Continue your current warfarin dose with next INR in 1 week       Summary  As of 8/3/2023      Full warfarin instructions:  2.5 mg every Wed, Sat; 5 mg all other days   Next INR check:  8/10/2023               Telephone call with Sidney who verbalizes understanding and agrees to plan    Lab visit scheduled    Education provided: None required    Plan made per [facility] anticoagulation protocol    Barbie Hylton, RN  Anticoagulation Clinic  8/3/2023    _______________________________________________________________________     Anticoagulation Episode Summary       Current INR goal:  2.0-3.0   TTR:  80.2 % (1 y)   Target end date:  Indefinite   Send INR reminders to:  [facility]    Indications    History of DVT (deep vein thrombosis)-multiple [Z86.718]  Chronic deep vein thrombosis (DVT) of proximal vein of both lower extremities (H) [I82.5Y3]  Personal history of DVT (deep vein thrombosis) [Z86.718]             Comments:               Anticoagulation Care Providers       Provider Role Specialty Phone number    Zach Arredondo MD Referring Family Medicine [phone]

## 2023-08-10 ENCOUNTER — ANTICOAGULATION THERAPY VISIT (OUTPATIENT)
Dept: ANTICOAGULATION | Facility: OTHER | Age: 88
End: 2023-08-10
Attending: FAMILY MEDICINE
Payer: MEDICARE

## 2023-08-10 ENCOUNTER — LAB (OUTPATIENT)
Dept: LAB | Facility: OTHER | Age: 88
End: 2023-08-10
Payer: MEDICARE

## 2023-08-10 DIAGNOSIS — Z86.718 PERSONAL HISTORY OF DVT (DEEP VEIN THROMBOSIS): ICD-10-CM

## 2023-08-10 DIAGNOSIS — I82.5Y3 CHRONIC DEEP VEIN THROMBOSIS (DVT) OF PROXIMAL VEIN OF BOTH LOWER EXTREMITIES (H): ICD-10-CM

## 2023-08-10 DIAGNOSIS — Z86.718 HISTORY OF DVT (DEEP VEIN THROMBOSIS): Primary | ICD-10-CM

## 2023-08-10 LAB — INR BLD: 3.5 (ref 0.9–1.1)

## 2023-08-10 PROCEDURE — 36415 COLL VENOUS BLD VENIPUNCTURE: CPT | Mod: ZL

## 2023-08-10 PROCEDURE — 85610 PROTHROMBIN TIME: CPT | Mod: ZL

## 2023-08-10 NOTE — PROGRESS NOTES
ANTICOAGULATION MANAGEMENT     Sidney Barriga 89 year old male is on warfarin with supratherapeutic INR result. (Goal INR 2.0-3.0)    Recent labs: (last 7 days)     08/10/23  1457   INR 3.5*       ASSESSMENT     Source(s): Chart Review and Patient/Caregiver Call     Warfarin doses taken: Warfarin taken as instructed  Diet: No new diet changes identified  Medication/supplement changes: None noted  New illness, injury, or hospitalization: No  Signs or symptoms of bleeding or clotting: No  Previous result: Therapeutic last visit; previously outside of goal range  Additional findings: None       PLAN     Recommended plan for no diet, medication or health factor changes affecting INR     Dosing Instructions: decrease your warfarin dose (8.3% change) with next INR in 1 week       Summary  As of 8/10/2023      Full warfarin instructions:  2.5 mg every Mon, Wed, Fri; 5 mg all other days   Next INR check:  8/17/2023               Telephone call with Sidney who verbalizes understanding and agrees to plan    Lab visit scheduled    Education provided:   None required    Plan made per [facility] anticoagulation protocol    Lizbeth Ely RN  Anticoagulation Clinic  8/10/2023    _______________________________________________________________________     Anticoagulation Episode Summary       Current INR goal:  2.0-3.0   TTR:  80.9 % (1 y)   Target end date:  Indefinite   Send INR reminders to:  [facility]    Indications    History of DVT (deep vein thrombosis)-multiple [Z86.718]  Chronic deep vein thrombosis (DVT) of proximal vein of both lower extremities (H) [I82.5Y3]  Personal history of DVT (deep vein thrombosis) [Z86.718]             Comments:  Patient would like to keep his INR below 2.7 due to his past history of bleeding.             Anticoagulation Care Providers       Provider Role Specialty Phone number    Zach Arredondo MD Referring Family Medicine [phone]

## 2023-08-14 ENCOUNTER — OFFICE VISIT (OUTPATIENT)
Dept: CARDIOLOGY | Facility: OTHER | Age: 88
End: 2023-08-14
Attending: INTERNAL MEDICINE
Payer: MEDICARE

## 2023-08-14 VITALS
RESPIRATION RATE: 16 BRPM | SYSTOLIC BLOOD PRESSURE: 120 MMHG | HEIGHT: 70 IN | BODY MASS INDEX: 23.48 KG/M2 | OXYGEN SATURATION: 96 % | HEART RATE: 80 BPM | WEIGHT: 164 LBS | DIASTOLIC BLOOD PRESSURE: 68 MMHG

## 2023-08-14 DIAGNOSIS — I71.21 ANEURYSM OF ASCENDING AORTA WITHOUT RUPTURE (H): ICD-10-CM

## 2023-08-14 DIAGNOSIS — I27.20 MILD PULMONARY HYPERTENSION (H): ICD-10-CM

## 2023-08-14 DIAGNOSIS — I10 ESSENTIAL HYPERTENSION, BENIGN: ICD-10-CM

## 2023-08-14 DIAGNOSIS — I50.42 CHRONIC COMBINED SYSTOLIC AND DIASTOLIC CONGESTIVE HEART FAILURE (H): ICD-10-CM

## 2023-08-14 DIAGNOSIS — I49.3 FREQUENT PVCS: ICD-10-CM

## 2023-08-14 DIAGNOSIS — I25.5 ISCHEMIC CARDIOMYOPATHY: ICD-10-CM

## 2023-08-14 DIAGNOSIS — I48.92 ATRIAL FLUTTER, UNSPECIFIED TYPE (H): ICD-10-CM

## 2023-08-14 DIAGNOSIS — I49.8 VENTRICULAR BIGEMINY: ICD-10-CM

## 2023-08-14 DIAGNOSIS — N18.32 STAGE 3B CHRONIC KIDNEY DISEASE (H): ICD-10-CM

## 2023-08-14 DIAGNOSIS — I10 ESSENTIAL HYPERTENSION: ICD-10-CM

## 2023-08-14 DIAGNOSIS — R07.9 CHEST PAIN, UNSPECIFIED TYPE: ICD-10-CM

## 2023-08-14 DIAGNOSIS — E78.2 MIXED HYPERLIPIDEMIA: ICD-10-CM

## 2023-08-14 DIAGNOSIS — Z95.3 S/P TAVR (TRANSCATHETER AORTIC VALVE REPLACEMENT), BIOPROSTHETIC: ICD-10-CM

## 2023-08-14 DIAGNOSIS — R06.09 DOE (DYSPNEA ON EXERTION): Primary | ICD-10-CM

## 2023-08-14 DIAGNOSIS — I49.9 IRREGULAR HEARTBEAT: ICD-10-CM

## 2023-08-14 PROCEDURE — 99215 OFFICE O/P EST HI 40 MIN: CPT | Performed by: INTERNAL MEDICINE

## 2023-08-14 PROCEDURE — G0463 HOSPITAL OUTPT CLINIC VISIT: HCPCS

## 2023-08-14 RX ORDER — FUROSEMIDE 20 MG
20 TABLET ORAL EVERY OTHER DAY
Qty: 135 TABLET | Refills: 3 | Status: SHIPPED | OUTPATIENT
Start: 2023-08-14 | End: 2024-05-22

## 2023-08-14 RX ORDER — EZETIMIBE 10 MG/1
10 TABLET ORAL AT BEDTIME
Qty: 90 TABLET | Refills: 3 | Status: SHIPPED | OUTPATIENT
Start: 2023-08-14 | End: 2023-11-27

## 2023-08-14 RX ORDER — CARVEDILOL 12.5 MG/1
12.5 TABLET ORAL 2 TIMES DAILY WITH MEALS
Qty: 180 TABLET | Refills: 3 | Status: SHIPPED | OUTPATIENT
Start: 2023-08-14 | End: 2024-03-27 | Stop reason: ALTCHOICE

## 2023-08-14 RX ORDER — AMOXICILLIN 500 MG/1
CAPSULE ORAL
Qty: 4 CAPSULE | Refills: 3 | Status: SHIPPED | OUTPATIENT
Start: 2023-08-14 | End: 2024-02-27

## 2023-08-14 ASSESSMENT — PAIN SCALES - GENERAL: PAINLEVEL: NO PAIN (0)

## 2023-08-14 NOTE — PATIENT INSTRUCTIONS
Thank you for allowing Alessia Molina CNP and our  team to participate in your care. Please call our office at [phone] with scheduling questions or if you need to cancel or change your appointment. With any other questions or concerns you may call cardiology nurse at [phone] or [phone].       If you experience chest pain, chest pressure, chest tightness, shortness of breath, fainting, lightheadedness, nausea, vomiting, or other concerning symptoms, please report to the Emergency Department or call 911. These symptoms may be emergent, and best treated in the Emergency Department.      Follow up in April as planned.

## 2023-08-14 NOTE — PROGRESS NOTES
[facility] HEART CARE   CARDIOLOGY PROGRESS NOTE     Chief Complaint   Patient presents with    Follow Up     Discuss Hosp stay and dose change of Coreg.           Diagnosis:  1.  RIVAS 2/2 CHF, pulm htn, mild COPD, severe diffusion defect on 11/18/20, and DVT/PE???  2.  CABG x3, 4/29/2014-LIMA to LAD, SVG to OM, and second OM.  3.  CKD-4.  4.  CHF-systolic, ischemic. 30-35% on 4/5/22 and 4/5/2023. 35-40% on 5/5/22. 30-35% on 12/9/20. Diastolic grade 1 on 11/1/2007. Unchanged on 2/11/21.  5.  TAVR on 12/6/22, [place]. 29 mm Patricio S3.  6.  MV and AV-moderate stenosis on cardiac cath on 1/15/21.   7.  H/O DVT-x5. H/O IVC filter, removal in 2014.  8.  Hypomagnesia.  9.  Hypertension-controlled.  10.  Hypertriglyceridemia-controlled.  11.  Tobacco abuse quitting in 1978.  12.  Cardiac cath x5 on 7/17/2007, 4/20//14, 2019, 1/15/21, and 11/30/22.  13.  Coronary stent placement on 7/17/2007 x2 stents to the LAD and in 2019 in [place] to unknown vessel.  14.  H/O sarcoidosis in 1982.  15.  DM-2-controlled.   16.  COPD-mild on 11/18/2020.  17.  WMA on 12/9/20.  18.  Right lung nodule x2 on 2/12/2021.  19.  Concern for TAAA at 4.3 cm on 5/15/2021.  20.  Concern for AAA r/o on ultrasound from 2/12/2021.  21.  Mild pulmonary hypertension on his cardiac cath at the [facility] on 1/15/2021.      Assessment/Plan:    1.   bleed: Has had recurrent  bleeds.  Hospitalized on 7/17/2023.  Currently on Coumadin for recurrent blood clots.  He believes he has had approximately x5 DVT's.  History of IVC filter removed in 2014.  Not a candidate for NOAC/DOAC's because of his history of aortic valve replacement.  We will continue on Coumadin with goal INR of 2.0-2.7.  Limited substitutes for his recurrent bleed.  Was given ceftriaxone for presumptive inflammation/infection while hospitalized.  2.  CHF: Previously on lisinopril but discontinued secondary to hypotension and dizziness while hospitalized.  Discussed the importance of  taking this medication but patient declined.  Coreg was decreased from 25 mg twice a day to 12.5 mg twice a day.  Continues on Lasix 20 mg alternating with 40 mg every other day.  Has mild peripheral edema.  Suggested we reintroduce lisinopril 2.5 mg daily but patient declined understanding the risk.  3.  Aortic valve replacement: Have suggested at each visit he is to follow-up with [place].  Patient has declined.  He was have his valve replaced at the [facility] but chose to go to [place] on his own.  Will need amoxicillin 2 g, 30 to 60 minutes prior to dental procedures as he has his lower teeth.  4.  Follow-up on 4/22/2024 as planned or sooner with issues.    Interval history:  As noted above.      HPI:    Mr. Barriga is being seen by cardiology to establish care.  He has a history of coronary disease with both stenting and CABG x3.  His wife passed away on 9/1/2020, suddenly from cancer.  He does not plan to go back to [place] and is establishing care locally.     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     Sidney is here to establish care.  He has a history of CAD with stenting and bypass. He previously obtained his cardiac care through [place], [place].  He states that dating back in 1982, he was diagnosed with sarcoidosis.  More recently, he had a cardiac catheterization on 7/17/2007 with history of MI with stenting x2 placed to the p-mLAD.  He went on to have CABG x3 on 4/29/2014 in [place].  He had LIMA to LAD, SVG to OM1 and OM 2.  At that time, his EF was reported to be 30% with ischemic cardiomyopathy.  More recently, he reports having had a cardiac  "catheterization once again in [place] with stenting to unknown vessels in 2019.  His symptoms in the past have been exertional dyspnea with intrascapular discomfort.  He states he has been having similar symptoms for the last 6 months but to a lesser degree.  Risk factors include history of tobacco abuse at approximately a half a pack a day until 1978, hypertension, hyperlipidemia, DM-2, family history, and diet.     He reportedly has a history of ischemic cardiomyopathy.  EF on 8/12/2010 was 40%.  Recently, while in [place] at time of bypass on 4/29/2014, his EF was 30%.  His echo on 11/1/2017 showed an EF of 40-45% with grade 1 diastolic dysfunction.  There was evidence for wall motion normalities as well as mild left atrial enlargement.  He has not had any swelling to his legs, presently on Lasix 20 mg daily.  He states he does not have issues with fluid overload.     He has been on Coumadin for extended period time.  He describes having had multiple recurrent DVT's with a IVC filter in place.  He is currently on Coumadin with management through the Coumadin clinic.     He has a history of hyperlipidemia.  His most recent lipid panel was on 10/20/2020 showing a total cholesterol of 248 and LDL of 164.  HDL was 46.  He has not been able to tolerate Lipitor or Crestor in the past.  He has been on Zetia.  On 12/7/2020,  he was started on Crestor 20 mg twice a week.  If he is able to tolerate this dosage, would increase as able. He states he has been on \"every statin with issues with all of them\".     Patient is known to have mild COPD with history of tobacco abuse.  PFT's on 11/18/2020 support mild COPD with severe diffusion defect.      Relevant testing:  US lower extremity for DVT on 7/17/2023:   No evidence of deep venous thrombosis within the lower extremities.    Echocardiogram on 5/8/2023 at [place]:  1. Status post 29 mm Paulino Patricio 3 transcatheter aortic valve bioprosthesis implanted via a transfemoral " approach (6-DEC-2022).   2. Normal prosthetic leaflet(s) motion. Mean gradient 7 mmHg. Trivial prosthetic regurgitation. No periprosthetic regurgitation.   3. Moderate mitral valve regurgitation (PISA ERO 0.31 cm2; RV 32 ml). Mechanism is likely degenerative disease resulting in malcoaptation on a background of moderate MAC.   4. Moderately enlarged left ventricular chamber size with moderate-severe generalized hypokinesis with regional variability, ejection fraction 27%.   5. Elevated left ventricular filling pressure. See comments.   6. Normal right ventricular chamber size with mildly reduced systolic function. Estimated right ventricular systolic pressure 65 mmHg (right atrial pressure of 10 mmHg).   7. Normal inferior vena cava size with reduced inspiratory collapse (<50%).   8. No  pericardial effusion.   9. Compared to the report of 08/15/2022 the following changes have occurred: in addition to new TAVR implantation, mitral regurgitation has increased and right ventricular systolic pressure has also increased.  Side by side comparison of images   performed.     Echocardiogram in 4/5/2023:  Left ventricular function is decreased. The ejection fraction is 30-35% (moderately reduced). Severe diffuse hypokinesis is present.   Grade II or moderate diastolic dysfunction.  The right ventricle is normal size. Global right ventricular function is normal.  The 29 mm Patricio III TAVR is well-seated. Leaflet opening is not clearly visualized. There is trace paravalvular regurgitation. There is no valvular regurgitation. The Vmax is 2.1 m/s, the mean gradient is 10 mmHg, the dimensionless index is 0.50, and the acceleration time is 120 ms.  This study was compared with the study from 05/05/2022. The AV gradient is lower. There is no significant change in the biventricular function.    MCOT at [place] on 12/8/22:  1. The patient was monitored from 12/8/2022 to 1/6/2023 with a total monitoring time of 28 days 19 hr 54 min. The  "baseline rhythm was sinus with a first-degree AV delay and a left bundle-branch block. The heart rate varied from 55 bpm to 93 bpm. The   average heart rate was 67 bpm.   2. There were 35,812 PVCs seen singly, paired, and in bigeminy and trigeminy with a PVC burden of 1.26%. There were 6 ventricular tachycardia events with greater than 3 beats with the longest duration being 8 beats. The maximum VT rate was 183 bpm.   3. There were 15,775 PACs seen singly with a PAC burden of <1%. There were 19 runs of supraventricular tachycardia observed with the longest duration being 39 beats. The maximum SVT rate was 125 bpm.   4. The patient reported 26 symptomatic events of \"dizziness.\" During these events, the rhythm was sinus with a first-degree AV delay and a left bundle-branch block. The heart rate varied from 64 bpm to 92 bpm. At and/or around these times, PVCs were seen    singly and in bigeminy and trigeminy.       Cardiac cath on 11/30/22:  The left main coronary artery is 30% obstructed by multiple discrete lesions.   The proximal left anterior descending artery is 100% obstructed by a discrete lesion. The distal segment is not visible.   The proximal circumflex artery is 40% obstructed by multiple discrete lesions. The distal segment is normal size.   The ramus intermedius segment is 100% obstructed by a discrete lesion. Fills retrogradely from graft.   The proximal right coronary artery is 50% obstructed by a discrete lesion. The distal segment is normal size.   The distal right coronary artery is 30% obstructed by multiple discrete lesions. The distal segment is normal size.   GRAFT ANGIOGRAPHY SUMMARY   Single body vein bypass graft to the Ramus Intermedius Segment. Graft Appears normal.   Single body left internal mammary graft to the Middle Left Anterior Descending Artery.   GRAFT ANGIOGRAPHY SUMMARY   Single body vein bypass graft to the Ramus Intermedius Segment. Graft Appears normal.   Single body left " internal mammary graft to the Middle Left Anterior Descending Artery.      ECHO on 5/5/22:  The visual ejection fraction is 35-40%. Moderate diffuse hypokinesis is present.  Right ventricular function, chamber size, wall motion, and thickness are  normal.  The calculated aortic valve area is 0.94 cm2.The mean gradient across the AV is 22mmHg, the peak velocity is 36 mmHg.  Mild mitral insufficiency is present. Mild tricuspid insufficiency is present.  The inferior vena cava cannot be assessed.   Ascending aorta 4.2 cm.  No pericardial effusion is present.    Stress test on 4/26/22:  There is a large area of a severe   degree of infarction in the anterior, apical, inferior and septal   segment(s) of the left ventricle. The left ventricular ejection fraction   at rest is 37%.  The left ventricular ejection fraction at stress is 38%.       [facility] on 3/1/21:  Underlying rhythm was sinus.  Hrt rate ranged from 50 bpm, maximum heart rate of 197 bmp, averaging 72 bmp.  No significant bradycardia, pauses, 2nd degree Mobitz type II or 3rd degree heart block.  No atrial fibrillation was identified on this study.  x0 triggered events and x0 diary entries.  x18 runs of VT lasting to 11 beats with a maximum heart rate of 197 bmp.    x25 runs of SVT lasting up to 15 beats with a maximum heart rate of 156 bmp.  Rare, less than 1% of PAC's, atrial couplets, atrial triplets, ventricular couplets and ventricular triplets.  Frequent PVC's at 15.9%.  + episodes of ventricular bigeminy lasting up to 18 min's and 9 sec's.  + episodes of ventricular trigeminy lasting up to 30 mins and 5 sec's.    Stress echo at [place] on 5/25/2021:  1. AT REST:   2. Estimated left ventricular ejection fraction 25%   3. Stroke volume index 45 ml/m^2, aortic valve mean systolic gradient 20 mm Hg, aortic valve   area 1.16 cm^2, aortic valve area index 0.62 cm^2/m^2   4. STRESS TEST:   5. Dobutamine was infused from 5 mcg/kg/min to 20.0 mcg/kg/min.   6. PEAK  STRESS:   7. Estimated left ventricular ejection fraction range 30% - 35%.   8. Stroke volume index 55 ml/m^2, aortic valve mean systolic gradient 35 mm Hg, aortic valve  area 1.36 cm^2, aortic valve area index 0.73 cm^2/m^2       US aorta on 2/11/21:  No abdominal aortic aneurysm.    US carotids on 2/11/21:  No hemodynamically significant stenoses are identified in either carotid bifurcation.    ECHO on 2/11/21:  No pericardial effusion is present.  Moderately (EF 30-35%) reduced left ventricular function is present.  The right ventricle is normal size.  Global right ventricular function is normal.  Mild left atrial enlargement is present.  Mild mitral insufficiency is present.  Severe aortic valve calcification is present.  The peak aortic velocity is 3.02 m/sec.  The mean gradient across the aortic valve is 18.4 mmHg.  Moderate aortic stenosis is present.  Right ventricular systolic pressure is 44 mmHg above the right atrial pressure.  Mild tricuspid insufficiency is present.  Mild pulmonic insufficiency is present.  Ascending aorta 4.1 cm.  Sinuses of Valsalva 4.3 cm.    CTA chest on 2/11/21:  2 small nodules are identified in the right lung.  Follow-up CT scan in 6 months time is recommended. The ascending aorta measures 4.2 cm in maximal transverse diameter.    Cardiac cath on 1/15/21:  Right sided filling pressures are normal.  Mild elevated pulmonary hypertension.  Left sided filling pressures are moderately elevated.  Left ventricular filling pressures are moderately elevated.  Normal cardiac output level.  Moderate mitral valve stenosis (mean gradient 8.4 mm Hg, 2.2 cm2)  Moderate aortic valve stenosis (mean gradient 17 mm Hg, 1.2 cm2)  Native three vessel CAD  >>> Moderate 50% proximal RCA stenosis [ungrafted vessel]  >>> Mimimal LMCA stenosis  >>> 100% Ostial LAD stenosis [LIMA-LAD widely patent]  >>> Proximal 50% LCx and 90% RI stenoses [Sequential SVG-RI-OM patent with focal 60% ISR in jump segment of  SVG]     Suggest medical treatment at this time.  Neither the aortic valve nor mitral valve require intervention at this time.  The proximal RCA stenosis is unlikely be physiologically significant. The LAD territory is infarcted but the LIMA-LAD remains widely patent.  There is 70% ISR of SVG in jump segment but the OM is receiving brisk DAVID-3 flow via the native coronary (only 50% proximal stenosis).    Cath on 7/17/2007:  1. Severe, single-vessel coronary artery disease. Chronic proximal-to-mild occlusion of the left anterior descending coronary artery with distal right-to-left collaterals.  2. Cypher drug-eluting stenting of the [id number] segment of the left anterior descending.    Stress test on 12/15/20:    The nuclear stress test is abnormal.    There is a large area of a severe degree of infarction in the entire apical and anteroseptal segment(s) of the left ventricle.    Left ventricular function is moderately reduced.    The left ventricular ejection fraction at rest is 44%.  The left ventricular ejection fraction at stress is 35%.    There is no prior study for comparison.    ECHO on 12/9/20:  No pericardial effusion is present.  Left ventricular size is normal.  Anterior wall hypokinesis is present.  Apical wall hypokinesis is present.  Moderately (EF 30-35%) reduced left ventricular function is present.  The right ventricle is normal size.  Global right ventricular function is normal.  Mild mitral annular calcification is present.  Mild mitral insufficiency is present.  Moderate aortic valve calcification is present.  Trace aortic insufficiency is present.  The Aortic valve has significant calcification of valve leaflets with poor  mobility. Our measured values I feel underestimate the amount of stenosis. By  visual estimate would put at least moderate Aortic stenosis.  Trace tricuspid insufficiency is present.  Right ventricular systolic pressure is 6 mmHg above the right atrial pressure.  The  peak aortic velocity is 2.57 m/sec.  The mean gradient across the aortic valve is 15.5 mmHg.        ICD-10-CM    1. RIVAS (dyspnea on exertion)  R06.09 furosemide (LASIX) 20 MG tablet      2. S/p TAVR (transcatheter aortic valve replacement), bioprosthetic on 12/6/2022 with a 29 mm IMAN 3 valve  Z95.3 amoxicillin (AMOXIL) 500 MG capsule      3. Essential hypertension, benign  I10 carvedilol (COREG) 12.5 MG tablet     furosemide (LASIX) 20 MG tablet      4. Ventricular bigeminy  I49.8 carvedilol (COREG) 12.5 MG tablet      5. Irregular heartbeat  I49.9 carvedilol (COREG) 12.5 MG tablet      6. Ischemic cardiomyopathy  I25.5 carvedilol (COREG) 12.5 MG tablet     furosemide (LASIX) 20 MG tablet      7. Chronic combined systolic and diastolic congestive heart failure (H)  I50.42 carvedilol (COREG) 12.5 MG tablet     furosemide (LASIX) 20 MG tablet      8. Frequent PVCs  I49.3 carvedilol (COREG) 12.5 MG tablet      9. Atrial flutter, unspecified type (H)  I48.92 carvedilol (COREG) 12.5 MG tablet      10. Chest pain, unspecified type  R07.9 carvedilol (COREG) 12.5 MG tablet      11. Aneurysm of ascending aorta without rupture (H)  I71.21 carvedilol (COREG) 12.5 MG tablet      12. Mixed hyperlipidemia  E78.2 ezetimibe (ZETIA) 10 MG tablet      13. Essential hypertension  I10 furosemide (LASIX) 20 MG tablet      14. Mild pulmonary hypertension (H)  I27.20 furosemide (LASIX) 20 MG tablet      15. Stage 3b chronic kidney disease (H)  N18.32 furosemide (LASIX) 20 MG tablet            Past Medical History:   Diagnosis Date    Acute thromboembolism of deep veins of lower extremity (H)     No Comments Provided    Benign essential hypertension     No Comments Provided    Cancer (H)     skin    Congestive heart failure (H)     COPD (chronic obstructive pulmonary disease) (H)     Coronary atherosclerosis     No Comments Provided    Osteoarthrosis     No Comments Provided    Other and unspecified hyperlipidemia     No Comments  Provided    Other specified forms of chronic ischemic heart disease     No Comments Provided    Sarcoidosis     No Comments Provided    Type 2 or unspecified type diabetes mellitus     No Comments Provided       Past Surgical History:   Procedure Laterality Date    ANGIOPLASTY  01/01/2019    stents    CARDIAC SURGERY  01/01/2014    heart bypass    COLONOSCOPY  01/01/2019    CV CORONARY ANGIOGRAM N/A 1/15/2021    Procedure: CV CORONARY ANGIOGRAM;  Surgeon: Charlie Harris MD;  Location:  HEART CARDIAC CATH LAB    CV LEFT HEART CATH N/A 1/15/2021    Procedure: Left Heart Cath;  Surgeon: Charlie Harris MD;  Location:  HEART CARDIAC CATH LAB    CV RIGHT HEART CATH MEASUREMENTS RECORDED N/A 1/15/2021    Procedure: CV RIGHT HEART CATH;  Surgeon: Charlie Harris MD;  Location:  HEART CARDIAC CATH LAB    IR IVC FILTER REMOVAL  5/2/2014    OTHER SURGICAL HISTORY      [id number],BIOPSY LUNG OR [facility]       Allergies   Allergen Reactions    Isosorbide Nitrate Visual Disturbance     LIGHT HEADED,ALMOST PASSED OUT      Zocor [Simvastatin] Itching    Atorvastatin Itching    Isosorbide Visual Disturbance    Rosuvastatin Itching    Ancef [Cefazolin] Rash     Patient received cefazolin IV while in the [facility] on 12/6/22. On 12/12/22 he was seen by his primary for a drug rash on his trunk. Pt thought it was from amoxicillin administered at the [facility] [place] but the [facility] only infused cefazolin per chart records. Amoxil rx given for dental procedures pt will not take due to fear of reaction.        Current Outpatient Medications   Medication Sig Dispense Refill    albuterol (PROAIR HFA/PROVENTIL HFA/VENTOLIN HFA) 108 (90 Base) MCG/ACT inhaler Inhale 1-2 puffs into the lungs every 4 hours as needed for shortness of breath / dyspnea or wheezing 18 g 3    amoxicillin (AMOXIL) 500 MG capsule Tk 2 grams 30-60 minutes prior to dental procedures/cleanings. 4 capsule 3    blood glucose (NO BRAND SPECIFIED)  lancets standard Use to test blood sugar 1 times daily or as directed. 1 each 3    blood glucose (NO BRAND SPECIFIED) test strip Use to test blood sugar 1 times daily or as directed. 90 strip 3    blood glucose monitoring (NO BRAND SPECIFIED) meter device kit Use to test blood sugar 1 times daily or as directed. 1 kit 0    Calcium Carbonate-Vit D-Min (CALCIUM 600+D PLUS MINERALS) 600-400 MG-UNIT TABS Take 1 tablet by mouth daily      carvedilol (COREG) 12.5 MG tablet Take 1 tablet (12.5 mg) by mouth 2 times daily (with meals) 180 tablet 3    carvedilol (COREG) 12.5 MG tablet Take 12.5 mg by mouth 2 times daily (with meals)      cinnamon 500 MG CAPS Take 500 mg by mouth daily       clopidogrel (PLAVIX) 75 MG tablet Take 1 tablet (75 mg) by mouth daily 90 tablet 3    Coenzyme Q10 (CO Q-10) 200 MG CAPS Take 1 capsule by mouth daily      cyanocobalamin (VITAMIN B-12) 500 MCG SUBL sublingual tablet Place 500 mcg under the tongue daily      ezetimibe (ZETIA) 10 MG tablet Take 1 tablet (10 mg) by mouth At Bedtime (8 PM) 90 tablet 3    furosemide (LASIX) 20 MG tablet Take 1 tablet (20 mg) by mouth every other day alternating with 40 mg every other day. 135 tablet 3    LUTEIN PO Take 1 tablet by mouth daily      nitroGLYcerin (NITROSTAT) 0.4 MG sublingual tablet For chest pain place 1 tablet under the tongue every 5 minutes for 3 doses. If symptoms persist 5 minutes after 1st dose call 911. 25 tablet 3    Omega-3 Fatty Acids (FISH OIL) 1200 MG capsule Take 1 capsule by mouth daily      rosuvastatin (CRESTOR) 20 MG tablet Take 1 tablet (20 mg) by mouth twice a week 30 tablet 3    vitamin C (ASCORBIC ACID) 500 MG tablet Take 1,000 mg by mouth daily      Vitamin D3 (CHOLECALCIFEROL) 125 MCG (5000 UT) tablet Take 1 tablet by mouth daily      warfarin ANTICOAGULANT (COUMADIN) 5 MG tablet Take 2.5mg by oral route on Wednesdays, Saturdays, and take 5mg by oral route all other days 90 tablet 0       Social History     Socioeconomic  History    Marital status:      Spouse name: Not on file    Number of children: Not on file    Years of education: Not on file    Highest education level: Not on file   Occupational History    Not on file   Tobacco Use    Smoking status: Former     Packs/day: 0.50     Years: 24.00     Pack years: 12.00     Types: Cigarettes     Start date: 1954     Quit date: 1978     Years since quittin.6    Smokeless tobacco: Never   Substance and Sexual Activity    Alcohol use: Yes     Alcohol/week: 5.0 standard drinks of alcohol     Types: 3 Cans of beer, 2 Shots of liquor per week    Drug use: Unknown     Types: Other     Comment: Drug use: No    Sexual activity: Not on file   Other Topics Concern    Parent/sibling w/ CABG, MI or angioplasty before 65F 55M? Not Asked   Social History Narrative    Not on file     Social Determinants of Health     Financial Resource Strain: Not on file   Food Insecurity: Not on file   Transportation Needs: Not on file   Physical Activity: Not on file   Stress: Not on file   Social Connections: Not on file   Intimate Partner Violence: Not on file   Housing Stability: Not on file       LAB RESULTS:   Orders Only on 2021   Component Date Value Ref Range Status    Sodium 2021 141  133 - 144 mmol/L Final    Potassium 2021 4.7  3.4 - 5.3 mmol/L Final    Chloride 2021 107  94 - 109 mmol/L Final    Carbon Dioxide 2021 29  20 - 32 mmol/L Final    Anion Gap 2021 5  3 - 14 mmol/L Final    Glucose 2021 125* 70 - 99 mg/dL Final    Urea Nitrogen 2021 26  7 - 30 mg/dL Final    Creatinine 2021 1.40* 0.66 - 1.25 mg/dL Final    GFR Estimate 2021 44* >60 mL/min/[1.73_m2] Final    GFR Estimate If Black 2021 51* >60 mL/min/[1.73_m2] Final    Calcium 2021 9.9  8.5 - 10.1 mg/dL Final    WBC 2021 6.7  4.0 - 11.0 10e9/L Final    RBC Count 2021 3.63* 4.4 - 5.9 10e12/L Final    Hemoglobin 2021 11.3* 13.3 - 17.7  g/dL Final    Hematocrit 01/21/2021 34.9* 40.0 - 53.0 % Final    MCV 01/21/2021 96  78 - 100 fl Final    MCH 01/21/2021 31.1  26.5 - 33.0 pg Final    MCHC 01/21/2021 32.4  31.5 - 36.5 g/dL Final    RDW 01/21/2021 12.2  10.0 - 15.0 % Final    Platelet Count 01/21/2021 163  150 - 450 10e9/L Final    INR Point of Care 01/21/2021 2.9* 0.86 - 1.14 Final    Capillary Blood Collection 01/21/2021 Capillary collection performed   Final   Office Visit on 01/12/2021   Component Date Value Ref Range Status    WBC 01/12/2021 6.4  4.0 - 11.0 10e9/L Final    RBC Count 01/12/2021 3.72* 4.4 - 5.9 10e12/L Final    Hemoglobin 01/12/2021 11.7* 13.3 - 17.7 g/dL Final    Hematocrit 01/12/2021 35.3* 40.0 - 53.0 % Final    MCV 01/12/2021 95  78 - 100 fl Final    MCH 01/12/2021 31.5  26.5 - 33.0 pg Final    MCHC 01/12/2021 33.1  31.5 - 36.5 g/dL Final    RDW 01/12/2021 12.2  10.0 - 15.0 % Final    Platelet Count 01/12/2021 162  150 - 450 10e9/L Final    % Neutrophils 01/12/2021 62.1  % Final    % Lymphocytes 01/12/2021 19.7  % Final    % Monocytes 01/12/2021 13.5  % Final    % Eosinophils 01/12/2021 4.2  % Final    % Basophils 01/12/2021 0.5  % Final    Absolute Neutrophil 01/12/2021 4.0  1.6 - 8.3 10e9/L Final    Absolute Lymphocytes 01/12/2021 1.3  0.8 - 5.3 10e9/L Final    Absolute Monocytes 01/12/2021 0.9  0.0 - 1.3 10e9/L Final    Absolute Eosinophils 01/12/2021 0.3  0.0 - 0.7 10e9/L Final    Absolute Basophils 01/12/2021 0.0  0.0 - 0.2 10e9/L Final    Diff Method 01/12/2021 Automated Method   Final    Sodium 01/12/2021 142  133 - 144 mmol/L Final    Potassium 01/12/2021 4.4  3.4 - 5.3 mmol/L Final    Chloride 01/12/2021 109  94 - 109 mmol/L Final    Carbon Dioxide 01/12/2021 29  20 - 32 mmol/L Final    Anion Gap 01/12/2021 4  3 - 14 mmol/L Final    Glucose 01/12/2021 100* 70 - 99 mg/dL Final    Urea Nitrogen 01/12/2021 28  7 - 30 mg/dL Final    Creatinine 01/12/2021 1.14  0.66 - 1.25 mg/dL Final    GFR Estimate 01/12/2021 57* >60  mL/min/[1.73_m2] Final    GFR Estimate If Black 01/12/2021 66  >60 mL/min/[1.73_m2] Final    Calcium 01/12/2021 9.6  8.5 - 10.1 mg/dL Final    INR Point of Care 01/12/2021 3.8* 0.86 - 1.14 Final   Office Visit on 01/11/2021   Component Date Value Ref Range Status    COVID-19 Virus PCR to [facility] - So* 01/11/2021 Nasopharyngeal   Final    COVID-19 Virus PCR to [facility] - Re* 01/11/2021 Not Detected   Final   Orders Only on 12/01/2020   Component Date Value Ref Range Status    INR 12/01/2020 2.65* 0.86 - 1.14 Final   Orders Only on 12/01/2020   Component Date Value Ref Range Status    WBC 12/01/2020 5.5  4.0 - 11.0 10e9/L Final    RBC Count 12/01/2020 3.98* 4.4 - 5.9 10e12/L Final    Hemoglobin 12/01/2020 12.5* 13.3 - 17.7 g/dL Final    Hematocrit 12/01/2020 38.4* 40.0 - 53.0 % Final    MCV 12/01/2020 97  78 - 100 fl Final    MCH 12/01/2020 31.4  26.5 - 33.0 pg Final    MCHC 12/01/2020 32.6  31.5 - 36.5 g/dL Final    RDW 12/01/2020 12.4  10.0 - 15.0 % Final    Platelet Count 12/01/2020 141* 150 - 450 10e9/L Final    Diff Method 12/01/2020 Automated Method   Final    % Neutrophils 12/01/2020 63.5  % Final    % Lymphocytes 12/01/2020 21.9  % Final    % Monocytes 12/01/2020 12.2  % Final    % Eosinophils 12/01/2020 1.8  % Final    % Basophils 12/01/2020 0.4  % Final    % Immature Granulocytes 12/01/2020 0.2  % Final    Nucleated RBCs 12/01/2020 0  0 /100 Final    Absolute Neutrophil 12/01/2020 3.5  1.6 - 8.3 10e9/L Final    Absolute Lymphocytes 12/01/2020 1.2  0.8 - 5.3 10e9/L Final    Absolute Monocytes 12/01/2020 0.7  0.0 - 1.3 10e9/L Final    Absolute Eosinophils 12/01/2020 0.1  0.0 - 0.7 10e9/L Final    Absolute Basophils 12/01/2020 0.0  0.0 - 0.2 10e9/L Final    Abs Immature Granulocytes 12/01/2020 0.0  0 - 0.4 10e9/L Final    Absolute Nucleated RBC 12/01/2020 0.0   Final        Review of systems: Negative except that which was noted in the HPI.    Physical examination:  /68   Pulse 80   Resp 16   Ht 1.778 m  "(5' 10\")   Wt 74.4 kg (164 lb)   SpO2 96%   BMI 23.53 kg/m      GENERAL APPEARANCE: healthy, alert and no distress  CHEST: lungs clear to auscultation.  CARDIOVASCULAR: regular rhythm, normal S1 with physiologic split S2, no S3 or S4 but with systolic crescendo decrescendo murmur, no click or rub  EXTREMITIES: no clubbing, cyanosis with mild peripheral edema      Total time spent on day of visit, including review of tests, obtaining/reviewing separately obtained history, ordering medications/tests/procedures, communicating with PCP/consultants, and documenting in electronic medical record: 51 minutes.           Thank you for allowing me to participate in the care of your patient. Please do not hesitate to contact me if you have any questions.     Justyn Rae, DO            "

## 2023-08-17 ENCOUNTER — ANTICOAGULATION THERAPY VISIT (OUTPATIENT)
Dept: ANTICOAGULATION | Facility: OTHER | Age: 88
End: 2023-08-17
Attending: FAMILY MEDICINE
Payer: MEDICARE

## 2023-08-17 ENCOUNTER — LAB (OUTPATIENT)
Dept: LAB | Facility: OTHER | Age: 88
End: 2023-08-17
Payer: MEDICARE

## 2023-08-17 DIAGNOSIS — I82.5Y3 CHRONIC DEEP VEIN THROMBOSIS (DVT) OF PROXIMAL VEIN OF BOTH LOWER EXTREMITIES (H): ICD-10-CM

## 2023-08-17 DIAGNOSIS — Z86.718 PERSONAL HISTORY OF DVT (DEEP VEIN THROMBOSIS): ICD-10-CM

## 2023-08-17 DIAGNOSIS — Z86.718 HISTORY OF DVT (DEEP VEIN THROMBOSIS): Primary | ICD-10-CM

## 2023-08-17 LAB — INR BLD: 3.3 (ref 0.9–1.1)

## 2023-08-17 PROCEDURE — 85610 PROTHROMBIN TIME: CPT | Mod: ZL

## 2023-08-17 PROCEDURE — 36415 COLL VENOUS BLD VENIPUNCTURE: CPT | Mod: ZL

## 2023-08-17 NOTE — PROGRESS NOTES
ANTICOAGULATION MANAGEMENT     Sidney Barriga 89 year old male is on warfarin with supratherapeutic INR result. (Goal INR 2.0-3.0)    Recent labs: (last 7 days)     08/17/23  1458   INR 3.3*       ASSESSMENT     Source(s): Chart Review and Patient/Caregiver Call     Warfarin doses taken: Warfarin taken as instructed  Diet: No new diet changes identified  Medication/supplement changes: None noted  New illness, injury, or hospitalization: No  Signs or symptoms of bleeding or clotting: No  Previous result: Supratherapeutic  Additional findings: None       PLAN     Recommended plan for no diet, medication or health factor changes affecting INR     Dosing Instructions: Continue your current warfarin dose with next INR in 1 week       Summary  As of 8/17/2023      Full warfarin instructions:  2.5 mg every Mon, Wed, Fri; 5 mg all other days   Next INR check:  8/24/2023               Telephone call with Sidney who verbalizes understanding and agrees to plan    Lab visit scheduled    Education provided:   None required    Plan made per [facility] anticoagulation protocol    Lizbeth Ely RN  Anticoagulation Clinic  8/17/2023    _______________________________________________________________________     Anticoagulation Episode Summary       Current INR goal:  2.0-3.0   TTR:  80.6 % (1 y)   Target end date:  Indefinite   Send INR reminders to:  [facility]    Indications    History of DVT (deep vein thrombosis)-multiple [Z86.718]  Chronic deep vein thrombosis (DVT) of proximal vein of both lower extremities (H) [I82.5Y3]  Personal history of DVT (deep vein thrombosis) [Z86.718]             Comments:  We will continue on Coumadin with goal INR of 2.0-2.7 per Dr Rae             Anticoagulation Care Providers       Provider Role Specialty Phone number    Zach Arredondo MD Referring Family Medicine [phone]               Can give Zofran 2.5 mL every 12 hours as needed for nausea and vomiting.  Recommend giving an additional dose tomorrow morning and can discontinue use if child continues to tolerate solids.  Recommend clear liquidsThis includes: popsicles, Jell-O, clear sodas, juice and broths and bland foods (BRAT diet) bananas, rice, applesauce, toast.  Recommend small frequent meals as larger meals can cause vomiting.  Once tolerable, you may advance diet back to normal.   Follow-up with your pediatrician on Monday if symptoms have not resolved.  Return to the emergency department with new or worsening symptoms such as high fevers, persistent vomiting preventing any oral fluid intake, severe abdominal pain, or black tarry stools.

## 2023-08-24 ENCOUNTER — APPOINTMENT (OUTPATIENT)
Dept: LAB | Facility: OTHER | Age: 88
End: 2023-08-24
Payer: MEDICARE

## 2023-08-24 ENCOUNTER — ANTICOAGULATION THERAPY VISIT (OUTPATIENT)
Dept: ANTICOAGULATION | Facility: OTHER | Age: 88
End: 2023-08-24
Attending: FAMILY MEDICINE
Payer: MEDICARE

## 2023-08-24 DIAGNOSIS — Z86.718 HISTORY OF DVT (DEEP VEIN THROMBOSIS): Primary | ICD-10-CM

## 2023-08-24 DIAGNOSIS — Z86.718 PERSONAL HISTORY OF DVT (DEEP VEIN THROMBOSIS): ICD-10-CM

## 2023-08-24 DIAGNOSIS — I82.5Y3 CHRONIC DEEP VEIN THROMBOSIS (DVT) OF PROXIMAL VEIN OF BOTH LOWER EXTREMITIES (H): ICD-10-CM

## 2023-08-24 LAB — INR BLD: 3.2 (ref 0.9–1.1)

## 2023-08-24 PROCEDURE — 36416 COLLJ CAPILLARY BLOOD SPEC: CPT | Mod: ZL

## 2023-08-24 PROCEDURE — 85610 PROTHROMBIN TIME: CPT | Mod: ZL

## 2023-08-24 NOTE — PROGRESS NOTES
ANTICOAGULATION MANAGEMENT     Sidneycarol Barriga 89 year old male is on warfarin with supratherapeutic INR result. (Goal INR 2.0-3.0)    Recent labs: (last 7 days)     08/24/23  1454   INR 3.2*       ASSESSMENT     Source(s): Chart Review and Patient/Caregiver Call     Warfarin doses taken: Warfarin taken as instructed  Diet: No new diet changes identified  New illness, injury, or hospitalization: No  Medication/supplement changes: None noted  Signs or symptoms of bleeding or clotting: No  Previous INR: Supratherapeutic  Additional findings: None     PLAN     Recommended plan for no diet, medication or health factor changes affecting INR     Dosing Instructions: decrease your warfarin dose (9.1% change) with next INR in 1 week       Summary  As of 8/24/2023      Full warfarin instructions:  5 mg every Mon, Wed, Fri; 2.5 mg all other days   Next INR check:  8/31/2023               Telephone call with Sidney who verbalizes understanding and agrees to plan    Lab visit scheduled    Education provided: Please call back if any changes to your diet, medications or how you've been taking warfarin    Plan made per [facility] anticoagulation protocol    Yolande Preston, RN  Anticoagulation Clinic  8/24/2023    _______________________________________________________________________     Anticoagulation Episode Summary       Current INR goal:  2.0-3.0   TTR:  78.7 % (1 y)   Target end date:  Indefinite   Send INR reminders to:  [facility]    Indications    History of DVT (deep vein thrombosis)-multiple [Z.718]  Chronic deep vein thrombosis (DVT) of proximal vein of both lower extremities (H) [I82.5Y3]  Personal history of DVT (deep vein thrombosis) [Z86.718]             Comments:  We will continue on Coumadin with goal INR of 2.0-2.7 per Dr Rae             Anticoagulation Care Providers       Provider Role Specialty Phone number    Zach Arredondo MD Referring [facility] Medicine [phone]

## 2023-08-31 ENCOUNTER — ANTICOAGULATION THERAPY VISIT (OUTPATIENT)
Dept: ANTICOAGULATION | Facility: OTHER | Age: 88
End: 2023-08-31
Attending: FAMILY MEDICINE
Payer: MEDICARE

## 2023-08-31 ENCOUNTER — LAB (OUTPATIENT)
Dept: LAB | Facility: OTHER | Age: 88
End: 2023-08-31
Payer: MEDICARE

## 2023-08-31 DIAGNOSIS — I82.5Y3 CHRONIC DEEP VEIN THROMBOSIS (DVT) OF PROXIMAL VEIN OF BOTH LOWER EXTREMITIES (H): ICD-10-CM

## 2023-08-31 DIAGNOSIS — Z86.718 HISTORY OF DVT (DEEP VEIN THROMBOSIS): Primary | ICD-10-CM

## 2023-08-31 DIAGNOSIS — Z86.718 PERSONAL HISTORY OF DVT (DEEP VEIN THROMBOSIS): ICD-10-CM

## 2023-08-31 LAB — INR BLD: 2.8 (ref 0.9–1.1)

## 2023-08-31 PROCEDURE — 36415 COLL VENOUS BLD VENIPUNCTURE: CPT | Mod: ZL

## 2023-08-31 PROCEDURE — 85610 PROTHROMBIN TIME: CPT | Mod: ZL

## 2023-08-31 NOTE — PROGRESS NOTES
ANTICOAGULATION MANAGEMENT     Sidney Barriga 89 year old male is on warfarin with supratherapeutic INR result. (Goal INR 2.0-3.0)    Recent labs: (last 7 days)     08/31/23  1454   INR 2.8*       ASSESSMENT     Source(s): Chart Review and Patient/Caregiver Call     Warfarin doses taken: Warfarin taken as instructed  Diet: No new diet changes identified  Medication/supplement changes: None noted  New illness, injury, or hospitalization: No  Signs or symptoms of bleeding or clotting: No  Previous result: Supratherapeutic  Additional findings: None       PLAN     Recommended plan for no diet, medication or health factor changes affecting INR     Dosing Instructions: Continue your current warfarin dose with next INR in 1 week       Summary  As of 8/31/2023      Full warfarin instructions:  5 mg every Mon, Wed, Fri; 2.5 mg all other days   Next INR check:  9/7/2023               Telephone call with Sidney who verbalizes understanding and agrees to plan    Lab visit scheduled    Education provided:   None required    Plan made per [facility] anticoagulation protocol    Lizbeth Ely RN  Anticoagulation Clinic  8/31/2023    _______________________________________________________________________     Anticoagulation Episode Summary       Current INR goal:  2.0-3.0   TTR:  77.7 % (1 y)   Target end date:  Indefinite   Send INR reminders to:  [facility]    Indications    History of DVT (deep vein thrombosis)-multiple [Z86.718]  Chronic deep vein thrombosis (DVT) of proximal vein of both lower extremities (H) [I82.5Y3]  Personal history of DVT (deep vein thrombosis) [Z86.718]             Comments:  We will continue on Coumadin with goal INR of 2.0-2.7 per Dr Rae             Anticoagulation Care Providers       Provider Role Specialty Phone number    Zach Arredondo MD Referring Family Medicine [phone]

## 2023-09-06 NOTE — PROGRESS NOTES
Assessment & Plan     Type 2 diabetes mellitus with hyperglycemia, without long-term current use of insulin (H)  Doing well.  Update labs and follow.  Foot exam normal.    - Basic metabolic panel; Future  - Hemoglobin A1c; Future  - IN FOOT EXAM NO CHARGE  - Albumin Random Urine Quantitative with Creat Ratio; Future    Chronic obstructive pulmonary disease, unspecified COPD type (H)  Stable.  Breathing stable.      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  No new issues.  We reviewed the nature of this and his breathing.      Essential hypertension, benign  Stable.                   No follow-ups on file.    Zach Arredondo MD  [facility]   Sidney is a 89 year old, presenting for the following health issues:  Diabetes      HPI     Diabetes Follow-up    How often are you checking your blood sugar? A few times a week  What time of day are you checking your blood sugars (select all that apply)?  Before and after meals  Have you had any blood sugars above 200?  No  Have you had any blood sugars below 70?  No  What symptoms do you notice when your blood sugar is low?  None  What concerns do you have today about your diabetes? None   Do you have any of these symptoms? (Select all that apply)  Numbness in feet and Burning in feet      BP Readings from Last 2 Encounters:   09/07/23 112/60   08/14/23 120/68     Hemoglobin A1C (%)   Date Value   07/24/2023 5.4   03/06/2023 5.9 (H)   03/04/2021 5.5   10/20/2020 5.6     LDL Cholesterol Calculated (mg/dL)   Date Value   03/06/2023 139 (H)   10/20/2020 164 (H)   01/09/2019 149 (H)             Hypertension Follow-up    Do you check your blood pressure regularly outside of the clinic? No   Are you following a low salt diet? Yes  Are your blood pressures ever more than 140 on the top number (systolic) OR more   than 90 on the bottom number (diastolic), for  example 140/90? NA        Review of Systems   Constitutional, HEENT, cardiovascular, pulmonary, gi and gu systems are negative, except as otherwise noted.      Objective    /60   Pulse 72   Wt 75.3 kg (166 lb)   SpO2 99%   BMI 23.82 kg/m    Body mass index is 23.82 kg/m .  Physical Exam   GENERAL: healthy, alert and no distress  NECK: no adenopathy, no asymmetry, masses, or scars and thyroid normal to palpation  RESP: lungs clear to auscultation - no rales, rhonchi or wheezes  CV: regular rate and rhythm, normal S1 S2, no S3 or S4, no murmur, click or rub, no peripheral edema and peripheral pulses strong  ABDOMEN: soft, nontender, no hepatosplenomegaly, no masses and bowel sounds normal  MS: no gross musculoskeletal defects noted, no edema        Full labs pending.

## 2023-09-07 ENCOUNTER — ANTICOAGULATION THERAPY VISIT (OUTPATIENT)
Dept: ANTICOAGULATION | Facility: OTHER | Age: 88
End: 2023-09-07
Attending: FAMILY MEDICINE
Payer: MEDICARE

## 2023-09-07 ENCOUNTER — OFFICE VISIT (OUTPATIENT)
Dept: FAMILY MEDICINE | Facility: OTHER | Age: 88
End: 2023-09-07
Attending: FAMILY MEDICINE
Payer: MEDICARE

## 2023-09-07 ENCOUNTER — APPOINTMENT (OUTPATIENT)
Dept: LAB | Facility: OTHER | Age: 88
End: 2023-09-07
Attending: FAMILY MEDICINE
Payer: MEDICARE

## 2023-09-07 VITALS
OXYGEN SATURATION: 99 % | SYSTOLIC BLOOD PRESSURE: 112 MMHG | DIASTOLIC BLOOD PRESSURE: 60 MMHG | WEIGHT: 166 LBS | BODY MASS INDEX: 23.82 KG/M2 | HEART RATE: 72 BPM

## 2023-09-07 DIAGNOSIS — Z95.3 S/P TAVR (TRANSCATHETER AORTIC VALVE REPLACEMENT), BIOPROSTHETIC: ICD-10-CM

## 2023-09-07 DIAGNOSIS — Z86.718 HISTORY OF DVT (DEEP VEIN THROMBOSIS): Primary | ICD-10-CM

## 2023-09-07 DIAGNOSIS — I10 ESSENTIAL HYPERTENSION, BENIGN: ICD-10-CM

## 2023-09-07 DIAGNOSIS — I82.5Y3 CHRONIC DEEP VEIN THROMBOSIS (DVT) OF PROXIMAL VEIN OF BOTH LOWER EXTREMITIES (H): ICD-10-CM

## 2023-09-07 DIAGNOSIS — J44.9 CHRONIC OBSTRUCTIVE PULMONARY DISEASE, UNSPECIFIED COPD TYPE (H): ICD-10-CM

## 2023-09-07 DIAGNOSIS — Z86.718 PERSONAL HISTORY OF DVT (DEEP VEIN THROMBOSIS): ICD-10-CM

## 2023-09-07 DIAGNOSIS — E11.65 TYPE 2 DIABETES MELLITUS WITH HYPERGLYCEMIA, WITHOUT LONG-TERM CURRENT USE OF INSULIN (H): Primary | ICD-10-CM

## 2023-09-07 LAB
ANION GAP SERPL CALCULATED.3IONS-SCNC: 13 MMOL/L (ref 7–15)
BUN SERPL-MCNC: 29.3 MG/DL (ref 8–23)
CALCIUM SERPL-MCNC: 9.6 MG/DL (ref 8.2–9.6)
CHLORIDE SERPL-SCNC: 101 MMOL/L (ref 98–107)
CREAT SERPL-MCNC: 1.4 MG/DL (ref 0.67–1.17)
CREAT UR-MCNC: 118.1 MG/DL
DEPRECATED HCO3 PLAS-SCNC: 25 MMOL/L (ref 22–29)
EGFRCR SERPLBLD CKD-EPI 2021: 47 ML/MIN/1.73M2
EST. AVERAGE GLUCOSE BLD GHB EST-MCNC: 117 MG/DL
GLUCOSE SERPL-MCNC: 114 MG/DL (ref 70–99)
HBA1C MFR BLD: 5.7 %
INR BLD: 3 (ref 0.9–1.1)
MICROALBUMIN UR-MCNC: <12 MG/L
MICROALBUMIN/CREAT UR: NORMAL MG/G{CREAT}
POTASSIUM SERPL-SCNC: 4.1 MMOL/L (ref 3.4–5.3)
SODIUM SERPL-SCNC: 139 MMOL/L (ref 136–145)

## 2023-09-07 PROCEDURE — 36415 COLL VENOUS BLD VENIPUNCTURE: CPT | Mod: ZL | Performed by: FAMILY MEDICINE

## 2023-09-07 PROCEDURE — 99214 OFFICE O/P EST MOD 30 MIN: CPT | Performed by: FAMILY MEDICINE

## 2023-09-07 PROCEDURE — 80048 BASIC METABOLIC PNL TOTAL CA: CPT | Mod: ZL | Performed by: FAMILY MEDICINE

## 2023-09-07 PROCEDURE — 82570 ASSAY OF URINE CREATININE: CPT | Mod: ZL | Performed by: FAMILY MEDICINE

## 2023-09-07 PROCEDURE — 83036 HEMOGLOBIN GLYCOSYLATED A1C: CPT | Mod: ZL | Performed by: FAMILY MEDICINE

## 2023-09-07 PROCEDURE — 85610 PROTHROMBIN TIME: CPT | Mod: ZL | Performed by: FAMILY MEDICINE

## 2023-09-07 PROCEDURE — G0463 HOSPITAL OUTPT CLINIC VISIT: HCPCS

## 2023-09-07 ASSESSMENT — PAIN SCALES - GENERAL: PAINLEVEL: NO PAIN (0)

## 2023-09-07 NOTE — PROGRESS NOTES
ANTICOAGULATION MANAGEMENT     Sidney Barriga 89 year old male is on warfarin with therapeutic INR result. (Goal INR 2.0-3.0)    Recent labs: (last 7 days)     09/07/23  1034   INR 3.0*       ASSESSMENT     Source(s): Chart Review and Patient/Caregiver Call     Warfarin doses taken: Warfarin taken as instructed  Diet: No new diet changes identified  Medication/supplement changes: None noted  New illness, injury, or hospitalization: No  Signs or symptoms of bleeding or clotting: No  Previous result: Therapeutic last visit; previously outside of goal range  Additional findings: None       PLAN     Recommended plan for no diet, medication or health factor changes affecting INR     Dosing Instructions: decrease your warfarin dose (10% change) with next INR in 2 weeks       Summary  As of 9/7/2023      Full warfarin instructions:  5 mg every Mon, Fri; 2.5 mg all other days   Next INR check:  9/21/2023               Telephone call with Sidney who verbalizes understanding and agrees to plan    Lab visit scheduled    Education provided:   None required    Plan made per [facility] anticoagulation protocol    Lizbeth Ely, RN  Anticoagulation Clinic  9/7/2023    _______________________________________________________________________     Anticoagulation Episode Summary       Current INR goal:  2.0-3.0   TTR:  77.7 % (1 y)   Target end date:  Indefinite   Send INR reminders to:  [facility]    Indications    History of DVT (deep vein thrombosis)-multiple [Z86.718]  Chronic deep vein thrombosis (DVT) of proximal vein of both lower extremities (H) [I82.5Y3]  Personal history of DVT (deep vein thrombosis) [Z86.718]             Comments:  We will continue on Coumadin with goal INR of 2.0-2.7 per Dr Rae             Anticoagulation Care Providers       Provider Role Specialty Phone number    Zach Arredondo MD Referring Family Medicine [phone]

## 2023-09-21 ENCOUNTER — ANTICOAGULATION THERAPY VISIT (OUTPATIENT)
Dept: ANTICOAGULATION | Facility: OTHER | Age: 88
End: 2023-09-21
Attending: FAMILY MEDICINE
Payer: MEDICARE

## 2023-09-21 ENCOUNTER — LAB (OUTPATIENT)
Dept: LAB | Facility: OTHER | Age: 88
End: 2023-09-21
Payer: MEDICARE

## 2023-09-21 DIAGNOSIS — Z86.718 PERSONAL HISTORY OF DVT (DEEP VEIN THROMBOSIS): ICD-10-CM

## 2023-09-21 DIAGNOSIS — Z95.3 S/P TAVR (TRANSCATHETER AORTIC VALVE REPLACEMENT), BIOPROSTHETIC: ICD-10-CM

## 2023-09-21 DIAGNOSIS — Z86.718 HISTORY OF DVT (DEEP VEIN THROMBOSIS): Primary | ICD-10-CM

## 2023-09-21 DIAGNOSIS — I82.5Y3 CHRONIC DEEP VEIN THROMBOSIS (DVT) OF PROXIMAL VEIN OF BOTH LOWER EXTREMITIES (H): ICD-10-CM

## 2023-09-21 LAB — INR BLD: 2.5 (ref 0.9–1.1)

## 2023-09-21 PROCEDURE — 36416 COLLJ CAPILLARY BLOOD SPEC: CPT | Mod: ZL

## 2023-09-21 PROCEDURE — 85610 PROTHROMBIN TIME: CPT | Mod: ZL

## 2023-09-21 NOTE — PROGRESS NOTES
ANTICOAGULATION MANAGEMENT     Sidneycarol Barriga 89 year old male is on warfarin with therapeutic INR result. (Goal INR 2.0-3.0)    Recent labs: (last 7 days)     09/21/23  1457   INR 2.5*       ASSESSMENT     Source(s): Chart Review and Patient/Caregiver Call     Warfarin doses taken: Warfarin taken as instructed  Diet: No new diet changes identified  New illness, injury, or hospitalization: No  Medication/supplement changes: None noted  Signs or symptoms of bleeding or clotting: No  Previous INR:  therapeutic- but Dr. Rae likes it between 2.0-2.7  Additional findings: None     PLAN     Recommended plan for no diet, medication or health factor changes affecting INR     Dosing Instructions: Continue your current warfarin dose with next INR in 3 weeks       Summary  As of 9/21/2023      Full warfarin instructions:  5 mg every Mon, Fri; 2.5 mg all other days   Next INR check:  10/12/2023               Telephone call with Sidney who verbalizes understanding and agrees to plan    Lab visit scheduled    Education provided: Please call back if any changes to your diet, medications or how you've been taking warfarin    Plan made per [facility] anticoagulation protocol    Yolande Preston, RN  Anticoagulation Clinic  9/21/2023    _______________________________________________________________________     Anticoagulation Episode Summary       Current INR goal:  2.0-3.0   TTR:  77.7 % (1 y)   Target end date:  Indefinite   Send INR reminders to:  [facility]    Indications    History of DVT (deep vein thrombosis)-multiple [Z71.718]  Chronic deep vein thrombosis (DVT) of proximal vein of both lower extremities (H) [I82.5Y3]  Personal history of DVT (deep vein thrombosis) [Z86.718]             Comments:  We will continue on Coumadin with goal INR of 2.0-2.7 per Dr Rae             Anticoagulation Care Providers       Provider Role Specialty Phone number    Zach Arredondo MD Referring [facility] Medicine [phone]

## 2023-09-22 DIAGNOSIS — Z86.718 HISTORY OF DVT (DEEP VEIN THROMBOSIS): ICD-10-CM

## 2023-09-22 RX ORDER — WARFARIN SODIUM 5 MG/1
TABLET ORAL
Qty: 90 TABLET | Refills: 0 | Status: SHIPPED | OUTPATIENT
Start: 2023-09-22 | End: 2024-04-11

## 2023-09-22 NOTE — TELEPHONE ENCOUNTER
Coumadin      Last Written Prescription Date:  6.28.23  Last Fill Quantity: #90,   # refills: 0  Last Office Visit: 9.7.23  Future Office visit:       Routing refill request to provider for review/approval because:

## 2023-10-12 ENCOUNTER — LAB (OUTPATIENT)
Dept: LAB | Facility: OTHER | Age: 88
End: 2023-10-12
Payer: MEDICARE

## 2023-10-12 ENCOUNTER — ANTICOAGULATION THERAPY VISIT (OUTPATIENT)
Dept: ANTICOAGULATION | Facility: OTHER | Age: 88
End: 2023-10-12
Attending: FAMILY MEDICINE
Payer: MEDICARE

## 2023-10-12 DIAGNOSIS — I82.5Y3 CHRONIC DEEP VEIN THROMBOSIS (DVT) OF PROXIMAL VEIN OF BOTH LOWER EXTREMITIES (H): ICD-10-CM

## 2023-10-12 DIAGNOSIS — Z86.718 PERSONAL HISTORY OF DVT (DEEP VEIN THROMBOSIS): ICD-10-CM

## 2023-10-12 DIAGNOSIS — Z95.3 S/P TAVR (TRANSCATHETER AORTIC VALVE REPLACEMENT), BIOPROSTHETIC: ICD-10-CM

## 2023-10-12 DIAGNOSIS — Z86.718 HISTORY OF DVT (DEEP VEIN THROMBOSIS): Primary | ICD-10-CM

## 2023-10-12 LAB — INR BLD: 2.2 (ref 0.9–1.1)

## 2023-10-12 PROCEDURE — 36415 COLL VENOUS BLD VENIPUNCTURE: CPT | Mod: ZL

## 2023-10-12 PROCEDURE — 85610 PROTHROMBIN TIME: CPT | Mod: ZL

## 2023-10-12 NOTE — PROGRESS NOTES
ANTICOAGULATION MANAGEMENT     Sidney Barriga 89 year old male is on warfarin with therapeutic INR result. (Goal INR 2.0-3.0)    Recent labs: (last 7 days)     10/12/23  1452   INR 2.2*       ASSESSMENT     Source(s): Chart Review and Patient/Caregiver Call     Warfarin doses taken: Warfarin taken as instructed  Diet: No new diet changes identified  Medication/supplement changes: None noted  New illness, injury, or hospitalization: No  Signs or symptoms of bleeding or clotting: No  Previous result: Therapeutic last visit; previously outside of goal range  Additional findings: None       PLAN     Recommended plan for no diet, medication or health factor changes affecting INR     Dosing Instructions: Continue your current warfarin dose with next INR in 4 weeks       Summary  As of 10/12/2023      Full warfarin instructions:  5 mg every Mon, Fri; 2.5 mg all other days   Next INR check:  11/9/2023               Telephone call with Sidney who verbalizes understanding and agrees to plan    Lab visit scheduled    Education provided:   None required    Plan made per [facility] anticoagulation protocol    Lizbeth Ely RN  Anticoagulation Clinic  10/12/2023    _______________________________________________________________________     Anticoagulation Episode Summary       Current INR goal:  2.0-3.0   TTR:  77.7% (1 y)   Target end date:  Indefinite   Send INR reminders to:  [facility]    Indications    History of DVT (deep vein thrombosis)-multiple [Z86.718]  Chronic deep vein thrombosis (DVT) of proximal vein of both lower extremities (H) [I82.5Y3]  Personal history of DVT (deep vein thrombosis) [Z86.718]             Comments:  We will continue on Coumadin with goal INR of 2.0-2.7 per Dr Rae             Anticoagulation Care Providers       Provider Role Specialty Phone number    Zach Arredondo MD Referring Family Medicine [phone]

## 2023-10-25 NOTE — PROGRESS NOTES
Assessment & Plan     Left foot pain  Left dorsal forefoot pain with slightly increased edema compared to right.  Patient feels like symptoms started after reduction of Lasix dose.  Presentation and exam more suspicious for MSK etiology so did check x-ray to rule out underlying fracture.  Complex cardiovascular history and feels like swelling in both legs slightly exacerbated so we will make temporary adjustment as below.  Otherwise continue supportive cares for peripheral edema and reassuring that symptoms have already spontaneously improved over the past week.  Recommend close follow-up.  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  Also history of recurrent DVT although INR has been therapeutic over recent months.  Does have 1-2+ pitting edema bilateral lower extremities and reports symptoms gradually progressing since decreasing diuretic dose, however vitals normal, exam reassuring, and no overt signs of CHF exacerbation.  Spent a long time discussing supportive measures for peripheral edema and will temporary adjust Lasix regimen with close follow-up.  - Increase Lasix to 40 mg daily for 5 days (this is only 2 additional days of increased dose)  - Then plan to resume alternating 20 mg and 40 mg dosing every other day  - Discussed leg elevation  - Likely needs metabolic panel at follow-up next week    I spent a total of 42 minutes on the day of the visit.   Time spent by me doing chart review, history and exam, documentation and further activities per the note       Follow-up next week.    Marko Dunlap,  MD  [facility] [place]    Vanessa Little is a 89 year old, presenting for the following health issues:  Edema      HPI     Edema     Duration: 10/19/2023  Description (location/character/radiation): Edema / pain in feet and legs, left side is worse   Intensity:  moderate, severe  Accompanying signs and symptoms: Pain  History (similar episodes/previous evaluation): yes   Precipitating or alleviating factors: Ibuprofen  Therapies tried and outcome: Ibuprofen with minimal relief       -Fairly sudden onset of soreness and edema on the top of left forefoot about a week ago  -Not sure what started it, thinks it has to do with adjusting his Lasix dose  -Denies any falls, injuries, trauma  -Initially was quite sore with weightbearing, improved with rest    -Previously had been on Lasix 40 mg daily  -Lasix was weaned to 20 mg and 40 mg alternating every other day  -Patient feels like this is when he noticed the swelling change  -Does have some baseline swelling of both legs which overall might be slightly increased?    -Has taken a couple tabs of Motrin, couple tabs of Tylenol over the past week which have provided some temporary relief  -Also has been using ice packs which seems to help significantly  -Overall discomfort and swelling have been improving over the last several days  -No redness or warmth of the foot  -No pain in calf or shin    Review of Systems   Constitutional, HEENT, cardiovascular, pulmonary, gi and gu systems are negative, except as otherwise noted.      Objective    /64 (BP Location: Right arm, Patient Position: Sitting, Cuff Size: Adult Regular)   Pulse 72   Temp 98.8  F (37.1  C) (Tympanic)   Wt 73.9 kg (163 lb)   SpO2 98%   BMI 23.39 kg/m    Body mass index is 23.39 kg/m .  Physical Exam  Vitals reviewed.   Constitutional:       General: He is not in acute distress.     Appearance: Normal appearance. He is not ill-appearing or toxic-appearing.   HENT:      Head:  Normocephalic and atraumatic.   Cardiovascular:      Rate and Rhythm: Normal rate and regular rhythm.      Heart sounds: Normal heart sounds.   Pulmonary:      Effort: Pulmonary effort is normal.      Breath sounds: Normal breath sounds.   Abdominal:      General: Abdomen is flat. There is no distension.      Palpations: Abdomen is soft.      Tenderness: There is no abdominal tenderness.   Musculoskeletal:         General: Normal range of motion.      Right lower leg: Edema present.      Left lower leg: Edema present.      Comments: 1-2+ pitting edema bilateral lower extremities to about mid shin, dorsal surface left forefoot slightly more edematous than right.  This region also tender to palpation.  No overlying erythema or ecchymosis.   Skin:     General: Skin is warm and dry.   Neurological:      General: No focal deficit present.      Mental Status: He is alert and oriented to person, place, and time.   Psychiatric:         Mood and Affect: Mood normal.         Behavior: Behavior normal.        XR Foot Left G/E 3 Views    Result Date: 10/27/2023  PROCEDURE:  XR FOOT LEFT G/E 3 VIEWS HISTORY: Left forefoot pain, swelling. No known injury. R/o fx; Left foot pain COMPARISON:  None. TECHNIQUE:  XR FOOT LEFT 3 VIEWS FINDINGS: No acute fracture or dislocation is identified. No suspicious osseous lesion. Advanced degenerative changes of the first MTP joint with findings of complete joint space loss, marginal osteophytosis. No foreign body or subcutaneous emphysema. Arterial vascular calcifications.      IMPRESSION: No acute osseous abnormality. PILAR WINTER MD   SYSTEM ID:  [id number]

## 2023-10-27 ENCOUNTER — OFFICE VISIT (OUTPATIENT)
Dept: FAMILY MEDICINE | Facility: OTHER | Age: 88
End: 2023-10-27
Attending: STUDENT IN AN ORGANIZED HEALTH CARE EDUCATION/TRAINING PROGRAM
Payer: MEDICARE

## 2023-10-27 ENCOUNTER — ANCILLARY PROCEDURE (OUTPATIENT)
Dept: GENERAL RADIOLOGY | Facility: OTHER | Age: 88
End: 2023-10-27
Attending: STUDENT IN AN ORGANIZED HEALTH CARE EDUCATION/TRAINING PROGRAM
Payer: MEDICARE

## 2023-10-27 VITALS
HEART RATE: 72 BPM | TEMPERATURE: 98.8 F | OXYGEN SATURATION: 98 % | BODY MASS INDEX: 23.39 KG/M2 | SYSTOLIC BLOOD PRESSURE: 111 MMHG | WEIGHT: 163 LBS | DIASTOLIC BLOOD PRESSURE: 64 MMHG

## 2023-10-27 DIAGNOSIS — M79.672 LEFT FOOT PAIN: Primary | ICD-10-CM

## 2023-10-27 DIAGNOSIS — M79.672 LEFT FOOT PAIN: ICD-10-CM

## 2023-10-27 DIAGNOSIS — I50.42 CHRONIC COMBINED SYSTOLIC AND DIASTOLIC CONGESTIVE HEART FAILURE (H): ICD-10-CM

## 2023-10-27 DIAGNOSIS — Z95.3 S/P TAVR (TRANSCATHETER AORTIC VALVE REPLACEMENT), BIOPROSTHETIC: ICD-10-CM

## 2023-10-27 PROCEDURE — 73630 X-RAY EXAM OF FOOT: CPT | Mod: TC,LT,FY

## 2023-10-27 PROCEDURE — 99215 OFFICE O/P EST HI 40 MIN: CPT | Performed by: STUDENT IN AN ORGANIZED HEALTH CARE EDUCATION/TRAINING PROGRAM

## 2023-10-27 PROCEDURE — G0463 HOSPITAL OUTPT CLINIC VISIT: HCPCS

## 2023-10-27 RX ORDER — CARVEDILOL 12.5 MG/1
12.5 TABLET ORAL 2 TIMES DAILY WITH MEALS
Qty: 180 TABLET | Refills: 3 | Status: CANCELLED | OUTPATIENT
Start: 2023-10-27

## 2023-10-27 RX ORDER — ACETAMINOPHEN 500 MG
500 TABLET ORAL EVERY 8 HOURS PRN
Qty: 60 TABLET | Refills: 0 | Status: SHIPPED | OUTPATIENT
Start: 2023-10-27

## 2023-10-27 ASSESSMENT — PAIN SCALES - GENERAL: PAINLEVEL: MODERATE PAIN (5)

## 2023-11-09 ENCOUNTER — LAB (OUTPATIENT)
Dept: LAB | Facility: OTHER | Age: 88
End: 2023-11-09
Payer: MEDICARE

## 2023-11-09 ENCOUNTER — ANTICOAGULATION THERAPY VISIT (OUTPATIENT)
Dept: ANTICOAGULATION | Facility: OTHER | Age: 88
End: 2023-11-09
Attending: FAMILY MEDICINE
Payer: MEDICARE

## 2023-11-09 DIAGNOSIS — I82.5Y3 CHRONIC DEEP VEIN THROMBOSIS (DVT) OF PROXIMAL VEIN OF BOTH LOWER EXTREMITIES (H): ICD-10-CM

## 2023-11-09 DIAGNOSIS — Z95.3 S/P TAVR (TRANSCATHETER AORTIC VALVE REPLACEMENT), BIOPROSTHETIC: ICD-10-CM

## 2023-11-09 DIAGNOSIS — Z86.718 HISTORY OF DVT (DEEP VEIN THROMBOSIS): Primary | ICD-10-CM

## 2023-11-09 DIAGNOSIS — Z86.718 PERSONAL HISTORY OF DVT (DEEP VEIN THROMBOSIS): ICD-10-CM

## 2023-11-09 LAB — INR BLD: 2.9 (ref 0.9–1.1)

## 2023-11-09 PROCEDURE — 36415 COLL VENOUS BLD VENIPUNCTURE: CPT | Mod: ZL

## 2023-11-09 PROCEDURE — 85610 PROTHROMBIN TIME: CPT | Mod: ZL

## 2023-11-09 NOTE — PROGRESS NOTES
ANTICOAGULATION MANAGEMENT     Sidney Barriga 89 year old male is on warfarin with therapeutic INR result. (Goal INR 2.0-3.0)    Recent labs: (last 7 days)     11/09/23  1453   INR 2.9*       ASSESSMENT     Source(s): Chart Review and Patient/Caregiver Call     Warfarin doses taken: Warfarin taken as instructed  Diet: No new diet changes identified  Medication/supplement changes:  Tylenol  New illness, injury, or hospitalization: No  Signs or symptoms of bleeding or clotting: No  Previous result: Therapeutic last 2(+) visits  Additional findings: None       PLAN     Recommended plan for temporary change(s) affecting INR     Dosing Instructions: Continue your current warfarin dose with next INR in 4 weeks       Summary  As of 11/9/2023      Full warfarin instructions:  5 mg every Mon, Fri; 2.5 mg all other days   Next INR check:  12/7/2023               Telephone call with Sidney who verbalizes understanding and agrees to plan    Lab visit scheduled    Education provided:   None required    Plan made per [facility] anticoagulation protocol    Lizbeth Ely RN  Anticoagulation Clinic  11/9/2023    _______________________________________________________________________     Anticoagulation Episode Summary       Current INR goal:  2.0-3.0   TTR:  77.9% (1 y)   Target end date:  Indefinite   Send INR reminders to:  [facility]    Indications    History of DVT (deep vein thrombosis)-multiple [Z86.718]  Chronic deep vein thrombosis (DVT) of proximal vein of both lower extremities (H) [I82.5Y3]  Personal history of DVT (deep vein thrombosis) [Z86.718]             Comments:  We will continue on Coumadin with goal INR of 2.0-2.7 per Dr Rae             Anticoagulation Care Providers       Provider Role Specialty Phone number    Zach Arredondo MD Referring Family Medicine [phone]

## 2023-11-27 DIAGNOSIS — E11.65 TYPE 2 DIABETES MELLITUS WITH HYPERGLYCEMIA, WITHOUT LONG-TERM CURRENT USE OF INSULIN (H): ICD-10-CM

## 2023-11-27 DIAGNOSIS — E78.1 HYPERTRIGLYCERIDEMIA: Primary | ICD-10-CM

## 2023-11-27 DIAGNOSIS — E78.2 MIXED HYPERLIPIDEMIA: ICD-10-CM

## 2023-11-27 RX ORDER — EZETIMIBE 10 MG/1
10 TABLET ORAL AT BEDTIME
Qty: 90 TABLET | Refills: 3 | Status: SHIPPED | OUTPATIENT
Start: 2023-11-27

## 2023-11-27 NOTE — TELEPHONE ENCOUNTER
ezetimibe (ZETIA) 10 MG tablet 90 tablet 3 8/14/2023     LOV 08/14/2023    Future Office visit:       Routing refill request to provider for review/approval because:    Patient leaves message stating he has no refills.

## 2023-12-07 ENCOUNTER — ANTICOAGULATION THERAPY VISIT (OUTPATIENT)
Dept: ANTICOAGULATION | Facility: OTHER | Age: 88
End: 2023-12-07
Attending: FAMILY MEDICINE
Payer: MEDICARE

## 2023-12-07 ENCOUNTER — LAB (OUTPATIENT)
Dept: LAB | Facility: OTHER | Age: 88
End: 2023-12-07
Payer: MEDICARE

## 2023-12-07 DIAGNOSIS — Z86.718 HISTORY OF DVT (DEEP VEIN THROMBOSIS): Primary | ICD-10-CM

## 2023-12-07 DIAGNOSIS — I82.5Y3 CHRONIC DEEP VEIN THROMBOSIS (DVT) OF PROXIMAL VEIN OF BOTH LOWER EXTREMITIES (H): ICD-10-CM

## 2023-12-07 DIAGNOSIS — Z95.3 S/P TAVR (TRANSCATHETER AORTIC VALVE REPLACEMENT), BIOPROSTHETIC: ICD-10-CM

## 2023-12-07 DIAGNOSIS — Z86.718 PERSONAL HISTORY OF DVT (DEEP VEIN THROMBOSIS): ICD-10-CM

## 2023-12-07 LAB — INR BLD: 2.4 (ref 0.9–1.1)

## 2023-12-07 PROCEDURE — 36415 COLL VENOUS BLD VENIPUNCTURE: CPT | Mod: ZL

## 2023-12-07 PROCEDURE — 85610 PROTHROMBIN TIME: CPT | Mod: ZL

## 2023-12-07 NOTE — PROGRESS NOTES
ANTICOAGULATION MANAGEMENT     Sidney Barriga 89 year old male is on warfarin with therapeutic INR result. (Goal INR 2.0-3.0)    Recent labs: (last 7 days)     12/07/23  1444   INR 2.4*       ASSESSMENT     Source(s): Chart Review     Warfarin doses taken: Reviewed in chart  Diet:  RAF  New illness, injury, or hospitalization: No  Medication/supplement changes: None noted  Signs or symptoms of bleeding or clotting: No  Previous INR: Supratherapeutic  Additional findings: None       PLAN     Recommended plan for no diet, medication or health factor changes affecting INR     Dosing Instructions: Continue your current warfarin dose with next INR in 4 weeks       Summary  As of 12/7/2023      Full warfarin instructions:  5 mg every Mon, Fri; 2.5 mg all other days   Next INR check:  1/4/2024               Detailed voice message left for Sidney with dosing instructions and follow up date.     Patient using outside facility for labs    Education provided: Importance of notifying clinic for changes in medications; a sooner lab recheck maybe needed.    Plan made per [facility] anticoagulation protocol    Erika Anderson RN  Anticoagulation Clinic  12/7/2023    _______________________________________________________________________     Anticoagulation Episode Summary       Current INR goal:  2.0-3.0   TTR:  80.3% (1 y)   Target end date:  Indefinite   Send INR reminders to:  [facility]    Indications    History of DVT (deep vein thrombosis)-multiple [Z86.718]  Chronic deep vein thrombosis (DVT) of proximal vein of both lower extremities (H) [I82.5Y3]  Personal history of DVT (deep vein thrombosis) [Z86.718]             Comments:  We will continue on Coumadin with goal INR of 2.0-2.7 per Dr Rae             Anticoagulation Care Providers       Provider Role Specialty Phone number    Zach Arredondo MD Referring Family Medicine [phone]

## 2024-01-04 ENCOUNTER — LAB (OUTPATIENT)
Dept: LAB | Facility: OTHER | Age: 89
End: 2024-01-04
Payer: MEDICARE

## 2024-01-04 ENCOUNTER — ANTICOAGULATION THERAPY VISIT (OUTPATIENT)
Dept: ANTICOAGULATION | Facility: OTHER | Age: 89
End: 2024-01-04
Attending: FAMILY MEDICINE
Payer: MEDICARE

## 2024-01-04 DIAGNOSIS — Z95.3 S/P TAVR (TRANSCATHETER AORTIC VALVE REPLACEMENT), BIOPROSTHETIC: ICD-10-CM

## 2024-01-04 LAB — INR BLD: 3 (ref 0.9–1.1)

## 2024-01-04 PROCEDURE — 85610 PROTHROMBIN TIME: CPT | Mod: ZL

## 2024-01-04 PROCEDURE — 36416 COLLJ CAPILLARY BLOOD SPEC: CPT | Mod: ZL

## 2024-01-04 NOTE — PROGRESS NOTES
ANTICOAGULATION MANAGEMENT     Sidney Barriga 89 year old male is on warfarin with therapeutic INR result. (Goal INR 2.0-3.0)    Recent labs: (last 7 days)     01/04/24  1446   INR 3.0*       ASSESSMENT     Source(s): Chart Review and Patient/Caregiver Call     Warfarin doses taken: Warfarin taken as instructed  Diet: No new diet changes identified  Medication/supplement changes: None noted  New illness, injury, or hospitalization: No  Signs or symptoms of bleeding or clotting: No  Previous result: Therapeutic last 2(+) visits  Additional findings: None       PLAN     Recommended plan for no diet, medication or health factor changes affecting INR     Dosing Instructions: Continue your current warfarin dose with next INR in 4 weeks       Summary  As of 1/4/2024      Full warfarin instructions:  5 mg every Mon, Fri; 2.5 mg all other days   Next INR check:  2/1/2024               Telephone call with Sidney who verbalizes understanding and agrees to plan    Lab visit scheduled    Education provided:   None required    Plan made per [facility]agulation protocol    Lizbeth Ely RN  Anticoagulation Clinic  1/4/2024    _______________________________________________________________________     Anticoagulation Episode Summary       Current INR goal:  2.0-3.0   TTR:  83.2% (1 y)   Target end date:  Indefinite   Send INR reminders to:  [facility]    Indications    History of DVT (deep vein thrombosis)-multiple [Z86.718]  Chronic deep vein thrombosis (DVT) of proximal vein of both lower extremities (H) [I82.5Y3]  Personal history of DVT (deep vein thrombosis) [Z86.718]             Comments:  We will continue on Coumadin with goal INR of 2.0-2.7 per Dr Rae             Anticoagulation Care Providers       Provider Role Specialty Phone number    Zach Arredondo MD Referring Family Medicine [phone]

## 2024-02-01 ENCOUNTER — ANTICOAGULATION THERAPY VISIT (OUTPATIENT)
Dept: ANTICOAGULATION | Facility: OTHER | Age: 89
End: 2024-02-01
Attending: FAMILY MEDICINE
Payer: MEDICARE

## 2024-02-01 ENCOUNTER — LAB (OUTPATIENT)
Dept: LAB | Facility: OTHER | Age: 89
End: 2024-02-01
Attending: FAMILY MEDICINE
Payer: MEDICARE

## 2024-02-01 DIAGNOSIS — Z86.718 PERSONAL HISTORY OF DVT (DEEP VEIN THROMBOSIS): ICD-10-CM

## 2024-02-01 DIAGNOSIS — Z86.718 HISTORY OF DVT (DEEP VEIN THROMBOSIS): Primary | ICD-10-CM

## 2024-02-01 DIAGNOSIS — Z95.3 S/P TAVR (TRANSCATHETER AORTIC VALVE REPLACEMENT), BIOPROSTHETIC: ICD-10-CM

## 2024-02-01 DIAGNOSIS — I82.5Y3 CHRONIC DEEP VEIN THROMBOSIS (DVT) OF PROXIMAL VEIN OF BOTH LOWER EXTREMITIES (H): ICD-10-CM

## 2024-02-01 LAB — INR BLD: 2.6 (ref 0.9–1.1)

## 2024-02-01 PROCEDURE — 85610 PROTHROMBIN TIME: CPT | Mod: ZL

## 2024-02-01 PROCEDURE — 36416 COLLJ CAPILLARY BLOOD SPEC: CPT | Mod: ZL

## 2024-02-01 NOTE — PROGRESS NOTES
ANTICOAGULATION MANAGEMENT     Sidney Barriga 89 year old male is on warfarin with therapeutic INR result. (Goal INR 2.0-3.0)    Recent labs: (last 7 days)     02/01/24  1449   INR 2.6*       ASSESSMENT     Source(s): Chart Review and Patient/Caregiver Call     Warfarin doses taken: Warfarin taken as instructed  Diet: No new diet changes identified  Medication/supplement changes: None noted  New illness, injury, or hospitalization: No  Signs or symptoms of bleeding or clotting: No  Previous result: Therapeutic last 2(+) visits  Additional findings: None       PLAN     Recommended plan for no diet, medication or health factor changes affecting INR     Dosing Instructions: Continue your current warfarin dose with next INR in 4 weeks       Summary  As of 2/1/2024      Full warfarin instructions:  5 mg every Mon, Fri; 2.5 mg all other days   Next INR check:  2/29/2024               Telephone call with Sidney who verbalizes understanding and agrees to plan    Lab visit scheduled    Education provided:   None required    Plan made per [facility]agulation protocol    Lizbeth Ely RN  Anticoagulation Clinic  2/1/2024    _______________________________________________________________________     Anticoagulation Episode Summary       Current INR goal:  2.0-3.0   TTR:  86.8% (1 y)   Target end date:  Indefinite   Send INR reminders to:  [facility]    Indications    History of DVT (deep vein thrombosis)-multiple [Z86.718]  Chronic deep vein thrombosis (DVT) of proximal vein of both lower extremities (H) [I82.5Y3]  Personal history of DVT (deep vein thrombosis) [Z86.718]             Comments:  We will continue on Coumadin with goal INR of 2.0-2.7 per Dr Rae             Anticoagulation Care Providers       Provider Role Specialty Phone number    Zach Arredondo MD Referring Family Medicine [phone]

## 2024-02-27 ENCOUNTER — TELEPHONE (OUTPATIENT)
Dept: CARDIOLOGY | Facility: OTHER | Age: 89
End: 2024-02-27

## 2024-02-27 DIAGNOSIS — I25.810 ATHEROSCLEROSIS OF CORONARY ARTERY BYPASS GRAFT OF NATIVE HEART WITHOUT ANGINA PECTORIS: ICD-10-CM

## 2024-02-27 DIAGNOSIS — R07.9 CHEST PAIN, UNSPECIFIED TYPE: ICD-10-CM

## 2024-02-27 DIAGNOSIS — Z95.5 HISTORY OF CORONARY ARTERY STENT PLACEMENT: ICD-10-CM

## 2024-02-27 DIAGNOSIS — F17.201 TOBACCO DEPENDENCE IN REMISSION: Primary | ICD-10-CM

## 2024-02-27 DIAGNOSIS — Z98.890 STATUS POST CORONARY ANGIOGRAM: ICD-10-CM

## 2024-02-27 DIAGNOSIS — I25.10 ATHEROSCLEROSIS OF NATIVE CORONARY ARTERY OF NATIVE HEART WITHOUT ANGINA PECTORIS: ICD-10-CM

## 2024-02-27 DIAGNOSIS — J44.9 CHRONIC OBSTRUCTIVE PULMONARY DISEASE, UNSPECIFIED COPD TYPE (H): ICD-10-CM

## 2024-02-27 DIAGNOSIS — Z95.1 S/P CABG X 3: ICD-10-CM

## 2024-02-27 DIAGNOSIS — I25.5 ISCHEMIC CARDIOMYOPATHY: ICD-10-CM

## 2024-02-27 DIAGNOSIS — R06.09 DOE (DYSPNEA ON EXERTION): ICD-10-CM

## 2024-02-27 DIAGNOSIS — Z98.890 H/O CARDIAC CATHETERIZATION: ICD-10-CM

## 2024-02-27 DIAGNOSIS — J44.9 COPD, MILD (H): ICD-10-CM

## 2024-02-27 RX ORDER — NITROGLYCERIN 0.4 MG/1
TABLET SUBLINGUAL
Qty: 25 TABLET | Refills: 3 | Status: SHIPPED | OUTPATIENT
Start: 2024-02-27

## 2024-02-27 RX ORDER — ALBUTEROL SULFATE 90 UG/1
1-2 AEROSOL, METERED RESPIRATORY (INHALATION) EVERY 4 HOURS PRN
Qty: 18 G | Refills: 3 | Status: SHIPPED | OUTPATIENT
Start: 2024-02-27

## 2024-02-27 NOTE — TELEPHONE ENCOUNTER
Patient called cardiology and left a message requesting refills for his nitroglycerin and Albuterol inhaler.   [facility] pharmacy said Ntitroglycerin still has refills remaining. Patient was updated.  Kiesha stated that the  Albuterol inhaler script has  and will need a new script.    Last refill was 2022.   Alessia Molina CNP signed last refill, but I saw he follows with you.  Last office visit was 23.

## 2024-02-29 ENCOUNTER — LAB (OUTPATIENT)
Dept: LAB | Facility: OTHER | Age: 89
End: 2024-02-29
Payer: MEDICARE

## 2024-02-29 ENCOUNTER — ANTICOAGULATION THERAPY VISIT (OUTPATIENT)
Dept: ANTICOAGULATION | Facility: OTHER | Age: 89
End: 2024-02-29
Attending: FAMILY MEDICINE
Payer: MEDICARE

## 2024-02-29 DIAGNOSIS — Z86.718 HISTORY OF DVT (DEEP VEIN THROMBOSIS): Primary | ICD-10-CM

## 2024-02-29 DIAGNOSIS — I82.5Y3 CHRONIC DEEP VEIN THROMBOSIS (DVT) OF PROXIMAL VEIN OF BOTH LOWER EXTREMITIES (H): ICD-10-CM

## 2024-02-29 DIAGNOSIS — Z86.718 PERSONAL HISTORY OF DVT (DEEP VEIN THROMBOSIS): ICD-10-CM

## 2024-02-29 DIAGNOSIS — Z95.3 S/P TAVR (TRANSCATHETER AORTIC VALVE REPLACEMENT), BIOPROSTHETIC: ICD-10-CM

## 2024-02-29 LAB — INR BLD: 2 (ref 0.9–1.1)

## 2024-02-29 PROCEDURE — 85610 PROTHROMBIN TIME: CPT | Mod: ZL

## 2024-02-29 PROCEDURE — 36416 COLLJ CAPILLARY BLOOD SPEC: CPT | Mod: ZL

## 2024-02-29 NOTE — PROGRESS NOTES
ANTICOAGULATION MANAGEMENT     Sidney Barriga 89 year old male is on warfarin with therapeutic INR result. (Goal INR 2.0-3.0)    Recent labs: (last 7 days)     02/29/24  1446   INR 2.0*       ASSESSMENT     Source(s): Patient/ Care giver call     Warfarin doses taken: Warfarin taken as instructed  Diet: Increased greens/vitamin K in diet; plans to resume previous intake  New illness, injury, or hospitalization: No  Medication/supplement changes: None noted  Signs or symptoms of bleeding or clotting: No  Previous INR: Therapeutic last 2(+) visits  Additional findings: None     PLAN     Recommended plan for no diet, medication or health factor changes affecting INR     Dosing Instructions: Continue your current warfarin dose with next INR in 4 weeks       Summary  As of 2/29/2024      Full warfarin instructions:  5 mg every Mon, Fri; 2.5 mg all other days   Next INR check:  3/28/2024               Telephone call with Sidney who verbalizes understanding and agrees to plan    Lab visit scheduled    Education provided: Please call back if any changes to your diet, medications or how you've been taking warfarin    Plan made per [facility] anticoagulation protocol    Desiree Roy, RN  Anticoagulation Clinic  2/29/2024    _______________________________________________________________________     Anticoagulation Episode Summary       Current INR goal:  2.0-3.0   TTR:  86.8% (1 y)   Target end date:  Indefinite   Send INR reminders to:  [facility]    Indications    History of DVT (deep vein thrombosis)-multiple [Z19.718]  Chronic deep vein thrombosis (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

## 2024-03-26 NOTE — PROGRESS NOTES
[facility] HEART CARE   CARDIOLOGY PROGRESS NOTE     Chief Complaint   Patient presents with    Follow Up     Per pt          Diagnosis:  1.  RIVAS 2/2 CHF, pulm htn, mild COPD, severe diffusion defect on 11/18/20, and DVT/PE???  2.  CABG x3, 4/29/2014.  -LIMA to LAD, SVG to OM, and 2nd OM.  3.  CKD-3.  4.  CHF-systolic, ischemic.    -27% on 5/8/2023, [facility].  -30-35% on 4/5/22 and 4/5/2023.   -35-40% on 5/5/22.   -30-35% on 12/9/20.   -Diastolic grade 1 on 11/1/2007. Unchanged on 2/11/21.  5.  TAVR on 12/6/22, [facility].   -29 mm Patricio S3.  6.  MV and AV-moderate stenosis on cardiac cath on 1/15/21.   7.  H/O DVT-x5.   -H/O IVC filter, removal in 2014.  8.  Hypomagnesia.  9.  Hypertension-controlled.  10.  Hypertriglyceridemia-controlled.  11.  Tobacco abuse quitting in 1978.  12.  Cardiac cath x5.    -7/17/2007, 4/20//14, 2019, 1/15/21, and 11/30/22.  13.  Coronary stent placement on 7/17/2007.  -x2 stents to the LAD and in 2019 in [place] to unknown vessel.  14.  H/O sarcoidosis in 1982.  15.  DM-2-controlled.   16.  COPD-mild on 11/18/2020.  17.  WMA on 12/9/20.  18.  Right lung nodule x2 on 2/12/2021.  19.  Concern for TAAA at 4.3 cm on 5/15/2021.  20.  Concern for AAA r/o on ultrasound from 2/12/2021.  21.  Mild pulmonary hypertension on his cardiac cath at the [facility] on 1/15/2021.  22.  A-fib, new onset on 3/27/2024.   -Metoprolol and Coumadin.   -Stop Coreg 12.5 mg.  Start metoprolol 50 mg XL twice daily, 3/27/2024      Assessment/Plan:    1.  RIVAS: Likely A-fib which is new today.  EKG today shows new onset A-fib with RVR with a rate of 116 bpm.  Stop Coreg 12.5 mg twice a day and started on metoprolol 50 mg XL twice a day for rate control.  Currently on Coumadin with history of aortic valve replacement through [facility].  Certainly could be CHF, pulm hypertension, COPD, and diffusion defect.  Plan for labs and chest x-ray today.  In addition, we will obtain an echocardiogram and stress test.  2.  CHF: Has  had a consistently reduced ejection fraction since 2020 at 30-35%.  Most recently, his echocardiogram through [facility] on 5/8/2023 showed his heart function was the lowest it had been at 27%.  Will start on Jardiance 10 mg once daily as Farxiga required a prior authorization.  In addition, will be given Entresto 24/26 mg twice a day.  Continue on Lasix 20 mg daily.  Will plan for repeat echocardiogram.  Salt restriction, fluid restriction, and daily weights.  3.  CAD: History of CABG in 2014.  Increasingly dyspneic on exertion.  He is describing chest discomfort comparable to what he has had in the past resulting in CABG.  Will plan for stress test.  Refill Crestor 20 mg once daily and Plavix 75 mg once daily.  Continue Plavix, Zetia, metoprolol, and SL nitro.  4.  Labs today.  Labs surrounding RIVAS.  To include INR, vitamin D, TSH, CBC, CMP, lipid, A1c, BMP, magnesium, PSA.  5.  A-fib: New onset today, 3/27/2024.  Patient has been dyspneic on exertion.  EKG shows A-fib with RVR at a rate of 116.  Stop Coreg 12.5 mg twice a day and started on metoprolol 50 mg XL twice a day with likely increases in the future.  He is already anticoagulated on Coumadin.  Plan for an INR.  Briefly, discussed cardioversion but we will try rate control first to see if this improves his symptoms.  If it does not, we will reconsider cardioversion in the future.  6.  Hypotension: Previously on lisinopril but discontinued secondary to hypotension and dizziness while hospitalized.  He is on it for heart failure and have discussed the importance of taking this medication but patient declined.  Coreg was decreased from 25 mg twice a day to 12.5 mg twice a day.  Continues on Lasix 20 mg alternating daily.  Has mild peripheral edema.   7.  TAVR: Plan for repeat echocardiogram with RIVAS and history of valve replacement.  Continue on Plavix.  Will need amoxicillin 2 g, 30 to 60 minutes prior to dental procedures as he has his lower teeth.  8.  Refill  Crestor and Plavix.  9.  Follow-up after testing which includes chest x-ray, labs today, 3/27/2024, echo, and stress test.  Was started on Jardiance and Entresto today.  Refilled Crestor and Plavix.  Coreg stopped and metoprolol started because of new onset A-fib today.  Given a prescription for amoxicillin 2 g, 30 to 60 minutes prior to dental procedures/cleanings plan      Interval history:  As noted above.      HPI:    Mr. Barriga is being seen by cardiology to establish care.  He has a history of coronary disease with both stenting and CABG x3.  His wife passed away on 9/1/2020, suddenly from cancer.  He does not plan to go back to [place] and is establishing care locally.     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     Sidney is here to establish care.  He has a history of CAD with stenting and bypass. He previously obtained his cardiac care through [place], [place].  He states that dating back in 1982, he was diagnosed with sarcoidosis.  More recently, he had a cardiac catheterization on 7/17/2007 with history of MI with stenting x2 placed to the p-mLAD.  He went on to have CABG x3 on 4/29/2014 in [place].  He had LIMA to LAD, SVG to OM1 and OM 2.  At that time, his EF was reported to be 30% with ischemic cardiomyopathy.  More recently, he reports having had a cardiac catheterization once again in [place] with stenting to unknown vessels in 2019.  His symptoms in the past have been exertional dyspnea with intrascapular discomfort.  He states he has been having similar symptoms for the last 6 months but to a lesser degree.  Risk factors include history of tobacco abuse at  "approximately a half a pack a day until 1978, hypertension, hyperlipidemia, DM-2, family history, and diet.     He reportedly has a history of ischemic cardiomyopathy.  EF on 8/12/2010 was 40%.  Recently, while in [place] at time of bypass on 4/29/2014, his EF was 30%.  His echo on 11/1/2017 showed an EF of 40-45% with grade 1 diastolic dysfunction.  There was evidence for wall motion normalities as well as mild left atrial enlargement.  He has not had any swelling to his legs, presently on Lasix 20 mg daily.  He states he does not have issues with fluid overload.     He has been on Coumadin for extended period time.  He describes having had multiple recurrent DVT's with a IVC filter in place.  He is currently on Coumadin with management through the Coumadin clinic.     He has a history of hyperlipidemia.  His most recent lipid panel was on 10/20/2020 showing a total cholesterol of 248 and LDL of 164.  HDL was 46.  He has not been able to tolerate Lipitor or Crestor in the past.  He has been on Zetia.  On 12/7/2020,  he was started on Crestor 20 mg twice a week.  If he is able to tolerate this dosage, would increase as able. He states he has been on \"every statin with issues with all of them\".     Patient is known to have mild COPD with history of tobacco abuse.  PFT's on 11/18/2020 support mild COPD with severe diffusion defect.      Relevant testing:  US lower extremity for DVT on 7/17/2023:   No evidence of deep venous thrombosis within the lower extremities.    Echocardiogram on 5/8/2023 at [facility]:  1. Status post 29 mm Paulino Patricio 3 transcatheter aortic valve bioprosthesis implanted via a transfemoral approach (6-DEC-2022).   2. Normal prosthetic leaflet(s) motion. Mean gradient 7 mmHg. Trivial prosthetic regurgitation. No periprosthetic regurgitation.   3. Moderate mitral valve regurgitation (PISA ERO 0.31 cm2; RV 32 ml). Mechanism is likely degenerative disease resulting in malcoaptation on a background of " moderate MAC.   4. Moderately enlarged left ventricular chamber size with moderate-severe generalized hypokinesis with regional variability, ejection fraction 27%.   5. Elevated left ventricular filling pressure. See comments.   6. Normal right ventricular chamber size with mildly reduced systolic function. Estimated right ventricular systolic pressure 65 mmHg (right atrial pressure of 10 mmHg).   7. Normal inferior vena cava size with reduced inspiratory collapse (<50%).   8. No  pericardial effusion.   9. Compared to the report of 08/15/2022 the following changes have occurred: in addition to new TAVR implantation, mitral regurgitation has increased and right ventricular systolic pressure has also increased.  Side by side comparison of images   performed.     Echocardiogram in 4/5/2023:  Left ventricular function is decreased. The ejection fraction is 30-35% (moderately reduced). Severe diffuse hypokinesis is present.   Grade II or moderate diastolic dysfunction.  The right ventricle is normal size. Global right ventricular function is normal.  The 29 mm Patricio III TAVR is well-seated. Leaflet opening is not clearly visualized. There is trace paravalvular regurgitation. There is no valvular regurgitation. The Vmax is 2.1 m/s, the mean gradient is 10 mmHg, the dimensionless index is 0.50, and the acceleration time is 120 ms.  This study was compared with the study from 05/05/2022. The AV gradient is lower. There is no significant change in the biventricular function.    [facility]UCY at [facility] on 12/8/22:  1. The patient was monitored from 12/8/2022 to 1/6/2023 with a total monitoring time of 28 days 19 hr 54 min. The baseline rhythm was sinus with a first-degree AV delay and a left bundle-branch block. The heart rate varied from 55 bpm to 93 bpm. The   average heart rate was 67 bpm.   2. There were 35,812 PVCs seen singly, paired, and in bigeminy and trigeminy with a PVC burden of 1.26%. There were 6 ventricular tachycardia  "events with greater than 3 beats with the longest duration being 8 beats. The maximum VT rate was 183 bpm.   3. There were 15,775 PACs seen singly with a PAC burden of <1%. There were 19 runs of supraventricular tachycardia observed with the longest duration being 39 beats. The maximum SVT rate was 125 bpm.   4. The patient reported 26 symptomatic events of \"dizziness.\" During these events, the rhythm was sinus with a first-degree AV delay and a left bundle-branch block. The heart rate varied from 64 bpm to 92 bpm. At and/or around these times, PVCs were seen    singly and in bigeminy and trigeminy.       Cardiac cath on 11/30/22:  The left main coronary artery is 30% obstructed by multiple discrete lesions.   The proximal left anterior descending artery is 100% obstructed by a discrete lesion. The distal segment is not visible.   The proximal circumflex artery is 40% obstructed by multiple discrete lesions. The distal segment is normal size.   The ramus intermedius segment is 100% obstructed by a discrete lesion. Fills retrogradely from graft.   The proximal right coronary artery is 50% obstructed by a discrete lesion. The distal segment is normal size.   The distal right coronary artery is 30% obstructed by multiple discrete lesions. The distal segment is normal size.   GRAFT ANGIOGRAPHY SUMMARY   Single body vein bypass graft to the Ramus Intermedius Segment. Graft Appears normal.   Single body left internal mammary graft to the Middle Left Anterior Descending Artery.   GRAFT ANGIOGRAPHY SUMMARY   Single body vein bypass graft to the Ramus Intermedius Segment. Graft Appears normal.   Single body left internal mammary graft to the Middle Left Anterior Descending Artery.      ECHO on 5/5/22:  The visual ejection fraction is 35-40%. Moderate diffuse hypokinesis is present.  Right ventricular function, chamber size, wall motion, and thickness are  normal.  The calculated aortic valve area is 0.94 cm2.The mean " gradient across the AV is 22mmHg, the peak velocity is 36 mmHg.  Mild mitral insufficiency is present. Mild tricuspid insufficiency is present.  The inferior vena cava cannot be assessed.   Ascending aorta 4.2 cm.  No pericardial effusion is present.    Stress test on 4/26/22:  There is a large area of a severe   degree of infarction in the anterior, apical, inferior and septal   segment(s) of the left ventricle. The left ventricular ejection fraction   at rest is 37%.  The left ventricular ejection fraction at stress is 38%.       [facility] on 3/1/21:  Underlying rhythm was sinus.  Hrt rate ranged from 50 bpm, maximum heart rate of 197 bmp, averaging 72 bmp.  No significant bradycardia, pauses, 2nd degree Mobitz type II or 3rd degree heart block.  No atrial fibrillation was identified on this study.  x0 triggered events and x0 diary entries.  x18 runs of VT lasting to 11 beats with a maximum heart rate of 197 bmp.    x25 runs of SVT lasting up to 15 beats with a maximum heart rate of 156 bmp.  Rare, less than 1% of PAC's, atrial couplets, atrial triplets, ventricular couplets and ventricular triplets.  Frequent PVC's at 15.9%.  + episodes of ventricular bigeminy lasting up to 18 min's and 9 sec's.  + episodes of ventricular trigeminy lasting up to 30 mins and 5 sec's.    Stress echo at [facility] on 5/25/2021:  1. AT REST:   2. Estimated left ventricular ejection fraction 25%   3. Stroke volume index 45 ml/m^2, aortic valve mean systolic gradient 20 mm Hg, aortic valve   area 1.16 cm^2, aortic valve area index 0.62 cm^2/m^2   4. STRESS TEST:   5. Dobutamine was infused from 5 mcg/kg/min to 20.0 mcg/kg/min.   6. PEAK STRESS:   7. Estimated left ventricular ejection fraction range 30% - 35%.   8. Stroke volume index 55 ml/m^2, aortic valve mean systolic gradient 35 mm Hg, aortic valve  area 1.36 cm^2, aortic valve area index 0.73 cm^2/m^2       US aorta on 2/11/21:  No abdominal aortic aneurysm.    US carotids on 2/11/21:  No  hemodynamically significant stenoses are identified in either carotid bifurcation.    ECHO on 2/11/21:  No pericardial effusion is present.  Moderately (EF 30-35%) reduced left ventricular function is present.  The right ventricle is normal size.  Global right ventricular function is normal.  Mild left atrial enlargement is present.  Mild mitral insufficiency is present.  Severe aortic valve calcification is present.  The peak aortic velocity is 3.02 m/sec.  The mean gradient across the aortic valve is 18.4 mmHg.  Moderate aortic stenosis is present.  Right ventricular systolic pressure is 44 mmHg above the right atrial pressure.  Mild tricuspid insufficiency is present.  Mild pulmonic insufficiency is present.  Ascending aorta 4.1 cm.  Sinuses of Valsalva 4.3 cm.    CTA chest on 2/11/21:  2 small nodules are identified in the right lung.  Follow-up CT scan in 6 months time is recommended. The ascending aorta measures 4.2 cm in maximal transverse diameter.    Cardiac cath on 1/15/21:  Right sided filling pressures are normal.  Mild elevated pulmonary hypertension.  Left sided filling pressures are moderately elevated.  Left ventricular filling pressures are moderately elevated.  Normal cardiac output level.  Moderate mitral valve stenosis (mean gradient 8.4 mm Hg, 2.2 cm2)  Moderate aortic valve stenosis (mean gradient 17 mm Hg, 1.2 cm2)  Native three vessel CAD  >>> Moderate 50% proximal RCA stenosis [ungrafted vessel]  >>> Mimimal LMCA stenosis  >>> 100% Ostial LAD stenosis [LIMA-LAD widely patent]  >>> Proximal 50% LCx and 90% RI stenoses [Sequential SVG-RI-OM patent with focal 60% ISR in jump segment of SVG]     Suggest medical treatment at this time.  Neither the aortic valve nor mitral valve require intervention at this time.  The proximal RCA stenosis is unlikely be physiologically significant. The LAD territory is infarcted but the LIMA-LAD remains widely patent.  There is 70% ISR of SVG in jump segment but  the OM is receiving brisk DAVID-3 flow via the native coronary (only 50% proximal stenosis).    Cath on 7/17/2007:  1. Severe, single-vessel coronary artery disease. Chronic proximal-to-mild occlusion of the left anterior descending coronary artery with distal right-to-left collaterals.  2. Cypher drug-eluting stenting of the [id number] segment of the left anterior descending.    Stress test on 12/15/20:    The nuclear stress test is abnormal.    There is a large area of a severe degree of infarction in the entire apical and anteroseptal segment(s) of the left ventricle.    Left ventricular function is moderately reduced.    The left ventricular ejection fraction at rest is 44%.  The left ventricular ejection fraction at stress is 35%.    There is no prior study for comparison.    ECHO on 12/9/20:  No pericardial effusion is present.  Left ventricular size is normal.  Anterior wall hypokinesis is present.  Apical wall hypokinesis is present.  Moderately (EF 30-35%) reduced left ventricular function is present.  The right ventricle is normal size.  Global right ventricular function is normal.  Mild mitral annular calcification is present.  Mild mitral insufficiency is present.  Moderate aortic valve calcification is present.  Trace aortic insufficiency is present.  The Aortic valve has significant calcification of valve leaflets with poor  mobility. Our measured values I feel underestimate the amount of stenosis. By  visual estimate would put at least moderate Aortic stenosis.  Trace tricuspid insufficiency is present.  Right ventricular systolic pressure is 6 mmHg above the right atrial pressure.  The peak aortic velocity is 2.57 m/sec.  The mean gradient across the aortic valve is 15.5 mmHg.        ICD-10-CM    1. Irregular heartbeat  I49.9 EKG 12-lead complete w/read - (Clinic Performed)      2. RIVAS (dyspnea on exertion)  R06.09 Echocardiogram Complete     NM MPI with Lexiscan     sacubitril-valsartan  (ENTRESTO) 24-26 MG per tablet     empagliflozin (JARDIANCE) 10 MG TABS tablet     N terminal pro BNP outpatient     Comprehensive metabolic panel     CBC with platelets     X-ray Chest 2 vws*     metoprolol succinate ER (TOPROL XL) 50 MG 24 hr tablet     N terminal pro BNP outpatient     Comprehensive metabolic panel     CBC with platelets      3. COPD, mild (H) on 11/18/2020  J44.9       4. Status post coronary angiogram x4 on 7/17/2007, 4/20/2014, 2019, and 1/15/2021  Z98.890 NM MPI with Lexiscan     clopidogrel (PLAVIX) 75 MG tablet     rosuvastatin (CRESTOR) 20 MG tablet      5. 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, SVG to first OM and second OM  Z95.1 NM MPI with Lexiscan     clopidogrel (PLAVIX) 75 MG tablet      6. History of coronary artery stent placement on 7/17/2007 x2 stents to the LAD and in 2019 in [place] to unknown vessel  Z95.5 NM MPI with Lexiscan     clopidogrel (PLAVIX) 75 MG tablet      7. H/O cardiac catheterization x3 on 7/17/2007, 4/20/2014, and 2019  Z98.890 NM MPI with Lexiscan     clopidogrel (PLAVIX) 75 MG tablet      8. S/p TAVR (transcatheter aortic valve replacement), bioprosthetic on 12/6/2022 with a 29 mm IMAN 3 valve  Z95.3 Echocardiogram Complete     amoxicillin (AMOXIL) 500 MG capsule      9. Ventricular bigeminy  I49.8 metoprolol succinate ER (TOPROL XL) 50 MG 24 hr tablet      10. Moderate mitral stenosis on his cardiac cath on 1/15/2021  I05.0 Echocardiogram Complete      11. Ischemic cardiomyopathy  I25.5 NM MPI with Lexiscan     sacubitril-valsartan (ENTRESTO) 24-26 MG per tablet     empagliflozin (JARDIANCE) 10 MG TABS tablet     N terminal pro BNP outpatient     clopidogrel (PLAVIX) 75 MG tablet     N terminal pro BNP outpatient      12. Mild pulmonary hypertension (H)  I27.20 Echocardiogram Complete     sacubitril-valsartan (ENTRESTO) 24-26 MG per tablet     empagliflozin (JARDIANCE) 10 MG TABS tablet     N terminal pro BNP outpatient     N terminal pro BNP  outpatient      13. Severe aortic stenosis  I35.0 Echocardiogram Complete     amoxicillin (AMOXIL) 500 MG capsule      14. Regional wall motion abnormality of heart  R93.1 Echocardiogram Complete     NM MPI with Lexiscan      15. Frequent PVCs  I49.3       16. Essential hypertension, benign  I10 metoprolol succinate ER (TOPROL XL) 50 MG 24 hr tablet      17. Essential hypertension  I10 metoprolol succinate ER (TOPROL XL) 50 MG 24 hr tablet      18. Congestive heart failure, unspecified HF chronicity, unspecified heart failure type (H)  I50.9 Echocardiogram Complete     sacubitril-valsartan (ENTRESTO) 24-26 MG per tablet     empagliflozin (JARDIANCE) 10 MG TABS tablet     N terminal pro BNP outpatient     N terminal pro BNP outpatient      19. Chronic combined systolic and diastolic congestive heart failure (H)  I50.42 Echocardiogram Complete     sacubitril-valsartan (ENTRESTO) 24-26 MG per tablet     empagliflozin (JARDIANCE) 10 MG TABS tablet     N terminal pro BNP outpatient     N terminal pro BNP outpatient      20. Atherosclerosis of native coronary artery of native heart without angina pectoris  I25.10 NM MPI with Lexiscan     N terminal pro BNP outpatient     clopidogrel (PLAVIX) 75 MG tablet     N terminal pro BNP outpatient      21. Aneurysm of ascending aorta without rupture (H24)  I71.21 Echocardiogram Complete      22. Atherosclerosis of coronary artery bypass graft of native heart without angina pectoris  I25.810 NM MPI with Lexiscan     clopidogrel (PLAVIX) 75 MG tablet     rosuvastatin (CRESTOR) 20 MG tablet      23. Abdominal aortic aneurysm (AAA) 3.0 cm to 5.5 cm in diameter in male (H24)  I71.40       24. Type 2 diabetes mellitus with hyperglycemia, without long-term current use of insulin (H)  E11.65 rosuvastatin (CRESTOR) 20 MG tablet      25. Mixed hyperlipidemia  E78.2 Lipid Profile     rosuvastatin (CRESTOR) 20 MG tablet     Lipid Profile      26. Hypomagnesemia  E83.42 Magnesium     Magnesium       27. Hypertriglyceridemia  E78.1       28. Chest pain, unspecified type  R07.9 NM MPI with Lexiscan     clopidogrel (PLAVIX) 75 MG tablet     rosuvastatin (CRESTOR) 20 MG tablet      29. Chronic deep vein thrombosis (DVT) of proximal vein of both lower extremities (H)  I82.5Y3 TSH with free T4 reflex     TSH with free T4 reflex      30. Chronic embolism and thrombosis of unspecified deep veins of proximal lower extremity, bilateral (H)  I82.5Y3       31. H/O aortic stenosis, severe  I35.0 Echocardiogram Complete     amoxicillin (AMOXIL) 500 MG capsule      32. Thrombocytopenia (H24)  D69.6       33. History of DVT (deep vein thrombosis)-multiple  Z86.718       34. Hx of sarcoidosis in 1982  Z86.2       35. Personal history of DVT (deep vein thrombosis)  Z86.718       36. Persistent atrial fibrillation (H)  I48.19 TSH with free T4 reflex     INR     metoprolol succinate ER (TOPROL XL) 50 MG 24 hr tablet     TSH with free T4 reflex     INR      37. New onset a-fib on 3/27/24 (H)  I48.91 TSH with free T4 reflex     INR     metoprolol succinate ER (TOPROL XL) 50 MG 24 hr tablet     TSH with free T4 reflex     INR      38. Screening for prostate cancer  Z12.5 Prostate spec antigen screen     Prostate spec antigen screen      39. Hyperglycemia  R73.9 Hemoglobin A1c     Comprehensive metabolic panel     Hemoglobin A1c     Comprehensive metabolic panel      40. Vitamin D deficiency  E55.9 Vitamin D Deficiency Screening     Vitamin D Deficiency Screening              Past Medical History:   Diagnosis Date    Acute thromboembolism of deep veins of lower extremity (H)     No Comments Provided    Benign essential hypertension     No Comments Provided    Cancer (H)     skin    Congestive heart failure (H)     COPD (chronic obstructive pulmonary disease) (H)     Coronary atherosclerosis     No Comments Provided    Osteoarthrosis     No Comments Provided    Other and unspecified hyperlipidemia     No Comments Provided    Other  specified forms of chronic ischemic heart disease     No Comments Provided    Sarcoidosis     No Comments Provided    Type 2 or unspecified type diabetes mellitus     No Comments Provided       Past Surgical History:   Procedure Laterality Date    ANGIOPLASTY  01/01/2019    stents    CARDIAC SURGERY  01/01/2014    heart bypass    COLONOSCOPY  01/01/2019    CV CORONARY ANGIOGRAM N/A 1/15/2021    Procedure: CV CORONARY ANGIOGRAM;  Surgeon: Charlie Harris MD;  Location:  [facility] CATH LAB    CV LEFT HEART CATH N/A 1/15/2021    Procedure: Left Heart Cath;  Surgeon: Charlie Harris MD;  Location:  HEART CARDIAC CATH LAB    CV RIGHT HEART CATH MEASUREMENTS RECORDED N/A 1/15/2021    Procedure: CV RIGHT HEART CATH;  Surgeon: Charlie Harris MD;  Location:  HEART CARDIAC CATH LAB    IR IVC FILTER REMOVAL  5/2/2014    OTHER SURGICAL HISTORY      [id number],BIOPSY LUNG OR [facility]       Allergies   Allergen Reactions    Isosorbide Nitrate Visual Disturbance     LIGHT HEADED,ALMOST PASSED OUT      Zocor [Simvastatin] Itching    Atorvastatin Itching    Isosorbide Visual Disturbance    Rosuvastatin Itching    Ancef [Cefazolin] Rash     Patient received cefazolin IV while in the [facility] on 12/6/22. On 12/12/22 he was seen by his primary for a drug rash on his trunk. Pt thought it was from amoxicillin administered at the [place] but the [place] only infused cefazolin per chart records. Amoxil rx given for dental procedures pt will not take due to fear of reaction.        Current Outpatient Medications   Medication Sig Dispense Refill    acetaminophen (TYLENOL) 500 MG tablet Take 1 tablet (500 mg) by mouth every 8 hours as needed for mild pain 60 tablet 0    albuterol (PROAIR HFA/PROVENTIL HFA/VENTOLIN HFA) 108 (90 Base) MCG/ACT inhaler Inhale 1-2 puffs into the lungs every 4 hours as needed for shortness of breath or wheezing 18 g 3    amoxicillin (AMOXIL) 500 MG capsule Take 2 g, 30-60 minutes,  prior to dental procedures/cleanings 4 capsule 3    blood glucose (NO BRAND SPECIFIED) lancets standard Use to test blood sugar 1 times daily or as directed. 1 each 3    blood glucose (NO BRAND SPECIFIED) test strip Use to test blood sugar 1 times daily or as directed. 90 strip 3    blood glucose monitoring (NO BRAND SPECIFIED) meter device kit Use to test blood sugar 1 times daily or as directed. 1 kit 0    Calcium Carbonate-Vit D-Min (CALCIUM 600+D PLUS MINERALS) 600-400 MG-UNIT TABS Take 1 tablet by mouth daily      cinnamon 500 MG CAPS Take 500 mg by mouth daily       clopidogrel (PLAVIX) 75 MG tablet Take 1 tablet (75 mg) by mouth daily 90 tablet 3    Coenzyme Q10 (CO Q-10) 200 MG CAPS Take 1 capsule by mouth daily      cyanocobalamin (VITAMIN B-12) 500 MCG SUBL sublingual tablet Place 500 mcg under the tongue daily      empagliflozin (JARDIANCE) 10 MG TABS tablet Take 1 tablet (10 mg) by mouth daily 90 tablet 3    ezetimibe (ZETIA) 10 MG tablet Take 1 tablet (10 mg) by mouth at bedtime (8 PM) 90 tablet 3    furosemide (LASIX) 20 MG tablet Take 1 tablet (20 mg) by mouth every other day alternating with 40 mg every other day. 135 tablet 3    LUTEIN PO Take 1 tablet by mouth daily      metoprolol succinate ER (TOPROL XL) 50 MG 24 hr tablet Take 1 tablet (50 mg) by mouth 2 times daily 180 tablet 1    nitroGLYcerin (NITROSTAT) 0.4 MG sublingual tablet For chest pain place 1 tablet under the tongue every 5 minutes for 3 doses. If symptoms persist 5 minutes after 1st dose call 911. 25 tablet 3    Omega-3 Fatty Acids (FISH OIL) 1200 MG capsule Take 1 capsule by mouth daily      [START ON 3/28/2024] rosuvastatin (CRESTOR) 20 MG tablet Take 1 tablet (20 mg) by mouth twice a week 30 tablet 3    sacubitril-valsartan (ENTRESTO) 24-26 MG per tablet Take 1 tablet by mouth 2 times daily 180 tablet 3    vitamin C (ASCORBIC ACID) 500 MG tablet Take 1,000 mg by mouth daily      Vitamin D3 (CHOLECALCIFEROL) 125 MCG (5000 UT)  tablet Take 1 tablet by mouth daily      warfarin ANTICOAGULANT (COUMADIN) 5 MG tablet Take 5mg by oral route on , , and take 2.5mg by oral route all other days 90 tablet 0       Social History     Socioeconomic History    Marital status:      Spouse name: Not on file    Number of children: Not on file    Years of education: Not on file    Highest education level: Not on file   Occupational History    Not on file   Tobacco Use    Smoking status: Former     Packs/day: 0.50     Years: 24.00     Additional pack years: 0.00     Total pack years: 12.00     Types: Cigarettes     Start date: 1954     Quit date: 1978     Years since quittin.2    Smokeless tobacco: Never   Vaping Use    Vaping Use: Never used   Substance and Sexual Activity    Alcohol use: Yes     Alcohol/week: 5.0 standard drinks of alcohol     Types: 3 Cans of beer, 2 Shots of liquor per week    Drug use: Unknown     Types: Other     Comment: Drug use: No    Sexual activity: Not on file   Other Topics Concern    Parent/sibling w/ CABG, MI or angioplasty before 65F 55M? Not Asked   Social History Narrative    Not on file     Social Determinants of Health     Financial Resource Strain: Low Risk  (10/27/2023)    Financial Resource Strain     Within the past 12 months, have you or your family members you live with been unable to get utilities (heat, electricity) when it was really needed?: No   Food Insecurity: Low Risk  (10/27/2023)    Food Insecurity     Within the past 12 months, did you worry that your food would run out before you got money to buy more?: No     Within the past 12 months, did the food you bought just not last and you didn t have money to get more?: No   Transportation Needs: Low Risk  (10/27/2023)    Transportation Needs     Within the past 12 months, has lack of transportation kept you from medical appointments, getting your medicines, non-medical meetings or appointments, work, or from getting things  that you need?: No   Physical Activity: Not on file   Stress: Not on file   Social Connections: Not on file   Interpersonal Safety: Low Risk  (10/27/2023)    Interpersonal Safety     Do you feel physically and emotionally safe where you currently live?: Yes     Within the past 12 months, have you been hit, slapped, kicked or otherwise physically hurt by someone?: No     Within the past 12 months, have you been humiliated or emotionally abused in other ways by your partner or ex-partner?: No   Housing Stability: Low Risk  (10/27/2023)    Housing Stability     Do you have housing? : Yes     Are you worried about losing your housing?: No       LAB RESULTS:   Orders Only on 01/21/2021   Component Date Value Ref Range Status    Sodium 01/21/2021 141  133 - 144 mmol/L Final    Potassium 01/21/2021 4.7  3.4 - 5.3 mmol/L Final    Chloride 01/21/2021 107  94 - 109 mmol/L Final    Carbon Dioxide 01/21/2021 29  20 - 32 mmol/L Final    Anion Gap 01/21/2021 5  3 - 14 mmol/L Final    Glucose 01/21/2021 125* 70 - 99 mg/dL Final    Urea Nitrogen 01/21/2021 26  7 - 30 mg/dL Final    Creatinine 01/21/2021 1.40* 0.66 - 1.25 mg/dL Final    GFR Estimate 01/21/2021 44* >60 mL/min/[1.73_m2] Final    GFR Estimate If Black 01/21/2021 51* >60 mL/min/[1.73_m2] Final    Calcium 01/21/2021 9.9  8.5 - 10.1 mg/dL Final    WBC 01/21/2021 6.7  4.0 - 11.0 10e9/L Final    RBC Count 01/21/2021 3.63* 4.4 - 5.9 10e12/L Final    Hemoglobin 01/21/2021 11.3* 13.3 - 17.7 g/dL Final    Hematocrit 01/21/2021 34.9* 40.0 - 53.0 % Final    MCV 01/21/2021 96  78 - 100 fl Final    MCH 01/21/2021 31.1  26.5 - 33.0 pg Final    MCHC 01/21/2021 32.4  31.5 - 36.5 g/dL Final    RDW 01/21/2021 12.2  10.0 - 15.0 % Final    Platelet Count 01/21/2021 163  150 - 450 10e9/L Final    INR Point of Care 01/21/2021 2.9* 0.86 - 1.14 Final    Capillary Blood Collection 01/21/2021 Capillary collection performed   Final   Office Visit on 01/12/2021   Component Date Value Ref Range  Status    WBC 01/12/2021 6.4  4.0 - 11.0 10e9/L Final    RBC Count 01/12/2021 3.72* 4.4 - 5.9 10e12/L Final    Hemoglobin 01/12/2021 11.7* 13.3 - 17.7 g/dL Final    Hematocrit 01/12/2021 35.3* 40.0 - 53.0 % Final    MCV 01/12/2021 95  78 - 100 fl Final    MCH 01/12/2021 31.5  26.5 - 33.0 pg Final    MCHC 01/12/2021 33.1  31.5 - 36.5 g/dL Final    RDW 01/12/2021 12.2  10.0 - 15.0 % Final    Platelet Count 01/12/2021 162  150 - 450 10e9/L Final    % Neutrophils 01/12/2021 62.1  % Final    % Lymphocytes 01/12/2021 19.7  % Final    % Monocytes 01/12/2021 13.5  % Final    % Eosinophils 01/12/2021 4.2  % Final    % Basophils 01/12/2021 0.5  % Final    Absolute Neutrophil 01/12/2021 4.0  1.6 - 8.3 10e9/L Final    Absolute Lymphocytes 01/12/2021 1.3  0.8 - 5.3 10e9/L Final    Absolute Monocytes 01/12/2021 0.9  0.0 - 1.3 10e9/L Final    Absolute Eosinophils 01/12/2021 0.3  0.0 - 0.7 10e9/L Final    Absolute Basophils 01/12/2021 0.0  0.0 - 0.2 10e9/L Final    Diff Method 01/12/2021 Automated Method   Final    Sodium 01/12/2021 142  133 - 144 mmol/L Final    Potassium 01/12/2021 4.4  3.4 - 5.3 mmol/L Final    Chloride 01/12/2021 109  94 - 109 mmol/L Final    Carbon Dioxide 01/12/2021 29  20 - 32 mmol/L Final    Anion Gap 01/12/2021 4  3 - 14 mmol/L Final    Glucose 01/12/2021 100* 70 - 99 mg/dL Final    Urea Nitrogen 01/12/2021 28  7 - 30 mg/dL Final    Creatinine 01/12/2021 1.14  0.66 - 1.25 mg/dL Final    GFR Estimate 01/12/2021 57* >60 mL/min/[1.73_m2] Final    GFR Estimate If Black 01/12/2021 66  >60 mL/min/[1.73_m2] Final    Calcium 01/12/2021 9.6  8.5 - 10.1 mg/dL Final    INR Point of Care 01/12/2021 3.8* 0.86 - 1.14 Final   Office Visit on 01/11/2021   Component Date Value Ref Range Status    COVID-19 Virus PCR to [facility] - So* 01/11/2021 Nasopharyngeal   Final    COVID-19 Virus PCR to [facility] - Re* 01/11/2021 Not Detected   Final   Orders Only on 12/01/2020   Component Date Value Ref Range Status    INR 12/01/2020  "2.65* 0.86 - 1.14 Final   Orders Only on 12/01/2020   Component Date Value Ref Range Status    WBC 12/01/2020 5.5  4.0 - 11.0 10e9/L Final    RBC Count 12/01/2020 3.98* 4.4 - 5.9 10e12/L Final    Hemoglobin 12/01/2020 12.5* 13.3 - 17.7 g/dL Final    Hematocrit 12/01/2020 38.4* 40.0 - 53.0 % Final    MCV 12/01/2020 97  78 - 100 fl Final    MCH 12/01/2020 31.4  26.5 - 33.0 pg Final    MCHC 12/01/2020 32.6  31.5 - 36.5 g/dL Final    RDW 12/01/2020 12.4  10.0 - 15.0 % Final    Platelet Count 12/01/2020 141* 150 - 450 10e9/L Final    Diff Method 12/01/2020 Automated Method   Final    % Neutrophils 12/01/2020 63.5  % Final    % Lymphocytes 12/01/2020 21.9  % Final    % Monocytes 12/01/2020 12.2  % Final    % Eosinophils 12/01/2020 1.8  % Final    % Basophils 12/01/2020 0.4  % Final    % Immature Granulocytes 12/01/2020 0.2  % Final    Nucleated RBCs 12/01/2020 0  0 /100 Final    Absolute Neutrophil 12/01/2020 3.5  1.6 - 8.3 10e9/L Final    Absolute Lymphocytes 12/01/2020 1.2  0.8 - 5.3 10e9/L Final    Absolute Monocytes 12/01/2020 0.7  0.0 - 1.3 10e9/L Final    Absolute Eosinophils 12/01/2020 0.1  0.0 - 0.7 10e9/L Final    Absolute Basophils 12/01/2020 0.0  0.0 - 0.2 10e9/L Final    Abs Immature Granulocytes 12/01/2020 0.0  0 - 0.4 10e9/L Final    Absolute Nucleated RBC 12/01/2020 0.0   Final        Review of systems: Negative except that which was noted in the HPI.    Physical examination:  /62   Pulse 77   Resp 22   Ht 1.778 m (5' 10\")   Wt 73.2 kg (161 lb 6.4 oz)   SpO2 98%   BMI 23.16 kg/m      GENERAL APPEARANCE: healthy, alert and no distress  CHEST: lungs clear to auscultation.  CARDIOVASCULAR: Irregular rate irregular rhythm, normal S1 with physiologic split S2, no S3 or S4 but with systolic crescendo decrescendo murmur, no click or rub  EXTREMITIES: no clubbing, cyanosis with mild peripheral edema      Total time spent on day of visit, including review of tests, obtaining/reviewing separately obtained " history, ordering medications/tests/procedures, communicating with PCP/consultants, and documenting in electronic medical record: 46 minutes.           Thank you for allowing me to participate in the care of your patient. Please do not hesitate to contact me if you have any questions.     Justyn Rae, DO

## 2024-03-27 ENCOUNTER — OFFICE VISIT (OUTPATIENT)
Dept: CARDIOLOGY | Facility: OTHER | Age: 89
End: 2024-03-27
Attending: INTERNAL MEDICINE
Payer: MEDICARE

## 2024-03-27 ENCOUNTER — ANCILLARY PROCEDURE (OUTPATIENT)
Dept: GENERAL RADIOLOGY | Facility: OTHER | Age: 89
End: 2024-03-27
Attending: INTERNAL MEDICINE
Payer: MEDICARE

## 2024-03-27 VITALS
OXYGEN SATURATION: 98 % | WEIGHT: 161.4 LBS | DIASTOLIC BLOOD PRESSURE: 62 MMHG | HEIGHT: 70 IN | RESPIRATION RATE: 22 BRPM | HEART RATE: 77 BPM | SYSTOLIC BLOOD PRESSURE: 118 MMHG | BODY MASS INDEX: 23.11 KG/M2

## 2024-03-27 DIAGNOSIS — R06.09 DOE (DYSPNEA ON EXERTION): ICD-10-CM

## 2024-03-27 DIAGNOSIS — I82.5Y3: ICD-10-CM

## 2024-03-27 DIAGNOSIS — Z98.890 H/O CARDIAC CATHETERIZATION: ICD-10-CM

## 2024-03-27 DIAGNOSIS — Z86.718 PERSONAL HISTORY OF DVT (DEEP VEIN THROMBOSIS): ICD-10-CM

## 2024-03-27 DIAGNOSIS — I82.5Y3 CHRONIC DEEP VEIN THROMBOSIS (DVT) OF PROXIMAL VEIN OF BOTH LOWER EXTREMITIES (H): ICD-10-CM

## 2024-03-27 DIAGNOSIS — E55.9 VITAMIN D DEFICIENCY: ICD-10-CM

## 2024-03-27 DIAGNOSIS — I25.5 ISCHEMIC CARDIOMYOPATHY: ICD-10-CM

## 2024-03-27 DIAGNOSIS — I50.9 CONGESTIVE HEART FAILURE, UNSPECIFIED HF CHRONICITY, UNSPECIFIED HEART FAILURE TYPE (H): ICD-10-CM

## 2024-03-27 DIAGNOSIS — Z95.1 S/P CABG X 3: ICD-10-CM

## 2024-03-27 DIAGNOSIS — E78.1 HYPERTRIGLYCERIDEMIA: ICD-10-CM

## 2024-03-27 DIAGNOSIS — I49.9 IRREGULAR HEARTBEAT: Primary | ICD-10-CM

## 2024-03-27 DIAGNOSIS — I71.40 ABDOMINAL AORTIC ANEURYSM (AAA) 3.0 CM TO 5.5 CM IN DIAMETER IN MALE (H): ICD-10-CM

## 2024-03-27 DIAGNOSIS — E11.65 TYPE 2 DIABETES MELLITUS WITH HYPERGLYCEMIA, WITHOUT LONG-TERM CURRENT USE OF INSULIN (H): ICD-10-CM

## 2024-03-27 DIAGNOSIS — I71.21 ANEURYSM OF ASCENDING AORTA WITHOUT RUPTURE (H): ICD-10-CM

## 2024-03-27 DIAGNOSIS — I49.8 VENTRICULAR BIGEMINY: ICD-10-CM

## 2024-03-27 DIAGNOSIS — R07.9 CHEST PAIN, UNSPECIFIED TYPE: ICD-10-CM

## 2024-03-27 DIAGNOSIS — R73.9 HYPERGLYCEMIA: ICD-10-CM

## 2024-03-27 DIAGNOSIS — Z95.3 S/P TAVR (TRANSCATHETER AORTIC VALVE REPLACEMENT), BIOPROSTHETIC: ICD-10-CM

## 2024-03-27 DIAGNOSIS — I35.0 SEVERE AORTIC STENOSIS: ICD-10-CM

## 2024-03-27 DIAGNOSIS — I10 ESSENTIAL HYPERTENSION, BENIGN: ICD-10-CM

## 2024-03-27 DIAGNOSIS — I25.10 ATHEROSCLEROSIS OF NATIVE CORONARY ARTERY OF NATIVE HEART WITHOUT ANGINA PECTORIS: ICD-10-CM

## 2024-03-27 DIAGNOSIS — J44.9 COPD, MILD (H): ICD-10-CM

## 2024-03-27 DIAGNOSIS — I49.3 FREQUENT PVCS: ICD-10-CM

## 2024-03-27 DIAGNOSIS — I25.810 ATHEROSCLEROSIS OF CORONARY ARTERY BYPASS GRAFT OF NATIVE HEART WITHOUT ANGINA PECTORIS: ICD-10-CM

## 2024-03-27 DIAGNOSIS — Z12.5 SCREENING FOR PROSTATE CANCER: ICD-10-CM

## 2024-03-27 DIAGNOSIS — I50.42 CHRONIC COMBINED SYSTOLIC AND DIASTOLIC CONGESTIVE HEART FAILURE (H): ICD-10-CM

## 2024-03-27 DIAGNOSIS — I05.0 MODERATE MITRAL STENOSIS: ICD-10-CM

## 2024-03-27 DIAGNOSIS — Z98.890 STATUS POST CORONARY ANGIOGRAM: ICD-10-CM

## 2024-03-27 DIAGNOSIS — I35.0 AORTIC STENOSIS, SEVERE: ICD-10-CM

## 2024-03-27 DIAGNOSIS — D69.6 THROMBOCYTOPENIA (H): ICD-10-CM

## 2024-03-27 DIAGNOSIS — I48.19 PERSISTENT ATRIAL FIBRILLATION (H): ICD-10-CM

## 2024-03-27 DIAGNOSIS — I10 ESSENTIAL HYPERTENSION: ICD-10-CM

## 2024-03-27 DIAGNOSIS — Z86.2 HX OF SARCOIDOSIS: ICD-10-CM

## 2024-03-27 DIAGNOSIS — I27.20 MILD PULMONARY HYPERTENSION (H): ICD-10-CM

## 2024-03-27 DIAGNOSIS — Z95.5 HISTORY OF CORONARY ARTERY STENT PLACEMENT: ICD-10-CM

## 2024-03-27 DIAGNOSIS — E83.42 HYPOMAGNESEMIA: ICD-10-CM

## 2024-03-27 DIAGNOSIS — Z86.718 HISTORY OF DVT (DEEP VEIN THROMBOSIS): ICD-10-CM

## 2024-03-27 DIAGNOSIS — R97.20 ELEVATED PROSTATE SPECIFIC ANTIGEN (PSA): ICD-10-CM

## 2024-03-27 DIAGNOSIS — E78.2 MIXED HYPERLIPIDEMIA: ICD-10-CM

## 2024-03-27 DIAGNOSIS — I48.91 NEW ONSET A-FIB (H): ICD-10-CM

## 2024-03-27 DIAGNOSIS — R93.1 REGIONAL WALL MOTION ABNORMALITY OF HEART: ICD-10-CM

## 2024-03-27 PROBLEM — I48.92 ATRIAL FLUTTER, UNSPECIFIED TYPE (H): Status: RESOLVED | Noted: 2022-04-13 | Resolved: 2024-03-27

## 2024-03-27 PROBLEM — I95.89 CHRONIC ASYMPTOMATIC HYPOTENSION: Status: RESOLVED | Noted: 2023-07-18 | Resolved: 2024-03-27

## 2024-03-27 LAB
ALBUMIN SERPL BCG-MCNC: 4 G/DL (ref 3.5–5.2)
ALP SERPL-CCNC: 80 U/L (ref 40–150)
ALT SERPL W P-5'-P-CCNC: 31 U/L (ref 0–70)
ANION GAP SERPL CALCULATED.3IONS-SCNC: 13 MMOL/L (ref 7–15)
AST SERPL W P-5'-P-CCNC: 43 U/L (ref 0–45)
ATRIAL RATE - MUSE: NORMAL BPM
BILIRUB SERPL-MCNC: 0.5 MG/DL
BUN SERPL-MCNC: 34.1 MG/DL (ref 8–23)
CALCIUM SERPL-MCNC: 9.8 MG/DL (ref 8.2–9.6)
CHLORIDE SERPL-SCNC: 100 MMOL/L (ref 98–107)
CHOLEST SERPL-MCNC: 148 MG/DL
CREAT SERPL-MCNC: 1.38 MG/DL (ref 0.67–1.17)
DEPRECATED HCO3 PLAS-SCNC: 25 MMOL/L (ref 22–29)
DIASTOLIC BLOOD PRESSURE - MUSE: NORMAL MMHG
EGFRCR SERPLBLD CKD-EPI 2021: 48 ML/MIN/1.73M2
ERYTHROCYTE [DISTWIDTH] IN BLOOD BY AUTOMATED COUNT: 13.5 % (ref 10–15)
EST. AVERAGE GLUCOSE BLD GHB EST-MCNC: 143 MG/DL
FASTING STATUS PATIENT QL REPORTED: NO
GLUCOSE SERPL-MCNC: 126 MG/DL (ref 70–99)
HBA1C MFR BLD: 6.6 %
HCT VFR BLD AUTO: 38.1 % (ref 40–53)
HDLC SERPL-MCNC: 36 MG/DL
HGB BLD-MCNC: 12.2 G/DL (ref 13.3–17.7)
INR PPP: 2.81 (ref 0.85–1.15)
INTERPRETATION ECG - MUSE: NORMAL
LDLC SERPL CALC-MCNC: 82 MG/DL
MAGNESIUM SERPL-MCNC: 2 MG/DL (ref 1.7–2.3)
MCH RBC QN AUTO: 30.1 PG (ref 26.5–33)
MCHC RBC AUTO-ENTMCNC: 32 G/DL (ref 31.5–36.5)
MCV RBC AUTO: 94 FL (ref 78–100)
NONHDLC SERPL-MCNC: 112 MG/DL
NT-PROBNP SERPL-MCNC: 8545 PG/ML (ref 0–1800)
P AXIS - MUSE: NORMAL DEGREES
PLATELET # BLD AUTO: 141 10E3/UL (ref 150–450)
POTASSIUM SERPL-SCNC: 4.2 MMOL/L (ref 3.4–5.3)
PR INTERVAL - MUSE: NORMAL MS
PROT SERPL-MCNC: 7.9 G/DL (ref 6.4–8.3)
PSA SERPL DL<=0.01 NG/ML-MCNC: 24.77 NG/ML
QRS DURATION - MUSE: 156 MS
QT - MUSE: 374 MS
QTC - MUSE: 519 MS
R AXIS - MUSE: -35 DEGREES
RBC # BLD AUTO: 4.05 10E6/UL (ref 4.4–5.9)
SODIUM SERPL-SCNC: 138 MMOL/L (ref 135–145)
SYSTOLIC BLOOD PRESSURE - MUSE: NORMAL MMHG
T AXIS - MUSE: 121 DEGREES
T4 FREE SERPL-MCNC: 1.3 NG/DL (ref 0.9–1.7)
TRIGL SERPL-MCNC: 151 MG/DL
TSH SERPL DL<=0.005 MIU/L-ACNC: 4.33 UIU/ML (ref 0.3–4.2)
VENTRICULAR RATE- MUSE: 116 BPM
WBC # BLD AUTO: 7.8 10E3/UL (ref 4–11)

## 2024-03-27 PROCEDURE — 84443 ASSAY THYROID STIM HORMONE: CPT | Mod: ZL | Performed by: INTERNAL MEDICINE

## 2024-03-27 PROCEDURE — 80053 COMPREHEN METABOLIC PANEL: CPT | Mod: ZL | Performed by: INTERNAL MEDICINE

## 2024-03-27 PROCEDURE — 82306 VITAMIN D 25 HYDROXY: CPT | Mod: ZL | Performed by: INTERNAL MEDICINE

## 2024-03-27 PROCEDURE — 85014 HEMATOCRIT: CPT | Mod: ZL | Performed by: INTERNAL MEDICINE

## 2024-03-27 PROCEDURE — 83735 ASSAY OF MAGNESIUM: CPT | Mod: ZL | Performed by: INTERNAL MEDICINE

## 2024-03-27 PROCEDURE — 71046 X-RAY EXAM CHEST 2 VIEWS: CPT | Mod: TC

## 2024-03-27 PROCEDURE — 85610 PROTHROMBIN TIME: CPT | Mod: ZL | Performed by: INTERNAL MEDICINE

## 2024-03-27 PROCEDURE — 83036 HEMOGLOBIN GLYCOSYLATED A1C: CPT | Mod: ZL | Performed by: INTERNAL MEDICINE

## 2024-03-27 PROCEDURE — 84439 ASSAY OF FREE THYROXINE: CPT | Mod: ZL | Performed by: INTERNAL MEDICINE

## 2024-03-27 PROCEDURE — 80061 LIPID PANEL: CPT | Mod: ZL | Performed by: INTERNAL MEDICINE

## 2024-03-27 PROCEDURE — G0463 HOSPITAL OUTPT CLINIC VISIT: HCPCS | Mod: 25

## 2024-03-27 PROCEDURE — G0463 HOSPITAL OUTPT CLINIC VISIT: HCPCS

## 2024-03-27 PROCEDURE — 99215 OFFICE O/P EST HI 40 MIN: CPT | Performed by: INTERNAL MEDICINE

## 2024-03-27 PROCEDURE — 83880 ASSAY OF NATRIURETIC PEPTIDE: CPT | Mod: ZL | Performed by: INTERNAL MEDICINE

## 2024-03-27 PROCEDURE — 36415 COLL VENOUS BLD VENIPUNCTURE: CPT | Mod: ZL | Performed by: INTERNAL MEDICINE

## 2024-03-27 PROCEDURE — G0103 PSA SCREENING: HCPCS | Mod: ZL | Performed by: INTERNAL MEDICINE

## 2024-03-27 PROCEDURE — 93005 ELECTROCARDIOGRAM TRACING: CPT | Performed by: INTERNAL MEDICINE

## 2024-03-27 PROCEDURE — 93010 ELECTROCARDIOGRAM REPORT: CPT | Performed by: INTERNAL MEDICINE

## 2024-03-27 RX ORDER — SACUBITRIL AND VALSARTAN 24; 26 MG/1; MG/1
1 TABLET, FILM COATED ORAL 2 TIMES DAILY
Qty: 180 TABLET | Refills: 3 | Status: SHIPPED | OUTPATIENT
Start: 2024-03-27 | End: 2024-04-10

## 2024-03-27 RX ORDER — AMOXICILLIN 500 MG/1
CAPSULE ORAL
Qty: 4 CAPSULE | Refills: 3 | Status: SHIPPED | OUTPATIENT
Start: 2024-03-27

## 2024-03-27 RX ORDER — ROSUVASTATIN CALCIUM 20 MG/1
20 TABLET, COATED ORAL
Qty: 30 TABLET | Refills: 3 | Status: SHIPPED | OUTPATIENT
Start: 2024-03-28

## 2024-03-27 RX ORDER — METOPROLOL SUCCINATE 50 MG/1
50 TABLET, EXTENDED RELEASE ORAL 2 TIMES DAILY
Qty: 180 TABLET | Refills: 1 | Status: SHIPPED | OUTPATIENT
Start: 2024-03-27 | End: 2024-09-16

## 2024-03-27 RX ORDER — CLOPIDOGREL BISULFATE 75 MG/1
75 TABLET ORAL DAILY
Qty: 90 TABLET | Refills: 3 | Status: SHIPPED | OUTPATIENT
Start: 2024-03-27

## 2024-03-27 ASSESSMENT — PAIN SCALES - GENERAL: PAINLEVEL: MILD PAIN (2)

## 2024-03-27 NOTE — LETTER
April 2, 2024      Sidney LAURITA Barriga  [address] [address] [address]        Dear ,    We are writing to inform you of your test results.    Here is a copy of your lab results.  As you can see, your vitamin D level was elevated at 78.  If you are taking vitamin D on a daily basis, you may want to decrease the amount or frequency you are taking it.  Your BNP which is a marker for heart failure/fluid overload has increased substantially compared to 3 years ago.  3 years ago, your BNP was 2099.  Now, it is 8545.  Your TSH is elevated but your free T4 suggesting overall normal thyroid function.  Your kidney function is weak suggesting stage III chronic kidney disease.  This is common as we age.  It is unlikely unless something significant happens that you will progress to dialysis.  Your calcium level is slightly elevated 9.8.  Your glucose/sugar was elevated 126.  This is consistent with your known history of diabetes.  Your A1c came back at 6.6% which is in the diabetic range.  Your hemoglobin has improved from 10.4-12.2.  However, you remain mildly anemic.  Your platelets are slightly reduced.  Platelets help blood clot.  Lastly, your PSA significantly elevated at 24.77.  I have placed a referral to Urology for you to be evaluated for this issue.    Resulted Orders   Prostate spec antigen screen   Result Value Ref Range    Prostate Specific Antigen Screen 24.77 ng/mL      Comment:      No reference ranges have been established for patients over 80 years.    Narrative    This result is obtained using the Roche Elecsys total PSA method on the trang [id number] immunoassay analyzer. Results obtained with different assay methods or kits cannot be used interchangeably.   Magnesium   Result Value Ref Range    Magnesium 2.0 1.7 - 2.3 mg/dL   N terminal pro BNP outpatient   Result Value Ref Range    N Terminal Pro BNP Outpatient 8,545 (H) 0 - 1,800 pg/mL      Comment:      Reference range shown and results flagged as  abnormal are for the outpatient, non acute settings. Establishing a baseline value for each individual patient is useful for follow-up.    Suggested inpatient cut points for confirming diagnosis of CHF in an acute setting are:  >450 pg/mL (age 18 to less than 50)  >900 pg/mL (age 50 to less than 75)  >1800 pg/mL (75 yrs and older)    An inpatient or emergency department NT-proPBNP <300 pg/mL effectively rules out acute CHF, with 99% negative predictive value.       Hemoglobin A1c   Result Value Ref Range    Estimated Average Glucose 143 mg/dL    Hemoglobin A1C 6.6 (H) <5.7 %      Comment:      Normal <5.7%   Prediabetes 5.7-6.4%    Diabetes 6.5% or higher     Note: Adopted from ADA consensus guidelines.   Lipid Profile   Result Value Ref Range    Cholesterol 148 <200 mg/dL    Triglycerides 151 (H) <150 mg/dL    Direct Measure HDL 36 (L) >=40 mg/dL    LDL Cholesterol Calculated 82 <=100 mg/dL    Non HDL Cholesterol 112 <130 mg/dL    Patient Fasting > 8hrs? No     Narrative    Cholesterol  Desirable:  <200 mg/dL    Triglycerides  Normal:  Less than 150 mg/dL  Borderline High:  150-199 mg/dL  High:  200-499 mg/dL  Very High:  Greater than or equal to 500 mg/dL    Direct Measure HDL  Female:  Greater than or equal to 50 mg/dL   Male:  Greater than or equal to 40 mg/dL    LDL Cholesterol  Desirable:  <100mg/dL  Above Desirable:  100-129 mg/dL   Borderline High:  130-159 mg/dL   High:  160-189 mg/dL   Very High:  >= 190 mg/dL    Non HDL Cholesterol  Desirable:  130 mg/dL  Above Desirable:  130-159 mg/dL  Borderline High:  160-189 mg/dL  High:  190-219 mg/dL  Very High:  Greater than or equal to 220 mg/dL   Comprehensive metabolic panel   Result Value Ref Range    Sodium 138 135 - 145 mmol/L      Comment:      Reference intervals for this test were updated on 09/26/2023 to more accurately reflect our healthy population. There may be differences in the flagging of prior results with similar values performed with this  method. Interpretation of those prior results can be made in the context of the updated reference intervals.     Potassium 4.2 3.4 - 5.3 mmol/L    Carbon Dioxide (CO2) 25 22 - 29 mmol/L    Anion Gap 13 7 - 15 mmol/L    Urea Nitrogen 34.1 (H) 8.0 - 23.0 mg/dL    Creatinine 1.38 (H) 0.67 - 1.17 mg/dL    GFR Estimate 48 (L) >60 mL/min/1.73m2    Calcium 9.8 (H) 8.2 - 9.6 mg/dL    Chloride 100 98 - 107 mmol/L    Glucose 126 (H) 70 - 99 mg/dL    Alkaline Phosphatase 80 40 - 150 U/L      Comment:      Reference intervals for this test were updated on 11/14/2023 to more accurately reflect our healthy population. There may be differences in the flagging of prior results with similar values performed with this method. Interpretation of those prior results can be made in the context of the updated reference intervals.    AST 43 0 - 45 U/L      Comment:      Reference intervals for this test were updated on 6/12/2023 to more accurately reflect our healthy population. There may be differences in the flagging of prior results with similar values performed with this method. Interpretation of those prior results can be made in the context of the updated reference intervals.    ALT 31 0 - 70 U/L      Comment:      Reference intervals for this test were updated on 6/12/2023 to more accurately reflect our healthy population. There may be differences in the flagging of prior results with similar values performed with this method. Interpretation of those prior results can be made in the context of the updated reference intervals.      Protein Total 7.9 6.4 - 8.3 g/dL    Albumin 4.0 3.5 - 5.2 g/dL    Bilirubin Total 0.5 <=1.2 mg/dL   CBC with platelets   Result Value Ref Range    WBC Count 7.8 4.0 - 11.0 10e3/uL    RBC Count 4.05 (L) 4.40 - 5.90 10e6/uL    Hemoglobin 12.2 (L) 13.3 - 17.7 g/dL    Hematocrit 38.1 (L) 40.0 - 53.0 %    MCV 94 78 - 100 fL    MCH 30.1 26.5 - 33.0 pg    MCHC 32.0 31.5 - 36.5 g/dL    RDW 13.5 10.0 - 15.0 %     Platelet Count 141 (L) 150 - 450 10e3/uL   TSH with free T4 reflex   Result Value Ref Range    TSH 4.33 (H) 0.30 - 4.20 uIU/mL   Vitamin D Deficiency Screening   Result Value Ref Range    Vitamin D, Total (25-Hydroxy) 78 (H) 20 - 50 ng/mL      Comment:      indicates supplementation, with increased risk of hypercalciuria    Narrative    Season, race, dietary intake, and treatment affect the concentration of 25-hydroxy-Vitamin D. Values may decrease during winter months and increase during summer months.    Vitamin D determination is routinely performed by an immunoassay specific for 25 hydroxyvitamin D3.  If an individual is on vitamin D2(ergocalciferol) supplementation, please specify 25 OH vitamin D2 and D3 level determination by LCMSMS test [id number].     T4 free   Result Value Ref Range    Free T4 1.30 0.90 - 1.70 ng/dL       If you have any questions or concerns, please call the clinic at the number listed above.       Sincerely,      Justyn Rae, DO

## 2024-03-27 NOTE — PATIENT INSTRUCTIONS
Thank you for allowing Dr MICAH Rae and our  team to participate in your care. Please call our office at [phone] with scheduling questions or if you need to cancel or change your appointment. With any other questions or concerns you may call cardiology nurse at  [phone].       If you experience chest pain, chest pressure, chest tightness, shortness of breath, fainting, lightheadedness, nausea, vomiting, or other concerning symptoms, please report to the Emergency Department or call 911. These symptoms may be emergent, and best treated in the Emergency Department.

## 2024-03-28 ENCOUNTER — ANTICOAGULATION THERAPY VISIT (OUTPATIENT)
Dept: ANTICOAGULATION | Facility: OTHER | Age: 89
End: 2024-03-28
Attending: FAMILY MEDICINE
Payer: MEDICARE

## 2024-03-28 ENCOUNTER — TELEPHONE (OUTPATIENT)
Dept: CARDIOLOGY | Facility: OTHER | Age: 89
End: 2024-03-28

## 2024-03-28 DIAGNOSIS — Z86.718 HISTORY OF DVT (DEEP VEIN THROMBOSIS): Primary | ICD-10-CM

## 2024-03-28 DIAGNOSIS — I82.5Y3 CHRONIC DEEP VEIN THROMBOSIS (DVT) OF PROXIMAL VEIN OF BOTH LOWER EXTREMITIES (H): ICD-10-CM

## 2024-03-28 DIAGNOSIS — Z86.718 PERSONAL HISTORY OF DVT (DEEP VEIN THROMBOSIS): ICD-10-CM

## 2024-03-28 NOTE — PROGRESS NOTES
ANTICOAGULATION MANAGEMENT     Sidney Barriga 89 year old male is on warfarin with therapeutic INR result. (Goal INR 2.0-3.0)    Recent labs: (last 7 days)     03/27/24  1600   INR 2.81*       ASSESSMENT     Source(s): Chart Review and Patient/Caregiver Call     Warfarin doses taken: Warfarin taken as instructed  Diet: No new diet changes identified  Medication/supplement changes:  stop coreg, started on jardiance, entresto, metoprolol has not picked up yet [facility]  New illness, injury, or hospitalization: Yes: new afib dx  Signs or symptoms of bleeding or clotting: No  Previous result: Therapeutic last 2(+) visits  Additional findings: None       PLAN     Recommended plan for ongoing change(s) affecting INR     Dosing Instructions: Continue your current warfarin dose with next INR in 2 weeks       Summary  As of 3/28/2024      Full warfarin instructions:  5 mg every Mon, Fri; 2.5 mg all other days   Next INR check:  4/11/2024               Telephone call with Sidney who verbalizes understanding and agrees to plan    Lab visit scheduled    Education provided:   None required    Plan made per [facility] anticoagulation protocol    Lizbeth Ely, RN  Anticoagulation Clinic  3/28/2024    _______________________________________________________________________     Anticoagulation Episode Summary       Current INR goal:  2.0-3.0   TTR:  86.7% (1 y)   Target end date:  Indefinite   Send INR reminders to:  [facility]    Indications    History of DVT (deep vein thrombosis)-multiple [Z86.718]  Chronic deep vein thrombosis (DVT) of proximal vein of both lower extremities (H) [I82.5Y3]  Personal history of DVT (deep vein thrombosis) [Z86.308]             Comments:  We will continue on Coumadin with goal INR of 2.0-2.7 per Dr Rae             Anticoagulation Care Providers       Provider Role Specialty Phone number    Zach Arredondo MD Referring Family Medicine [phone]

## 2024-03-28 NOTE — TELEPHONE ENCOUNTER
PRIOR AUTHORIZATION DENIED    Medication: ROSUVASTATIN CALCIUM 20 MG PO TABS  Insurance Company: [employer] - Phone [phone] Fax [phone]  Denial Date: 3/28/2024  Denial Reason(s):     Appeal Information:       Patient Notified: No

## 2024-03-29 LAB — VIT D+METAB SERPL-MCNC: 78 NG/ML (ref 20–50)

## 2024-04-03 ENCOUNTER — HOSPITAL ENCOUNTER (OUTPATIENT)
Dept: CARDIOLOGY | Facility: HOSPITAL | Age: 89
Discharge: HOME | End: 2024-04-03
Attending: INTERNAL MEDICINE | Admitting: INTERNAL MEDICINE
Payer: MEDICARE

## 2024-04-03 DIAGNOSIS — I50.9 CONGESTIVE HEART FAILURE, UNSPECIFIED HF CHRONICITY, UNSPECIFIED HEART FAILURE TYPE (H): ICD-10-CM

## 2024-04-03 DIAGNOSIS — I35.0 AORTIC STENOSIS, SEVERE: ICD-10-CM

## 2024-04-03 DIAGNOSIS — I35.0 SEVERE AORTIC STENOSIS: ICD-10-CM

## 2024-04-03 DIAGNOSIS — R93.1 REGIONAL WALL MOTION ABNORMALITY OF HEART: ICD-10-CM

## 2024-04-03 DIAGNOSIS — I71.21 ANEURYSM OF ASCENDING AORTA WITHOUT RUPTURE (H): ICD-10-CM

## 2024-04-03 DIAGNOSIS — I27.20 MILD PULMONARY HYPERTENSION (H): ICD-10-CM

## 2024-04-03 DIAGNOSIS — Z95.3 S/P TAVR (TRANSCATHETER AORTIC VALVE REPLACEMENT), BIOPROSTHETIC: ICD-10-CM

## 2024-04-03 DIAGNOSIS — I05.0 MODERATE MITRAL STENOSIS: ICD-10-CM

## 2024-04-03 DIAGNOSIS — I50.42 CHRONIC COMBINED SYSTOLIC AND DIASTOLIC CONGESTIVE HEART FAILURE (H): ICD-10-CM

## 2024-04-03 DIAGNOSIS — R06.09 DOE (DYSPNEA ON EXERTION): ICD-10-CM

## 2024-04-03 LAB — LVEF ECHO: NORMAL

## 2024-04-03 PROCEDURE — 93306 TTE W/DOPPLER COMPLETE: CPT | Mod: 26 | Performed by: INTERNAL MEDICINE

## 2024-04-03 PROCEDURE — 93306 TTE W/DOPPLER COMPLETE: CPT

## 2024-04-05 ENCOUNTER — HOSPITAL ENCOUNTER (OUTPATIENT)
Dept: CARDIOLOGY | Facility: HOSPITAL | Age: 89
Setting detail: NUCLEAR MEDICINE
Discharge: HOME | End: 2024-04-05
Attending: INTERNAL MEDICINE
Payer: MEDICARE

## 2024-04-05 ENCOUNTER — HOSPITAL ENCOUNTER (OUTPATIENT)
Dept: NUCLEAR MEDICINE | Facility: HOSPITAL | Age: 89
Setting detail: NUCLEAR MEDICINE
Discharge: HOME | End: 2024-04-05
Attending: INTERNAL MEDICINE
Payer: MEDICARE

## 2024-04-05 DIAGNOSIS — Z98.890 STATUS POST CORONARY ANGIOGRAM: ICD-10-CM

## 2024-04-05 DIAGNOSIS — R06.09 DOE (DYSPNEA ON EXERTION): ICD-10-CM

## 2024-04-05 DIAGNOSIS — Z95.1 S/P CABG X 3: ICD-10-CM

## 2024-04-05 DIAGNOSIS — I25.10 ATHEROSCLEROSIS OF NATIVE CORONARY ARTERY OF NATIVE HEART WITHOUT ANGINA PECTORIS: ICD-10-CM

## 2024-04-05 DIAGNOSIS — Z98.890 H/O CARDIAC CATHETERIZATION: ICD-10-CM

## 2024-04-05 DIAGNOSIS — I25.810 ATHEROSCLEROSIS OF CORONARY ARTERY BYPASS GRAFT OF NATIVE HEART WITHOUT ANGINA PECTORIS: ICD-10-CM

## 2024-04-05 DIAGNOSIS — R93.1 REGIONAL WALL MOTION ABNORMALITY OF HEART: ICD-10-CM

## 2024-04-05 DIAGNOSIS — Z95.5 HISTORY OF CORONARY ARTERY STENT PLACEMENT: ICD-10-CM

## 2024-04-05 DIAGNOSIS — I25.5 ISCHEMIC CARDIOMYOPATHY: ICD-10-CM

## 2024-04-05 DIAGNOSIS — R07.9 CHEST PAIN, UNSPECIFIED TYPE: ICD-10-CM

## 2024-04-05 LAB
CV STRESS MAX HR HE: 99
NUC STRESS EJECTION FRACTION: 28 %
RATE PRESSURE PRODUCT: 9702
STRESS ECHO BASELINE DIASTOLIC HE: 72
STRESS ECHO BASELINE HR: 77 BPM
STRESS ECHO BASELINE SYSTOLIC BP: 110
STRESS ECHO CALCULATED PERCENT HR: 77 %
STRESS ECHO LAST STRESS DIASTOLIC BP: 72
STRESS ECHO LAST STRESS SYSTOLIC BP: 98
STRESS ECHO TARGET HR: 129

## 2024-04-05 PROCEDURE — 93018 CV STRESS TEST I&R ONLY: CPT | Performed by: INTERNAL MEDICINE

## 2024-04-05 PROCEDURE — A9500 TC99M SESTAMIBI: HCPCS | Performed by: RADIOLOGY

## 2024-04-05 PROCEDURE — 343N000001 HC RX 343: Performed by: RADIOLOGY

## 2024-04-05 PROCEDURE — 93017 CV STRESS TEST TRACING ONLY: CPT

## 2024-04-05 PROCEDURE — 78452 HT MUSCLE IMAGE SPECT MULT: CPT | Mod: MG

## 2024-04-05 PROCEDURE — 250N000011 HC RX IP 250 OP 636: Mod: JZ | Performed by: INTERNAL MEDICINE

## 2024-04-05 PROCEDURE — 93016 CV STRESS TEST SUPVJ ONLY: CPT | Performed by: INTERNAL MEDICINE

## 2024-04-05 RX ORDER — REGADENOSON 0.08 MG/ML
0.4 INJECTION, SOLUTION INTRAVENOUS ONCE
Status: COMPLETED | OUTPATIENT
Start: 2024-04-05 | End: 2024-04-05

## 2024-04-05 RX ADMIN — Medication 10.7 MILLICURIE: at 07:58

## 2024-04-05 RX ADMIN — REGADENOSON 0.4 MG: 0.08 INJECTION, SOLUTION INTRAVENOUS at 09:52

## 2024-04-05 RX ADMIN — Medication 31.9 MILLICURIE: at 09:52

## 2024-04-10 ENCOUNTER — TELEPHONE (OUTPATIENT)
Dept: CARDIOLOGY | Facility: OTHER | Age: 89
End: 2024-04-10

## 2024-04-10 DIAGNOSIS — I27.20 MILD PULMONARY HYPERTENSION (H): ICD-10-CM

## 2024-04-10 DIAGNOSIS — I50.42 CHRONIC COMBINED SYSTOLIC AND DIASTOLIC CONGESTIVE HEART FAILURE (H): ICD-10-CM

## 2024-04-10 DIAGNOSIS — I50.9 CONGESTIVE HEART FAILURE, UNSPECIFIED HF CHRONICITY, UNSPECIFIED HEART FAILURE TYPE (H): ICD-10-CM

## 2024-04-10 DIAGNOSIS — I25.5 ISCHEMIC CARDIOMYOPATHY: ICD-10-CM

## 2024-04-10 DIAGNOSIS — R06.09 DOE (DYSPNEA ON EXERTION): ICD-10-CM

## 2024-04-10 RX ORDER — SACUBITRIL AND VALSARTAN 24; 26 MG/1; MG/1
1 TABLET, FILM COATED ORAL 2 TIMES DAILY
Qty: 180 TABLET | Refills: 3 | Status: SHIPPED | OUTPATIENT
Start: 2024-04-10

## 2024-04-10 NOTE — TELEPHONE ENCOUNTER
12:17 PM    Reason for Call: Phone Call    Description: [facility]'s empagliflozin (JARDIANCE) 10 MG TABS tablet andsacubitril-valsartan (ENTRESTO) 24-26 MG per tablet [facility], as [facility] medications [facility] the [facility] than the [facility]'s regular pharmacy.     Was an appointment offered for this call? No  If yes : Appointment type              Date    Preferred method for responding to this message: Telephone Call  What is your phone number ?[phone] (ask for Shannan)    If we cannot reach you directly, may we leave a detailed response at the number you provided? Yes    Can this message wait until your PCP/provider returns, if available today? No    Geovanna Plata

## 2024-04-11 ENCOUNTER — LAB (OUTPATIENT)
Dept: LAB | Facility: OTHER | Age: 89
End: 2024-04-11
Attending: FAMILY MEDICINE
Payer: MEDICARE

## 2024-04-11 ENCOUNTER — ANTICOAGULATION THERAPY VISIT (OUTPATIENT)
Dept: ANTICOAGULATION | Facility: OTHER | Age: 89
End: 2024-04-11
Attending: FAMILY MEDICINE
Payer: MEDICARE

## 2024-04-11 DIAGNOSIS — Z86.718 PERSONAL HISTORY OF DVT (DEEP VEIN THROMBOSIS): ICD-10-CM

## 2024-04-11 DIAGNOSIS — Z86.718 HISTORY OF DVT (DEEP VEIN THROMBOSIS): Primary | ICD-10-CM

## 2024-04-11 DIAGNOSIS — Z86.718 HISTORY OF DVT (DEEP VEIN THROMBOSIS): ICD-10-CM

## 2024-04-11 DIAGNOSIS — I82.5Y3 CHRONIC DEEP VEIN THROMBOSIS (DVT) OF PROXIMAL VEIN OF BOTH LOWER EXTREMITIES (H): ICD-10-CM

## 2024-04-11 DIAGNOSIS — Z95.3 S/P TAVR (TRANSCATHETER AORTIC VALVE REPLACEMENT), BIOPROSTHETIC: ICD-10-CM

## 2024-04-11 LAB — INR BLD: 2.9 (ref 0.9–1.1)

## 2024-04-11 PROCEDURE — 85610 PROTHROMBIN TIME: CPT | Mod: ZL

## 2024-04-11 PROCEDURE — 36416 COLLJ CAPILLARY BLOOD SPEC: CPT | Mod: ZL

## 2024-04-11 RX ORDER — WARFARIN SODIUM 5 MG/1
TABLET ORAL
Qty: 90 TABLET | Refills: 3 | Status: SHIPPED | OUTPATIENT
Start: 2024-04-11

## 2024-04-22 ENCOUNTER — TRANSFERRED RECORDS (OUTPATIENT)
Dept: HEALTH INFORMATION MANAGEMENT | Facility: CLINIC | Age: 89
End: 2024-04-22
Payer: MEDICARE

## 2024-04-22 LAB — RETINOPATHY: NEGATIVE

## 2024-05-02 ENCOUNTER — ANTICOAGULATION THERAPY VISIT (OUTPATIENT)
Dept: ANTICOAGULATION | Facility: OTHER | Age: 89
End: 2024-05-02
Attending: FAMILY MEDICINE
Payer: MEDICARE

## 2024-05-02 ENCOUNTER — LAB (OUTPATIENT)
Dept: LAB | Facility: OTHER | Age: 89
End: 2024-05-02
Payer: MEDICARE

## 2024-05-02 DIAGNOSIS — Z86.718 PERSONAL HISTORY OF DVT (DEEP VEIN THROMBOSIS): ICD-10-CM

## 2024-05-02 DIAGNOSIS — Z95.3 S/P TAVR (TRANSCATHETER AORTIC VALVE REPLACEMENT), BIOPROSTHETIC: ICD-10-CM

## 2024-05-02 DIAGNOSIS — Z86.718 HISTORY OF DVT (DEEP VEIN THROMBOSIS): Primary | ICD-10-CM

## 2024-05-02 DIAGNOSIS — I82.5Y3 CHRONIC DEEP VEIN THROMBOSIS (DVT) OF PROXIMAL VEIN OF BOTH LOWER EXTREMITIES (H): ICD-10-CM

## 2024-05-02 LAB — INR BLD: 2.8 (ref 0.9–1.1)

## 2024-05-02 PROCEDURE — 36416 COLLJ CAPILLARY BLOOD SPEC: CPT | Mod: ZL

## 2024-05-02 PROCEDURE — 85610 PROTHROMBIN TIME: CPT | Mod: ZL

## 2024-05-02 NOTE — PROGRESS NOTES
ANTICOAGULATION MANAGEMENT     Sidney Barriga 89 year old male is on warfarin with therapeutic INR result. (Goal INR 2.0-3.0)    Recent labs: (last 7 days)     05/02/24  1007   INR 2.8*       ASSESSMENT     Source(s): Chart Review and Patient/Caregiver Call     Warfarin doses taken: Warfarin taken as instructed  Diet: No new diet changes identified  Medication/supplement changes: None noted  New illness, injury, or hospitalization: No  Signs or symptoms of bleeding or clotting: No  Previous result: Therapeutic last 2(+) visits  Additional findings: None       PLAN     Recommended plan for no diet, medication or health factor changes affecting INR     Dosing Instructions: Continue your current warfarin dose with next INR in 4 weeks       Summary  As of 5/2/2024      Full warfarin instructions:  5 mg every Mon, Fri; 2.5 mg all other days   Next INR check:  5/30/2024               Telephone call with Sidney who verbalizes understanding and agrees to plan    Lab visit scheduled    Education provided:   None required    Plan made per [facility] protocol    Lizbeth Ely, RN  Anticoagulation Clinic  5/2/2024    _______________________________________________________________________     Anticoagulation Episode Summary       Current INR goal:  2.0-3.0   TTR:  86.8% (1 y)   Target end date:  Indefinite   Send INR reminders to:  [facility]    Indications    History of DVT (deep vein thrombosis)-multiple [Z86.718]  Chronic deep vein thrombosis (DVT) of proximal vein of both lower extremities (H) [I82.5Y3]  Personal history of DVT (deep vein thrombosis) [Z86.718]             Comments:  We will continue on Coumadin with goal INR of 2.0-2.7 per Dr Rae             Anticoagulation Care Providers       Provider Role Specialty Phone number    Zach Arredondo MD Referring Family Medicine [phone]

## 2024-05-21 NOTE — PROGRESS NOTES
[facility] HEART CARE   CARDIOLOGY PROGRESS NOTE     Chief Complaint   Patient presents with    Follow Up          Diagnosis:  1.  RIVAS 2/2 CHF, pulm htn, mild COPD, severe diffusion defect on 11/18/20, and DVT/PE???  2.  CABG x3, 4/29/2014.  -LIMA to LAD, SVG to OM, and 2nd OM.  3.  CKD-3.  4.  CHF-systolic, ischemic.    -15-20% on 4/3/24.   -27% on 5/8/2023, [facility].  -30-35% on 4/5/22 and 4/5/2023.   -35-40% on 5/5/22.   -30-35% on 12/9/20.   -Diastolic grade 1 on 11/1/2007. Unchanged on 2/11/21.  5.  TAVR on 12/6/22, [facility].   -29 mm Patricio S3.  6.  MV and AV-moderate stenosis on cardiac cath on 1/15/21.   7.  H/O DVT-x5.   -H/O IVC filter, removal in 2014.  8.  Hypomagnesia.   -2.0 on 3/27/2024.  9.  Hypertension-controlled.  10.  Hypertriglyceridemia-controlled.  11.  Tobacco abuse.    -Quit in 1978.  12.  Cardiac cath x5.    -7/17/2007, 4/20//14, 2019, 1/15/21, and 11/30/22.  13.  Coronary stent placement on 7/17/2007.  -x2 stents to the LAD and in 2019 in [place] to unknown vessel.  14.  H/O sarcoidosis in 1982.  15.  DM-2-controlled.   16.  COPD.  -Mild on 11/18/2020.  17.  WMA on 12/9/20.  18.  Right lung nodule x2 on 2/12/2021.  19.  Concern for TAAA at 4.3 cm on 5/15/2021.  20.  Concern for AAA r/o on ultrasound from 2/12/2021.  21.  Mild pulmonary hypertension on his cardiac cath at the [facility] on 1/15/2021.  22.  A-fib, new onset on 3/27/2024.   -Metoprolol and Coumadin.   -Stop Coreg 12.5 mg.  Start metoprolol 50 mg XL twice daily, 3/27/2024      Assessment/Plan:    1.  CHF: Here in follow-up to his echocardiogram completed 4/5/24.  His EF has declined significantly compared to his echocardiogram on 5/8/2023 through [facility].  Previously, his ejection fraction was noted to be 27%.  It has declined to 15 to 20%.  He had mildly elevated pulmonary pressures at 50 mmHg.  Thankfully, he has not had significant peripheral edema or swelling.  We discussed medications.  He has only been on Entresto for a  week and a half but was previously prescribed 2 months ago.  He states he feels much better and is less short of breath.  He just received Jardiance which he has not even taken a dose.  He will start taking this medication.  He is also on metoprolol 50 mg XL twice daily.  I suggested the Lasix be increased to 40 mg daily.  He was previously on 20 mg alternating with 40 mg.  He has considerable swelling but is worse on the right compared to the left.  Because he has not started Jardiance, I did not make changes to Entresto.  His blood pressure is 108/66 and I am hesitant to start him on spironolactone.  With these changes in medications, I suggested labs in 2 weeks and these have been ordered.  2.  Labs: Plan for labs in 2 weeks as he starting Jardiance and doubled Lasix to 40 mg daily.  Labs include magnesium, BNP, and basic metabolic panel.  3.  A-fib: Newly diagnosed on his visit on 3/27/2024.  His EKG today shows A-fib.  Other than some shortness of breath, he is asymptomatic.  He is rate controlled with a heart rate of 73 bpm on his EKG today.  No changes.  Continue warfarin and metoprolol.  4.  CAD: No concerns.  Continue medical management.  Denies anginal symptoms.  5.  TAVR: Encouraged to take aspirin and antibiotic prior to dental procedures as he still has some of his teeth.  6.  Follow-up in 2 to 3 months with plans for changes of his heart failure medications.  Preferentially, increase Entresto and consider starting spironolactone versus increasing metoprolol.      Interval history:  As noted above.      HPI:    Mr. Barriga is being seen by cardiology to establish care.  He has a history of coronary disease with both stenting and CABG x3.  His wife passed away on 9/1/2020, suddenly from cancer.  He does not plan to go back to [place] and is establishing care locally.     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     Sidney is here to establish care.  He has a history of CAD with stenting and bypass. He previously obtained his cardiac care through [place], [place].  He states that dating back in 1982, he was diagnosed with sarcoidosis.  More recently, he had a cardiac catheterization on 7/17/2007 with history of MI with stenting x2 placed to the p-mLAD.  He went on to have CABG x3 on 4/29/2014 in [place].  He had LIMA to LAD, SVG to OM1 and OM 2.  At that time, his EF was reported to be 30% with ischemic cardiomyopathy.  More recently, he reports having had a cardiac catheterization once again in [place] with stenting to unknown vessels in 2019.  His symptoms in the past have been exertional dyspnea with intrascapular discomfort.  He states he has been having similar symptoms for the last 6 months but to a lesser degree.  Risk factors include history of tobacco abuse at approximately a half a pack a day until 1978, hypertension, hyperlipidemia, DM-2, family history, and diet.     He reportedly has a history of ischemic cardiomyopathy.  EF on 8/12/2010 was 40%.  Recently, while in [place] at time of bypass on 4/29/2014, his EF was 30%.  His echo on 11/1/2017 showed an EF of 40-45% with grade 1 diastolic dysfunction.  There was evidence for wall motion normalities as well as mild left atrial enlargement.  He has not had any swelling to his legs, presently on Lasix 20 mg daily.  He states he does not have issues with fluid overload.     He has been on Coumadin for extended period time.  He describes having had multiple recurrent DVT's with a IVC filter in place.  He is currently on Coumadin with management through the Coumadin clinic.     He has a history of hyperlipidemia.   "His most recent lipid panel was on 10/20/2020 showing a total cholesterol of 248 and LDL of 164.  HDL was 46.  He has not been able to tolerate Lipitor or Crestor in the past.  He has been on Zetia.  On 12/7/2020,  he was started on Crestor 20 mg twice a week.  If he is able to tolerate this dosage, would increase as able. He states he has been on \"every statin with issues with all of them\".     Patient is known to have mild COPD with history of tobacco abuse.  PFT's on 11/18/2020 support mild COPD with severe diffusion defect.      Relevant testing:  Stress test on 4/5/2024:    The nuclear stress test is abnormal.     There is a large area of infarction in the anterior, apical, septal,   anteroseptal and inferoseptal segment(s) of the left ventricle.     Left ventricular function is severely reduced.     The left ventricular ejection fraction at stress is 28%.     A prior study was conducted on 4/26/2022.  This study has changes noted when compared with the prior study. The estimated ejection fraction is worse.     Echo on 4/3/2024:  Left ventricular function is decreased.   The ejection fraction is 15-20% (severely reduced).   Severe diffuse hypokinesis is present.  Global right ventricular function is mildly to moderately reduced.  The 29 mm Patricio III TAVR is well-seated. Leaflet opening is not clearly  visualized. There is no paravalvular or valvular regurgitation. The Vmax is  1.4 m/s, the mean gradient is 5 mmHg, the dimensionless index is 0.64.  Mild tricuspid and mitral insufficiency present.  Estimated right ventricular systolic pressure is 50 mmHg plus right atrial  pressure. Pulmonary hypertension is present.  No pericardial effusion is present.    US lower extremity for DVT on 7/17/2023:   No evidence of deep venous thrombosis within the lower extremities.    Zio patch on 4/19/2023:  Worn for 14 days and 0 hr's.  After removing artifact, total time was 13 days and 23 hr's. Placed on 4/19/2023 at 12:34 " PM and completed on 5/3/2023 at 12:34 PM.  Underlying rhythm was sinus.  Hrt rate ranged from 53 bpm, maximum heart rate of 182 bmp, averaging 65 bmp.  No significant bradycardia, pauses, Mobitz type II or 3rd degree heart block.  No atrial fibrillation on this study.  x0 triggered events and x0 diary entries.   x306 runs of VT lasting up to 4 beats with a maximum heart rate of 182 bmp.    x47 runs of SVT lasting up to 20 beats with a maximum heart rate of 160 bmp.  Rare, <1% of PAC's, atrial couplets, and atrial triplets.  Frequent PVC's at 27.4%.  Occasional ventricular couplets at 2.6%.  Occasional ventricular triplets at 1.0%.  + episodes of ventricular bigeminy lasting up to 5 min's and 41 sec's.  + episodes of ventricular trigeminy lasting up to 1 min and 40 sec's.       Echocardiogram on 5/8/2023 at [place]:  1. Status post 29 mm Paulino Patricio 3 transcatheter aortic valve bioprosthesis implanted via a transfemoral approach (6-DEC-2022).   2. Normal prosthetic leaflet(s) motion. Mean gradient 7 mmHg. Trivial prosthetic regurgitation. No periprosthetic regurgitation.   3. Moderate mitral valve regurgitation (PISA ERO 0.31 cm2; RV 32 ml). Mechanism is likely degenerative disease resulting in malcoaptation on a background of moderate MAC.   4. Moderately enlarged left ventricular chamber size with moderate-severe generalized hypokinesis with regional variability, ejection fraction 27%.   5. Elevated left ventricular filling pressure. See comments.   6. Normal right ventricular chamber size with mildly reduced systolic function. Estimated right ventricular systolic pressure 65 mmHg (right atrial pressure of 10 mmHg).   7. Normal inferior vena cava size with reduced inspiratory collapse (<50%).   8. No  pericardial effusion.   9. Compared to the report of 08/15/2022 the following changes have occurred: in addition to new TAVR implantation, mitral regurgitation has increased and right ventricular systolic pressure has  "also increased.  Side by side comparison of images   performed.     Echocardiogram in 4/5/2023:  Left ventricular function is decreased. The ejection fraction is 30-35% (moderately reduced). Severe diffuse hypokinesis is present.   Grade II or moderate diastolic dysfunction.  The right ventricle is normal size. Global right ventricular function is normal.  The 29 mm Patricio III TAVR is well-seated. Leaflet opening is not clearly visualized. There is trace paravalvular regurgitation. There is no valvular regurgitation. The Vmax is 2.1 m/s, the mean gradient is 10 mmHg, the dimensionless index is 0.50, and the acceleration time is 120 ms.  This study was compared with the study from 05/05/2022. The AV gradient is lower. There is no significant change in the biventricular function.    [facility] at [place] on 12/8/22:  1. The patient was monitored from 12/8/2022 to 1/6/2023 with a total monitoring time of 28 days 19 hr 54 min. The baseline rhythm was sinus with a first-degree AV delay and a left bundle-branch block. The heart rate varied from 55 bpm to 93 bpm. The   average heart rate was 67 bpm.   2. There were 35,812 PVCs seen singly, paired, and in bigeminy and trigeminy with a PVC burden of 1.26%. There were 6 ventricular tachycardia events with greater than 3 beats with the longest duration being 8 beats. The maximum VT rate was 183 bpm.   3. There were 15,775 PACs seen singly with a PAC burden of <1%. There were 19 runs of supraventricular tachycardia observed with the longest duration being 39 beats. The maximum SVT rate was 125 bpm.   4. The patient reported 26 symptomatic events of \"dizziness.\" During these events, the rhythm was sinus with a first-degree AV delay and a left bundle-branch block. The heart rate varied from 64 bpm to 92 bpm. At and/or around these times, PVCs were seen    singly and in bigeminy and trigeminy.       Cardiac cath on 11/30/22:  The left main coronary artery is 30% obstructed by multiple " discrete lesions.   The proximal left anterior descending artery is 100% obstructed by a discrete lesion. The distal segment is not visible.   The proximal circumflex artery is 40% obstructed by multiple discrete lesions. The distal segment is normal size.   The ramus intermedius segment is 100% obstructed by a discrete lesion. Fills retrogradely from graft.   The proximal right coronary artery is 50% obstructed by a discrete lesion. The distal segment is normal size.   The distal right coronary artery is 30% obstructed by multiple discrete lesions. The distal segment is normal size.   GRAFT ANGIOGRAPHY SUMMARY   Single body vein bypass graft to the Ramus Intermedius Segment. Graft Appears normal.   Single body left internal mammary graft to the Middle Left Anterior Descending Artery.   GRAFT ANGIOGRAPHY SUMMARY   Single body vein bypass graft to the Ramus Intermedius Segment. Graft Appears normal.   Single body left internal mammary graft to the Middle Left Anterior Descending Artery.      ECHO on 5/5/22:  The visual ejection fraction is 35-40%. Moderate diffuse hypokinesis is present.  Right ventricular function, chamber size, wall motion, and thickness are  normal.  The calculated aortic valve area is 0.94 cm2.The mean gradient across the AV is 22mmHg, the peak velocity is 36 mmHg.  Mild mitral insufficiency is present. Mild tricuspid insufficiency is present.  The inferior vena cava cannot be assessed.   Ascending aorta 4.2 cm.  No pericardial effusion is present.    Stress test on 4/26/22:  There is a large area of a severe   degree of infarction in the anterior, apical, inferior and septal   segment(s) of the left ventricle. The left ventricular ejection fraction   at rest is 37%.  The left ventricular ejection fraction at stress is 38%.       Zio on 3/1/21:  Underlying rhythm was sinus.  Hrt rate ranged from 50 bpm, maximum heart rate of 197 bmp, averaging 72 bmp.  No significant bradycardia, pauses, 2nd  degree Mobitz type II or 3rd degree heart block.  No atrial fibrillation was identified on this study.  x0 triggered events and x0 diary entries.  x18 runs of VT lasting to 11 beats with a maximum heart rate of 197 bmp.    x25 runs of SVT lasting up to 15 beats with a maximum heart rate of 156 bmp.  Rare, less than 1% of PAC's, atrial couplets, atrial triplets, ventricular couplets and ventricular triplets.  Frequent PVC's at 15.9%.  + episodes of ventricular bigeminy lasting up to 18 min's and 9 sec's.  + episodes of ventricular trigeminy lasting up to 30 mins and 5 sec's.    Stress echo at [place] on 5/25/2021:  1. AT REST:   2. Estimated left ventricular ejection fraction 25%   3. Stroke volume index 45 ml/m^2, aortic valve mean systolic gradient 20 mm Hg, aortic valve   area 1.16 cm^2, aortic valve area index 0.62 cm^2/m^2   4. STRESS TEST:   5. Dobutamine was infused from 5 mcg/kg/min to 20.0 mcg/kg/min.   6. PEAK STRESS:   7. Estimated left ventricular ejection fraction range 30% - 35%.   8. Stroke volume index 55 ml/m^2, aortic valve mean systolic gradient 35 mm Hg, aortic valve  area 1.36 cm^2, aortic valve area index 0.73 cm^2/m^2       US aorta on 2/11/21:  No abdominal aortic aneurysm.    US carotids on 2/11/21:  No hemodynamically significant stenoses are identified in either carotid bifurcation.    ECHO on 2/11/21:  No pericardial effusion is present.  Moderately (EF 30-35%) reduced left ventricular function is present.  The right ventricle is normal size.  Global right ventricular function is normal.  Mild left atrial enlargement is present.  Mild mitral insufficiency is present.  Severe aortic valve calcification is present.  The peak aortic velocity is 3.02 m/sec.  The mean gradient across the aortic valve is 18.4 mmHg.  Moderate aortic stenosis is present.  Right ventricular systolic pressure is 44 mmHg above the right atrial pressure.  Mild tricuspid insufficiency is present.  Mild pulmonic  insufficiency is present.  Ascending aorta 4.1 cm.  Sinuses of Valsalva 4.3 cm.    CTA chest on 2/11/21:  2 small nodules are identified in the right lung.  Follow-up CT scan in 6 months time is recommended. The ascending aorta measures 4.2 cm in maximal transverse diameter.    Cardiac cath on 1/15/21:  Right sided filling pressures are normal.  Mild elevated pulmonary hypertension.  Left sided filling pressures are moderately elevated.  Left ventricular filling pressures are moderately elevated.  Normal cardiac output level.  Moderate mitral valve stenosis (mean gradient 8.4 mm Hg, 2.2 cm2)  Moderate aortic valve stenosis (mean gradient 17 mm Hg, 1.2 cm2)  Native three vessel CAD  >>> Moderate 50% proximal RCA stenosis [ungrafted vessel]  >>> Mimimal LMCA stenosis  >>> 100% Ostial LAD stenosis [LIMA-LAD widely patent]  >>> Proximal 50% LCx and 90% RI stenoses [Sequential SVG-RI-OM patent with focal 60% ISR in jump segment of SVG]     Suggest medical treatment at this time.  Neither the aortic valve nor mitral valve require intervention at this time.  The proximal RCA stenosis is unlikely be physiologically significant. The LAD territory is infarcted but the LIMA-LAD remains widely patent.  There is 70% ISR of SVG in jump segment but the OM is receiving brisk DAVID-3 flow via the native coronary (only 50% proximal stenosis).    Cath on 7/17/2007:  1. Severe, single-vessel coronary artery disease. Chronic proximal-to-mild occlusion of the left anterior descending coronary artery with distal right-to-left collaterals.  2. Cypher drug-eluting stenting of the [id number] segment of the left anterior descending.    Stress test on 12/15/20:    The nuclear stress test is abnormal.    There is a large area of a severe degree of infarction in the entire apical and anteroseptal segment(s) of the left ventricle.    Left ventricular function is moderately reduced.    The left ventricular ejection fraction at rest is 44%.   The left ventricular ejection fraction at stress is 35%.    There is no prior study for comparison.    ECHO on 12/9/20:  No pericardial effusion is present.  Left ventricular size is normal.  Anterior wall hypokinesis is present.  Apical wall hypokinesis is present.  Moderately (EF 30-35%) reduced left ventricular function is present.  The right ventricle is normal size.  Global right ventricular function is normal.  Mild mitral annular calcification is present.  Mild mitral insufficiency is present.  Moderate aortic valve calcification is present.  Trace aortic insufficiency is present.  The Aortic valve has significant calcification of valve leaflets with poor  mobility. Our measured values I feel underestimate the amount of stenosis. By  visual estimate would put at least moderate Aortic stenosis.  Trace tricuspid insufficiency is present.  Right ventricular systolic pressure is 6 mmHg above the right atrial pressure.  The peak aortic velocity is 2.57 m/sec.  The mean gradient across the aortic valve is 15.5 mmHg.        ICD-10-CM    1. Congestive heart failure, unspecified HF chronicity, unspecified heart failure type (H)  I50.9 EKG 12-lead complete w/read - (Clinic Performed)     Basic metabolic panel     N terminal pro BNP outpatient     Magnesium      2. RIVAS (dyspnea on exertion)  R06.09 EKG 12-lead complete w/read - (Clinic Performed)     furosemide (LASIX) 40 MG tablet     Basic metabolic panel     N terminal pro BNP outpatient     Magnesium      3. Mixed hyperlipidemia  E78.2       4. Chronic combined systolic and diastolic congestive heart failure (H)  I50.42 furosemide (LASIX) 40 MG tablet     Basic metabolic panel     N terminal pro BNP outpatient     Magnesium      5. Essential hypertension, benign  I10 furosemide (LASIX) 40 MG tablet      6. Ischemic cardiomyopathy  I25.5 furosemide (LASIX) 40 MG tablet      7. Essential hypertension  I10 furosemide (LASIX) 40 MG tablet      8. Mild pulmonary  hypertension (H)  I27.20 furosemide (LASIX) 40 MG tablet      9. Stage 3b chronic kidney disease (H)  N18.32 furosemide (LASIX) 40 MG tablet     Basic metabolic panel     N terminal pro BNP outpatient     Magnesium                Past Medical History:   Diagnosis Date    Acute thromboembolism of deep veins of lower extremity (H)     No Comments Provided    Benign essential hypertension     No Comments Provided    Cancer (H)     skin    Congestive heart failure (H)     COPD (chronic obstructive pulmonary disease) (H)     Coronary atherosclerosis     No Comments Provided    Osteoarthrosis     No Comments Provided    Other and unspecified hyperlipidemia     No Comments Provided    Other specified forms of chronic ischemic heart disease     No Comments Provided    Sarcoidosis     No Comments Provided    Type 2 or unspecified type diabetes mellitus     No Comments Provided       Past Surgical History:   Procedure Laterality Date    ANGIOPLASTY  01/01/2019    stents    CARDIAC SURGERY  01/01/2014    heart bypass    COLONOSCOPY  01/01/2019    CV CORONARY ANGIOGRAM N/A 1/15/2021    Procedure: CV CORONARY ANGIOGRAM;  Surgeon: Charlie Harris MD;  Location:  HEART CARDIAC CATH LAB    CV LEFT HEART CATH N/A 1/15/2021    Procedure: Left Heart Cath;  Surgeon: Charlie Harris MD;  Location:  HEART CARDIAC CATH LAB    CV RIGHT HEART CATH MEASUREMENTS RECORDED N/A 1/15/2021    Procedure: CV RIGHT HEART CATH;  Surgeon: Charlie Harris MD;  Location:  HEART CARDIAC CATH LAB    IR IVC FILTER REMOVAL  5/2/2014    OTHER SURGICAL HISTORY      [id number],BIOPSY LUNG OR [facility]       Allergies   Allergen Reactions    Isosorbide Nitrate Visual Disturbance     LIGHT HEADED,ALMOST PASSED OUT      Zocor [Simvastatin] Itching    Atorvastatin Itching    Isosorbide Visual Disturbance    Rosuvastatin Itching    Ancef [Cefazolin] Rash     Patient received cefazolin IV while in the [facility] on 12/6/22. On  12/12/22 he was seen by his primary for a drug rash on his trunk. Pt thought it was from amoxicillin administered at the [facility] but the [facility] only infused cefazolin per chart records. Amoxil rx given for dental procedures pt will not take due to fear of reaction.        Current Outpatient Medications   Medication Sig Dispense Refill    acetaminophen (TYLENOL) 500 MG tablet Take 1 tablet (500 mg) by mouth every 8 hours as needed for mild pain 60 tablet 0    albuterol (PROAIR HFA/PROVENTIL HFA/VENTOLIN HFA) 108 (90 Base) MCG/ACT inhaler Inhale 1-2 puffs into the lungs every 4 hours as needed for shortness of breath or wheezing 18 g 3    amoxicillin (AMOXIL) 500 MG capsule Take 2 g, 30-60 minutes, prior to dental procedures/cleanings 4 capsule 3    blood glucose (NO BRAND SPECIFIED) lancets standard Use to test blood sugar 1 times daily or as directed. 1 each 3    blood glucose (NO BRAND SPECIFIED) test strip Use to test blood sugar 1 times daily or as directed. 90 strip 3    blood glucose monitoring (NO BRAND SPECIFIED) meter device kit Use to test blood sugar 1 times daily or as directed. 1 kit 0    Calcium Carbonate-Vit D-Min (CALCIUM 600+D PLUS MINERALS) 600-400 MG-UNIT TABS Take 1 tablet by mouth daily      cinnamon 500 MG CAPS Take 500 mg by mouth daily       clopidogrel (PLAVIX) 75 MG tablet Take 1 tablet (75 mg) by mouth daily 90 tablet 3    Coenzyme Q10 (CO Q-10) 200 MG CAPS Take 1 capsule by mouth daily      cyanocobalamin (VITAMIN B-12) 500 MCG SUBL sublingual tablet Place 500 mcg under the tongue daily      empagliflozin (JARDIANCE) 10 MG TABS tablet Take 1 tablet (10 mg) by mouth daily 90 tablet 3    ezetimibe (ZETIA) 10 MG tablet Take 1 tablet (10 mg) by mouth at bedtime (8 PM) 90 tablet 3    furosemide (LASIX) 40 MG tablet Take 1 tablet (40 mg) by mouth every other day alternating with 40 mg every other day. 90 tablet 3    LUTEIN PO Take 1 tablet by mouth daily      metoprolol succinate ER (TOPROL XL) 50 MG  24 hr tablet Take 1 tablet (50 mg) by mouth 2 times daily 180 tablet 1    nitroGLYcerin (NITROSTAT) 0.4 MG sublingual tablet For chest pain place 1 tablet under the tongue every 5 minutes for 3 doses. If symptoms persist 5 minutes after 1st dose call 911. 25 tablet 3    Omega-3 Fatty Acids (FISH OIL) 1200 MG capsule Take 1 capsule by mouth daily      rosuvastatin (CRESTOR) 20 MG tablet Take 1 tablet (20 mg) by mouth twice a week 30 tablet 3    sacubitril-valsartan (ENTRESTO) 24-26 MG per tablet Take 1 tablet by mouth 2 times daily 180 tablet 3    vitamin C (ASCORBIC ACID) 500 MG tablet Take 1,000 mg by mouth daily      Vitamin D3 (CHOLECALCIFEROL) 125 MCG (5000 UT) tablet Take 1 tablet by mouth daily      warfarin ANTICOAGULANT (COUMADIN) 5 MG tablet Take 5mg by oral route on , , and take 2.5mg by oral route all other days 90 tablet 3       Social History     Socioeconomic History    Marital status:      Spouse name: Not on file    Number of children: Not on file    Years of education: Not on file    Highest education level: Not on file   Occupational History    Not on file   Tobacco Use    Smoking status: Former     Current packs/day: 0.00     Average packs/day: 0.5 packs/day for 24.0 years (12.0 ttl pk-yrs)     Types: Cigarettes     Start date: 1954     Quit date: 1978     Years since quittin.4    Smokeless tobacco: Never   Vaping Use    Vaping status: Never Used   Substance and Sexual Activity    Alcohol use: Yes     Alcohol/week: 5.0 standard drinks of alcohol     Types: 3 Cans of beer, 2 Shots of liquor per week    Drug use: Unknown     Types: Other     Comment: Drug use: No    Sexual activity: Not on file   Other Topics Concern    Parent/sibling w/ CABG, MI or angioplasty before 65F 55M? Not Asked   Social History Narrative    Not on file     Social Determinants of Health     Financial Resource Strain: Low Risk  (10/27/2023)    Financial Resource Strain     Within the past 12  months, have you or your family members you live with been unable to get utilities (heat, electricity) when it was really needed?: No   Food Insecurity: Low Risk  (10/27/2023)    Food Insecurity     Within the past 12 months, did you worry that your food would run out before you got money to buy more?: No     Within the past 12 months, did the food you bought just not last and you didn t have money to get more?: No   Transportation Needs: Low Risk  (10/27/2023)    Transportation Needs     Within the past 12 months, has lack of transportation kept you from medical appointments, getting your medicines, non-medical meetings or appointments, work, or from getting things that you need?: No   Physical Activity: Not on file   Stress: Not on file   Social Connections: Not on file   Interpersonal Safety: Low Risk  (10/27/2023)    Interpersonal Safety     Do you feel physically and emotionally safe where you currently live?: Yes     Within the past 12 months, have you been hit, slapped, kicked or otherwise physically hurt by someone?: No     Within the past 12 months, have you been humiliated or emotionally abused in other ways by your partner or ex-partner?: No   Housing Stability: Low Risk  (10/27/2023)    Housing Stability     Do you have housing? : Yes     Are you worried about losing your housing?: No       LAB RESULTS:   Orders Only on 01/21/2021   Component Date Value Ref Range Status    Sodium 01/21/2021 141  133 - 144 mmol/L Final    Potassium 01/21/2021 4.7  3.4 - 5.3 mmol/L Final    Chloride 01/21/2021 107  94 - 109 mmol/L Final    Carbon Dioxide 01/21/2021 29  20 - 32 mmol/L Final    Anion Gap 01/21/2021 5  3 - 14 mmol/L Final    Glucose 01/21/2021 125* 70 - 99 mg/dL Final    Urea Nitrogen 01/21/2021 26  7 - 30 mg/dL Final    Creatinine 01/21/2021 1.40* 0.66 - 1.25 mg/dL Final    GFR Estimate 01/21/2021 44* >60 mL/min/[1.73_m2] Final    GFR Estimate If Black 01/21/2021 51* >60 mL/min/[1.73_m2] Final    Calcium  01/21/2021 9.9  8.5 - 10.1 mg/dL Final    WBC 01/21/2021 6.7  4.0 - 11.0 10e9/L Final    RBC Count 01/21/2021 3.63* 4.4 - 5.9 10e12/L Final    Hemoglobin 01/21/2021 11.3* 13.3 - 17.7 g/dL Final    Hematocrit 01/21/2021 34.9* 40.0 - 53.0 % Final    MCV 01/21/2021 96  78 - 100 fl Final    MCH 01/21/2021 31.1  26.5 - 33.0 pg Final    MCHC 01/21/2021 32.4  31.5 - 36.5 g/dL Final    RDW 01/21/2021 12.2  10.0 - 15.0 % Final    Platelet Count 01/21/2021 163  150 - 450 10e9/L Final    INR Point of Care 01/21/2021 2.9* 0.86 - 1.14 Final    Capillary Blood Collection 01/21/2021 Capillary collection performed   Final   Office Visit on 01/12/2021   Component Date Value Ref Range Status    WBC 01/12/2021 6.4  4.0 - 11.0 10e9/L Final    RBC Count 01/12/2021 3.72* 4.4 - 5.9 10e12/L Final    Hemoglobin 01/12/2021 11.7* 13.3 - 17.7 g/dL Final    Hematocrit 01/12/2021 35.3* 40.0 - 53.0 % Final    MCV 01/12/2021 95  78 - 100 fl Final    MCH 01/12/2021 31.5  26.5 - 33.0 pg Final    MCHC 01/12/2021 33.1  31.5 - 36.5 g/dL Final    RDW 01/12/2021 12.2  10.0 - 15.0 % Final    Platelet Count 01/12/2021 162  150 - 450 10e9/L Final    % Neutrophils 01/12/2021 62.1  % Final    % Lymphocytes 01/12/2021 19.7  % Final    % Monocytes 01/12/2021 13.5  % Final    % Eosinophils 01/12/2021 4.2  % Final    % Basophils 01/12/2021 0.5  % Final    Absolute Neutrophil 01/12/2021 4.0  1.6 - 8.3 10e9/L Final    Absolute Lymphocytes 01/12/2021 1.3  0.8 - 5.3 10e9/L Final    Absolute Monocytes 01/12/2021 0.9  0.0 - 1.3 10e9/L Final    Absolute Eosinophils 01/12/2021 0.3  0.0 - 0.7 10e9/L Final    Absolute Basophils 01/12/2021 0.0  0.0 - 0.2 10e9/L Final    Diff Method 01/12/2021 Automated Method   Final    Sodium 01/12/2021 142  133 - 144 mmol/L Final    Potassium 01/12/2021 4.4  3.4 - 5.3 mmol/L Final    Chloride 01/12/2021 109  94 - 109 mmol/L Final    Carbon Dioxide 01/12/2021 29  20 - 32 mmol/L Final    Anion Gap 01/12/2021 4  3 - 14 mmol/L Final     Glucose 01/12/2021 100* 70 - 99 mg/dL Final    Urea Nitrogen 01/12/2021 28  7 - 30 mg/dL Final    Creatinine 01/12/2021 1.14  0.66 - 1.25 mg/dL Final    GFR Estimate 01/12/2021 57* >60 mL/min/[1.73_m2] Final    GFR Estimate If Black 01/12/2021 66  >60 mL/min/[1.73_m2] Final    Calcium 01/12/2021 9.6  8.5 - 10.1 mg/dL Final    INR Point of Care 01/12/2021 3.8* 0.86 - 1.14 Final   Office Visit on 01/11/2021   Component Date Value Ref Range Status    COVID-19 Virus PCR to [facility] - So* 01/11/2021 Nasopharyngeal   Final    COVID-19 Virus PCR to [facility] - Re* 01/11/2021 Not Detected   Final   Orders Only on 12/01/2020   Component Date Value Ref Range Status    INR 12/01/2020 2.65* 0.86 - 1.14 Final   Orders Only on 12/01/2020   Component Date Value Ref Range Status    WBC 12/01/2020 5.5  4.0 - 11.0 10e9/L Final    RBC Count 12/01/2020 3.98* 4.4 - 5.9 10e12/L Final    Hemoglobin 12/01/2020 12.5* 13.3 - 17.7 g/dL Final    Hematocrit 12/01/2020 38.4* 40.0 - 53.0 % Final    MCV 12/01/2020 97  78 - 100 fl Final    MCH 12/01/2020 31.4  26.5 - 33.0 pg Final    MCHC 12/01/2020 32.6  31.5 - 36.5 g/dL Final    RDW 12/01/2020 12.4  10.0 - 15.0 % Final    Platelet Count 12/01/2020 141* 150 - 450 10e9/L Final    Diff Method 12/01/2020 Automated Method   Final    % Neutrophils 12/01/2020 63.5  % Final    % Lymphocytes 12/01/2020 21.9  % Final    % Monocytes 12/01/2020 12.2  % Final    % Eosinophils 12/01/2020 1.8  % Final    % Basophils 12/01/2020 0.4  % Final    % Immature Granulocytes 12/01/2020 0.2  % Final    Nucleated RBCs 12/01/2020 0  0 /100 Final    Absolute Neutrophil 12/01/2020 3.5  1.6 - 8.3 10e9/L Final    Absolute Lymphocytes 12/01/2020 1.2  0.8 - 5.3 10e9/L Final    Absolute Monocytes 12/01/2020 0.7  0.0 - 1.3 10e9/L Final    Absolute Eosinophils 12/01/2020 0.1  0.0 - 0.7 10e9/L Final    Absolute Basophils 12/01/2020 0.0  0.0 - 0.2 10e9/L Final    Abs Immature Granulocytes 12/01/2020 0.0  0 - 0.4 10e9/L Final    Absolute  "Nucleated RBC 12/01/2020 0.0   Final        Review of systems: Negative except that which was noted in the HPI.    Physical examination:  /66 (BP Location: Left arm, Patient Position: Sitting, Cuff Size: Adult Regular)   Pulse 74   Resp 16   Ht 1.778 m (5' 10\")   Wt 73.4 kg (161 lb 14.4 oz)   SpO2 97%   BMI 23.23 kg/m      GENERAL APPEARANCE: healthy, alert and no distress  CHEST: lungs clear to auscultation.  CARDIOVASCULAR: Irregular rate irregular rhythm, normal S1 with physiologic split S2, no S3 or S4 but with systolic crescendo decrescendo murmur, no click or rub  EXTREMITIES: no clubbing, cyanosis with mild to moderate peripheral edema      Total time spent on day of visit, including review of tests, obtaining/reviewing separately obtained history, ordering medications/tests/procedures, communicating with PCP/consultants, and documenting in electronic medical record: 52 minutes.           Thank you for allowing me to participate in the care of your patient. Please do not hesitate to contact me if you have any questions.     Justyn Rae, DO              "

## 2024-05-22 ENCOUNTER — OFFICE VISIT (OUTPATIENT)
Dept: CARDIOLOGY | Facility: OTHER | Age: 89
End: 2024-05-22
Attending: INTERNAL MEDICINE
Payer: MEDICARE

## 2024-05-22 VITALS
OXYGEN SATURATION: 97 % | BODY MASS INDEX: 23.18 KG/M2 | DIASTOLIC BLOOD PRESSURE: 66 MMHG | RESPIRATION RATE: 16 BRPM | HEART RATE: 74 BPM | WEIGHT: 161.9 LBS | SYSTOLIC BLOOD PRESSURE: 106 MMHG | HEIGHT: 70 IN

## 2024-05-22 DIAGNOSIS — R06.09 DOE (DYSPNEA ON EXERTION): ICD-10-CM

## 2024-05-22 DIAGNOSIS — I27.20 MILD PULMONARY HYPERTENSION (H): ICD-10-CM

## 2024-05-22 DIAGNOSIS — I50.9 CONGESTIVE HEART FAILURE, UNSPECIFIED HF CHRONICITY, UNSPECIFIED HEART FAILURE TYPE (H): Primary | ICD-10-CM

## 2024-05-22 DIAGNOSIS — I10 ESSENTIAL HYPERTENSION: ICD-10-CM

## 2024-05-22 DIAGNOSIS — I50.42 CHRONIC COMBINED SYSTOLIC AND DIASTOLIC CONGESTIVE HEART FAILURE (H): ICD-10-CM

## 2024-05-22 DIAGNOSIS — I25.5 ISCHEMIC CARDIOMYOPATHY: ICD-10-CM

## 2024-05-22 DIAGNOSIS — I10 ESSENTIAL HYPERTENSION, BENIGN: ICD-10-CM

## 2024-05-22 DIAGNOSIS — N18.32 STAGE 3B CHRONIC KIDNEY DISEASE (H): ICD-10-CM

## 2024-05-22 DIAGNOSIS — E78.2 MIXED HYPERLIPIDEMIA: ICD-10-CM

## 2024-05-22 LAB
ATRIAL RATE - MUSE: NORMAL BPM
DIASTOLIC BLOOD PRESSURE - MUSE: NORMAL MMHG
INTERPRETATION ECG - MUSE: NORMAL
P AXIS - MUSE: NORMAL DEGREES
PR INTERVAL - MUSE: NORMAL MS
QRS DURATION - MUSE: 156 MS
QT - MUSE: 402 MS
QTC - MUSE: 442 MS
R AXIS - MUSE: -38 DEGREES
SYSTOLIC BLOOD PRESSURE - MUSE: NORMAL MMHG
T AXIS - MUSE: 155 DEGREES
VENTRICULAR RATE- MUSE: 73 BPM

## 2024-05-22 PROCEDURE — 93010 ELECTROCARDIOGRAM REPORT: CPT | Performed by: INTERNAL MEDICINE

## 2024-05-22 PROCEDURE — G0463 HOSPITAL OUTPT CLINIC VISIT: HCPCS | Mod: 25

## 2024-05-22 PROCEDURE — 93005 ELECTROCARDIOGRAM TRACING: CPT | Performed by: INTERNAL MEDICINE

## 2024-05-22 PROCEDURE — G0463 HOSPITAL OUTPT CLINIC VISIT: HCPCS

## 2024-05-22 PROCEDURE — 99215 OFFICE O/P EST HI 40 MIN: CPT | Performed by: INTERNAL MEDICINE

## 2024-05-22 RX ORDER — FUROSEMIDE 40 MG
40 TABLET ORAL EVERY OTHER DAY
Qty: 90 TABLET | Refills: 3 | Status: SHIPPED | OUTPATIENT
Start: 2024-05-22 | End: 2024-05-24

## 2024-05-22 ASSESSMENT — PAIN SCALES - GENERAL: PAINLEVEL: MILD PAIN (3)

## 2024-05-22 NOTE — PATIENT INSTRUCTIONS
Thank you for allowing Dr MICAH Rae and our  team to participate in your care. Please call our office at [phone] with scheduling questions or if you need to cancel or change your appointment. With any other questions or concerns you may call cardiology nurse at  [phone].       If you experience chest pain, chest pressure, chest tightness, shortness of breath, fainting, lightheadedness, nausea, vomiting, or other concerning symptoms, please report to the Emergency Department or call 911. These symptoms may be emergent, and best treated in the Emergency Department.

## 2024-05-22 NOTE — NURSING NOTE
"Chief Complaint   Patient presents with    Follow Up       Initial /66 (BP Location: Left arm, Patient Position: Sitting, Cuff Size: Adult Regular)   Pulse 74   Resp 16   Ht 1.778 m (5' 10\")   Wt 73.4 kg (161 lb 14.4 oz)   SpO2 97%   BMI 23.23 kg/m   Estimated body mass index is 23.23 kg/m  as calculated from the following:    Height as of this encounter: 1.778 m (5' 10\").    Weight as of this encounter: 73.4 kg (161 lb 14.4 oz).  Medication Reconciliation: complete    Eva Ray, RN    "

## 2024-05-24 DIAGNOSIS — N18.32 STAGE 3B CHRONIC KIDNEY DISEASE (H): ICD-10-CM

## 2024-05-24 DIAGNOSIS — I25.5 ISCHEMIC CARDIOMYOPATHY: ICD-10-CM

## 2024-05-24 DIAGNOSIS — I27.20 MILD PULMONARY HYPERTENSION (H): ICD-10-CM

## 2024-05-24 DIAGNOSIS — I10 ESSENTIAL HYPERTENSION: ICD-10-CM

## 2024-05-24 DIAGNOSIS — R06.09 DOE (DYSPNEA ON EXERTION): ICD-10-CM

## 2024-05-24 DIAGNOSIS — I50.42 CHRONIC COMBINED SYSTOLIC AND DIASTOLIC CONGESTIVE HEART FAILURE (H): ICD-10-CM

## 2024-05-24 DIAGNOSIS — I10 ESSENTIAL HYPERTENSION, BENIGN: ICD-10-CM

## 2024-05-24 RX ORDER — FUROSEMIDE 40 MG
40 TABLET ORAL EVERY OTHER DAY
Qty: 90 TABLET | Refills: 3 | Status: SHIPPED | OUTPATIENT
Start: 2024-05-24 | End: 2024-08-26

## 2024-05-31 ENCOUNTER — ANTICOAGULATION THERAPY VISIT (OUTPATIENT)
Dept: ANTICOAGULATION | Facility: OTHER | Age: 89
End: 2024-05-31
Payer: MEDICARE

## 2024-05-31 ENCOUNTER — LAB (OUTPATIENT)
Dept: LAB | Facility: OTHER | Age: 89
End: 2024-05-31
Payer: MEDICARE

## 2024-05-31 DIAGNOSIS — Z86.718 HISTORY OF DVT (DEEP VEIN THROMBOSIS): Primary | ICD-10-CM

## 2024-05-31 DIAGNOSIS — Z95.3 S/P TAVR (TRANSCATHETER AORTIC VALVE REPLACEMENT), BIOPROSTHETIC: ICD-10-CM

## 2024-05-31 DIAGNOSIS — Z86.718 PERSONAL HISTORY OF DVT (DEEP VEIN THROMBOSIS): ICD-10-CM

## 2024-05-31 DIAGNOSIS — I82.5Y3 CHRONIC DEEP VEIN THROMBOSIS (DVT) OF PROXIMAL VEIN OF BOTH LOWER EXTREMITIES (H): ICD-10-CM

## 2024-05-31 LAB — INR BLD: 2.9 (ref 0.9–1.1)

## 2024-05-31 PROCEDURE — 36416 COLLJ CAPILLARY BLOOD SPEC: CPT | Mod: ZL

## 2024-05-31 PROCEDURE — 85610 PROTHROMBIN TIME: CPT | Mod: ZL

## 2024-05-31 NOTE — PROGRESS NOTES
ANTICOAGULATION MANAGEMENT     Sidney Barriga 89 year old male is on warfarin with therapeutic INR result. (Goal INR 2.0-3.0)    Recent labs: (last 7 days)     05/31/24  1008   INR 2.9*       ASSESSMENT     Source(s): Chart Review  Previous INR was Therapeutic last 2(+) visits  Medication, diet, health changes since last INR chart reviewed; none identified         PLAN     Recommended plan for no diet, medication or health factor changes affecting INR     Dosing Instructions: Continue your current warfarin dose with next INR in 4 weeks       Summary  As of 5/31/2024      Full warfarin instructions:  5 mg every Mon, Fri; 2.5 mg all other days   Next INR check:  6/28/2024               Detailed voice message left for Sidney with dosing instructions and follow up date.     Contact [phone] to schedule and with any changes, questions or concerns.     Education provided:   Please call back if any changes to your diet, medications or how you've been taking warfarin    Plan made per [facility] anticoagulation protocol    Lizbeth Ely, RN  Anticoagulation Clinic  5/31/2024    _______________________________________________________________________     Anticoagulation Episode Summary       Current INR goal:  2.0-3.0   TTR:  86.8% (1 y)   Target end date:  Indefinite   Send INR reminders to:  [facility]    Indications    History of DVT (deep vein thrombosis)-multiple [Z86.718]  Chronic deep vein thrombosis (DVT) of proximal vein of both lower extremities (H) [I82.5Y3]  Personal history of DVT (deep vein thrombosis) [Z86.718]             Comments:  We will continue on Coumadin with goal INR of 2.0-2.7 per Dr Rae             Anticoagulation Care Providers       Provider Role Specialty Phone number    Zach Arredondo MD Referring Family Medicine [phone]

## 2024-06-05 ENCOUNTER — LAB (OUTPATIENT)
Dept: LAB | Facility: OTHER | Age: 89
End: 2024-06-05
Payer: MEDICARE

## 2024-06-05 DIAGNOSIS — I50.42 CHRONIC COMBINED SYSTOLIC AND DIASTOLIC CONGESTIVE HEART FAILURE (H): ICD-10-CM

## 2024-06-05 DIAGNOSIS — R06.09 DOE (DYSPNEA ON EXERTION): ICD-10-CM

## 2024-06-05 DIAGNOSIS — I50.9 CONGESTIVE HEART FAILURE, UNSPECIFIED HF CHRONICITY, UNSPECIFIED HEART FAILURE TYPE (H): ICD-10-CM

## 2024-06-05 DIAGNOSIS — N18.32 STAGE 3B CHRONIC KIDNEY DISEASE (H): ICD-10-CM

## 2024-06-05 LAB
ANION GAP SERPL CALCULATED.3IONS-SCNC: 13 MMOL/L (ref 7–15)
BUN SERPL-MCNC: 39.2 MG/DL (ref 8–23)
CALCIUM SERPL-MCNC: 9.4 MG/DL (ref 8.2–9.6)
CHLORIDE SERPL-SCNC: 101 MMOL/L (ref 98–107)
CREAT SERPL-MCNC: 1.72 MG/DL (ref 0.67–1.17)
DEPRECATED HCO3 PLAS-SCNC: 27 MMOL/L (ref 22–29)
EGFRCR SERPLBLD CKD-EPI 2021: 37 ML/MIN/1.73M2
GLUCOSE SERPL-MCNC: 165 MG/DL (ref 70–99)
MAGNESIUM SERPL-MCNC: 2.1 MG/DL (ref 1.7–2.3)
NT-PROBNP SERPL-MCNC: 6437 PG/ML (ref 0–1800)
POTASSIUM SERPL-SCNC: 4.1 MMOL/L (ref 3.4–5.3)
SODIUM SERPL-SCNC: 141 MMOL/L (ref 135–145)

## 2024-06-05 PROCEDURE — 36415 COLL VENOUS BLD VENIPUNCTURE: CPT | Mod: ZL

## 2024-06-05 PROCEDURE — 83735 ASSAY OF MAGNESIUM: CPT | Mod: ZL

## 2024-06-05 PROCEDURE — 80048 BASIC METABOLIC PNL TOTAL CA: CPT | Mod: ZL

## 2024-06-05 PROCEDURE — 83880 ASSAY OF NATRIURETIC PEPTIDE: CPT | Mod: ZL

## 2024-06-05 NOTE — NURSING NOTE
"Chief Complaint   Patient presents with     Diabetes       Initial /60   Pulse 68   Wt 74.4 kg (164 lb)   SpO2 99%   BMI 23.53 kg/m   Estimated body mass index is 23.53 kg/m  as calculated from the following:    Height as of 7/20/21: 1.778 m (5' 10\").    Weight as of this encounter: 74.4 kg (164 lb).  Medication Reconciliation: complete  Griselda Peck LPN  "
35

## 2024-06-28 ENCOUNTER — LAB (OUTPATIENT)
Dept: LAB | Facility: OTHER | Age: 89
End: 2024-06-28
Payer: MEDICARE

## 2024-06-28 ENCOUNTER — ANTICOAGULATION THERAPY VISIT (OUTPATIENT)
Dept: ANTICOAGULATION | Facility: OTHER | Age: 89
End: 2024-06-28
Attending: FAMILY MEDICINE
Payer: MEDICARE

## 2024-06-28 DIAGNOSIS — Z86.718 HISTORY OF DVT (DEEP VEIN THROMBOSIS): Primary | ICD-10-CM

## 2024-06-28 DIAGNOSIS — I82.5Y3 CHRONIC DEEP VEIN THROMBOSIS (DVT) OF PROXIMAL VEIN OF BOTH LOWER EXTREMITIES (H): ICD-10-CM

## 2024-06-28 DIAGNOSIS — Z86.718 PERSONAL HISTORY OF DVT (DEEP VEIN THROMBOSIS): ICD-10-CM

## 2024-06-28 DIAGNOSIS — Z95.3 S/P TAVR (TRANSCATHETER AORTIC VALVE REPLACEMENT), BIOPROSTHETIC: ICD-10-CM

## 2024-06-28 LAB — INR BLD: 2.6 (ref 0.9–1.1)

## 2024-06-28 PROCEDURE — 36416 COLLJ CAPILLARY BLOOD SPEC: CPT | Mod: ZL

## 2024-06-28 PROCEDURE — 85610 PROTHROMBIN TIME: CPT | Mod: ZL

## 2024-06-28 NOTE — PROGRESS NOTES
ANTICOAGULATION MANAGEMENT     Sidney Barriga 89 year old male is on warfarin with therapeutic INR result. (Goal INR 2.0-3.0)    Recent labs: (last 7 days)     06/28/24  0957   INR 2.6*       ASSESSMENT     Source(s): Chart Review and Patient/Caregiver Call     Warfarin doses taken: Warfarin taken as instructed  Diet: No new diet changes identified  Medication/supplement changes: None noted  New illness, injury, or hospitalization: No  Signs or symptoms of bleeding or clotting: No  Previous result: Therapeutic last 2(+) visits  Additional findings: None       PLAN     Recommended plan for no diet, medication or health factor changes affecting INR     Dosing Instructions: Continue your current warfarin dose with next INR in 4 weeks       Summary  As of 6/28/2024      Full warfarin instructions:  5 mg every Mon, Fri; 2.5 mg all other days   Next INR check:  7/26/2024               Telephone call with Sidney who verbalizes understanding and agrees to plan    Lab visit scheduled    Education provided: None required    Plan made per [facility] anticoagulation protocol    Lizbeth Ely RN  Anticoagulation Clinic  6/28/2024    _______________________________________________________________________     Anticoagulation Episode Summary       Current INR goal:  2.0-3.0   TTR:  86.8% (1 y)   Target end date:  Indefinite   Send INR reminders to:  [facility]    Indications    History of DVT (deep vein thrombosis)-multiple [Z86.718]  Chronic deep vein thrombosis (DVT) of proximal vein of both lower extremities (H) [I82.5Y3]  Personal history of DVT (deep vein thrombosis) [Z86.718]             Comments:  We will continue on Coumadin with goal INR of 2.0-2.7 per Dr Rae             Anticoagulation Care Providers       Provider Role Specialty Phone number    Zach Arredondo MD Referring Family Medicine [phone]

## 2024-07-26 ENCOUNTER — LAB (OUTPATIENT)
Dept: LAB | Facility: OTHER | Age: 89
End: 2024-07-26
Payer: MEDICARE

## 2024-07-26 ENCOUNTER — ANTICOAGULATION THERAPY VISIT (OUTPATIENT)
Dept: ANTICOAGULATION | Facility: OTHER | Age: 89
End: 2024-07-26
Attending: FAMILY MEDICINE
Payer: MEDICARE

## 2024-07-26 DIAGNOSIS — Z86.718 PERSONAL HISTORY OF DVT (DEEP VEIN THROMBOSIS): ICD-10-CM

## 2024-07-26 DIAGNOSIS — Z95.3 S/P TAVR (TRANSCATHETER AORTIC VALVE REPLACEMENT), BIOPROSTHETIC: ICD-10-CM

## 2024-07-26 DIAGNOSIS — Z86.718 HISTORY OF DVT (DEEP VEIN THROMBOSIS): Primary | ICD-10-CM

## 2024-07-26 DIAGNOSIS — I82.5Y3 CHRONIC DEEP VEIN THROMBOSIS (DVT) OF PROXIMAL VEIN OF BOTH LOWER EXTREMITIES (H): ICD-10-CM

## 2024-07-26 LAB — INR BLD: 1.9 (ref 0.9–1.1)

## 2024-07-26 PROCEDURE — 85610 PROTHROMBIN TIME: CPT | Mod: ZL

## 2024-07-26 PROCEDURE — 36416 COLLJ CAPILLARY BLOOD SPEC: CPT | Mod: ZL

## 2024-07-26 NOTE — PROGRESS NOTES
ANTICOAGULATION MANAGEMENT     Sidney Barriga 89 year old male is on warfarin with subtherapeutic INR result. (Goal INR 2.0-3.0)    Recent labs: (last 7 days)     07/26/24  0958   INR 1.9*       ASSESSMENT     Source(s): Chart Review and Patient/Caregiver Call     Warfarin doses taken: Warfarin taken as instructed  Diet: No new diet changes identified  Medication/supplement changes: started on finasteride the middle of June  New illness, injury, or hospitalization: No  Signs or symptoms of bleeding or clotting: No  Previous result: Therapeutic last 2(+) visits  Additional findings: None       PLAN     Recommended plan for ongoing change(s) affecting INR     Dosing Instructions: Continue your current warfarin dose with next INR in 4 weeks       Summary  As of 7/26/2024      Full warfarin instructions:  5 mg every Mon, Fri; 2.5 mg all other days   Next INR check:  8/23/2024               Telephone call with Sidney who verbalizes understanding and agrees to plan    Lab visit scheduled    Education provided: None required    Plan made per [facility] anticoagulation protocol    Lizbeth Ely, RN  Anticoagulation Clinic  7/26/2024    _______________________________________________________________________     Anticoagulation Episode Summary       Current INR goal:  2.0-3.0   TTR:  92.0% (1 y)   Target end date:  Indefinite   Send INR reminders to:  [facility]    Indications    History of DVT (deep vein thrombosis)-multiple [Z86.718]  Chronic deep vein thrombosis (DVT) of proximal vein of both lower extremities (H) [I82.5Y3]  Personal history of DVT (deep vein thrombosis) [Z86.718]             Comments:  We will continue on Coumadin with goal INR of 2.0-2.7 per Dr Rae             Anticoagulation Care Providers       Provider Role Specialty Phone number    Zach Arredondo MD Referring Family Medicine [phone]

## 2024-08-23 ENCOUNTER — ANTICOAGULATION THERAPY VISIT (OUTPATIENT)
Dept: ANTICOAGULATION | Facility: OTHER | Age: 89
End: 2024-08-23
Attending: FAMILY MEDICINE
Payer: MEDICARE

## 2024-08-23 ENCOUNTER — LAB (OUTPATIENT)
Dept: LAB | Facility: OTHER | Age: 89
End: 2024-08-23
Payer: MEDICARE

## 2024-08-23 DIAGNOSIS — I82.5Y3 CHRONIC DEEP VEIN THROMBOSIS (DVT) OF PROXIMAL VEIN OF BOTH LOWER EXTREMITIES (H): ICD-10-CM

## 2024-08-23 DIAGNOSIS — Z86.718 PERSONAL HISTORY OF DVT (DEEP VEIN THROMBOSIS): ICD-10-CM

## 2024-08-23 DIAGNOSIS — Z95.3 S/P TAVR (TRANSCATHETER AORTIC VALVE REPLACEMENT), BIOPROSTHETIC: ICD-10-CM

## 2024-08-23 DIAGNOSIS — Z86.718 HISTORY OF DVT (DEEP VEIN THROMBOSIS): Primary | ICD-10-CM

## 2024-08-23 LAB — INR BLD: 1.8 (ref 0.9–1.1)

## 2024-08-23 PROCEDURE — 85610 PROTHROMBIN TIME: CPT | Mod: ZL

## 2024-08-23 PROCEDURE — 36416 COLLJ CAPILLARY BLOOD SPEC: CPT | Mod: ZL

## 2024-08-23 NOTE — PROGRESS NOTES
ANTICOAGULATION MANAGEMENT     Sidney Barriga 89 year old male is on warfarin with subtherapeutic INR result. (Goal INR 2.0-3.0)    Recent labs: (last 7 days)     08/23/24  1006   INR 1.8*       ASSESSMENT     Source(s): Chart Review and Patient/Caregiver Call     Warfarin doses taken: Warfarin taken as instructed  Diet: No new diet changes identified  Medication/supplement changes: None noted  New illness, injury, or hospitalization: No  Signs or symptoms of bleeding or clotting: No  Previous result: Subtherapeutic  Additional findings: None       PLAN     Recommended plan for no diet, medication or health factor changes affecting INR     Dosing Instructions: booster dose then continue your current warfarin dose with next INR in 4 weeks       Summary  As of 8/23/2024      Full warfarin instructions:  8/23: 7.5 mg; Otherwise 5 mg every Mon, Fri; 2.5 mg all other days   Next INR check:  9/20/2024               Telephone call with Sidney who verbalizes understanding and agrees to plan    Lab visit scheduled    Education provided: None required    Plan made per [facility]agulation protocol    Lizbeth Ely RN  Anticoagulation Clinic  8/23/2024    _______________________________________________________________________     Anticoagulation Episode Summary       Current INR goal:  2.0-3.0   TTR:  90.2% (1 y)   Target end date:  Indefinite   Send INR reminders to:  [facility]    Indications    History of DVT (deep vein thrombosis)-multiple [Z86.718]  Chronic deep vein thrombosis (DVT) of proximal vein of both lower extremities (H) [I82.5Y3]  Personal history of DVT (deep vein thrombosis) [Z86.718]             Comments:  We will continue on Coumadin with goal INR of 2.0-2.7 per Dr Rae             Anticoagulation Care Providers       Provider Role Specialty Phone number    Zach Arredondo MD Referring Family Medicine [phone]

## 2024-08-26 ENCOUNTER — OFFICE VISIT (OUTPATIENT)
Dept: CARDIOLOGY | Facility: OTHER | Age: 89
End: 2024-08-26
Attending: INTERNAL MEDICINE
Payer: MEDICARE

## 2024-08-26 VITALS
OXYGEN SATURATION: 99 % | DIASTOLIC BLOOD PRESSURE: 56 MMHG | RESPIRATION RATE: 20 BRPM | WEIGHT: 160 LBS | BODY MASS INDEX: 22.9 KG/M2 | HEART RATE: 61 BPM | HEIGHT: 70 IN | SYSTOLIC BLOOD PRESSURE: 96 MMHG

## 2024-08-26 DIAGNOSIS — I10 ESSENTIAL HYPERTENSION, BENIGN: ICD-10-CM

## 2024-08-26 DIAGNOSIS — J44.9 CHRONIC OBSTRUCTIVE PULMONARY DISEASE, UNSPECIFIED COPD TYPE (H): ICD-10-CM

## 2024-08-26 DIAGNOSIS — N18.32 STAGE 3B CHRONIC KIDNEY DISEASE (H): ICD-10-CM

## 2024-08-26 DIAGNOSIS — I25.5 ISCHEMIC CARDIOMYOPATHY: ICD-10-CM

## 2024-08-26 DIAGNOSIS — R06.09 DOE (DYSPNEA ON EXERTION): Primary | ICD-10-CM

## 2024-08-26 DIAGNOSIS — I50.9 CONGESTIVE HEART FAILURE, UNSPECIFIED HF CHRONICITY, UNSPECIFIED HEART FAILURE TYPE (H): ICD-10-CM

## 2024-08-26 DIAGNOSIS — N17.9 ACUTE KIDNEY INJURY SUPERIMPOSED ON CKD (H): ICD-10-CM

## 2024-08-26 DIAGNOSIS — I27.20 MILD PULMONARY HYPERTENSION (H): ICD-10-CM

## 2024-08-26 DIAGNOSIS — I50.42 CHRONIC COMBINED SYSTOLIC AND DIASTOLIC CONGESTIVE HEART FAILURE (H): ICD-10-CM

## 2024-08-26 DIAGNOSIS — I10 ESSENTIAL HYPERTENSION: ICD-10-CM

## 2024-08-26 DIAGNOSIS — N18.9 ACUTE KIDNEY INJURY SUPERIMPOSED ON CKD (H): ICD-10-CM

## 2024-08-26 PROCEDURE — 99214 OFFICE O/P EST MOD 30 MIN: CPT | Performed by: INTERNAL MEDICINE

## 2024-08-26 PROCEDURE — G0463 HOSPITAL OUTPT CLINIC VISIT: HCPCS

## 2024-08-26 RX ORDER — FUROSEMIDE 20 MG
60 TABLET ORAL EVERY OTHER DAY
Qty: 270 TABLET | Refills: 3 | Status: SHIPPED
Start: 2024-08-26 | End: 2024-10-07

## 2024-08-26 ASSESSMENT — PAIN SCALES - GENERAL: PAINLEVEL: NO PAIN (0)

## 2024-08-26 NOTE — PROGRESS NOTES
[facility] HEART CARE   CARDIOLOGY PROGRESS NOTE     Chief Complaint   Patient presents with    Follow Up          Diagnosis:  1.  RIVAS 2/2 CHF, pulm htn, mild COPD, severe diffusion defect on 11/18/20, and DVT/PE???  2.  CABG x3, 4/29/2014.  -LIMA to LAD, SVG to OM, and 2nd OM.  3.  CKD-3.  4.  CHF-systolic, ischemic.    -15-20% on 4/3/24.   -27% on 5/8/2023, [place].  -30-35% on 4/5/22 and 4/5/2023.   -35-40% on 5/5/22.   -30-35% on 12/9/20.   -Diastolic grade 1 on 11/1/2007. Unchanged on 2/11/21.  5.  TAVR on 12/6/22, [place].   -29 mm Patricio S3.  6.  MV and AV-moderate stenosis on cardiac cath on 1/15/21.   7.  H/O DVT-x5.   -H/O IVC filter, removal in 2014.  8.  Hypomagnesia.   -2.0 on 3/27/2024.  9.  Hypertension-controlled.  10.  Hypertriglyceridemia-controlled.  11.  Tobacco abuse.    -Quit in 1978.  12.  Cardiac cath x5.    -7/17/2007, 4/20//14, 2019, 1/15/21, and 11/30/22.  13.  Coronary stent placement on 7/17/2007.  -x2 stents to the LAD and in 2019 in [place] to unknown vessel.  14.  H/O sarcoidosis in 1982.  15.  DM-2-controlled.   16.  COPD.  -Mild on 11/18/2020.  17.  WMA on 12/9/20.  18.  Right lung nodule x2 on 2/12/2021.  19.  Concern for TAAA at 4.3 cm on 5/15/2021.  20.  Concern for AAA r/o on ultrasound from 2/12/2021.  21.  Mild pulmonary hypertension on his cardiac cath at the [facility] on 1/15/2021.  22.  A-fib, new onset on 3/27/2024.   -Metoprolol and Coumadin.   -Stop Coreg 12.5 mg.  Start metoprolol 50 mg XL twice daily, 3/27/2024      Assessment/Plan:     1.  CHF: Here for follow-up.  Patient has a history of a severely reduced EF.  Most recent echocardiogram was on 4/3/2024 at 15 to 20%.  Currently on Jardiance 10 mg daily, Lasix 40 mg every other day, metoprolol 50 mg XL twice a day and Entresto 24/26 mg twice a day.  Blood pressure today is 96/56 mmHg with a heart rate of 61 bpm.  He is having some swelling.  Will increase Lasix to 60 mg every other day.  Encouraged salt  restriction, fluid striction, and daily weights.  Presently, he is doing rather well.  I would like him to see advanced heart failure as well.  I am feeling limited with increasing his medication because of his low blood pressure.  Unfortunately, his EF has declined significantly compared to his echocardiogram on 5/8/2023 through [facility].  Previously, his ejection fraction was noted to be 27%.  It has declined to 15-20%.  He had mildly elevated pulmonary pressures at 50 mmHg.  Thankfully, he has not had significant peripheral edema or swelling.  He is feeling less short of breath on Entresto and Jardiance .  Because of his low blood pressures, I am hesitant to start him on spironolactone.    2.  History of DVT: Currently on Coumadin.  3.  A-fib: Newly diagnosed on his visit on 3/27/2024.  His EKG from 5/22/2024 showed A-fib.  Some mild dyspnea on exertion but has no other symptoms.  Heart rate today is controlled at 61 bpm.  No changes.  Continue warfarin and metoprolol.  4.  CAD: No concerns.  Continue medical management.  Denies anginal symptoms.  5.  TAVR: Still has his teeth.  Encouraged him to take amoxicillin and aspirin.  No issues.  Valve was stable on 4/3/24.  6.  Follow-up in 3 months.  Increased Lasix to 60 mg every other day.  [facility]  Takes a half a tablet of the 25 mg.  He gets 12.5 mg.  Refer to advanced heart failure.      Interval history:  As noted above.      HPI:    Mr. Barriga is being seen by cardiology to establish care.  He has a history of coronary disease with both stenting and CABG x3.  His wife passed away on 9/1/2020, suddenly from cancer.  He does not plan to go back to [place] and is establishing care locally.     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     Sidney is here to establish care.  He has a history of CAD with stenting and bypass. He previously obtained his cardiac care through [place], [place].  He states that dating back in 1982, he was diagnosed with sarcoidosis.  More recently, he had a cardiac catheterization on 7/17/2007 with history of MI with stenting x2 placed to the p-mLAD.  He went on to have CABG x3 on 4/29/2014 in [place].  He had LIMA to LAD, SVG to OM1 and OM 2.  At that time, his EF was reported to be 30% with ischemic cardiomyopathy.  More recently, he reports having had a cardiac catheterization once again in [place] with stenting to unknown vessels in 2019.  His symptoms in the past have been exertional dyspnea with intrascapular discomfort.  He states he has been having similar symptoms for the last 6 months but to a lesser degree.  Risk factors include history of tobacco abuse at approximately a half a pack a day until 1978, hypertension, hyperlipidemia, DM-2, family history, and diet.     He reportedly has a history of ischemic cardiomyopathy.  EF on 8/12/2010 was 40%.  Recently, while in [place] at time of bypass on 4/29/2014, his EF was 30%.  His echo on 11/1/2017 showed an EF of 40-45% with grade 1 diastolic dysfunction.  There was evidence for wall motion normalities as well as mild left atrial enlargement.  He has not had any swelling to his legs, presently on Lasix 20 mg daily.  He states he does not have issues with fluid overload.     He has been on Coumadin for extended period time.  He describes having had multiple recurrent DVT's with a IVC filter in place.  He is currently on Coumadin with management through the Coumadin clinic.     He has a history of hyperlipidemia.  His most recent lipid panel was on 10/20/2020 showing a total cholesterol of 248 and LDL of 164.  HDL was 46.  He has  "not been able to tolerate Lipitor or Crestor in the past.  He has been on Zetia.  On 12/7/2020,  he was started on Crestor 20 mg twice a week.  If he is able to tolerate this dosage, would increase as able. He states he has been on \"every statin with issues with all of them\".     Patient is known to have mild COPD with history of tobacco abuse.  PFT's on 11/18/2020 support mild COPD with severe diffusion defect.      Relevant testing:  Stress test on 4/5/2024:    The nuclear stress test is abnormal.     There is a large area of infarction in the anterior, apical, septal,   anteroseptal and inferoseptal segment(s) of the left ventricle.     Left ventricular function is severely reduced.     The left ventricular ejection fraction at stress is 28%.     A prior study was conducted on 4/26/2022.  This study has changes noted when compared with the prior study. The estimated ejection fraction is worse.     Echo on 4/3/2024:  Left ventricular function is decreased.   The ejection fraction is 15-20% (severely reduced).   Severe diffuse hypokinesis is present.  Global right ventricular function is mildly to moderately reduced.  The 29 mm Patricio III TAVR is well-seated. Leaflet opening is not clearly  visualized. There is no paravalvular or valvular regurgitation. The Vmax is  1.4 m/s, the mean gradient is 5 mmHg, the dimensionless index is 0.64.  Mild tricuspid and mitral insufficiency present.  Estimated right ventricular systolic pressure is 50 mmHg plus right atrial  pressure. Pulmonary hypertension is present.  No pericardial effusion is present.    US lower extremity for DVT on 7/17/2023:   No evidence of deep venous thrombosis within the lower extremities.    Zio patch on 4/19/2023:  Worn for 14 days and 0 hr's.  After removing artifact, total time was 13 days and 23 hr's. Placed on 4/19/2023 at 12:34 PM and completed on 5/3/2023 at 12:34 PM.  Underlying rhythm was sinus.  Hrt rate ranged from 53 bpm, maximum heart " rate of 182 bmp, averaging 65 bmp.  No significant bradycardia, pauses, Mobitz type II or 3rd degree heart block.  No atrial fibrillation on this study.  x0 triggered events and x0 diary entries.   x306 runs of VT lasting up to 4 beats with a maximum heart rate of 182 bmp.    x47 runs of SVT lasting up to 20 beats with a maximum heart rate of 160 bmp.  Rare, <1% of PAC's, atrial couplets, and atrial triplets.  Frequent PVC's at 27.4%.  Occasional ventricular couplets at 2.6%.  Occasional ventricular triplets at 1.0%.  + episodes of ventricular bigeminy lasting up to 5 min's and 41 sec's.  + episodes of ventricular trigeminy lasting up to 1 min and 40 sec's.       Echocardiogram on 5/8/2023 at [place]:  1. Status post 29 mm Paulino Patricio 3 transcatheter aortic valve bioprosthesis implanted via a transfemoral approach (6-DEC-2022).   2. Normal prosthetic leaflet(s) motion. Mean gradient 7 mmHg. Trivial prosthetic regurgitation. No periprosthetic regurgitation.   3. Moderate mitral valve regurgitation (PISA ERO 0.31 cm2; RV 32 ml). Mechanism is likely degenerative disease resulting in malcoaptation on a background of moderate MAC.   4. Moderately enlarged left ventricular chamber size with moderate-severe generalized hypokinesis with regional variability, ejection fraction 27%.   5. Elevated left ventricular filling pressure. See comments.   6. Normal right ventricular chamber size with mildly reduced systolic function. Estimated right ventricular systolic pressure 65 mmHg (right atrial pressure of 10 mmHg).   7. Normal inferior vena cava size with reduced inspiratory collapse (<50%).   8. No  pericardial effusion.   9. Compared to the report of 08/15/2022 the following changes have occurred: in addition to new TAVR implantation, mitral regurgitation has increased and right ventricular systolic pressure has also increased.  Side by side comparison of images   performed.     Echocardiogram in 4/5/2023:  Left ventricular  "function is decreased. The ejection fraction is 30-35% (moderately reduced). Severe diffuse hypokinesis is present.   Grade II or moderate diastolic dysfunction.  The right ventricle is normal size. Global right ventricular function is normal.  The 29 mm Patricio III TAVR is well-seated. Leaflet opening is not clearly visualized. There is trace paravalvular regurgitation. There is no valvular regurgitation. The Vmax is 2.1 m/s, the mean gradient is 10 mmHg, the dimensionless index is 0.50, and the acceleration time is 120 ms.  This study was compared with the study from 05/05/2022. The AV gradient is lower. There is no significant change in the biventricular function.    MCOT at [facility] on 12/8/22:  1. The patient was monitored from 12/8/2022 to 1/6/2023 with a total monitoring time of 28 days 19 hr 54 min. The baseline rhythm was sinus with a first-degree AV delay and a left bundle-branch block. The heart rate varied from 55 bpm to 93 bpm. The   average heart rate was 67 bpm.   2. There were 35,812 PVCs seen singly, paired, and in bigeminy and trigeminy with a PVC burden of 1.26%. There were 6 ventricular tachycardia events with greater than 3 beats with the longest duration being 8 beats. The maximum VT rate was 183 bpm.   3. There were 15,775 PACs seen singly with a PAC burden of <1%. There were 19 runs of supraventricular tachycardia observed with the longest duration being 39 beats. The maximum SVT rate was 125 bpm.   4. The patient reported 26 symptomatic events of \"dizziness.\" During these events, the rhythm was sinus with a first-degree AV delay and a left bundle-branch block. The heart rate varied from 64 bpm to 92 bpm. At and/or around these times, PVCs were seen    singly and in bigeminy and trigeminy.       Cardiac cath on 11/30/22:  The left main coronary artery is 30% obstructed by multiple discrete lesions.   The proximal left anterior descending artery is 100% obstructed by a discrete lesion. The distal " segment is not visible.   The proximal circumflex artery is 40% obstructed by multiple discrete lesions. The distal segment is normal size.   The ramus intermedius segment is 100% obstructed by a discrete lesion. Fills retrogradely from graft.   The proximal right coronary artery is 50% obstructed by a discrete lesion. The distal segment is normal size.   The distal right coronary artery is 30% obstructed by multiple discrete lesions. The distal segment is normal size.   GRAFT ANGIOGRAPHY SUMMARY   Single body vein bypass graft to the Ramus Intermedius Segment. Graft Appears normal.   Single body left internal mammary graft to the Middle Left Anterior Descending Artery.   GRAFT ANGIOGRAPHY SUMMARY   Single body vein bypass graft to the Ramus Intermedius Segment. Graft Appears normal.   Single body left internal mammary graft to the Middle Left Anterior Descending Artery.      ECHO on 5/5/22:  The visual ejection fraction is 35-40%. Moderate diffuse hypokinesis is present.  Right ventricular function, chamber size, wall motion, and thickness are  normal.  The calculated aortic valve area is 0.94 cm2.The mean gradient across the AV is 22mmHg, the peak velocity is 36 mmHg.  Mild mitral insufficiency is present. Mild tricuspid insufficiency is present.  The inferior vena cava cannot be assessed.   Ascending aorta 4.2 cm.  No pericardial effusion is present.    Stress test on 4/26/22:  There is a large area of a severe   degree of infarction in the anterior, apical, inferior and septal   segment(s) of the left ventricle. The left ventricular ejection fraction   at rest is 37%.  The left ventricular ejection fraction at stress is 38%.       Zio on 3/1/21:  Underlying rhythm was sinus.  Hrt rate ranged from 50 bpm, maximum heart rate of 197 bmp, averaging 72 bmp.  No significant bradycardia, pauses, 2nd degree Mobitz type II or 3rd degree heart block.  No atrial fibrillation was identified on this study.  x0 triggered  events and x0 diary entries.  x18 runs of VT lasting to 11 beats with a maximum heart rate of 197 bmp.    x25 runs of SVT lasting up to 15 beats with a maximum heart rate of 156 bmp.  Rare, less than 1% of PAC's, atrial couplets, atrial triplets, ventricular couplets and ventricular triplets.  Frequent PVC's at 15.9%.  + episodes of ventricular bigeminy lasting up to 18 min's and 9 sec's.  + episodes of ventricular trigeminy lasting up to 30 mins and 5 sec's.    Stress echo at [place] on 5/25/2021:  1. AT REST:   2. Estimated left ventricular ejection fraction 25%   3. Stroke volume index 45 ml/m^2, aortic valve mean systolic gradient 20 mm Hg, aortic valve   area 1.16 cm^2, aortic valve area index 0.62 cm^2/m^2   4. STRESS TEST:   5. Dobutamine was infused from 5 mcg/kg/min to 20.0 mcg/kg/min.   6. PEAK STRESS:   7. Estimated left ventricular ejection fraction range 30% - 35%.   8. Stroke volume index 55 ml/m^2, aortic valve mean systolic gradient 35 mm Hg, aortic valve  area 1.36 cm^2, aortic valve area index 0.73 cm^2/m^2       US aorta on 2/11/21:  No abdominal aortic aneurysm.    US carotids on 2/11/21:  No hemodynamically significant stenoses are identified in either carotid bifurcation.    ECHO on 2/11/21:  No pericardial effusion is present.  Moderately (EF 30-35%) reduced left ventricular function is present.  The right ventricle is normal size.  Global right ventricular function is normal.  Mild left atrial enlargement is present.  Mild mitral insufficiency is present.  Severe aortic valve calcification is present.  The peak aortic velocity is 3.02 m/sec.  The mean gradient across the aortic valve is 18.4 mmHg.  Moderate aortic stenosis is present.  Right ventricular systolic pressure is 44 mmHg above the right atrial pressure.  Mild tricuspid insufficiency is present.  Mild pulmonic insufficiency is present.  Ascending aorta 4.1 cm.  Sinuses of Valsalva 4.3 cm.    CTA chest on 2/11/21:  2 small nodules are  identified in the right lung.  Follow-up CT scan in 6 months time is recommended. The ascending aorta measures 4.2 cm in maximal transverse diameter.    Cardiac cath on 1/15/21:  Right sided filling pressures are normal.  Mild elevated pulmonary hypertension.  Left sided filling pressures are moderately elevated.  Left ventricular filling pressures are moderately elevated.  Normal cardiac output level.  Moderate mitral valve stenosis (mean gradient 8.4 mm Hg, 2.2 cm2)  Moderate aortic valve stenosis (mean gradient 17 mm Hg, 1.2 cm2)  Native three vessel CAD  >>> Moderate 50% proximal RCA stenosis [ungrafted vessel]  >>> Mimimal LMCA stenosis  >>> 100% Ostial LAD stenosis [LIMA-LAD widely patent]  >>> Proximal 50% LCx and 90% RI stenoses [Sequential SVG-RI-OM patent with focal 60% ISR in jump segment of SVG]     Suggest medical treatment at this time.  Neither the aortic valve nor mitral valve require intervention at this time.  The proximal RCA stenosis is unlikely be physiologically significant. The LAD territory is infarcted but the LIMA-LAD remains widely patent.  There is 70% ISR of SVG in jump segment but the OM is receiving brisk DAVID-3 flow via the native coronary (only 50% proximal stenosis).    Cath on 7/17/2007:  1. Severe, single-vessel coronary artery disease. Chronic proximal-to-mild occlusion of the left anterior descending coronary artery with distal right-to-left collaterals.  2. Cypher drug-eluting stenting of the [id number] segment of the left anterior descending.    Stress test on 12/15/20:    The nuclear stress test is abnormal.    There is a large area of a severe degree of infarction in the entire apical and anteroseptal segment(s) of the left ventricle.    Left ventricular function is moderately reduced.    The left ventricular ejection fraction at rest is 44%.  The left ventricular ejection fraction at stress is 35%.    There is no prior study for comparison.    ECHO on 12/9/20:  No  pericardial effusion is present.  Left ventricular size is normal.  Anterior wall hypokinesis is present.  Apical wall hypokinesis is present.  Moderately (EF 30-35%) reduced left ventricular function is present.  The right ventricle is normal size.  Global right ventricular function is normal.  Mild mitral annular calcification is present.  Mild mitral insufficiency is present.  Moderate aortic valve calcification is present.  Trace aortic insufficiency is present.  The Aortic valve has significant calcification of valve leaflets with poor  mobility. Our measured values I feel underestimate the amount of stenosis. By  visual estimate would put at least moderate Aortic stenosis.  Trace tricuspid insufficiency is present.  Right ventricular systolic pressure is 6 mmHg above the right atrial pressure.  The peak aortic velocity is 2.57 m/sec.  The mean gradient across the aortic valve is 15.5 mmHg.        ICD-10-CM    1. RIVAS (dyspnea on exertion)  R06.09 furosemide (LASIX) 20 MG tablet     empagliflozin (JARDIANCE) 25 MG TABS tablet     Adult Cardiology Eval  Referral      2. Chronic obstructive pulmonary disease, unspecified COPD type (H)  J44.9       3. Mild pulmonary hypertension (H)  I27.20 furosemide (LASIX) 20 MG tablet     empagliflozin (JARDIANCE) 25 MG TABS tablet     Adult Cardiology Eval  Referral      4. Ischemic cardiomyopathy  I25.5 furosemide (LASIX) 20 MG tablet     empagliflozin (JARDIANCE) 25 MG TABS tablet     Adult Cardiology Eval  Referral      5. Essential hypertension, benign  I10 furosemide (LASIX) 20 MG tablet      6. Essential hypertension  I10 furosemide (LASIX) 20 MG tablet      7. Congestive heart failure, unspecified HF chronicity, unspecified heart failure type (H)  I50.9 furosemide (LASIX) 20 MG tablet     empagliflozin (JARDIANCE) 25 MG TABS tablet     Adult Cardiology Eval  Referral      8. Chronic combined systolic and diastolic congestive heart  failure (H)  I50.42 furosemide (LASIX) 20 MG tablet     empagliflozin (JARDIANCE) 25 MG TABS tablet     Adult Cardiology Eval  Referral      9. Stage 3b chronic kidney disease (H)  N18.32 furosemide (LASIX) 20 MG tablet      10. Acute kidney injury superimposed on CKD  (H24)  N17.9     N18.9                   Past Medical History:   Diagnosis Date    Acute thromboembolism of deep veins of lower extremity (H)     No Comments Provided    Benign essential hypertension     No Comments Provided    Cancer (H)     skin    Congestive heart failure (H)     COPD (chronic obstructive pulmonary disease) (H)     Coronary atherosclerosis     No Comments Provided    Osteoarthrosis     No Comments Provided    Other and unspecified hyperlipidemia     No Comments Provided    Other specified forms of chronic ischemic heart disease     No Comments Provided    Sarcoidosis     No Comments Provided    Type 2 or unspecified type diabetes mellitus     No Comments Provided       Past Surgical History:   Procedure Laterality Date    ANGIOPLASTY  01/01/2019    stents    CARDIAC SURGERY  01/01/2014    heart bypass    COLONOSCOPY  01/01/2019    CV CORONARY ANGIOGRAM N/A 1/15/2021    Procedure: CV CORONARY ANGIOGRAM;  Surgeon: Charlie Harris MD;  Location:  HEART CARDIAC CATH LAB    CV LEFT HEART CATH N/A 1/15/2021    Procedure: Left Heart Cath;  Surgeon: Charlie Harris MD;  Location:  HEART CARDIAC CATH LAB    CV RIGHT HEART CATH MEASUREMENTS RECORDED N/A 1/15/2021    Procedure: CV RIGHT HEART CATH;  Surgeon: Charlie Harris MD;  Location:  HEART CARDIAC CATH LAB    IR IVC FILTER REMOVAL  5/2/2014    OTHER SURGICAL HISTORY      [id number],BIOPSY LUNG OR [facility]       Allergies   Allergen Reactions    Isosorbide Nitrate Visual Disturbance     LIGHT HEADED,ALMOST PASSED OUT      Zocor [Simvastatin] Itching    Atorvastatin Itching    Isosorbide Visual Disturbance    Rosuvastatin Itching    Ancef  [Cefazolin] Rash     Patient received cefazolin IV while in the [facility] on 12/6/22. On 12/12/22 he was seen by his primary for a drug rash on his trunk. Pt thought it was from amoxicillin administered at the [place] but the [place] only infused cefazolin per chart records. Amoxil rx given for dental procedures pt will not take due to fear of reaction.        Current Outpatient Medications   Medication Sig Dispense Refill    acetaminophen (TYLENOL) 500 MG tablet Take 1 tablet (500 mg) by mouth every 8 hours as needed for mild pain 60 tablet 0    albuterol (PROAIR HFA/PROVENTIL HFA/VENTOLIN HFA) 108 (90 Base) MCG/ACT inhaler Inhale 1-2 puffs into the lungs every 4 hours as needed for shortness of breath or wheezing 18 g 3    blood glucose (NO BRAND SPECIFIED) lancets standard Use to test blood sugar 1 times daily or as directed. 1 each 3    blood glucose (NO BRAND SPECIFIED) test strip Use to test blood sugar 1 times daily or as directed. 90 strip 3    blood glucose monitoring (NO BRAND SPECIFIED) meter device kit Use to test blood sugar 1 times daily or as directed. 1 kit 0    Calcium Carbonate-Vit D-Min (CALCIUM 600+D PLUS MINERALS) 600-400 MG-UNIT TABS Take 1 tablet by mouth daily      cinnamon 500 MG CAPS Take 500 mg by mouth daily       clopidogrel (PLAVIX) 75 MG tablet Take 1 tablet (75 mg) by mouth daily 90 tablet 3    Coenzyme Q10 (CO Q-10) 200 MG CAPS Take 1 capsule by mouth daily      cyanocobalamin (VITAMIN B-12) 500 MCG SUBL sublingual tablet Place 500 mcg under the tongue daily      empagliflozin (JARDIANCE) 25 MG TABS tablet Take 0.5 tablets (12.5 mg) by mouth daily. 45 tablet 3    ezetimibe (ZETIA) 10 MG tablet Take 1 tablet (10 mg) by mouth at bedtime (8 PM) 90 tablet 3    furosemide (LASIX) 20 MG tablet Take 3 tablets (60 mg) by mouth every other day. 270 tablet 3    LUTEIN PO Take 1 tablet by mouth daily      metoprolol succinate ER (TOPROL XL) 50 MG 24 hr tablet Take 1 tablet (50 mg) by mouth 2 times  daily 180 tablet 1    nitroGLYcerin (NITROSTAT) 0.4 MG sublingual tablet For chest pain place 1 tablet under the tongue every 5 minutes for 3 doses. If symptoms persist 5 minutes after 1st dose call 911. 25 tablet 3    Omega-3 Fatty Acids (FISH OIL) 1200 MG capsule Take 1 capsule by mouth daily      rosuvastatin (CRESTOR) 20 MG tablet Take 1 tablet (20 mg) by mouth twice a week 30 tablet 3    sacubitril-valsartan (ENTRESTO) 24-26 MG per tablet Take 1 tablet by mouth 2 times daily 180 tablet 3    vitamin C (ASCORBIC ACID) 500 MG tablet Take 1,000 mg by mouth daily      Vitamin D3 (CHOLECALCIFEROL) 125 MCG (5000 UT) tablet Take 1 tablet by mouth daily      warfarin ANTICOAGULANT (COUMADIN) 5 MG tablet Take 5mg by oral route on , , and take 2.5mg by oral route all other days 90 tablet 3    amoxicillin (AMOXIL) 500 MG capsule Take 2 g, 30-60 minutes, prior to dental procedures/cleanings (Patient not taking: Reported on 2024) 4 capsule 3       Social History     Socioeconomic History    Marital status:      Spouse name: Not on file    Number of children: Not on file    Years of education: Not on file    Highest education level: Not on file   Occupational History    Not on file   Tobacco Use    Smoking status: Former     Current packs/day: 0.00     Average packs/day: 0.5 packs/day for 24.0 years (12.0 ttl pk-yrs)     Types: Cigarettes     Start date: 1954     Quit date: 1978     Years since quittin.6    Smokeless tobacco: Never   Vaping Use    Vaping status: Never Used   Substance and Sexual Activity    Alcohol use: Yes     Alcohol/week: 5.0 standard drinks of alcohol     Types: 3 Cans of beer, 2 Shots of liquor per week    Drug use: Unknown     Types: Other     Comment: Drug use: No    Sexual activity: Not on file   Other Topics Concern    Parent/sibling w/ CABG, MI or angioplasty before 65F 55M? Not Asked   Social History Narrative    Not on file     Social Determinants of Health      Financial Resource Strain: Low Risk  (10/27/2023)    Financial Resource Strain     Within the past 12 months, have you or your family members you live with been unable to get utilities (heat, electricity) when it was really needed?: No   Food Insecurity: Low Risk  (10/27/2023)    Food Insecurity     Within the past 12 months, did you worry that your food would run out before you got money to buy more?: No     Within the past 12 months, did the food you bought just not last and you didn t have money to get more?: No   Transportation Needs: Low Risk  (10/27/2023)    Transportation Needs     Within the past 12 months, has lack of transportation kept you from medical appointments, getting your medicines, non-medical meetings or appointments, work, or from getting things that you need?: No   Physical Activity: Not on file   Stress: Not on file   Social Connections: Not on file   Interpersonal Safety: Low Risk  (10/27/2023)    Interpersonal Safety     Do you feel physically and emotionally safe where you currently live?: Yes     Within the past 12 months, have you been hit, slapped, kicked or otherwise physically hurt by someone?: No     Within the past 12 months, have you been humiliated or emotionally abused in other ways by your partner or ex-partner?: No   Housing Stability: Low Risk  (10/27/2023)    Housing Stability     Do you have housing? : Yes     Are you worried about losing your housing?: No       LAB RESULTS:   Orders Only on 01/21/2021   Component Date Value Ref Range Status    Sodium 01/21/2021 141  133 - 144 mmol/L Final    Potassium 01/21/2021 4.7  3.4 - 5.3 mmol/L Final    Chloride 01/21/2021 107  94 - 109 mmol/L Final    Carbon Dioxide 01/21/2021 29  20 - 32 mmol/L Final    Anion Gap 01/21/2021 5  3 - 14 mmol/L Final    Glucose 01/21/2021 125* 70 - 99 mg/dL Final    Urea Nitrogen 01/21/2021 26  7 - 30 mg/dL Final    Creatinine 01/21/2021 1.40* 0.66 - 1.25 mg/dL Final    GFR Estimate 01/21/2021 44*  >60 mL/min/[1.73_m2] Final    GFR Estimate If Black 01/21/2021 51* >60 mL/min/[1.73_m2] Final    Calcium 01/21/2021 9.9  8.5 - 10.1 mg/dL Final    WBC 01/21/2021 6.7  4.0 - 11.0 10e9/L Final    RBC Count 01/21/2021 3.63* 4.4 - 5.9 10e12/L Final    Hemoglobin 01/21/2021 11.3* 13.3 - 17.7 g/dL Final    Hematocrit 01/21/2021 34.9* 40.0 - 53.0 % Final    MCV 01/21/2021 96  78 - 100 fl Final    MCH 01/21/2021 31.1  26.5 - 33.0 pg Final    MCHC 01/21/2021 32.4  31.5 - 36.5 g/dL Final    RDW 01/21/2021 12.2  10.0 - 15.0 % Final    Platelet Count 01/21/2021 163  150 - 450 10e9/L Final    INR Point of Care 01/21/2021 2.9* 0.86 - 1.14 Final    Capillary Blood Collection 01/21/2021 Capillary collection performed   Final   Office Visit on 01/12/2021   Component Date Value Ref Range Status    WBC 01/12/2021 6.4  4.0 - 11.0 10e9/L Final    RBC Count 01/12/2021 3.72* 4.4 - 5.9 10e12/L Final    Hemoglobin 01/12/2021 11.7* 13.3 - 17.7 g/dL Final    Hematocrit 01/12/2021 35.3* 40.0 - 53.0 % Final    MCV 01/12/2021 95  78 - 100 fl Final    MCH 01/12/2021 31.5  26.5 - 33.0 pg Final    MCHC 01/12/2021 33.1  31.5 - 36.5 g/dL Final    RDW 01/12/2021 12.2  10.0 - 15.0 % Final    Platelet Count 01/12/2021 162  150 - 450 10e9/L Final    % Neutrophils 01/12/2021 62.1  % Final    % Lymphocytes 01/12/2021 19.7  % Final    % Monocytes 01/12/2021 13.5  % Final    % Eosinophils 01/12/2021 4.2  % Final    % Basophils 01/12/2021 0.5  % Final    Absolute Neutrophil 01/12/2021 4.0  1.6 - 8.3 10e9/L Final    Absolute Lymphocytes 01/12/2021 1.3  0.8 - 5.3 10e9/L Final    Absolute Monocytes 01/12/2021 0.9  0.0 - 1.3 10e9/L Final    Absolute Eosinophils 01/12/2021 0.3  0.0 - 0.7 10e9/L Final    Absolute Basophils 01/12/2021 0.0  0.0 - 0.2 10e9/L Final    Diff Method 01/12/2021 Automated Method   Final    Sodium 01/12/2021 142  133 - 144 mmol/L Final    Potassium 01/12/2021 4.4  3.4 - 5.3 mmol/L Final    Chloride 01/12/2021 109  94 - 109 mmol/L Final     Carbon Dioxide 01/12/2021 29  20 - 32 mmol/L Final    Anion Gap 01/12/2021 4  3 - 14 mmol/L Final    Glucose 01/12/2021 100* 70 - 99 mg/dL Final    Urea Nitrogen 01/12/2021 28  7 - 30 mg/dL Final    Creatinine 01/12/2021 1.14  0.66 - 1.25 mg/dL Final    GFR Estimate 01/12/2021 57* >60 mL/min/[1.73_m2] Final    GFR Estimate If Black 01/12/2021 66  >60 mL/min/[1.73_m2] Final    Calcium 01/12/2021 9.6  8.5 - 10.1 mg/dL Final    INR Point of Care 01/12/2021 3.8* 0.86 - 1.14 Final   Office Visit on 01/11/2021   Component Date Value Ref Range Status    COVID-19 Virus PCR to [facility] - So* 01/11/2021 Nasopharyngeal   Final    COVID-19 Virus PCR to [facility] - Re* 01/11/2021 Not Detected   Final   Orders Only on 12/01/2020   Component Date Value Ref Range Status    INR 12/01/2020 2.65* 0.86 - 1.14 Final   Orders Only on 12/01/2020   Component Date Value Ref Range Status    WBC 12/01/2020 5.5  4.0 - 11.0 10e9/L Final    RBC Count 12/01/2020 3.98* 4.4 - 5.9 10e12/L Final    Hemoglobin 12/01/2020 12.5* 13.3 - 17.7 g/dL Final    Hematocrit 12/01/2020 38.4* 40.0 - 53.0 % Final    MCV 12/01/2020 97  78 - 100 fl Final    MCH 12/01/2020 31.4  26.5 - 33.0 pg Final    MCHC 12/01/2020 32.6  31.5 - 36.5 g/dL Final    RDW 12/01/2020 12.4  10.0 - 15.0 % Final    Platelet Count 12/01/2020 141* 150 - 450 10e9/L Final    Diff Method 12/01/2020 Automated Method   Final    % Neutrophils 12/01/2020 63.5  % Final    % Lymphocytes 12/01/2020 21.9  % Final    % Monocytes 12/01/2020 12.2  % Final    % Eosinophils 12/01/2020 1.8  % Final    % Basophils 12/01/2020 0.4  % Final    % Immature Granulocytes 12/01/2020 0.2  % Final    Nucleated RBCs 12/01/2020 0  0 /100 Final    Absolute Neutrophil 12/01/2020 3.5  1.6 - 8.3 10e9/L Final    Absolute Lymphocytes 12/01/2020 1.2  0.8 - 5.3 10e9/L Final    Absolute Monocytes 12/01/2020 0.7  0.0 - 1.3 10e9/L Final    Absolute Eosinophils 12/01/2020 0.1  0.0 - 0.7 10e9/L Final    Absolute Basophils 12/01/2020 0.0   "0.0 - 0.2 10e9/L Final    Abs Immature Granulocytes 12/01/2020 0.0  0 - 0.4 10e9/L Final    Absolute Nucleated RBC 12/01/2020 0.0   Final        Review of systems: Negative except that which was noted in the HPI.    Physical examination:  BP 96/56   Pulse 61   Resp 20   Ht 1.778 m (5' 10\")   Wt 72.6 kg (160 lb)   SpO2 99%   BMI 22.96 kg/m      GENERAL APPEARANCE: healthy, alert and no distress  CHEST: lungs clear to auscultation.  CARDIOVASCULAR: Irregular rate irregular rhythm, normal S1 with physiologic split S2, no S3 or S4 but with systolic crescendo decrescendo murmur, no click or rub  EXTREMITIES: no clubbing, cyanosis with mild to moderate peripheral edema      Total time spent on day of visit, including review of tests, obtaining/reviewing separately obtained history, ordering medications/tests/procedures, communicating with PCP/consultants, and documenting in electronic medical record: 34 minutes.           Thank you for allowing me to participate in the care of your patient. Please do not hesitate to contact me if you have any questions.     Justyn Rae, DO              "

## 2024-08-26 NOTE — PATIENT INSTRUCTIONS
Thank you for allowing Dr NOAH Rae and our  team to participate in your care. Please call our office at [phone] with scheduling questions or if you need to cancel or change your appointment. With any other questions or concerns you may call cardiology nurse at  [phone].       If you experience chest pain, chest pressure, chest tightness, shortness of breath, fainting, lightheadedness, nausea, vomiting, or other concerning symptoms, please report to the Emergency Department or call 911. These symptoms may be emergent, and best treated in the Emergency Department.

## 2024-09-16 DIAGNOSIS — I10 ESSENTIAL HYPERTENSION, BENIGN: ICD-10-CM

## 2024-09-16 DIAGNOSIS — I49.8 VENTRICULAR BIGEMINY: ICD-10-CM

## 2024-09-16 DIAGNOSIS — R06.09 DOE (DYSPNEA ON EXERTION): ICD-10-CM

## 2024-09-16 DIAGNOSIS — I48.91 NEW ONSET A-FIB (H): ICD-10-CM

## 2024-09-16 DIAGNOSIS — I48.19 PERSISTENT ATRIAL FIBRILLATION (H): ICD-10-CM

## 2024-09-16 DIAGNOSIS — I10 ESSENTIAL HYPERTENSION: ICD-10-CM

## 2024-09-16 RX ORDER — METOPROLOL SUCCINATE 50 MG/1
50 TABLET, EXTENDED RELEASE ORAL 2 TIMES DAILY
Qty: 180 TABLET | Refills: 1 | Status: SHIPPED | OUTPATIENT
Start: 2024-09-16

## 2024-09-16 NOTE — TELEPHONE ENCOUNTER
metoprolol succinate ER (TOPROL XL) 50 MG 24 hr tablet 180 tablet 1 3/27/2024     Last Office Visit: 08/26/2024  Future Office visit:    Next 5 appointments (look out 90 days)      Nov 27, 2024 11:00 AM  (Arrive by 10:45 AM)  Return Visit with Justyn Rae DO  [facility] [place] ([facility] [place] ) [address]  [place] [address]  [phone]             Routing refill request to provider for review/approval because:

## 2024-09-16 NOTE — TELEPHONE ENCOUNTER
Requested Prescriptions   Pending Prescriptions Disp Refills    metoprolol succinate ER (TOPROL XL) 50 MG 24 hr tablet 180 tablet 1     Sig: Take 1 tablet (50 mg) by mouth 2 times daily.       Beta-Blockers Protocol Passed - 9/16/2024 10:34 AM        Passed - Blood pressure under 140/90 in past 12 months     BP Readings from Last 3 Encounters:   08/26/24 96/56   05/22/24 106/66   03/27/24 118/62       No data recorded            Passed - Patient is age 6 or older        Passed - Medication is active on med list        Passed - Medication indicated for associated diagnosis     Medication is associated with one or more of the following diagnoses:     Hypertension (HTN)   Atrial fibrillation/flutter   Angina   ASCVD   Migraine   Heart Failure   Tremor   Anxiety   Ocular hypertension   Glaucoma   PTSD   Obstructive hypertrophic cardiomyopathy   History of myocarditis   Palpitations   POTS (postural orthostatic tachycardia syndrome)   SVT (supraventricular tachycardia)   Hyperthyroidism   Tachycardia          Passed - Recent (12 mo) or future (90 days) visit within the authorizing provider's specialty     The patient must have completed an in-person or virtual visit within the past 12 months or has a future visit scheduled within the next 90 days with the authorizing provider s specialty.  Urgent care and e-visits do not quality as an office visit for this protocol.             Prescription approved per [facility] Refill Protocol.

## 2024-09-20 ENCOUNTER — ANTICOAGULATION THERAPY VISIT (OUTPATIENT)
Dept: ANTICOAGULATION | Facility: OTHER | Age: 89
End: 2024-09-20
Attending: FAMILY MEDICINE
Payer: MEDICARE

## 2024-09-20 ENCOUNTER — LAB (OUTPATIENT)
Dept: LAB | Facility: OTHER | Age: 89
End: 2024-09-20
Payer: MEDICARE

## 2024-09-20 DIAGNOSIS — Z86.718 HISTORY OF DVT (DEEP VEIN THROMBOSIS): ICD-10-CM

## 2024-09-20 DIAGNOSIS — I82.5Y3 CHRONIC DEEP VEIN THROMBOSIS (DVT) OF PROXIMAL VEIN OF BOTH LOWER EXTREMITIES (H): ICD-10-CM

## 2024-09-20 DIAGNOSIS — Z86.718 HISTORY OF DVT (DEEP VEIN THROMBOSIS): Primary | ICD-10-CM

## 2024-09-20 DIAGNOSIS — Z86.718 PERSONAL HISTORY OF DVT (DEEP VEIN THROMBOSIS): ICD-10-CM

## 2024-09-20 DIAGNOSIS — Z95.3 S/P TAVR (TRANSCATHETER AORTIC VALVE REPLACEMENT), BIOPROSTHETIC: Primary | ICD-10-CM

## 2024-09-20 LAB — INR BLD: 2.1 (ref 0.9–1.1)

## 2024-09-20 PROCEDURE — 36416 COLLJ CAPILLARY BLOOD SPEC: CPT | Mod: ZL

## 2024-09-20 PROCEDURE — 85610 PROTHROMBIN TIME: CPT | Mod: ZL

## 2024-09-20 NOTE — PROGRESS NOTES
ANTICOAGULATION MANAGEMENT     Sidney Barriga 89 year old male is on warfarin with therapeutic INR result. (Goal INR 2.0-3.0)    Recent labs: (last 7 days)     09/20/24  0957   INR 2.1*       ASSESSMENT     Source(s): Chart Review  Previous INR was Subtherapeutic  Medication, diet, health changes since last INR chart reviewed; none identified         PLAN     Recommended plan for no diet, medication or health factor changes affecting INR     Dosing Instructions: Continue your current warfarin dose with next INR in 4 weeks       Summary  As of 9/20/2024      Full warfarin instructions:  5 mg every Mon, Fri; 2.5 mg all other days   Next INR check:  10/18/2024               Detailed voice message left for Sidney with dosing instructions and follow up date.         Education provided: Please call back if any changes to your diet, medications or how you've been taking warfarin  [phone]    Plan made per [facility] anticoagulation protocol    Lizbeth Ely RN  9/20/2024  Anticoagulation Clinic  [facility] for routing messages: p JOSR SORIA          _______________________________________________________________________     Anticoagulation Episode Summary       Current INR goal:  2.0-3.0   TTR:  86.1% (1 y)   Target end date:  Indefinite   Send INR reminders to:  JOSR SORIA    Indications    History of DVT (deep vein thrombosis)-multiple [Z86.718]  Chronic deep vein thrombosis (DVT) of proximal vein of both lower extremities (H) [I82.5Y3]  Personal history of DVT (deep vein thrombosis) [Z86.718]             Comments:  We will continue on Coumadin with goal INR of 2.0-2.7 per Dr Rae             Anticoagulation Care Providers       Provider Role Specialty Phone number    Zach Arredondo MD Referring Family Medicine [phone]

## 2024-10-07 ENCOUNTER — TELEPHONE (OUTPATIENT)
Dept: CARDIOLOGY | Facility: OTHER | Age: 89
End: 2024-10-07

## 2024-10-07 DIAGNOSIS — I27.20 MILD PULMONARY HYPERTENSION (H): ICD-10-CM

## 2024-10-07 DIAGNOSIS — I50.9 CONGESTIVE HEART FAILURE, UNSPECIFIED HF CHRONICITY, UNSPECIFIED HEART FAILURE TYPE (H): ICD-10-CM

## 2024-10-07 DIAGNOSIS — N18.32 STAGE 3B CHRONIC KIDNEY DISEASE (H): ICD-10-CM

## 2024-10-07 DIAGNOSIS — I10 ESSENTIAL HYPERTENSION, BENIGN: ICD-10-CM

## 2024-10-07 DIAGNOSIS — R06.09 DOE (DYSPNEA ON EXERTION): ICD-10-CM

## 2024-10-07 DIAGNOSIS — I25.5 ISCHEMIC CARDIOMYOPATHY: ICD-10-CM

## 2024-10-07 DIAGNOSIS — I50.42 CHRONIC COMBINED SYSTOLIC AND DIASTOLIC CONGESTIVE HEART FAILURE (H): ICD-10-CM

## 2024-10-07 DIAGNOSIS — I10 ESSENTIAL HYPERTENSION: ICD-10-CM

## 2024-10-07 RX ORDER — FUROSEMIDE 20 MG/1
TABLET ORAL
Qty: 270 TABLET | Refills: 3 | Status: SHIPPED | OUTPATIENT
Start: 2024-10-07 | End: 2024-11-04

## 2024-10-07 NOTE — TELEPHONE ENCOUNTER
After verification, pt notified of provider response.. Pt verbalizes understanding and has no further questions or concerns.

## 2024-10-07 NOTE — TELEPHONE ENCOUNTER
After verification, pt notified of providers response. Pt verbalizes understanding and has no further questions or concerns.

## 2024-10-07 NOTE — TELEPHONE ENCOUNTER
Okay, Lasix has been refilled.  Please have him come in 2 weeks to have the labs drawn to make sure were not throwing off his electrolytes or kidney function.  Orders placed.    Dr. Rae

## 2024-10-07 NOTE — TELEPHONE ENCOUNTER
"Patient called and stated that he was needing his Lasix 40 mg refilled.   Med list shows patient to take (3) 20 mg lasix every other day.  Patient contacted and he stated he has been taking 40 mg daily and 20 mg at night, each day.   Last visit note was also read to patient, where it also states patient to take lasix 60 mg EOD.   Patient states his swelling has been  \"doing good\" and has been decreased, but said he will \"cut back\"  to EOD if that's what he's supposed to do.   "

## 2024-10-18 ENCOUNTER — ANTICOAGULATION THERAPY VISIT (OUTPATIENT)
Dept: ANTICOAGULATION | Facility: OTHER | Age: 89
End: 2024-10-18
Attending: FAMILY MEDICINE
Payer: MEDICARE

## 2024-10-18 ENCOUNTER — LAB (OUTPATIENT)
Dept: LAB | Facility: OTHER | Age: 89
End: 2024-10-18
Payer: MEDICARE

## 2024-10-18 DIAGNOSIS — N18.32 STAGE 3B CHRONIC KIDNEY DISEASE (H): ICD-10-CM

## 2024-10-18 DIAGNOSIS — I82.5Y3 CHRONIC DEEP VEIN THROMBOSIS (DVT) OF PROXIMAL VEIN OF BOTH LOWER EXTREMITIES (H): ICD-10-CM

## 2024-10-18 DIAGNOSIS — Z95.3 S/P TAVR (TRANSCATHETER AORTIC VALVE REPLACEMENT), BIOPROSTHETIC: ICD-10-CM

## 2024-10-18 DIAGNOSIS — Z86.718 PERSONAL HISTORY OF DVT (DEEP VEIN THROMBOSIS): ICD-10-CM

## 2024-10-18 DIAGNOSIS — R06.09 DOE (DYSPNEA ON EXERTION): ICD-10-CM

## 2024-10-18 DIAGNOSIS — I50.9 CONGESTIVE HEART FAILURE, UNSPECIFIED HF CHRONICITY, UNSPECIFIED HEART FAILURE TYPE (H): ICD-10-CM

## 2024-10-18 DIAGNOSIS — I50.42 CHRONIC COMBINED SYSTOLIC AND DIASTOLIC CONGESTIVE HEART FAILURE (H): ICD-10-CM

## 2024-10-18 DIAGNOSIS — Z86.718 HISTORY OF DVT (DEEP VEIN THROMBOSIS): Primary | ICD-10-CM

## 2024-10-18 DIAGNOSIS — I27.20 MILD PULMONARY HYPERTENSION (H): ICD-10-CM

## 2024-10-18 DIAGNOSIS — Z86.718 HISTORY OF DVT (DEEP VEIN THROMBOSIS): ICD-10-CM

## 2024-10-18 LAB
ANION GAP SERPL CALCULATED.3IONS-SCNC: 11 MMOL/L (ref 7–15)
BUN SERPL-MCNC: 51.9 MG/DL (ref 8–23)
CALCIUM SERPL-MCNC: 9.7 MG/DL (ref 8.8–10.4)
CHLORIDE SERPL-SCNC: 100 MMOL/L (ref 98–107)
CREAT SERPL-MCNC: 2.02 MG/DL (ref 0.67–1.17)
EGFRCR SERPLBLD CKD-EPI 2021: 30 ML/MIN/1.73M2
GLUCOSE SERPL-MCNC: 141 MG/DL (ref 70–99)
HCO3 SERPL-SCNC: 27 MMOL/L (ref 22–29)
INR BLD: 1.8 (ref 0.9–1.1)
MAGNESIUM SERPL-MCNC: 2.5 MG/DL (ref 1.7–2.3)
NT-PROBNP SERPL-MCNC: 7404 PG/ML (ref 0–1800)
POTASSIUM SERPL-SCNC: 4 MMOL/L (ref 3.4–5.3)
SODIUM SERPL-SCNC: 138 MMOL/L (ref 135–145)

## 2024-10-18 PROCEDURE — 83880 ASSAY OF NATRIURETIC PEPTIDE: CPT | Mod: ZL

## 2024-10-18 PROCEDURE — 36415 COLL VENOUS BLD VENIPUNCTURE: CPT | Mod: ZL

## 2024-10-18 PROCEDURE — 80048 BASIC METABOLIC PNL TOTAL CA: CPT | Mod: ZL

## 2024-10-18 PROCEDURE — 85610 PROTHROMBIN TIME: CPT | Mod: ZL

## 2024-10-18 PROCEDURE — 83735 ASSAY OF MAGNESIUM: CPT | Mod: ZL

## 2024-10-18 NOTE — PROGRESS NOTES
ANTICOAGULATION MANAGEMENT     Sidney Barriga 89 year old male is on warfarin with subtherapeutic INR result. (Goal INR 2.0-3.0)    Recent labs: (last 7 days)     10/18/24  0955   INR 1.8*       ASSESSMENT     Source(s): Chart Review and Patient/Caregiver Call     Warfarin doses taken: Warfarin taken as instructed  Diet: Increased greens/vitamin K in diet; plans to resume previous intake  Medication/supplement changes: None noted  New illness, injury, or hospitalization: No  Signs or symptoms of bleeding or clotting: No  Previous result: Therapeutic last visit; previously outside of goal range  Additional findings: None       PLAN     Recommended plan for temporary change(s) affecting INR     Dosing Instructions: Continue your current warfarin dose with next INR in 4 weeks       Summary  As of 10/18/2024      Full warfarin instructions:  5 mg every Mon, Fri; 2.5 mg all other days   Next INR check:  11/15/2024               Telephone call with Sidney who verbalizes understanding and agrees to plan    Lab visit scheduled    Education provided: None required    Plan made per [facility] anticoagulation protocol    Lizbeth Ely RN  10/18/2024  Anticoagulation Clinic  [employer] for routing messages: al SORIA          _______________________________________________________________________     Anticoagulation Episode Summary       Current INR goal:  2.0-3.0   TTR:  81.0% (1 y)   Target end date:  Indefinite   Send INR reminders to:  JOSR SORIA    Indications    History of DVT (deep vein thrombosis)-multiple [Z86.718]  Chronic deep vein thrombosis (DVT) of proximal vein of both lower extremities (H) [I82.5Y3]  Personal history of DVT (deep vein thrombosis) [Z86.718]             Comments:  We will continue on Coumadin with goal INR of 2.0-2.7 per Dr Rae             Anticoagulation Care Providers       Provider Role Specialty Phone number    Zach Arredondo MD Referring Family Medicine [phone]

## 2024-11-03 NOTE — PROGRESS NOTES
November 4, 2024     Dear Dr. Rae,  I had the pleasure of seeing Sidney Barriga  in the [facility].  As you know he is a   89 y/o male who recently established care in cardiology, he did live in [place] in the past.  He has a history of coronary disease with both stenting and CABG x3.  His wife passed away on 9/1/2020, suddenly from cancer.  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  Dating back in 1982, he was diagnosed with sarcoidosis.  He had a cardiac catheterization on 7/17/2007 with history of MI with stenting x2 placed to the p-mLAD.  He went on to have CABG x3 on 4/29/2014 in [place].  He had LIMA to LAD, SVG to OM1 and OM 2.  At that time, his EF was reported to be 30% with ischemic cardiomyopathy.  More recently, he reports having had a cardiac catheterization once again in [place] with stenting to unknown vessels in 2019.  His symptoms in the past have been exertional dyspnea with intrascapular discomfort.  He states he has been having similar symptoms for the last 6 months but to a lesser degree.  He has been on Coumadin for extended period time.  He describes having had multiple recurrent DVT's with a IVC filter in place.  He is currently on Coumadin with management through the Coumadin clinic. He did undergo TAVR in 2022 at [place].   Overall he notes that he continues to have class III symptoms at this time.  He has difficulty walking especially taking stairs.  However otherwise he lives in assisted living and able to do most of the activities, able to walk around and in fact he is still driving.  He does all his medications because he does not trust others doing  his medications.  He did live with his daughter in the past but now she moved out of [place].  No other complaints overall, denies any episodes of chest pain dizziness lightheadedness palpitations and no falls.  He actually notes his medications very well     PAST MEDICAL HISTORY:  1.  RIVAS 2/2 CHF, pulm htn, mild COPD, severe diffusion defect on 11/18/20, and DVT/PE???  2.  CABG x3, 4/29/2014.  -LIMA to LAD, SVG to OM, and 2nd OM.  3.  CKD-3.  4.  CHF-systolic, ischemic.                 -15-20% on 4/3/24.                -27% on 5/8/2023, [place].  -30-35% on 4/5/22 and 4/5/2023.   -35-40% on 5/5/22.   -30-35% on 12/9/20.   -Diastolic grade 1 on 11/1/2007. Unchanged on 2/11/21.  5.  TAVR on 12/6/22, [place].   -29 mm Patricio S3.  6.  MV and AV-moderate stenosis on cardiac cath on 1/15/21.   7.  H/O DVT-x5.   -H/O IVC filter, removal in 2014.  8.  Hypomagnesia.                -2.0 on 3/27/2024.  9.  Hypertension-controlled.  10.  Hypertriglyceridemia-controlled.  11.  Tobacco abuse.    -Quit in 1978.  12.  Cardiac cath x5.    -7/17/2007, 4/20//14, 2019, 1/15/21, and 11/30/22.  13.  Coronary stent placement on 7/17/2007.  -x2 stents to the LAD and in 2019 in [place] to unknown vessel.  14.  H/O sarcoidosis in 1982.  15.  DM-2-controlled.   16.  COPD.  -Mild on 11/18/2020.  17.  WMA on 12/9/20.  18.  Right lung nodule x2 on 2/12/2021.  19.  Concern for TAAA at 4.3 cm on 5/15/2021.  20.  Concern for AAA r/o on ultrasound from 2/12/2021.  21.  Mild pulmonary hypertension on his cardiac cath at the [facility] on 1/15/2021.  22.  A-fib, new onset on 3/27/2024.                -Metoprolol and Coumadin.                -Stop Coreg 12.5 mg.  Start metoprolol 50 mg XL twice daily, 3/27/2024    Past Medical History:   Diagnosis Date    Acute thromboembolism of deep veins of lower extremity (H)     No Comments Provided    Benign essential hypertension     No Comments Provided    Cancer (H)     skin    Congestive heart failure  (H)     COPD (chronic obstructive pulmonary disease) (H)     Coronary atherosclerosis     No Comments Provided    Osteoarthrosis     No Comments Provided    Other and unspecified hyperlipidemia     No Comments Provided    Other specified forms of chronic ischemic heart disease     No Comments Provided    Sarcoidosis     No Comments Provided    Type 2 or unspecified type diabetes mellitus     No Comments Provided     FAMILY HISTORY:  Family History   Problem Relation Age of Onset    Myocardial Infarction Father     Coronary Artery Disease Father     Alcoholism Mother     Hearing Loss Mother     Hyperlipidemia Mother     Coronary Artery Disease Brother     Cancer Brother     Hyperlipidemia Brother     Myocardial Infarction Brother     Obesity Brother     Kidney Disease Brother     Prostate Cancer Brother     Asthma Brother     Breast Cancer Sister     Cerebrovascular Disease Maternal Grandmother     Diabetes Paternal Grandmother     Hypertension No family hx of     Colon Cancer No family hx of     Anesthesia Reaction No family hx of     Thyroid Disease No family hx of     Genetic Disorder No family hx of      SOCIAL HISTORY:  Social History     Socioeconomic History    Marital status:    Tobacco Use    Smoking status: Former     Current packs/day: 0.00     Average packs/day: 0.5 packs/day for 24.0 years (12.0 ttl pk-yrs)     Types: Cigarettes     Start date: 1954     Quit date: 1978     Years since quittin.8    Smokeless tobacco: Never   Vaping Use    Vaping status: Never Used   Substance and Sexual Activity    Alcohol use: Yes     Alcohol/week: 5.0 standard drinks of alcohol     Types: 3 Cans of beer, 2 Shots of liquor per week    Drug use: Unknown     Types: Other     Comment: Drug use: No     Social Drivers of Health     Financial Resource Strain: Low Risk  (10/27/2023)    Financial Resource Strain     Within the past 12 months, have you or your family members you live with been unable to get  utilities (heat, electricity) when it was really needed?: No   Food Insecurity: Low Risk  (10/27/2023)    Food Insecurity     Within the past 12 months, did you worry that your food would run out before you got money to buy more?: No     Within the past 12 months, did the food you bought just not last and you didn t have money to get more?: No   Transportation Needs: Low Risk  (10/27/2023)    Transportation Needs     Within the past 12 months, has lack of transportation kept you from medical appointments, getting your medicines, non-medical meetings or appointments, work, or from getting things that you need?: No   Interpersonal Safety: Low Risk  (10/27/2023)    Interpersonal Safety     Do you feel physically and emotionally safe where you currently live?: Yes     Within the past 12 months, have you been hit, slapped, kicked or otherwise physically hurt by someone?: No     Within the past 12 months, have you been humiliated or emotionally abused in other ways by your partner or ex-partner?: No   Housing Stability: Low Risk  (10/27/2023)    Housing Stability     Do you have housing? : Yes     Are you worried about losing your housing?: No     CURRENT MEDICATIONS:  Current Outpatient Medications   Medication Sig Dispense Refill    acetaminophen (TYLENOL) 500 MG tablet Take 1 tablet (500 mg) by mouth every 8 hours as needed for mild pain 60 tablet 0    albuterol (PROAIR HFA/PROVENTIL HFA/VENTOLIN HFA) 108 (90 Base) MCG/ACT inhaler Inhale 1-2 puffs into the lungs every 4 hours as needed for shortness of breath or wheezing 18 g 3    amoxicillin (AMOXIL) 500 MG capsule Take 2 g, 30-60 minutes, prior to dental procedures/cleanings (Patient not taking: Reported on 8/26/2024) 4 capsule 3    blood glucose (NO BRAND SPECIFIED) lancets standard Use to test blood sugar 1 times daily or as directed. 1 each 3    blood glucose (NO BRAND SPECIFIED) test strip Use to test blood sugar 1 times daily or as directed. 90 strip 3    blood  "glucose monitoring (NO BRAND SPECIFIED) meter device kit Use to test blood sugar 1 times daily or as directed. 1 kit 0    Calcium Carbonate-Vit D-Min (CALCIUM 600+D PLUS MINERALS) 600-400 MG-UNIT TABS Take 1 tablet by mouth daily      cinnamon 500 MG CAPS Take 500 mg by mouth daily       clopidogrel (PLAVIX) 75 MG tablet Take 1 tablet (75 mg) by mouth daily 90 tablet 3    Coenzyme Q10 (CO Q-10) 200 MG CAPS Take 1 capsule by mouth daily      cyanocobalamin (VITAMIN B-12) 500 MCG SUBL sublingual tablet Place 500 mcg under the tongue daily      empagliflozin (JARDIANCE) 25 MG TABS tablet Take 0.5 tablets (12.5 mg) by mouth daily. 45 tablet 3    ezetimibe (ZETIA) 10 MG tablet Take 1 tablet (10 mg) by mouth at bedtime (8 PM) 90 tablet 3    furosemide (LASIX) 20 MG tablet Take 40 mg in the morning and 20 mg in the afternoon. 270 tablet 3    LUTEIN PO Take 1 tablet by mouth daily      metoprolol succinate ER (TOPROL XL) 50 MG 24 hr tablet Take 1 tablet (50 mg) by mouth 2 times daily. 180 tablet 1    nitroGLYcerin (NITROSTAT) 0.4 MG sublingual tablet For chest pain place 1 tablet under the tongue every 5 minutes for 3 doses. If symptoms persist 5 minutes after 1st dose call 911. 25 tablet 3    Omega-3 Fatty Acids (FISH OIL) 1200 MG capsule Take 1 capsule by mouth daily      rosuvastatin (CRESTOR) 20 MG tablet Take 1 tablet (20 mg) by mouth twice a week 30 tablet 3    sacubitril-valsartan (ENTRESTO) 24-26 MG per tablet Take 1 tablet by mouth 2 times daily 180 tablet 3    vitamin C (ASCORBIC ACID) 500 MG tablet Take 1,000 mg by mouth daily      Vitamin D3 (CHOLECALCIFEROL) 125 MCG (5000 UT) tablet Take 1 tablet by mouth daily      warfarin ANTICOAGULANT (COUMADIN) 5 MG tablet Take 5mg by oral route on Mondays, Fridays, and take 2.5mg by oral route all other days 90 tablet 3     No current facility-administered medications for this visit.     EXAM:  BP 98/60   Pulse 76   Resp 20   Ht 1.778 m (5' 10\")   Wt 72.1 kg (159 lb)  "  SpO2 97%   BMI 22.81 kg/m    General: appears comfortable, alert and oriented  Head: normocephalic, atraumatic  Eyes: anicteric sclera, EOMI , PERRL  Neck: no adenopathy  Orophyarynx: moist mucosa, no lesions noted  Heart: regular, S1/S2, no murmurs, rubs or gallop. Estimated JVP at 5 cmH2O  Lungs: CTAB, No wheezing.   Abdomen: soft, non-tender, bowel sounds present, no hepatosplenomegaly  Extremities: No LE edema today  Skin: no open lesions noted  Neuro: grossly non-focal     Labs:  Lab Results   Component Value Date    WBC 7.8 03/27/2024    HGB 12.2 (L) 03/27/2024    HCT 38.1 (L) 03/27/2024     (L) 03/27/2024     10/18/2024    POTASSIUM 4.0 10/18/2024    CHLORIDE 100 10/18/2024    CO2 27 10/18/2024    BUN 51.9 (H) 10/18/2024    CR 2.02 (H) 10/18/2024     (H) 10/18/2024    SED 57 (H) 07/20/2021    NTBNP 7,404 (H) 10/18/2024    AST 43 03/27/2024    ALT 31 03/27/2024    ALKPHOS 80 03/27/2024    BILITOTAL 0.5 03/27/2024    INR 1.8 (H) 10/18/2024     Stress test on 4/5/2024:    The nuclear stress test is abnormal.     There is a large area of infarction in the anterior, apical, septal, anteroseptal and inferoseptal segment(s) of the left ventricle.     Left ventricular function is severely reduced.     The left ventricular ejection fraction at stress is 28%.     A prior study was conducted on 4/26/2022.  This study has changes noted when compared with the prior study. The estimated ejection fraction is worse.      Echo on 4/3/2024:  Left ventricular function is decreased.  The ejection fraction is 15-20% (severely reduced). Severe diffuse hypokinesis is present.  Global right ventricular function is mildly to moderately reduced.  The 29 mm Patricio III TAVR is well-seated. Leaflet opening is not clearly visualized. There is no paravalvular or valvular regurgitation. The Vmax is 1.4 m/s, the mean gradient is 5 mmHg, the dimensionless index is 0.64.  Mild tricuspid and mitral insufficiency  present.  Estimated right ventricular systolic pressure is 50 mmHg plus right atrial pressure. Pulmonary hypertension is present. No pericardial effusion is present.     US lower extremity for DVT on 7/17/2023:  No evidence of deep venous thrombosis within the lower extremities.     Zio patch on 4/19/2023:  Worn for 14 days and 0 hr's.  After removing artifact, total time was 13 days and 23 hr's. Placed on 4/19/2023 at 12:34 PM and completed on 5/3/2023 at 12:34 PM.  Underlying rhythm was sinus.  Hrt rate ranged from 53 bpm, maximum heart rate of 182 bmp, averaging 65 bmp. No significant bradycardia, pauses, Mobitz type II or 3rd degree heart block.  No atrial fibrillation on this study.  x0 triggered events and x0 diary entries.   x306 runs of VT lasting up to 4 beats with a maximum heart rate of 182 bmp.    x47 runs of SVT lasting up to 20 beats with a maximum heart rate of 160 bmp.  Rare, <1% of PAC's, atrial couplets, and atrial triplets.  Frequent PVC's at 27.4%.  Occasional ventricular couplets at 2.6%.  Occasional ventricular triplets at 1.0%.  + episodes of ventricular bigeminy lasting up to 5 min's and 41 sec's.  + episodes of ventricular trigeminy lasting up to 1 min and 40 sec's.     Echocardiogram on 5/8/2023 at [place]:  1. Status post 29 mm Paulino Patricio 3 transcatheter aortic valve bioprosthesis implanted via a transfemoral approach (6-DEC-2022).   2. Normal prosthetic leaflet(s) motion. Mean gradient 7 mmHg. Trivial prosthetic regurgitation. No periprosthetic regurgitation.   3. Moderate mitral valve regurgitation (PISA ERO 0.31 cm2; RV 32 ml). Mechanism is likely degenerative disease resulting in malcoaptation on a background of moderate MAC.   4. Moderately enlarged left ventricular chamber size with moderate-severe generalized hypokinesis with regional variability, ejection fraction 27%.   5. Elevated left ventricular filling pressure. See comments.   6. Normal right ventricular chamber size with  mildly reduced systolic function. Estimated right ventricular systolic pressure 65 mmHg (right atrial pressure of 10 mmHg).   7. Normal inferior vena cava size with reduced inspiratory collapse (<50%).   8. No  pericardial effusion.   9. Compared to the report of 08/15/2022 the following changes have occurred: in addition to new TAVR implantation, mitral regurgitation has increased and right ventricular systolic pressure has also increased.  Side by side comparison of images performed.      Echocardiogram in 4/5/2023:  Left ventricular function is decreased. The ejection fraction is 30-35% (moderately reduced). Severe diffuse hypokinesis is present. Grade II or moderate diastolic dysfunction.  The right ventricle is normal size. Global right ventricular function is normal.  The 29 mm Patricio III TAVR is well-seated. Leaflet opening is not clearly visualized. There is trace paravalvular regurgitation. There is no valvular regurgitation. The Vmax is 2.1 m/s, the mean gradient is 10 mmHg, the dimensionless index is 0.50, and the acceleration time is 120 ms.  This study was compared with the study from 05/05/2022. The AV gradient is lower. There is no significant change in the biventricular function.     MCOT at [place] on 12/8/22:  1. The patient was monitored from 12/8/2022 to 1/6/2023 with a total monitoring time of 28 days 19 hr 54 min. The baseline rhythm was sinus with a first-degree AV delay and a left bundle-branch block. The heart rate varied from 55 bpm to 93 bpm. The average heart rate was 67 bpm.   2. There were 35,812 PVCs seen singly, paired, and in bigeminy and trigeminy with a PVC burden of 1.26%. There were 6 ventricular tachycardia events with greater than 3 beats with the longest duration being 8 beats. The maximum VT rate was 183 bpm.   3. There were 15,775 PACs seen singly with a PAC burden of <1%. There were 19 runs of supraventricular tachycardia observed with the longest duration being 39 beats. The  "maximum SVT rate was 125 bpm.   4. The patient reported 26 symptomatic events of \"dizziness.\" During these events, the rhythm was sinus with a first-degree AV delay and a left bundle-branch block. The heart rate varied from 64 bpm to 92 bpm. At and/or around these times, PVCs were seen singly and in bigeminy and trigeminy.       Cardiac cath on 11/30/22:  The left main coronary artery is 30% obstructed by multiple discrete lesions.   The proximal left anterior descending artery is 100% obstructed by a discrete lesion. The distal segment is not visible.   The proximal circumflex artery is 40% obstructed by multiple discrete lesions. The distal segment is normal size.   The ramus intermedius segment is 100% obstructed by a discrete lesion. Fills retrogradely from graft.   The proximal right coronary artery is 50% obstructed by a discrete lesion. The distal segment is normal size.   The distal right coronary artery is 30% obstructed by multiple discrete lesions. The distal segment is normal size.   GRAFT ANGIOGRAPHY SUMMARY   Single body vein bypass graft to the Ramus Intermedius Segment. Graft Appears normal.   Single body left internal mammary graft to the Middle Left Anterior Descending Artery.   GRAFT ANGIOGRAPHY SUMMARY   Single body vein bypass graft to the Ramus Intermedius Segment. Graft Appears normal.   Single body left internal mammary graft to the Middle Left Anterior Descending Artery.       ECHO on 5/5/22:  The visual ejection fraction is 35-40%. Moderate diffuse hypokinesis is present.   Right ventricular function, chamber size, wall motion, and thickness are normal.  The calculated aortic valve area is 0.94 cm2.The mean gradient across the AV is 22mmHg, the peak velocity is 36 mmHg.  Mild mitral insufficiency is present. Mild tricuspid insufficiency is present.  The inferior vena cava cannot be assessed. Ascending aorta 4.2 cm. No pericardial effusion is present.     Stress test on 4/26/22:  There is a " large area of a severe  degree of infarction in the anterior, apical, inferior and septal segment(s) of the left ventricle. The left ventricular ejection fraction at rest is 37%.  The left ventricular ejection fraction at stress is 38%.       Stress echo at [place] on 5/25/2021:  1. AT REST:   2. Estimated left ventricular ejection fraction 25%   3. Stroke volume index 45 ml/m^2, aortic valve mean systolic gradient 20 mm Hg, aortic valve   area 1.16 cm^2, aortic valve area index 0.62 cm^2/m^2   4. STRESS TEST:   5. Dobutamine was infused from 5 mcg/kg/min to 20.0 mcg/kg/min.   6. PEAK STRESS:   7. Estimated left ventricular ejection fraction range 30% - 35%.   8. Stroke volume index 55 ml/m^2, aortic valve mean systolic gradient 35 mm Hg, aortic valve  area 1.36 cm^2, aortic valve area index 0.73 cm^2/m^2      US aorta on 2/11/21:  No abdominal aortic aneurysm.     US carotids on 2/11/21:  No hemodynamically significant stenoses are identified in either carotid bifurcation.     ECHO on 2/11/21:  No pericardial effusion is present. Moderately (EF 30-35%) reduced left ventricular function is present.  The right ventricle is normal size. Global right ventricular function is normal.  Mild left atrial enlargement is present. Mild mitral insufficiency is present.  Severe aortic valve calcification is present. The peak aortic velocity is 3.02 m/sec. The mean gradient across the aortic valve is 18.4 mmHg.  Moderate aortic stenosis is present.  Right ventricular systolic pressure is 44 mmHg above the right atrial pressure.  Mild tricuspid insufficiency is present. Mild pulmonic insufficiency is present.  Ascending aorta 4.1 cm. Sinuses of Valsalva 4.3 cm.     CTA chest on 2/11/21:  2 small nodules are identified in the right lung.  Follow-up CT scan in 6 months time is recommended. The ascending aorta measures 4.2 cm in maximal transverse diameter.     Cardiac cath on 1/15/21:  Right sided filling pressures are normal.  Mild  elevated pulmonary hypertension.  Left sided filling pressures are moderately elevated.  Left ventricular filling pressures are moderately elevated.  Normal cardiac output level.  Moderate mitral valve stenosis (mean gradient 8.4 mm Hg, 2.2 cm2)  Moderate aortic valve stenosis (mean gradient 17 mm Hg, 1.2 cm2)  Native three vessel CAD  >>> Moderate 50% proximal RCA stenosis [ungrafted vessel]  >>> Mimimal LMCA stenosis  >>> 100% Ostial LAD stenosis [LIMA-LAD widely patent]  >>> Proximal 50% LCx and 90% RI stenoses [Sequential SVG-RI-OM patent with focal 60% ISR in jump segment of SVG]     Suggest medical treatment at this time.  Neither the aortic valve nor mitral valve require intervention at this time.  The proximal RCA stenosis is unlikely be physiologically significant. The LAD territory is infarcted but the LIMA-LAD remains widely patent.  There is 70% ISR of SVG in jump segment but the OM is receiving brisk DAVID-3 flow via the native coronary (only 50% proximal stenosis).     Cath on 7/17/2007:  1. Severe, single-vessel coronary artery disease. Chronic proximal-to-mild occlusion of the left anterior descending coronary artery with distal right-to-left collaterals.  2. Cypher drug-eluting stenting of the [id number] segment of the left anterior descending.       ASSESSMENT AND PLAN:  89-year-old male with very complex past medical history as detailed above including heart failure reduced ejection fraction, ischemic cardiomyopathy status post CABG as well as multiple PCI's, severe aortic stenosis status post TAVR in 2022 at [place], history of DVT on Coumadin currently with IVC filter previously in place, who presented to the clinic for further evaluation and then to establish care.  Overall he looks significantly better than his stated age, he lives in assisted living facility knows all his medications takes all the medications as prescribed and he continues to drive.  His major complaint is actually some  exertional shortness of breath but no episodes of chest pain dizziness lightheadedness palpitations.  No falls.  He is somewhat worried about his renal function which indeed has been little bit worsening recently although he did have these episodes in the past.  He appears somewhat volume overloaded on exam.  At this time we will continue current medical regimen he is on excellent therapy at this point especially given his complex history as well as limited medication tolerance.  However he appears somewhat volume overloaded and we will increase the Lasix from 40/20 mg to 40 mg twice daily dosing.  He will have labs in about a month then follow-up with Dr. Rae here in [place].  I will see him back in 6 months or sooner if needed.    I appreciate the opportunity to participate in the care of Sidney Barriga . Please do not hesitate to contact me with any further questions.  I have spent a total of 40 minutes today reviewing labs, imaging studies, discussing with colleagues, face-to-face time with patient and documentation in the medical record.  The longitudinal plan of care for the diagnosis(es)/condition(s) as documented were addressed during this visit. Due to the added complexity in care, I will continue to support Sidney in the subsequent management and with ongoing continuity of care.    Sincerely,   Hemanth Larsen MD  [facility] Division of Cardiology

## 2024-11-04 ENCOUNTER — OFFICE VISIT (OUTPATIENT)
Dept: CARDIOLOGY | Facility: OTHER | Age: 89
End: 2024-11-04
Attending: INTERNAL MEDICINE
Payer: MEDICARE

## 2024-11-04 VITALS
OXYGEN SATURATION: 97 % | BODY MASS INDEX: 22.76 KG/M2 | SYSTOLIC BLOOD PRESSURE: 98 MMHG | RESPIRATION RATE: 20 BRPM | WEIGHT: 159 LBS | HEIGHT: 70 IN | HEART RATE: 76 BPM | DIASTOLIC BLOOD PRESSURE: 60 MMHG

## 2024-11-04 DIAGNOSIS — I10 ESSENTIAL HYPERTENSION: ICD-10-CM

## 2024-11-04 DIAGNOSIS — E78.1 HYPERTRIGLYCERIDEMIA: ICD-10-CM

## 2024-11-04 DIAGNOSIS — I49.8 VENTRICULAR BIGEMINY: ICD-10-CM

## 2024-11-04 DIAGNOSIS — I50.42 CHRONIC COMBINED SYSTOLIC AND DIASTOLIC CONGESTIVE HEART FAILURE (H): ICD-10-CM

## 2024-11-04 DIAGNOSIS — R06.09 DOE (DYSPNEA ON EXERTION): Primary | ICD-10-CM

## 2024-11-04 DIAGNOSIS — E78.2 MIXED HYPERLIPIDEMIA: ICD-10-CM

## 2024-11-04 DIAGNOSIS — J44.9 CHRONIC OBSTRUCTIVE PULMONARY DISEASE, UNSPECIFIED COPD TYPE (H): ICD-10-CM

## 2024-11-04 DIAGNOSIS — I48.19 PERSISTENT ATRIAL FIBRILLATION (H): ICD-10-CM

## 2024-11-04 DIAGNOSIS — I25.5 ISCHEMIC CARDIOMYOPATHY: ICD-10-CM

## 2024-11-04 DIAGNOSIS — I25.10 ATHEROSCLEROSIS OF NATIVE CORONARY ARTERY OF NATIVE HEART WITHOUT ANGINA PECTORIS: ICD-10-CM

## 2024-11-04 DIAGNOSIS — I50.9 CONGESTIVE HEART FAILURE, UNSPECIFIED HF CHRONICITY, UNSPECIFIED HEART FAILURE TYPE (H): ICD-10-CM

## 2024-11-04 DIAGNOSIS — N18.32 STAGE 3B CHRONIC KIDNEY DISEASE (H): ICD-10-CM

## 2024-11-04 DIAGNOSIS — I10 ESSENTIAL HYPERTENSION, BENIGN: ICD-10-CM

## 2024-11-04 DIAGNOSIS — I27.20 MILD PULMONARY HYPERTENSION (H): ICD-10-CM

## 2024-11-04 DIAGNOSIS — E83.42 HYPOMAGNESEMIA: ICD-10-CM

## 2024-11-04 DIAGNOSIS — Z95.2 HISTORY OF TRANSCATHETER AORTIC VALVE REPLACEMENT (TAVR): ICD-10-CM

## 2024-11-04 PROBLEM — R97.20 ELEVATED PSA: Status: ACTIVE | Noted: 2024-11-04

## 2024-11-04 PROBLEM — R91.1 PULMONARY NODULE: Status: ACTIVE | Noted: 2024-11-04

## 2024-11-04 PROCEDURE — G0463 HOSPITAL OUTPT CLINIC VISIT: HCPCS

## 2024-11-04 PROCEDURE — G2211 COMPLEX E/M VISIT ADD ON: HCPCS | Performed by: INTERNAL MEDICINE

## 2024-11-04 PROCEDURE — 99215 OFFICE O/P EST HI 40 MIN: CPT | Performed by: INTERNAL MEDICINE

## 2024-11-04 RX ORDER — FUROSEMIDE 20 MG/1
40 TABLET ORAL 2 TIMES DAILY
Qty: 270 TABLET | Refills: 3 | Status: SHIPPED | OUTPATIENT
Start: 2024-11-04

## 2024-11-04 RX ORDER — FINASTERIDE 5 MG/1
1 TABLET, FILM COATED ORAL
COMMUNITY
Start: 2024-09-04

## 2024-11-04 ASSESSMENT — PAIN SCALES - GENERAL: PAINLEVEL_OUTOF10: NO PAIN (0)

## 2024-11-04 NOTE — PATIENT INSTRUCTIONS
Thank you for allowing Dr Larsen and our  team to participate in your care. Please call our office at [phone] with scheduling questions or if you need to cancel or change your appointment. With any other questions or concerns you may call cardiology nurse at  [phone].       If you experience chest pain, chest pressure, chest tightness, shortness of breath, fainting, lightheadedness, nausea, vomiting, or other concerning symptoms, please report to the Emergency Department or call 911. These symptoms may be emergent, and best treated in the Emergency Department.

## 2024-11-08 DIAGNOSIS — E78.2 MIXED HYPERLIPIDEMIA: ICD-10-CM

## 2024-11-08 DIAGNOSIS — E78.1 HYPERTRIGLYCERIDEMIA: ICD-10-CM

## 2024-11-08 DIAGNOSIS — E11.65 TYPE 2 DIABETES MELLITUS WITH HYPERGLYCEMIA, WITHOUT LONG-TERM CURRENT USE OF INSULIN (H): ICD-10-CM

## 2024-11-11 RX ORDER — EZETIMIBE 10 MG/1
TABLET ORAL
Qty: 90 TABLET | Refills: 3 | Status: SHIPPED | OUTPATIENT
Start: 2024-11-11

## 2024-11-11 NOTE — TELEPHONE ENCOUNTER
ezetimibe (ZETIA) 10 MG tablet 90 tablet 3 11/27/2023     Last Office Visit: 08/26/2024  Future Office visit:    Next 5 appointments (look out 90 days)      Nov 27, 2024 11:00 AM  (Arrive by 10:45 AM)  Return Visit with Justyn Rae DO  [facility] [place] ([facility] [place] ) [address]  [place] [address]  [phone]             Routing refill request to provider for review/approval because:

## 2024-11-15 ENCOUNTER — LAB (OUTPATIENT)
Dept: LAB | Facility: OTHER | Age: 89
End: 2024-11-15
Payer: MEDICARE

## 2024-11-15 ENCOUNTER — ANTICOAGULATION THERAPY VISIT (OUTPATIENT)
Dept: ANTICOAGULATION | Facility: OTHER | Age: 89
End: 2024-11-15
Payer: MEDICARE

## 2024-11-15 DIAGNOSIS — I82.5Y3 CHRONIC DEEP VEIN THROMBOSIS (DVT) OF PROXIMAL VEIN OF BOTH LOWER EXTREMITIES (H): ICD-10-CM

## 2024-11-15 DIAGNOSIS — N18.32 STAGE 3B CHRONIC KIDNEY DISEASE (H): ICD-10-CM

## 2024-11-15 DIAGNOSIS — I27.20 MILD PULMONARY HYPERTENSION (H): ICD-10-CM

## 2024-11-15 DIAGNOSIS — Z95.3 S/P TAVR (TRANSCATHETER AORTIC VALVE REPLACEMENT), BIOPROSTHETIC: ICD-10-CM

## 2024-11-15 DIAGNOSIS — R06.09 DOE (DYSPNEA ON EXERTION): ICD-10-CM

## 2024-11-15 DIAGNOSIS — Z86.718 HISTORY OF DVT (DEEP VEIN THROMBOSIS): ICD-10-CM

## 2024-11-15 DIAGNOSIS — Z86.718 HISTORY OF DVT (DEEP VEIN THROMBOSIS): Primary | ICD-10-CM

## 2024-11-15 DIAGNOSIS — I50.9 CONGESTIVE HEART FAILURE, UNSPECIFIED HF CHRONICITY, UNSPECIFIED HEART FAILURE TYPE (H): ICD-10-CM

## 2024-11-15 DIAGNOSIS — I50.42 CHRONIC COMBINED SYSTOLIC AND DIASTOLIC CONGESTIVE HEART FAILURE (H): ICD-10-CM

## 2024-11-15 DIAGNOSIS — Z86.718 PERSONAL HISTORY OF DVT (DEEP VEIN THROMBOSIS): ICD-10-CM

## 2024-11-15 LAB
ANION GAP SERPL CALCULATED.3IONS-SCNC: 14 MMOL/L (ref 7–15)
BUN SERPL-MCNC: 57.6 MG/DL (ref 8–23)
CALCIUM SERPL-MCNC: 9.5 MG/DL (ref 8.8–10.4)
CHLORIDE SERPL-SCNC: 100 MMOL/L (ref 98–107)
CREAT SERPL-MCNC: 2.04 MG/DL (ref 0.67–1.17)
EGFRCR SERPLBLD CKD-EPI 2021: 30 ML/MIN/1.73M2
GLUCOSE SERPL-MCNC: 145 MG/DL (ref 70–99)
HCO3 SERPL-SCNC: 27 MMOL/L (ref 22–29)
INR BLD: 2.1 (ref 0.9–1.1)
MAGNESIUM SERPL-MCNC: 2.4 MG/DL (ref 1.7–2.3)
NT-PROBNP SERPL-MCNC: 7210 PG/ML (ref 0–1800)
POTASSIUM SERPL-SCNC: 4.3 MMOL/L (ref 3.4–5.3)
SODIUM SERPL-SCNC: 141 MMOL/L (ref 135–145)

## 2024-11-15 PROCEDURE — 83880 ASSAY OF NATRIURETIC PEPTIDE: CPT | Mod: ZL

## 2024-11-15 PROCEDURE — 85610 PROTHROMBIN TIME: CPT | Mod: ZL

## 2024-11-15 PROCEDURE — 82310 ASSAY OF CALCIUM: CPT | Mod: ZL

## 2024-11-15 PROCEDURE — 83735 ASSAY OF MAGNESIUM: CPT | Mod: ZL

## 2024-11-15 PROCEDURE — 80048 BASIC METABOLIC PNL TOTAL CA: CPT | Mod: ZL

## 2024-11-15 PROCEDURE — 36415 COLL VENOUS BLD VENIPUNCTURE: CPT | Mod: ZL

## 2024-11-15 NOTE — PROGRESS NOTES
ANTICOAGULATION MANAGEMENT     Sidney Barriga 89 year old male is on warfarin with therapeutic INR result. (Goal INR 2.0-3.0)    Recent labs: (last 7 days)     11/15/24  0945   INR 2.1*       ASSESSMENT     Source(s): Chart Review and Patient/Caregiver Call     Warfarin doses taken: Warfarin taken as instructed  Diet: No new diet changes identified  Medication/supplement changes: None noted  New illness, injury, or hospitalization: No  Signs or symptoms of bleeding or clotting: No  Previous result: Therapeutic last 2(+) visits  Additional findings: None       PLAN     Recommended plan for no diet, medication or health factor changes affecting INR     Dosing Instructions: Continue your current warfarin dose with next INR in 4 weeks       Summary  As of 11/15/2024      Full warfarin instructions:  5 mg every Mon, Fri; 2.5 mg all other days   Next INR check:  12/13/2024               Telephone call with Sidney who verbalizes understanding and agrees to plan    Lab visit scheduled    Education provided: None required    Plan made per [facility] anticoagulation protocol    Lizbeth Ely RN  11/15/2024  Anticoagulation Clinic  [employer] for routing messages: al SORIA  [facility] patient phone line: [phone]        _______________________________________________________________________     Anticoagulation Episode Summary       Current INR goal:  2.0-3.0   TTR:  75.9% (1 y)   Target end date:  Indefinite   Send INR reminders to:  JOSR SORIA    Indications    History of DVT (deep vein thrombosis)-multiple [Z15.429]  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

## 2024-11-26 NOTE — PROGRESS NOTES
[facility] HEART CARE   CARDIOLOGY PROGRESS NOTE     Chief Complaint   Patient presents with    Follow Up          Diagnosis:  1.  RIVAS 2/2 CHF, pulm htn, mild COPD, severe diffusion defect on 11/18/20, and DVT/PE???  2.  CABG x3, 4/29/2014.  -LIMA to LAD, SVG to OM, and 2nd OM.  3.  CKD-3.  4.  CHF-systolic, ischemic.    -15-20% on 4/3/24.   -27% on 5/8/2023, [place].  -30-35% on 4/5/22 and 4/5/2023.   -35-40% on 5/5/22.   -30-35% on 12/9/20.   -Diastolic grade 1 on 11/1/2007. Unchanged on 2/11/21.  5.  TAVR on 12/6/22, [place].   -29 mm Patricio S3.  6.  MV and AV-moderate stenosis on cardiac cath on 1/15/21.   7.  H/O DVT-x5.   -H/O IVC filter, removal in 2014.  8.  Hypomagnesia.   -2.0 on 3/27/2024.  9.  Hypertension-controlled.  10.  Hypertriglyceridemia-controlled.  11.  Tobacco abuse.    -Quit in 1978.  12.  Cardiac cath x5.    -7/17/2007, 4/20//14, 2019, 1/15/21, and 11/30/22.  13.  Coronary stent placement on 7/17/2007.  -x2 stents to the LAD and in 2019 in [place] to unknown vessel.  14.  H/O sarcoidosis in 1982.  15.  DM-2-controlled.   16.  COPD.  -Mild on 11/18/2020.  17.  WMA on 12/9/20.  18.  Right lung nodule x2 on 2/12/2021.  19.  Concern for TAAA at 4.3 cm on 5/15/2021.  20.  Concern for AAA r/o on ultrasound from 2/12/2021.  21.  Mild pulmonary hypertension on his cardiac cath at the [facility] on 1/15/2021.  22.  A-fib, new onset on 3/27/2024.   -Metoprolol and Coumadin.   -Stop Coreg 12.5 mg.  Start metoprolol 50 mg XL twice daily, 3/27/2024      Assessment/Plan:     1.  CHF: Stable.  Has seen advanced heart failure.  Was seen on 11/4/24.  He was on Lasix 60 mg daily/every other day and increase to 40 mg twice a day.  Fluid is stable.  Currently on Jardiance.  He is feeling better on Jardiance.  Also, on metoprolol and Entresto and will continue his medications related to his severely depressed EF.  2.  Follow-up in 6 months or sooner if issues.      Interval history:  As noted above.      HPI:     Mr. Barriga is being seen by cardiology to establish care.  He has a history of coronary disease with both stenting and CABG x3.  His wife passed away on 9/1/2020, suddenly from cancer.  He does not plan to go back to [place] and is establishing care locally.     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     Sidney is here to establish care.  He has a history of CAD with stenting and bypass. He previously obtained his cardiac care through [place], [place].  He states that dating back in 1982, he was diagnosed with sarcoidosis.  More recently, he had a cardiac catheterization on 7/17/2007 with history of MI with stenting x2 placed to the p-mLAD.  He went on to have CABG x3 on 4/29/2014 in [place].  He had LIMA to LAD, SVG to OM1 and OM 2.  At that time, his EF was reported to be 30% with ischemic cardiomyopathy.  More recently, he reports having had a cardiac catheterization once again in [place] with stenting to unknown vessels in 2019.  His symptoms in the past have been exertional dyspnea with intrascapular discomfort.  He states he has been having similar symptoms for the last 6 months but to a lesser degree.  Risk factors include history of tobacco abuse at approximately a half a pack a day until 1978, hypertension, hyperlipidemia, DM-2, family history, and diet.     He reportedly has a history of ischemic cardiomyopathy.  EF on 8/12/2010 was 40%.  Recently, while in [place] at time of bypass on 4/29/2014, his EF was 30%.  His echo on 11/1/2017 showed an EF of 40-45% with grade 1 diastolic dysfunction.  There was evidence for wall motion normalities as well as mild left  "atrial enlargement.  He has not had any swelling to his legs, presently on Lasix 20 mg daily.  He states he does not have issues with fluid overload.     He has been on Coumadin for extended period time.  He describes having had multiple recurrent DVT's with a IVC filter in place.  He is currently on Coumadin with management through the Coumadin clinic.     He has a history of hyperlipidemia.  His most recent lipid panel was on 10/20/2020 showing a total cholesterol of 248 and LDL of 164.  HDL was 46.  He has not been able to tolerate Lipitor or Crestor in the past.  He has been on Zetia.  On 12/7/2020,  he was started on Crestor 20 mg twice a week.  If he is able to tolerate this dosage, would increase as able. He states he has been on \"every statin with issues with all of them\".     Patient is known to have mild COPD with history of tobacco abuse.  PFT's on 11/18/2020 support mild COPD with severe diffusion defect.      Relevant testing:  Stress test on 4/5/2024:    The nuclear stress test is abnormal.     There is a large area of infarction in the anterior, apical, septal,   anteroseptal and inferoseptal segment(s) of the left ventricle.     Left ventricular function is severely reduced.     The left ventricular ejection fraction at stress is 28%.     A prior study was conducted on 4/26/2022.  This study has changes noted when compared with the prior study. The estimated ejection fraction is worse.     Echo on 4/3/2024:  Left ventricular function is decreased.   The ejection fraction is 15-20% (severely reduced).   Severe diffuse hypokinesis is present.  Global right ventricular function is mildly to moderately reduced.  The 29 mm Patricio III TAVR is well-seated. Leaflet opening is not clearly  visualized. There is no paravalvular or valvular regurgitation. The Vmax is  1.4 m/s, the mean gradient is 5 mmHg, the dimensionless index is 0.64.  Mild tricuspid and mitral insufficiency present.  Estimated right " ventricular systolic pressure is 50 mmHg plus right atrial  pressure. Pulmonary hypertension is present.  No pericardial effusion is present.    US lower extremity for DVT on 7/17/2023:   No evidence of deep venous thrombosis within the lower extremities.    Zio patch on 4/19/2023:  Worn for 14 days and 0 hr's.  After removing artifact, total time was 13 days and 23 hr's. Placed on 4/19/2023 at 12:34 PM and completed on 5/3/2023 at 12:34 PM.  Underlying rhythm was sinus.  Hrt rate ranged from 53 bpm, maximum heart rate of 182 bmp, averaging 65 bmp.  No significant bradycardia, pauses, Mobitz type II or 3rd degree heart block.  No atrial fibrillation on this study.  x0 triggered events and x0 diary entries.   x306 runs of VT lasting up to 4 beats with a maximum heart rate of 182 bmp.    x47 runs of SVT lasting up to 20 beats with a maximum heart rate of 160 bmp.  Rare, <1% of PAC's, atrial couplets, and atrial triplets.  Frequent PVC's at 27.4%.  Occasional ventricular couplets at 2.6%.  Occasional ventricular triplets at 1.0%.  + episodes of ventricular bigeminy lasting up to 5 min's and 41 sec's.  + episodes of ventricular trigeminy lasting up to 1 min and 40 sec's.       Echocardiogram on 5/8/2023 at [place]:  1. Status post 29 mm Paulino Patricio 3 transcatheter aortic valve bioprosthesis implanted via a transfemoral approach (6-DEC-2022).   2. Normal prosthetic leaflet(s) motion. Mean gradient 7 mmHg. Trivial prosthetic regurgitation. No periprosthetic regurgitation.   3. Moderate mitral valve regurgitation (PISA ERO 0.31 cm2; RV 32 ml). Mechanism is likely degenerative disease resulting in malcoaptation on a background of moderate MAC.   4. Moderately enlarged left ventricular chamber size with moderate-severe generalized hypokinesis with regional variability, ejection fraction 27%.   5. Elevated left ventricular filling pressure. See comments.   6. Normal right ventricular chamber size with mildly reduced systolic  function. Estimated right ventricular systolic pressure 65 mmHg (right atrial pressure of 10 mmHg).   7. Normal inferior vena cava size with reduced inspiratory collapse (<50%).   8. No  pericardial effusion.   9. Compared to the report of 08/15/2022 the following changes have occurred: in addition to new TAVR implantation, mitral regurgitation has increased and right ventricular systolic pressure has also increased.  Side by side comparison of images   performed.     Echocardiogram in 4/5/2023:  Left ventricular function is decreased. The ejection fraction is 30-35% (moderately reduced). Severe diffuse hypokinesis is present.   Grade II or moderate diastolic dysfunction.  The right ventricle is normal size. Global right ventricular function is normal.  The 29 mm Patricio III TAVR is well-seated. Leaflet opening is not clearly visualized. There is trace paravalvular regurgitation. There is no valvular regurgitation. The Vmax is 2.1 m/s, the mean gradient is 10 mmHg, the dimensionless index is 0.50, and the acceleration time is 120 ms.  This study was compared with the study from 05/05/2022. The AV gradient is lower. There is no significant change in the biventricular function.    [facility]OT at [facility] on 12/8/22:  1. The patient was monitored from 12/8/2022 to 1/6/2023 with a total monitoring time of 28 days 19 hr 54 min. The baseline rhythm was sinus with a first-degree AV delay and a left bundle-branch block. The heart rate varied from 55 bpm to 93 bpm. The   average heart rate was 67 bpm.   2. There were 35,812 PVCs seen singly, paired, and in bigeminy and trigeminy with a PVC burden of 1.26%. There were 6 ventricular tachycardia events with greater than 3 beats with the longest duration being 8 beats. The maximum VT rate was 183 bpm.   3. There were 15,775 PACs seen singly with a PAC burden of <1%. There were 19 runs of supraventricular tachycardia observed with the longest duration being 39 beats. The maximum SVT rate was  "125 bpm.   4. The patient reported 26 symptomatic events of \"dizziness.\" During these events, the rhythm was sinus with a first-degree AV delay and a left bundle-branch block. The heart rate varied from 64 bpm to 92 bpm. At and/or around these times, PVCs were seen    singly and in bigeminy and trigeminy.       Cardiac cath on 11/30/22:  The left main coronary artery is 30% obstructed by multiple discrete lesions.   The proximal left anterior descending artery is 100% obstructed by a discrete lesion. The distal segment is not visible.   The proximal circumflex artery is 40% obstructed by multiple discrete lesions. The distal segment is normal size.   The ramus intermedius segment is 100% obstructed by a discrete lesion. Fills retrogradely from graft.   The proximal right coronary artery is 50% obstructed by a discrete lesion. The distal segment is normal size.   The distal right coronary artery is 30% obstructed by multiple discrete lesions. The distal segment is normal size.   GRAFT ANGIOGRAPHY SUMMARY   Single body vein bypass graft to the Ramus Intermedius Segment. Graft Appears normal.   Single body left internal mammary graft to the Middle Left Anterior Descending Artery.   GRAFT ANGIOGRAPHY SUMMARY   Single body vein bypass graft to the Ramus Intermedius Segment. Graft Appears normal.   Single body left internal mammary graft to the Middle Left Anterior Descending Artery.      ECHO on 5/5/22:  The visual ejection fraction is 35-40%. Moderate diffuse hypokinesis is present.  Right ventricular function, chamber size, wall motion, and thickness are  normal.  The calculated aortic valve area is 0.94 cm2.The mean gradient across the AV is 22mmHg, the peak velocity is 36 mmHg.  Mild mitral insufficiency is present. Mild tricuspid insufficiency is present.  The inferior vena cava cannot be assessed.   Ascending aorta 4.2 cm.  No pericardial effusion is present.    Stress test on 4/26/22:  There is a large area of a " severe   degree of infarction in the anterior, apical, inferior and septal   segment(s) of the left ventricle. The left ventricular ejection fraction   at rest is 37%.  The left ventricular ejection fraction at stress is 38%.       Zio on 3/1/21:  Underlying rhythm was sinus.  Hrt rate ranged from 50 bpm, maximum heart rate of 197 bmp, averaging 72 bmp.  No significant bradycardia, pauses, 2nd degree Mobitz type II or 3rd degree heart block.  No atrial fibrillation was identified on this study.  x0 triggered events and x0 diary entries.  x18 runs of VT lasting to 11 beats with a maximum heart rate of 197 bmp.    x25 runs of SVT lasting up to 15 beats with a maximum heart rate of 156 bmp.  Rare, less than 1% of PAC's, atrial couplets, atrial triplets, ventricular couplets and ventricular triplets.  Frequent PVC's at 15.9%.  + episodes of ventricular bigeminy lasting up to 18 min's and 9 sec's.  + episodes of ventricular trigeminy lasting up to 30 mins and 5 sec's.    Stress echo at [facility] on 5/25/2021:  1. AT REST:   2. Estimated left ventricular ejection fraction 25%   3. Stroke volume index 45 ml/m^2, aortic valve mean systolic gradient 20 mm Hg, aortic valve   area 1.16 cm^2, aortic valve area index 0.62 cm^2/m^2   4. STRESS TEST:   5. Dobutamine was infused from 5 mcg/kg/min to 20.0 mcg/kg/min.   6. PEAK STRESS:   7. Estimated left ventricular ejection fraction range 30% - 35%.   8. Stroke volume index 55 ml/m^2, aortic valve mean systolic gradient 35 mm Hg, aortic valve  area 1.36 cm^2, aortic valve area index 0.73 cm^2/m^2       US aorta on 2/11/21:  No abdominal aortic aneurysm.    US carotids on 2/11/21:  No hemodynamically significant stenoses are identified in either carotid bifurcation.    ECHO on 2/11/21:  No pericardial effusion is present.  Moderately (EF 30-35%) reduced left ventricular function is present.  The right ventricle is normal size.  Global right ventricular function is normal.  Mild left  atrial enlargement is present.  Mild mitral insufficiency is present.  Severe aortic valve calcification is present.  The peak aortic velocity is 3.02 m/sec.  The mean gradient across the aortic valve is 18.4 mmHg.  Moderate aortic stenosis is present.  Right ventricular systolic pressure is 44 mmHg above the right atrial pressure.  Mild tricuspid insufficiency is present.  Mild pulmonic insufficiency is present.  Ascending aorta 4.1 cm.  Sinuses of Valsalva 4.3 cm.    CTA chest on 2/11/21:  2 small nodules are identified in the right lung.  Follow-up CT scan in 6 months time is recommended. The ascending aorta measures 4.2 cm in maximal transverse diameter.    Cardiac cath on 1/15/21:  Right sided filling pressures are normal.  Mild elevated pulmonary hypertension.  Left sided filling pressures are moderately elevated.  Left ventricular filling pressures are moderately elevated.  Normal cardiac output level.  Moderate mitral valve stenosis (mean gradient 8.4 mm Hg, 2.2 cm2)  Moderate aortic valve stenosis (mean gradient 17 mm Hg, 1.2 cm2)  Native three vessel CAD  >>> Moderate 50% proximal RCA stenosis [ungrafted vessel]  >>> Mimimal LMCA stenosis  >>> 100% Ostial LAD stenosis [LIMA-LAD widely patent]  >>> Proximal 50% LCx and 90% RI stenoses [Sequential SVG-RI-OM patent with focal 60% ISR in jump segment of SVG]     Suggest medical treatment at this time.  Neither the aortic valve nor mitral valve require intervention at this time.  The proximal RCA stenosis is unlikely be physiologically significant. The LAD territory is infarcted but the LIMA-LAD remains widely patent.  There is 70% ISR of SVG in jump segment but the OM is receiving brisk DAVID-3 flow via the native coronary (only 50% proximal stenosis).    Cath on 7/17/2007:  1. Severe, single-vessel coronary artery disease. Chronic proximal-to-mild occlusion of the left anterior descending coronary artery with distal right-to-left collaterals.  2. Cypher  drug-eluting stenting of the [id number] segment of the left anterior descending.    Stress test on 12/15/20:    The nuclear stress test is abnormal.    There is a large area of a severe degree of infarction in the entire apical and anteroseptal segment(s) of the left ventricle.    Left ventricular function is moderately reduced.    The left ventricular ejection fraction at rest is 44%.  The left ventricular ejection fraction at stress is 35%.    There is no prior study for comparison.    ECHO on 12/9/20:  No pericardial effusion is present.  Left ventricular size is normal.  Anterior wall hypokinesis is present.  Apical wall hypokinesis is present.  Moderately (EF 30-35%) reduced left ventricular function is present.  The right ventricle is normal size.  Global right ventricular function is normal.  Mild mitral annular calcification is present.  Mild mitral insufficiency is present.  Moderate aortic valve calcification is present.  Trace aortic insufficiency is present.  The Aortic valve has significant calcification of valve leaflets with poor  mobility. Our measured values I feel underestimate the amount of stenosis. By  visual estimate would put at least moderate Aortic stenosis.  Trace tricuspid insufficiency is present.  Right ventricular systolic pressure is 6 mmHg above the right atrial pressure.  The peak aortic velocity is 2.57 m/sec.  The mean gradient across the aortic valve is 15.5 mmHg.        ICD-10-CM    1. Mixed hyperlipidemia  E78.2 EKG 12-lead complete w/read - (Clinic Performed)      2. RIVAS (dyspnea on exertion)  R06.09       3. COPD, mild (H) on 11/18/2020  J44.9                     Past Medical History:   Diagnosis Date    Acute thromboembolism of deep veins of lower extremity (H)     No Comments Provided    Benign essential hypertension     No Comments Provided    Cancer (H)     skin    Congestive heart failure (H)     COPD (chronic obstructive pulmonary disease) (H)     Coronary  atherosclerosis     No Comments Provided    Osteoarthrosis     No Comments Provided    Other and unspecified hyperlipidemia     No Comments Provided    Other specified forms of chronic ischemic heart disease     No Comments Provided    Sarcoidosis     No Comments Provided    Type 2 or unspecified type diabetes mellitus     No Comments Provided       Past Surgical History:   Procedure Laterality Date    ANGIOPLASTY  01/01/2019    stents    CARDIAC SURGERY  01/01/2014    heart bypass    COLONOSCOPY  01/01/2019    CV CORONARY ANGIOGRAM N/A 1/15/2021    Procedure: CV CORONARY ANGIOGRAM;  Surgeon: Charlie Harris MD;  Location: [facility] CARDIAC CATH LAB    CV LEFT HEART CATH N/A 1/15/2021    Procedure: Left Heart Cath;  Surgeon: Charlie Harris MD;  Location:  HEART CARDIAC CATH LAB    CV RIGHT HEART CATH MEASUREMENTS RECORDED N/A 1/15/2021    Procedure: CV RIGHT HEART CATH;  Surgeon: Charlie Harris MD;  Location: [facility] CARDIAC CATH LAB    IR IVC FILTER REMOVAL  5/2/2014    OTHER SURGICAL HISTORY      [id number],BIOPSY LUNG OR [facility]ICAL [facility]ING       Allergies   Allergen Reactions    Isosorbide Nitrate Visual Disturbance     LIGHT HEADED,ALMOST PASSED OUT      Zocor [Simvastatin] Itching    Atorvastatin Itching    Isosorbide Visual Disturbance    Rosuvastatin Itching    Ancef [Cefazolin] Rash     Patient received cefazolin IV while in the [facility] on 12/6/22. On 12/12/22 he was seen by his primary for a drug rash on his trunk. Pt thought it was from amoxicillin administered at the [facility] but the [facility] only infused cefazolin per chart records. Amoxil rx given for dental procedures pt will not take due to fear of reaction.        Current Outpatient Medications   Medication Sig Dispense Refill    acetaminophen (TYLENOL) 500 MG tablet Take 1 tablet (500 mg) by mouth every 8 hours as needed for mild pain 60 tablet 0    albuterol (PROAIR HFA/PROVENTIL HFA/VENTOLIN HFA) 108 (90 Base) MCG/ACT inhaler  Inhale 1-2 puffs into the lungs every 4 hours as needed for shortness of breath or wheezing 18 g 3    blood glucose (NO BRAND SPECIFIED) lancets standard Use to test blood sugar 1 times daily or as directed. 1 each 3    blood glucose (NO BRAND SPECIFIED) test strip Use to test blood sugar 1 times daily or as directed. 90 strip 3    blood glucose monitoring (NO BRAND SPECIFIED) meter device kit Use to test blood sugar 1 times daily or as directed. 1 kit 0    Calcium Carbonate-Vit D-Min (CALCIUM 600+D PLUS MINERALS) 600-400 MG-UNIT TABS Take 1 tablet by mouth daily      cinnamon 500 MG CAPS Take 500 mg by mouth daily       clopidogrel (PLAVIX) 75 MG tablet Take 1 tablet (75 mg) by mouth daily 90 tablet 3    Coenzyme Q10 (CO Q-10) 200 MG CAPS Take 1 capsule by mouth daily      cyanocobalamin (VITAMIN B-12) 500 MCG SUBL sublingual tablet Place 500 mcg under the tongue daily      empagliflozin (JARDIANCE) 25 MG TABS tablet Take 0.5 tablets (12.5 mg) by mouth daily. 45 tablet 3    ezetimibe (ZETIA) 10 MG tablet TAKE 1 TABLET BY MOUTH AT BEDTIME 8PM 90 tablet 3    finasteride (PROSCAR) 5 MG tablet Take 1 tablet by mouth daily at 2 pm.      furosemide (LASIX) 20 MG tablet Take 2 tablets (40 mg) by mouth 2 times daily. 270 tablet 3    LUTEIN PO Take 1 tablet by mouth daily      metoprolol succinate ER (TOPROL XL) 50 MG 24 hr tablet Take 1 tablet (50 mg) by mouth 2 times daily. 180 tablet 1    nitroGLYcerin (NITROSTAT) 0.4 MG sublingual tablet For chest pain place 1 tablet under the tongue every 5 minutes for 3 doses. If symptoms persist 5 minutes after 1st dose call 911. 25 tablet 3    Omega-3 Fatty Acids (FISH OIL) 1200 MG capsule Take 1 capsule by mouth daily      rosuvastatin (CRESTOR) 20 MG tablet Take 1 tablet (20 mg) by mouth twice a week 30 tablet 3    sacubitril-valsartan (ENTRESTO) 24-26 MG per tablet Take 1 tablet by mouth 2 times daily 180 tablet 3    vitamin C (ASCORBIC ACID) 500 MG tablet Take 1,000 mg by mouth  daily      Vitamin D3 (CHOLECALCIFEROL) 125 MCG (5000 UT) tablet Take 1 tablet by mouth daily      warfarin ANTICOAGULANT (COUMADIN) 5 MG tablet Take 5mg by oral route on , , and take 2.5mg by oral route all other days 90 tablet 3    amoxicillin (AMOXIL) 500 MG capsule Take 2 g, 30-60 minutes, prior to dental procedures/cleanings (Patient not taking: Reported on 2024) 4 capsule 3       Social History     Socioeconomic History    Marital status:      Spouse name: Not on file    Number of children: Not on file    Years of education: Not on file    Highest education level: Not on file   Occupational History    Not on file   Tobacco Use    Smoking status: Former     Current packs/day: 0.00     Average packs/day: 0.5 packs/day for 24.0 years (12.0 ttl pk-yrs)     Types: Cigarettes     Start date: 1954     Quit date: 1978     Years since quittin.9    Smokeless tobacco: Never   Vaping Use    Vaping status: Never Used   Substance and Sexual Activity    Alcohol use: Yes     Alcohol/week: 5.0 standard drinks of alcohol     Types: 3 Cans of beer, 2 Shots of liquor per week    Drug use: Unknown     Types: Other     Comment: Drug use: No    Sexual activity: Not on file   Other Topics Concern    Parent/sibling w/ CABG, MI or angioplasty before 65F 55M? Not Asked   Social History Narrative    Not on file     Social Drivers of Health     Financial Resource Strain: Low Risk  (10/27/2023)    Financial Resource Strain     Within the past 12 months, have you or your family members you live with been unable to get utilities (heat, electricity) when it was really needed?: No   Food Insecurity: Low Risk  (10/27/2023)    Food Insecurity     Within the past 12 months, did you worry that your food would run out before you got money to buy more?: No     Within the past 12 months, did the food you bought just not last and you didn t have money to get more?: No   Transportation Needs: Low Risk   (10/27/2023)    Transportation Needs     Within the past 12 months, has lack of transportation kept you from medical appointments, getting your medicines, non-medical meetings or appointments, work, or from getting things that you need?: No   Physical Activity: Not on file   Stress: Not on file   Social Connections: Not on file   Interpersonal Safety: Low Risk  (10/27/2023)    Interpersonal Safety     Do you feel physically and emotionally safe where you currently live?: Yes     Within the past 12 months, have you been hit, slapped, kicked or otherwise physically hurt by someone?: No     Within the past 12 months, have you been humiliated or emotionally abused in other ways by your partner or ex-partner?: No   Housing Stability: Low Risk  (10/27/2023)    Housing Stability     Do you have housing? : Yes     Are you worried about losing your housing?: No       LAB RESULTS:   Orders Only on 01/21/2021   Component Date Value Ref Range Status    Sodium 01/21/2021 141  133 - 144 mmol/L Final    Potassium 01/21/2021 4.7  3.4 - 5.3 mmol/L Final    Chloride 01/21/2021 107  94 - 109 mmol/L Final    Carbon Dioxide 01/21/2021 29  20 - 32 mmol/L Final    Anion Gap 01/21/2021 5  3 - 14 mmol/L Final    Glucose 01/21/2021 125* 70 - 99 mg/dL Final    Urea Nitrogen 01/21/2021 26  7 - 30 mg/dL Final    Creatinine 01/21/2021 1.40* 0.66 - 1.25 mg/dL Final    GFR Estimate 01/21/2021 44* >60 mL/min/[1.73_m2] Final    GFR Estimate If Black 01/21/2021 51* >60 mL/min/[1.73_m2] Final    Calcium 01/21/2021 9.9  8.5 - 10.1 mg/dL Final    WBC 01/21/2021 6.7  4.0 - 11.0 10e9/L Final    RBC Count 01/21/2021 3.63* 4.4 - 5.9 10e12/L Final    Hemoglobin 01/21/2021 11.3* 13.3 - 17.7 g/dL Final    Hematocrit 01/21/2021 34.9* 40.0 - 53.0 % Final    MCV 01/21/2021 96  78 - 100 fl Final    MCH 01/21/2021 31.1  26.5 - 33.0 pg Final    MCHC 01/21/2021 32.4  31.5 - 36.5 g/dL Final    RDW 01/21/2021 12.2  10.0 - 15.0 % Final    Platelet Count 01/21/2021 163   150 - 450 10e9/L Final    INR Point of Care 01/21/2021 2.9* 0.86 - 1.14 Final    Capillary Blood Collection 01/21/2021 Capillary collection performed   Final   Office Visit on 01/12/2021   Component Date Value Ref Range Status    WBC 01/12/2021 6.4  4.0 - 11.0 10e9/L Final    RBC Count 01/12/2021 3.72* 4.4 - 5.9 10e12/L Final    Hemoglobin 01/12/2021 11.7* 13.3 - 17.7 g/dL Final    Hematocrit 01/12/2021 35.3* 40.0 - 53.0 % Final    MCV 01/12/2021 95  78 - 100 fl Final    MCH 01/12/2021 31.5  26.5 - 33.0 pg Final    MCHC 01/12/2021 33.1  31.5 - 36.5 g/dL Final    RDW 01/12/2021 12.2  10.0 - 15.0 % Final    Platelet Count 01/12/2021 162  150 - 450 10e9/L Final    % Neutrophils 01/12/2021 62.1  % Final    % Lymphocytes 01/12/2021 19.7  % Final    % Monocytes 01/12/2021 13.5  % Final    % Eosinophils 01/12/2021 4.2  % Final    % Basophils 01/12/2021 0.5  % Final    Absolute Neutrophil 01/12/2021 4.0  1.6 - 8.3 10e9/L Final    Absolute Lymphocytes 01/12/2021 1.3  0.8 - 5.3 10e9/L Final    Absolute Monocytes 01/12/2021 0.9  0.0 - 1.3 10e9/L Final    Absolute Eosinophils 01/12/2021 0.3  0.0 - 0.7 10e9/L Final    Absolute Basophils 01/12/2021 0.0  0.0 - 0.2 10e9/L Final    Diff Method 01/12/2021 Automated Method   Final    Sodium 01/12/2021 142  133 - 144 mmol/L Final    Potassium 01/12/2021 4.4  3.4 - 5.3 mmol/L Final    Chloride 01/12/2021 109  94 - 109 mmol/L Final    Carbon Dioxide 01/12/2021 29  20 - 32 mmol/L Final    Anion Gap 01/12/2021 4  3 - 14 mmol/L Final    Glucose 01/12/2021 100* 70 - 99 mg/dL Final    Urea Nitrogen 01/12/2021 28  7 - 30 mg/dL Final    Creatinine 01/12/2021 1.14  0.66 - 1.25 mg/dL Final    GFR Estimate 01/12/2021 57* >60 mL/min/[1.73_m2] Final    GFR Estimate If Black 01/12/2021 66  >60 mL/min/[1.73_m2] Final    Calcium 01/12/2021 9.6  8.5 - 10.1 mg/dL Final    INR Point of Care 01/12/2021 3.8* 0.86 - 1.14 Final   Office Visit on 01/11/2021   Component Date Value Ref Range Status    COVID-19  "Virus PCR to [facility] - So* 01/11/2021 Nasopharyngeal   Final    COVID-19 Virus PCR to [facility] - Re* 01/11/2021 Not Detected   Final   Orders Only on 12/01/2020   Component Date Value Ref Range Status    INR 12/01/2020 2.65* 0.86 - 1.14 Final   Orders Only on 12/01/2020   Component Date Value Ref Range Status    WBC 12/01/2020 5.5  4.0 - 11.0 10e9/L Final    RBC Count 12/01/2020 3.98* 4.4 - 5.9 10e12/L Final    Hemoglobin 12/01/2020 12.5* 13.3 - 17.7 g/dL Final    Hematocrit 12/01/2020 38.4* 40.0 - 53.0 % Final    MCV 12/01/2020 97  78 - 100 fl Final    MCH 12/01/2020 31.4  26.5 - 33.0 pg Final    MCHC 12/01/2020 32.6  31.5 - 36.5 g/dL Final    RDW 12/01/2020 12.4  10.0 - 15.0 % Final    Platelet Count 12/01/2020 141* 150 - 450 10e9/L Final    Diff Method 12/01/2020 Automated Method   Final    % Neutrophils 12/01/2020 63.5  % Final    % Lymphocytes 12/01/2020 21.9  % Final    % Monocytes 12/01/2020 12.2  % Final    % Eosinophils 12/01/2020 1.8  % Final    % Basophils 12/01/2020 0.4  % Final    % Immature Granulocytes 12/01/2020 0.2  % Final    Nucleated RBCs 12/01/2020 0  0 /100 Final    Absolute Neutrophil 12/01/2020 3.5  1.6 - 8.3 10e9/L Final    Absolute Lymphocytes 12/01/2020 1.2  0.8 - 5.3 10e9/L Final    Absolute Monocytes 12/01/2020 0.7  0.0 - 1.3 10e9/L Final    Absolute Eosinophils 12/01/2020 0.1  0.0 - 0.7 10e9/L Final    Absolute Basophils 12/01/2020 0.0  0.0 - 0.2 10e9/L Final    Abs Immature Granulocytes 12/01/2020 0.0  0 - 0.4 10e9/L Final    Absolute Nucleated RBC 12/01/2020 0.0   Final        Review of systems: Negative except that which was noted in the HPI.    Physical examination:  BP 96/60 (BP Location: Right arm, Patient Position: Sitting, Cuff Size: Adult Regular)   Pulse 66   Temp 96.8  F (36  C) (Tympanic)   Resp 16   Ht 1.778 m (5' 10\")   Wt 73.1 kg (161 lb 1.6 oz)   SpO2 100%   BMI 23.12 kg/m      GENERAL APPEARANCE: healthy, alert and no distress  CHEST: lungs clear to " auscultation.  CARDIOVASCULAR: Irregular rate irregular rhythm, normal S1 with physiologic split S2, no S3 or S4 but with systolic crescendo decrescendo murmur, no click or rub  EXTREMITIES: no clubbing, cyanosis with scant to mild peripheral edema      Total time spent on day of visit, including review of tests, obtaining/reviewing separately obtained history, ordering medications/tests/procedures, communicating with PCP/consultants, and documenting in electronic medical record: 30 minutes.           Thank you for allowing me to participate in the care of your patient. Please do not hesitate to contact me if you have any questions.     Justyn Rae, DO

## 2024-11-27 ENCOUNTER — OFFICE VISIT (OUTPATIENT)
Dept: CARDIOLOGY | Facility: OTHER | Age: 89
End: 2024-11-27
Attending: INTERNAL MEDICINE
Payer: MEDICARE

## 2024-11-27 VITALS
OXYGEN SATURATION: 100 % | RESPIRATION RATE: 16 BRPM | TEMPERATURE: 96.8 F | HEIGHT: 70 IN | DIASTOLIC BLOOD PRESSURE: 60 MMHG | HEART RATE: 66 BPM | SYSTOLIC BLOOD PRESSURE: 96 MMHG | BODY MASS INDEX: 23.06 KG/M2 | WEIGHT: 161.1 LBS

## 2024-11-27 DIAGNOSIS — E78.2 MIXED HYPERLIPIDEMIA: Primary | ICD-10-CM

## 2024-11-27 DIAGNOSIS — R06.09 DOE (DYSPNEA ON EXERTION): ICD-10-CM

## 2024-11-27 DIAGNOSIS — J44.9 COPD, MILD (H): ICD-10-CM

## 2024-11-27 LAB
ATRIAL RATE - MUSE: NORMAL BPM
DIASTOLIC BLOOD PRESSURE - MUSE: NORMAL MMHG
INTERPRETATION ECG - MUSE: NORMAL
P AXIS - MUSE: NORMAL DEGREES
PR INTERVAL - MUSE: NORMAL MS
QRS DURATION - MUSE: 156 MS
QT - MUSE: 396 MS
QTC - MUSE: 481 MS
R AXIS - MUSE: -35 DEGREES
SYSTOLIC BLOOD PRESSURE - MUSE: NORMAL MMHG
T AXIS - MUSE: 121 DEGREES
VENTRICULAR RATE- MUSE: 89 BPM

## 2024-11-27 PROCEDURE — G0463 HOSPITAL OUTPT CLINIC VISIT: HCPCS | Mod: 25

## 2024-11-27 ASSESSMENT — PAIN SCALES - GENERAL: PAINLEVEL_OUTOF10: MILD PAIN (2)

## 2024-11-27 NOTE — PATIENT INSTRUCTIONS
Thank you for allowing Dr NOAH Rae and our  team to participate in your care. Please call our office at [phone] with scheduling questions or if you need to cancel or change your appointment. With any other questions or concerns you may call cardiology nurse at  [phone].       If you experience chest pain, chest pressure, chest tightness, shortness of breath, fainting, lightheadedness, nausea, vomiting, or other concerning symptoms, please report to the Emergency Department or call 911. These symptoms may be emergent, and best treated in the Emergency Department.

## 2024-12-13 ENCOUNTER — APPOINTMENT (OUTPATIENT)
Dept: ANTICOAGULATION | Facility: OTHER | Age: 89
End: 2024-12-13
Attending: FAMILY MEDICINE
Payer: MEDICARE

## 2024-12-16 ENCOUNTER — LAB (OUTPATIENT)
Dept: LAB | Facility: OTHER | Age: 89
End: 2024-12-16
Payer: MEDICARE

## 2024-12-16 ENCOUNTER — ANTICOAGULATION THERAPY VISIT (OUTPATIENT)
Dept: ANTICOAGULATION | Facility: OTHER | Age: 89
End: 2024-12-16
Payer: MEDICARE

## 2024-12-16 DIAGNOSIS — Z86.718 HISTORY OF DVT (DEEP VEIN THROMBOSIS): Primary | ICD-10-CM

## 2024-12-16 DIAGNOSIS — Z95.3 S/P TAVR (TRANSCATHETER AORTIC VALVE REPLACEMENT), BIOPROSTHETIC: ICD-10-CM

## 2024-12-16 DIAGNOSIS — I82.5Y3 CHRONIC DEEP VEIN THROMBOSIS (DVT) OF PROXIMAL VEIN OF BOTH LOWER EXTREMITIES (H): ICD-10-CM

## 2024-12-16 DIAGNOSIS — Z86.718 PERSONAL HISTORY OF DVT (DEEP VEIN THROMBOSIS): ICD-10-CM

## 2024-12-16 DIAGNOSIS — Z86.718 HISTORY OF DVT (DEEP VEIN THROMBOSIS): ICD-10-CM

## 2024-12-16 LAB — INR BLD: 2.3 (ref 0.9–1.1)

## 2024-12-16 PROCEDURE — 36416 COLLJ CAPILLARY BLOOD SPEC: CPT | Mod: ZL

## 2024-12-16 PROCEDURE — 85610 PROTHROMBIN TIME: CPT | Mod: ZL

## 2024-12-16 NOTE — PROGRESS NOTES
ANTICOAGULATION MANAGEMENT     Sidneycarol Barriga 89 year old male is on warfarin with therapeutic INR result. (Goal INR 2.0-3.0)    Recent labs: (last 7 days)     12/16/24  1106   INR 2.3*       ASSESSMENT     Source(s): Chart Review and Patient/Caregiver Call     Warfarin doses taken: Warfarin taken as instructed  Diet: No new diet changes identified  New illness, injury, or hospitalization: No  Medication/supplement changes: None noted  Signs or symptoms of bleeding or clotting: No  Previous INR: Therapeutic last visit; previously outside of goal range  Additional findings: None       PLAN     Recommended plan for no diet, medication or health factor changes affecting INR     Dosing Instructions: Continue your current warfarin dose with next INR in 4 weeks       Summary  As of 12/16/2024      Full warfarin instructions:  5 mg every Mon, Fri; 2.5 mg all other days   Next INR check:  1/13/2025               Telephone call with Sidney who verbalizes understanding and agrees to plan    Lab visit scheduled    Education provided: Please call back if any changes to your diet, medications or how you've been taking warfarin    Plan made per [facility] anticoagulation protocol    Yolande Johnson, RN  Anticoagulation Clinic  12/16/2024    _______________________________________________________________________     Anticoagulation Episode Summary       Current INR goal:  2.0-3.0   TTR:  75.9% (1 y)   Target end date:  Indefinite   Send INR reminders to:  [facility] [place]    Indications    History of DVT (deep vein thrombosis)-multiple [Z95.718]  Chronic deep vein thrombosis (DVT) of proximal vein of both lower extremities (H) [I82.5Y3]  Personal history of DVT (deep vein thrombosis) [Z86.538]             Comments:  We will continue on Coumadin with goal INR of 2.0-2.7 per Dr Rae             Anticoagulation Care Providers       Provider Role Specialty Phone number    Zach Arredondo MD Referring [facility] Medicine [phone]

## 2025-01-13 ENCOUNTER — ANTICOAGULATION THERAPY VISIT (OUTPATIENT)
Dept: ANTICOAGULATION | Facility: OTHER | Age: OVER 89
End: 2025-01-13
Payer: MEDICARE

## 2025-01-13 ENCOUNTER — LAB (OUTPATIENT)
Dept: LAB | Facility: OTHER | Age: OVER 89
End: 2025-01-13
Payer: MEDICARE

## 2025-01-13 DIAGNOSIS — Z95.3 S/P TAVR (TRANSCATHETER AORTIC VALVE REPLACEMENT), BIOPROSTHETIC: ICD-10-CM

## 2025-01-13 DIAGNOSIS — I82.5Y3 CHRONIC DEEP VEIN THROMBOSIS (DVT) OF PROXIMAL VEIN OF BOTH LOWER EXTREMITIES (H): ICD-10-CM

## 2025-01-13 DIAGNOSIS — Z86.718 HISTORY OF DVT (DEEP VEIN THROMBOSIS): Primary | ICD-10-CM

## 2025-01-13 DIAGNOSIS — Z86.718 PERSONAL HISTORY OF DVT (DEEP VEIN THROMBOSIS): ICD-10-CM

## 2025-01-13 DIAGNOSIS — Z86.718 HISTORY OF DVT (DEEP VEIN THROMBOSIS): ICD-10-CM

## 2025-01-13 LAB — INR BLD: 2.3 (ref 0.9–1.1)

## 2025-01-13 PROCEDURE — 36416 COLLJ CAPILLARY BLOOD SPEC: CPT | Mod: ZL

## 2025-01-13 PROCEDURE — 85610 PROTHROMBIN TIME: CPT | Mod: ZL

## 2025-01-13 NOTE — PROGRESS NOTES
ANTICOAGULATION MANAGEMENT     Sidney Barriga 89 year old male is on warfarin with therapeutic INR result. (Goal INR 2.0-3.0)    Recent labs: (last 7 days)     01/13/25  0957   INR 2.3*       ASSESSMENT     Source(s): Chart Review  Previous INR was Therapeutic last 2(+) visits  Medication, diet, health changes since last INR chart reviewed; none identified  Patient called back he has no changes and will continue same dose. Lab schedule for next INR       PLAN     Recommended plan for no diet, medication or health factor changes affecting INR     Dosing Instructions: Continue your current warfarin dose with next INR in 4 weeks       Summary  As of 1/13/2025      Full warfarin instructions:  5 mg every Mon, Fri; 2.5 mg all other days   Next INR check:  2/24/2025               Detailed voice message left for Sidney with dosing instructions and follow up date.     Contact [phone] or [phone] to schedule and with any changes, questions or concerns.     Education provided: Please call back if any changes to your diet, medications or how you've been taking warfarin    Plan made per [facility] anticoagulation protocol    Yolande Johnson RN  Anticoagulation Clinic  1/13/2025    _______________________________________________________________________     Anticoagulation Episode Summary       Current INR goal:  2.0-3.0   TTR:  75.9% (1 y)   Target end date:  Indefinite   Send INR reminders to:  [facility]    Indications    History of DVT (deep vein thrombosis)-multiple [Z06.718]  Chronic deep vein thrombosis (DVT) of proximal vein of both lower extremities (H) [I82.5Y3]  Personal history of DVT (deep vein thrombosis) [Z86.558]             Comments:  We will continue on Coumadin with goal INR of 2.0-2.7 per Dr Rae             Anticoagulation Care Providers       Provider Role Specialty Phone number    Zach Arredondo MD Referring Family Medicine [phone]

## 2025-02-21 ENCOUNTER — LAB (OUTPATIENT)
Dept: LAB | Facility: OTHER | Age: OVER 89
End: 2025-02-21
Payer: MEDICARE

## 2025-02-21 ENCOUNTER — TELEPHONE (OUTPATIENT)
Dept: CARDIOLOGY | Facility: OTHER | Age: OVER 89
End: 2025-02-21

## 2025-02-21 DIAGNOSIS — Z86.718 HISTORY OF DVT (DEEP VEIN THROMBOSIS): ICD-10-CM

## 2025-02-21 DIAGNOSIS — Z95.3 S/P TAVR (TRANSCATHETER AORTIC VALVE REPLACEMENT), BIOPROSTHETIC: ICD-10-CM

## 2025-02-21 DIAGNOSIS — I82.5Y3 CHRONIC DEEP VEIN THROMBOSIS (DVT) OF PROXIMAL VEIN OF BOTH LOWER EXTREMITIES (H): ICD-10-CM

## 2025-02-21 LAB — INR BLD: 3 (ref 0.9–1.1)

## 2025-02-21 PROCEDURE — 36415 COLL VENOUS BLD VENIPUNCTURE: CPT | Mod: ZL

## 2025-02-21 PROCEDURE — 85610 PROTHROMBIN TIME: CPT | Mod: ZL

## 2025-02-21 NOTE — TELEPHONE ENCOUNTER
Dr. Rae,     Pt INR is 3.0. Looking at his INR comment he likes to be 2.0-2.7 due to his past hx of bleeding. There is no protocol for this INR range. Would you like patient to continue his current dose or would you like something different? I spoke to patient and let him know to call us if he has any questions or concerns.     Routing to provider to review and respond.    Marko Rosario RN on 2/21/2025 at 11:16 AM

## 2025-02-24 NOTE — TELEPHONE ENCOUNTER
Called and spoke with patient and patient did verbalize that he would want to get his INR checked tomorrow 2/25/25. Patient was transferred to scheduling to schedule that INR lab appointment, patient hung up during transfer. Scheduling verbalized that they will try and call him back. Yolande Johnson RN on 2/24/2025 at 11:04 AM        Justyn Rae, DO to Marko Rosario RN   DB    2/24/25  9:44 AM   Continue current dose and recheck INR in 2 to 3 days from last INR check.

## 2025-02-25 ENCOUNTER — LAB (OUTPATIENT)
Dept: LAB | Facility: OTHER | Age: OVER 89
End: 2025-02-25
Attending: FAMILY MEDICINE
Payer: MEDICARE

## 2025-02-25 ENCOUNTER — ANTICOAGULATION THERAPY VISIT (OUTPATIENT)
Dept: ANTICOAGULATION | Facility: OTHER | Age: OVER 89
End: 2025-02-25
Attending: FAMILY MEDICINE
Payer: MEDICARE

## 2025-02-25 DIAGNOSIS — I82.5Y3 CHRONIC DEEP VEIN THROMBOSIS (DVT) OF PROXIMAL VEIN OF BOTH LOWER EXTREMITIES (H): ICD-10-CM

## 2025-02-25 DIAGNOSIS — Z86.718 HISTORY OF DVT (DEEP VEIN THROMBOSIS): Primary | ICD-10-CM

## 2025-02-25 DIAGNOSIS — Z86.718 HISTORY OF DVT (DEEP VEIN THROMBOSIS): ICD-10-CM

## 2025-02-25 DIAGNOSIS — Z95.3 S/P TAVR (TRANSCATHETER AORTIC VALVE REPLACEMENT), BIOPROSTHETIC: ICD-10-CM

## 2025-02-25 DIAGNOSIS — Z86.718 PERSONAL HISTORY OF DVT (DEEP VEIN THROMBOSIS): ICD-10-CM

## 2025-02-25 LAB — INR BLD: 3.2 (ref 0.9–1.1)

## 2025-02-25 PROCEDURE — 85610 PROTHROMBIN TIME: CPT | Mod: ZL

## 2025-02-25 PROCEDURE — 36416 COLLJ CAPILLARY BLOOD SPEC: CPT | Mod: ZL

## 2025-02-25 NOTE — PROGRESS NOTES
ANTICOAGULATION MANAGEMENT     Sidney Barriga 89 year old male is on warfarin with supratherapeutic INR result. (Goal INR 2.0-3.0)    Recent labs: (last 7 days)     02/25/25  0920   INR 3.2*       ASSESSMENT     Source(s): Chart Review and Patient/Caregiver Call     Warfarin doses taken: Warfarin taken as instructed  Diet: No new diet changes identified  Medication/supplement changes: None noted  New illness, injury, or hospitalization: No  Signs or symptoms of bleeding or clotting: No  Previous result: Therapeutic last 2(+) visits  Additional findings: None       PLAN     Recommended plan for no diet, medication or health factor changes and previous 2 INR results within goal affecting INR     Dosing Instructions: hold dose then continue your current warfarin dose with next INR in 2 weeks       Summary  As of 2/25/2025      Full warfarin instructions:  2/25: Hold; Otherwise 5 mg every Mon, Fri; 2.5 mg all other days   Next INR check:  3/11/2025               Telephone call with Sidney who verbalizes understanding and agrees to plan    Lab visit scheduled    Education provided: None required        Lizbeth Ely RN  2/25/2025  Anticoagulation Clinic  YouHelp for routing messages: al [facility]  [facility] patient phone line: [phone]        _______________________________________________________________________     Anticoagulation Episode Summary       Current INR goal:  2.0-3.0   TTR:  74.8% (1 y)   Target end date:  Indefinite   Send INR reminders to:  [facility]    Indications    History of DVT (deep vein thrombosis)-multiple [Z25.800]  Chronic deep vein thrombosis (DVT) of proximal vein of both lower extremities (H) [I82.5Y3]  Personal history of DVT (deep vein thrombosis) [P94.329]             Comments:  We will continue on Coumadin with goal INR of 2.0-2.7 per Dr Rae             Anticoagulation Care Providers       Provider Role Specialty Phone number    Zach Arredondo MD Referring Family  Medicine [phone]

## 2025-03-02 NOTE — PROGRESS NOTES
March 3, 2025     Dear Dr. Rae,  I had the pleasure of seeing Sidney Barriga  in the [facility].  As you know he is a   89 y/o male who recently established care in cardiology, he did live in [place] in the past.  He has a history of coronary disease with both stenting and CABG x3.  His wife passed away on 9/1/2020, suddenly from cancer.  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  Dating back in 1982, he was diagnosed with sarcoidosis.  He had a cardiac catheterization on 7/17/2007 with history of MI with stenting x2 placed to the p-mLAD.  He went on to have CABG x3 on 4/29/2014 in [place].  He had LIMA to LAD, SVG to OM1 and OM 2.  At that time, his EF was reported to be 30% with ischemic cardiomyopathy.  More recently, he reports having had a cardiac catheterization once again in [place] with stenting to unknown vessels in 2019.  His symptoms in the past have been exertional dyspnea with intrascapular discomfort.  He states he has been having similar symptoms for the last 6 months but to a lesser degree.  He has been on Coumadin for extended period time.  He describes having had multiple recurrent DVT's with a IVC filter in place.  He is currently on Coumadin with management through the Coumadin clinic. He did undergo TAVR in 2022 at [place].   He is doing relatively well denies any new complaints or issues other than ongoing shortness of breath and reduced exercise tolerance.  He notes that he lives in an assisted living facility now he has difficulties walking due to the distance that he has in the past.  He also has 2+ lower extremity edema.  No other complaints no dizziness  lightheadedness palpitations.  He does orally prepare all his medications and takes them all.  Knows all his medications very well.  He continues to drive which we again have discussed.  Please note that his daughter did move out of [place] but he does have 2 grandchildren here one of them is working in the ER at [employer].     PAST MEDICAL HISTORY:  1.  RIVAS 2/2 CHF, pulm htn, mild COPD, severe diffusion defect on 11/18/20, and DVT/PE???  2.  CABG x3, 4/29/2014.  -LIMA to LAD, SVG to OM, and 2nd OM.  3.  CKD-3.  4.  CHF-systolic, ischemic.                 -15-20% on 4/3/24.                -27% on 5/8/2023, [place].  -30-35% on 4/5/22 and 4/5/2023.   -35-40% on 5/5/22.   -30-35% on 12/9/20.   -Diastolic grade 1 on 11/1/2007. Unchanged on 2/11/21.  5.  TAVR on 12/6/22, [facility].   -29 mm Patricio S3.  6.  MV and AV-moderate stenosis on cardiac cath on 1/15/21.   7.  H/O DVT-x5.   -H/O IVC filter, removal in 2014.  8.  Hypomagnesia.                -2.0 on 3/27/2024.  9.  Hypertension-controlled.  10.  Hypertriglyceridemia-controlled.  11.  Tobacco abuse.    -Quit in 1978.  12.  Cardiac cath x5.    -7/17/2007, 4/20//14, 2019, 1/15/21, and 11/30/22.  13.  Coronary stent placement on 7/17/2007.  -x2 stents to the LAD and in 2019 in [place] to unknown vessel.  14.  H/O sarcoidosis in 1982.  15.  DM-2-controlled.   16.  COPD.  -Mild on 11/18/2020.  17.  WMA on 12/9/20.  18.  Right lung nodule x2 on 2/12/2021.  19.  Concern for TAAA at 4.3 cm on 5/15/2021.  20.  Concern for AAA r/o on ultrasound from 2/12/2021.  21.  Mild pulmonary hypertension on his cardiac cath at the [facility] on 1/15/2021.  22.  A-fib, new onset on 3/27/2024.                -Metoprolol and Coumadin.                -Stop Coreg 12.5 mg.  Start metoprolol 50 mg XL twice daily, 3/27/2024     PAST MEDICAL HISTORY:  Past Medical History:   Diagnosis Date    Acute thromboembolism of deep veins of lower extremity (H)     No Comments Provided    Benign  essential hypertension     No Comments Provided    Cancer (H)     skin    Congestive heart failure (H)     COPD (chronic obstructive pulmonary disease) (H)     Coronary atherosclerosis     No Comments Provided    Osteoarthrosis     No Comments Provided    Other and unspecified hyperlipidemia     No Comments Provided    Other specified forms of chronic ischemic heart disease     No Comments Provided    Sarcoidosis     No Comments Provided    Type 2 or unspecified type diabetes mellitus     No Comments Provided     FAMILY HISTORY:  Family History   Problem Relation Age of Onset    Myocardial Infarction Father     Coronary Artery Disease Father     Alcoholism Mother     Hearing Loss Mother     Hyperlipidemia Mother     Coronary Artery Disease Brother     Cancer Brother     Hyperlipidemia Brother     Myocardial Infarction Brother     Obesity Brother     Kidney Disease Brother     Prostate Cancer Brother     Asthma Brother     Breast Cancer Sister     Cerebrovascular Disease Maternal Grandmother     Diabetes Paternal Grandmother     Hypertension No family hx of     Colon Cancer No family hx of     Anesthesia Reaction No family hx of     Thyroid Disease No family hx of     Genetic Disorder No family hx of      SOCIAL HISTORY:  Social History     Socioeconomic History    Marital status:    Tobacco Use    Smoking status: Former     Current packs/day: 0.00     Average packs/day: 0.5 packs/day for 24.0 years (12.0 ttl pk-yrs)     Types: Cigarettes     Start date: 1954     Quit date: 1978     Years since quittin.1    Smokeless tobacco: Never   Vaping Use    Vaping status: Never Used   Substance and Sexual Activity    Alcohol use: Yes     Alcohol/week: 5.0 standard drinks of alcohol     Types: 3 Cans of beer, 2 Shots of liquor per week    Drug use: Unknown     Types: Other     Comment: Drug use: No     Social Drivers of Health     Financial Resource Strain: Low Risk  (10/27/2023)    Financial Resource Strain      Within the past 12 months, have you or your family members you live with been unable to get utilities (heat, electricity) when it was really needed?: No   Food Insecurity: Low Risk  (10/27/2023)    Food Insecurity     Within the past 12 months, did you worry that your food would run out before you got money to buy more?: No     Within the past 12 months, did the food you bought just not last and you didn t have money to get more?: No   Transportation Needs: Low Risk  (10/27/2023)    Transportation Needs     Within the past 12 months, has lack of transportation kept you from medical appointments, getting your medicines, non-medical meetings or appointments, work, or from getting things that you need?: No   Interpersonal Safety: Low Risk  (10/27/2023)    Interpersonal Safety     Do you feel physically and emotionally safe where you currently live?: Yes     Within the past 12 months, have you been hit, slapped, kicked or otherwise physically hurt by someone?: No     Within the past 12 months, have you been humiliated or emotionally abused in other ways by your partner or ex-partner?: No   Housing Stability: Low Risk  (10/27/2023)    Housing Stability     Do you have housing? : Yes     Are you worried about losing your housing?: No     CURRENT MEDICATIONS:  Current Outpatient Medications   Medication Sig Dispense Refill    acetaminophen (TYLENOL) 500 MG tablet Take 1 tablet (500 mg) by mouth every 8 hours as needed for mild pain 60 tablet 0    albuterol (PROAIR HFA/PROVENTIL HFA/VENTOLIN HFA) 108 (90 Base) MCG/ACT inhaler Inhale 1-2 puffs into the lungs every 4 hours as needed for shortness of breath or wheezing. 18 g 3    amoxicillin (AMOXIL) 500 MG capsule Take 2 g, 30-60 minutes, prior to dental procedures/cleanings (Patient not taking: Reported on 8/26/2024) 4 capsule 3    blood glucose (NO BRAND SPECIFIED) lancets standard Use to test blood sugar 1 times daily or as directed. 1 each 3    blood glucose (NO BRAND  SPECIFIED) test strip Use to test blood sugar 1 times daily or as directed. 90 strip 3    blood glucose monitoring (NO BRAND SPECIFIED) meter device kit Use to test blood sugar 1 times daily or as directed. 1 kit 0    Calcium Carbonate-Vit D-Min (CALCIUM 600+D PLUS MINERALS) 600-400 MG-UNIT TABS Take 1 tablet by mouth daily      cinnamon 500 MG CAPS Take 500 mg by mouth daily       clopidogrel (PLAVIX) 75 MG tablet Take 1 tablet (75 mg) by mouth daily 90 tablet 3    Coenzyme Q10 (CO Q-10) 200 MG CAPS Take 1 capsule by mouth daily      cyanocobalamin (VITAMIN B-12) 500 MCG SUBL sublingual tablet Place 500 mcg under the tongue daily      empagliflozin (JARDIANCE) 25 MG TABS tablet Take 0.5 tablets (12.5 mg) by mouth daily. 45 tablet 3    ezetimibe (ZETIA) 10 MG tablet TAKE 1 TABLET BY MOUTH AT BEDTIME 8PM 90 tablet 3    finasteride (PROSCAR) 5 MG tablet Take 1 tablet by mouth daily at 2 pm.      furosemide (LASIX) 20 MG tablet Take 2 tablets (40 mg) by mouth 2 times daily. 270 tablet 3    LUTEIN PO Take 1 tablet by mouth daily      metoprolol succinate ER (TOPROL XL) 50 MG 24 hr tablet Take 1 tablet (50 mg) by mouth 2 times daily. 180 tablet 1    nitroGLYcerin (NITROSTAT) 0.4 MG sublingual tablet For chest pain place 1 tablet under the tongue every 5 minutes for 3 doses. If symptoms persist 5 minutes after 1st dose call 911. 25 tablet 3    Omega-3 Fatty Acids (FISH OIL) 1200 MG capsule Take 1 capsule by mouth daily      rosuvastatin (CRESTOR) 20 MG tablet Take 1 tablet (20 mg) by mouth twice a week 30 tablet 3    sacubitril-valsartan (ENTRESTO) 24-26 MG per tablet Take 1 tablet by mouth 2 times daily 180 tablet 3    vitamin C (ASCORBIC ACID) 500 MG tablet Take 1,000 mg by mouth daily      Vitamin D3 (CHOLECALCIFEROL) 125 MCG (5000 UT) tablet Take 1 tablet by mouth daily      warfarin ANTICOAGULANT (COUMADIN) 5 MG tablet Take 5mg by oral route on Mondays, Fridays, and take 2.5mg by oral route all other days 90 tablet 3  "    No current facility-administered medications for this visit.     EXAM:  BP 96/56 (BP Location: Right arm, Patient Position: Sitting, Cuff Size: Adult Regular)   Pulse 83   Temp 96.8  F (36  C) (Tympanic)   Resp 15   Ht 1.778 m (5' 10\")   Wt 73.2 kg (161 lb 6.4 oz)   SpO2 98%   BMI 23.16 kg/m    General: appears comfortable, alert and oriented  Head: normocephalic, atraumatic  Eyes: anicteric sclera, EOMI , PERRL  Neck: no adenopathy  Orophyarynx: moist mucosa, no lesions noted  Heart: regular, S1/S2, no murmurs, rubs or gallop. Estimated JVP at 5 cmH2O  Lungs: CTAB, No wheezing.   Abdomen: soft, non-tender, bowel sounds present, no hepatosplenomegaly  Extremities: No LE edema today  Skin: no open lesions noted  Neuro: grossly non-focal     Labs:  Lab Results   Component Value Date    WBC 7.8 03/27/2024    HGB 12.2 (L) 03/27/2024    HCT 38.1 (L) 03/27/2024     (L) 03/27/2024     11/15/2024    POTASSIUM 4.3 11/15/2024    CHLORIDE 100 11/15/2024    CO2 27 11/15/2024    BUN 57.6 (H) 11/15/2024    CR 2.04 (H) 11/15/2024     (H) 11/15/2024    SED 57 (H) 07/20/2021    NTBNP 7,210 (H) 11/15/2024    AST 43 03/27/2024    ALT 31 03/27/2024    ALKPHOS 80 03/27/2024    BILITOTAL 0.5 03/27/2024    INR 3.2 (H) 02/25/2025     Stress test on 4/5/2024:    The nuclear stress test is abnormal.     There is a large area of infarction in the anterior, apical, septal, anteroseptal and inferoseptal segment(s) of the left ventricle.     Left ventricular function is severely reduced.     The left ventricular ejection fraction at stress is 28%.     A prior study was conducted on 4/26/2022.  This study has changes noted when compared with the prior study. The estimated ejection fraction is worse.      Echo on 4/3/2024:  Left ventricular function is decreased.  The ejection fraction is 15-20% (severely reduced). Severe diffuse hypokinesis is present.  Global right ventricular function is mildly to moderately " reduced.  The 29 mm Patricio III TAVR is well-seated. Leaflet opening is not clearly visualized. There is no paravalvular or valvular regurgitation. The Vmax is 1.4 m/s, the mean gradient is 5 mmHg, the dimensionless index is 0.64.  Mild tricuspid and mitral insufficiency present.  Estimated right ventricular systolic pressure is 50 mmHg plus right atrial pressure. Pulmonary hypertension is present. No pericardial effusion is present.     US lower extremity for DVT on 7/17/2023:  No evidence of deep venous thrombosis within the lower extremities.     Zio patch on 4/19/2023:  Worn for 14 days and 0 hr's.  After removing artifact, total time was 13 days and 23 hr's. Placed on 4/19/2023 at 12:34 PM and completed on 5/3/2023 at 12:34 PM.  Underlying rhythm was sinus.  Hrt rate ranged from 53 bpm, maximum heart rate of 182 bmp, averaging 65 bmp. No significant bradycardia, pauses, Mobitz type II or 3rd degree heart block.  No atrial fibrillation on this study.  x0 triggered events and x0 diary entries.   x306 runs of VT lasting up to 4 beats with a maximum heart rate of 182 bmp.    x47 runs of SVT lasting up to 20 beats with a maximum heart rate of 160 bmp.  Rare, <1% of PAC's, atrial couplets, and atrial triplets.  Frequent PVC's at 27.4%.  Occasional ventricular couplets at 2.6%.  Occasional ventricular triplets at 1.0%.  + episodes of ventricular bigeminy lasting up to 5 min's and 41 sec's.  + episodes of ventricular trigeminy lasting up to 1 min and 40 sec's.     Echocardiogram on 5/8/2023 at [facility]:  1. Status post 29 mm Paulino Patricio 3 transcatheter aortic valve bioprosthesis implanted via a transfemoral approach (6-DEC-2022).   2. Normal prosthetic leaflet(s) motion. Mean gradient 7 mmHg. Trivial prosthetic regurgitation. No periprosthetic regurgitation.   3. Moderate mitral valve regurgitation (PISA ERO 0.31 cm2; RV 32 ml). Mechanism is likely degenerative disease resulting in malcoaptation on a background of  moderate MAC.   4. Moderately enlarged left ventricular chamber size with moderate-severe generalized hypokinesis with regional variability, ejection fraction 27%.   5. Elevated left ventricular filling pressure. See comments.   6. Normal right ventricular chamber size with mildly reduced systolic function. Estimated right ventricular systolic pressure 65 mmHg (right atrial pressure of 10 mmHg).   7. Normal inferior vena cava size with reduced inspiratory collapse (<50%).   8. No  pericardial effusion.   9. Compared to the report of 08/15/2022 the following changes have occurred: in addition to new TAVR implantation, mitral regurgitation has increased and right ventricular systolic pressure has also increased.  Side by side comparison of images performed.      Echocardiogram in 4/5/2023:  Left ventricular function is decreased. The ejection fraction is 30-35% (moderately reduced). Severe diffuse hypokinesis is present. Grade II or moderate diastolic dysfunction.  The right ventricle is normal size. Global right ventricular function is normal.  The 29 mm Patricio III TAVR is well-seated. Leaflet opening is not clearly visualized. There is trace paravalvular regurgitation. There is no valvular regurgitation. The Vmax is 2.1 m/s, the mean gradient is 10 mmHg, the dimensionless index is 0.50, and the acceleration time is 120 ms.  This study was compared with the study from 05/05/2022. The AV gradient is lower. There is no significant change in the biventricular function.     LUCY at [place] on 12/8/22:  1. The patient was monitored from 12/8/2022 to 1/6/2023 with a total monitoring time of 28 days 19 hr 54 min. The baseline rhythm was sinus with a first-degree AV delay and a left bundle-branch block. The heart rate varied from 55 bpm to 93 bpm. The average heart rate was 67 bpm.   2. There were 35,812 PVCs seen singly, paired, and in bigeminy and trigeminy with a PVC burden of 1.26%. There were 6 ventricular tachycardia  "events with greater than 3 beats with the longest duration being 8 beats. The maximum VT rate was 183 bpm.   3. There were 15,775 PACs seen singly with a PAC burden of <1%. There were 19 runs of supraventricular tachycardia observed with the longest duration being 39 beats. The maximum SVT rate was 125 bpm.   4. The patient reported 26 symptomatic events of \"dizziness.\" During these events, the rhythm was sinus with a first-degree AV delay and a left bundle-branch block. The heart rate varied from 64 bpm to 92 bpm. At and/or around these times, PVCs were seen singly and in bigeminy and trigeminy.       Cardiac cath on 11/30/22:  The left main coronary artery is 30% obstructed by multiple discrete lesions.   The proximal left anterior descending artery is 100% obstructed by a discrete lesion. The distal segment is not visible.   The proximal circumflex artery is 40% obstructed by multiple discrete lesions. The distal segment is normal size.   The ramus intermedius segment is 100% obstructed by a discrete lesion. Fills retrogradely from graft.   The proximal right coronary artery is 50% obstructed by a discrete lesion. The distal segment is normal size.   The distal right coronary artery is 30% obstructed by multiple discrete lesions. The distal segment is normal size.   GRAFT ANGIOGRAPHY SUMMARY   Single body vein bypass graft to the Ramus Intermedius Segment. Graft Appears normal.   Single body left internal mammary graft to the Middle Left Anterior Descending Artery.   GRAFT ANGIOGRAPHY SUMMARY   Single body vein bypass graft to the Ramus Intermedius Segment. Graft Appears normal.   Single body left internal mammary graft to the Middle Left Anterior Descending Artery.       ECHO on 5/5/22:  The visual ejection fraction is 35-40%. Moderate diffuse hypokinesis is present.   Right ventricular function, chamber size, wall motion, and thickness are normal.  The calculated aortic valve area is 0.94 cm2.The mean gradient " across the AV is 22mmHg, the peak velocity is 36 mmHg.  Mild mitral insufficiency is present. Mild tricuspid insufficiency is present.  The inferior vena cava cannot be assessed. Ascending aorta 4.2 cm. No pericardial effusion is present.     Stress test on 4/26/22:  There is a large area of a severe  degree of infarction in the anterior, apical, inferior and septal segment(s) of the left ventricle. The left ventricular ejection fraction at rest is 37%.  The left ventricular ejection fraction at stress is 38%.      Stress echo at [place] on 5/25/2021:  1. AT REST:   2. Estimated left ventricular ejection fraction 25%   3. Stroke volume index 45 ml/m^2, aortic valve mean systolic gradient 20 mm Hg, aortic valve   area 1.16 cm^2, aortic valve area index 0.62 cm^2/m^2   4. STRESS TEST:   5. Dobutamine was infused from 5 mcg/kg/min to 20.0 mcg/kg/min.   6. PEAK STRESS:   7. Estimated left ventricular ejection fraction range 30% - 35%.   8. Stroke volume index 55 ml/m^2, aortic valve mean systolic gradient 35 mm Hg, aortic valve  area 1.36 cm^2, aortic valve area index 0.73 cm^2/m^2      US aorta on 2/11/21:  No abdominal aortic aneurysm.     US carotids on 2/11/21:  No hemodynamically significant stenoses are identified in either carotid bifurcation.     ECHO on 2/11/21:  No pericardial effusion is present. Moderately (EF 30-35%) reduced left ventricular function is present.  The right ventricle is normal size. Global right ventricular function is normal.  Mild left atrial enlargement is present. Mild mitral insufficiency is present.  Severe aortic valve calcification is present. The peak aortic velocity is 3.02 m/sec. The mean gradient across the aortic valve is 18.4 mmHg.  Moderate aortic stenosis is present.  Right ventricular systolic pressure is 44 mmHg above the right atrial pressure.  Mild tricuspid insufficiency is present. Mild pulmonic insufficiency is present.  Ascending aorta 4.1 cm. Sinuses of Valsalva 4.3  cm.     CTA chest on 2/11/21:  2 small nodules are identified in the right lung.  Follow-up CT scan in 6 months time is recommended. The ascending aorta measures 4.2 cm in maximal transverse diameter.     Cardiac cath on 1/15/21:  Right sided filling pressures are normal.  Mild elevated pulmonary hypertension.  Left sided filling pressures are moderately elevated.  Left ventricular filling pressures are moderately elevated.  Normal cardiac output level.  Moderate mitral valve stenosis (mean gradient 8.4 mm Hg, 2.2 cm2)  Moderate aortic valve stenosis (mean gradient 17 mm Hg, 1.2 cm2)  Native three vessel CAD  >>> Moderate 50% proximal RCA stenosis [ungrafted vessel]  >>> Mimimal LMCA stenosis  >>> 100% Ostial LAD stenosis [LIMA-LAD widely patent]  >>> Proximal 50% LCx and 90% RI stenoses [Sequential SVG-RI-OM patent with focal 60% ISR in jump segment of SVG]     Suggest medical treatment at this time.  Neither the aortic valve nor mitral valve require intervention at this time.  The proximal RCA stenosis is unlikely be physiologically significant. The LAD territory is infarcted but the LIMA-LAD remains widely patent.  There is 70% ISR of SVG in jump segment but the OM is receiving brisk DAVID-3 flow via the native coronary (only 50% proximal stenosis).     Cath on 7/17/2007:  1. Severe, single-vessel coronary artery disease. Chronic proximal-to-mild occlusion of the left anterior descending coronary artery with distal right-to-left collaterals.  2. Cypher drug-eluting stenting of the [id number] segment of the left anterior descending.        ASSESSMENT AND PLAN:  89-year-old male with very complex past medical history as detailed above including heart failure reduced ejection fraction, ischemic cardiomyopathy status post CABG as well as multiple PCI's, severe aortic stenosis status post TAVR in 2022 at [place], history of DVT on Coumadin currently with IVC filter previously in place, who presented to the clinic for  further evaluation.  Again he looks significantly younger than his stated age, he continues to drive and has excellent mental functioning.  He lives in assisted living facility prepares all his medications and takes them all very well.  He notes some ongoing shortness of breath at this time with exertion which is significantly worse than it had been in the past.  He also notes significant 2+ lower extremity edema.  At this time I do believe he is volume overloaded and therefore we will increase his Lasix to 60 mg in the morning and 40 in the afternoon.  Per his request we will give 40 mg pills in addition to the 20 mg pill that he will take once in the morning.  Again the total dose will be 60 mg in the morning and 40 mg in the afternoon.  We are checking labs today because he has not had any updated creatinine and potassium recently.  We might need to add some potassium supplementation but I am a little hesitant to do it often given his creatinine which is above 2 at the moment.  No other medication changes we will see him back in about 6 months or sooner if needed.    I appreciate the opportunity to participate in the care of Sidney Barriga . Please do not hesitate to contact me with any further questions.  I have spent a total of 40 minutes today reviewing labs, imaging studies, discussing with colleagues, face-to-face time with patient and documentation in the medical record.  The longitudinal plan of care for the diagnosis(es)/condition(s) as documented were addressed during this visit. Due to the added complexity in care, I will continue to support Sidney in the subsequent management and with ongoing continuity of care.    Sincerely,   Hemanth Larsen MD  [facility] Division of Cardiology

## 2025-03-03 ENCOUNTER — OFFICE VISIT (OUTPATIENT)
Dept: CARDIOLOGY | Facility: OTHER | Age: OVER 89
End: 2025-03-03
Attending: INTERNAL MEDICINE
Payer: MEDICARE

## 2025-03-03 VITALS
DIASTOLIC BLOOD PRESSURE: 56 MMHG | OXYGEN SATURATION: 98 % | HEIGHT: 70 IN | TEMPERATURE: 96.8 F | SYSTOLIC BLOOD PRESSURE: 96 MMHG | BODY MASS INDEX: 23.11 KG/M2 | HEART RATE: 83 BPM | RESPIRATION RATE: 15 BRPM | WEIGHT: 161.4 LBS

## 2025-03-03 DIAGNOSIS — E83.42 HYPOMAGNESEMIA: ICD-10-CM

## 2025-03-03 DIAGNOSIS — I50.9 CONGESTIVE HEART FAILURE, UNSPECIFIED HF CHRONICITY, UNSPECIFIED HEART FAILURE TYPE (H): ICD-10-CM

## 2025-03-03 DIAGNOSIS — I10 ESSENTIAL HYPERTENSION, BENIGN: ICD-10-CM

## 2025-03-03 DIAGNOSIS — I25.5 ISCHEMIC CARDIOMYOPATHY: ICD-10-CM

## 2025-03-03 DIAGNOSIS — I25.10 ATHEROSCLEROSIS OF NATIVE CORONARY ARTERY OF NATIVE HEART WITHOUT ANGINA PECTORIS: ICD-10-CM

## 2025-03-03 DIAGNOSIS — N18.32 STAGE 3B CHRONIC KIDNEY DISEASE (H): ICD-10-CM

## 2025-03-03 DIAGNOSIS — I49.8 VENTRICULAR BIGEMINY: ICD-10-CM

## 2025-03-03 DIAGNOSIS — I10 ESSENTIAL HYPERTENSION: ICD-10-CM

## 2025-03-03 DIAGNOSIS — E78.2 MIXED HYPERLIPIDEMIA: ICD-10-CM

## 2025-03-03 DIAGNOSIS — I50.42 CHRONIC COMBINED SYSTOLIC AND DIASTOLIC CONGESTIVE HEART FAILURE (H): ICD-10-CM

## 2025-03-03 DIAGNOSIS — R06.09 DOE (DYSPNEA ON EXERTION): ICD-10-CM

## 2025-03-03 DIAGNOSIS — Z95.2 HISTORY OF TRANSCATHETER AORTIC VALVE REPLACEMENT (TAVR): ICD-10-CM

## 2025-03-03 DIAGNOSIS — I27.20 MILD PULMONARY HYPERTENSION (H): ICD-10-CM

## 2025-03-03 LAB
ALBUMIN SERPL BCG-MCNC: 4.1 G/DL (ref 3.5–5.2)
ALP SERPL-CCNC: 60 U/L (ref 40–150)
ALT SERPL W P-5'-P-CCNC: 19 U/L (ref 0–70)
ANION GAP SERPL CALCULATED.3IONS-SCNC: 11 MMOL/L (ref 7–15)
AST SERPL W P-5'-P-CCNC: 25 U/L (ref 0–45)
BASOPHILS # BLD AUTO: 0 10E3/UL (ref 0–0.2)
BASOPHILS NFR BLD AUTO: 0 %
BILIRUB SERPL-MCNC: 0.6 MG/DL
BUN SERPL-MCNC: 61.5 MG/DL (ref 8–23)
CALCIUM SERPL-MCNC: 9.8 MG/DL (ref 8.8–10.4)
CHLORIDE SERPL-SCNC: 98 MMOL/L (ref 98–107)
CREAT SERPL-MCNC: 2.25 MG/DL (ref 0.67–1.17)
EGFRCR SERPLBLD CKD-EPI 2021: 27 ML/MIN/1.73M2
EOSINOPHIL # BLD AUTO: 0.2 10E3/UL (ref 0–0.7)
EOSINOPHIL NFR BLD AUTO: 2 %
ERYTHROCYTE [DISTWIDTH] IN BLOOD BY AUTOMATED COUNT: 12.3 % (ref 10–15)
GLUCOSE SERPL-MCNC: 111 MG/DL (ref 70–99)
HCO3 SERPL-SCNC: 27 MMOL/L (ref 22–29)
HCT VFR BLD AUTO: 39.2 % (ref 40–53)
HGB BLD-MCNC: 12.9 G/DL (ref 13.3–17.7)
IMM GRANULOCYTES # BLD: 0 10E3/UL
IMM GRANULOCYTES NFR BLD: 0 %
LYMPHOCYTES # BLD AUTO: 1.4 10E3/UL (ref 0.8–5.3)
LYMPHOCYTES NFR BLD AUTO: 16 %
MCH RBC QN AUTO: 32.6 PG (ref 26.5–33)
MCHC RBC AUTO-ENTMCNC: 32.9 G/DL (ref 31.5–36.5)
MCV RBC AUTO: 99 FL (ref 78–100)
MONOCYTES # BLD AUTO: 0.9 10E3/UL (ref 0–1.3)
MONOCYTES NFR BLD AUTO: 10 %
NEUTROPHILS # BLD AUTO: 6.2 10E3/UL (ref 1.6–8.3)
NEUTROPHILS NFR BLD AUTO: 71 %
NRBC # BLD AUTO: 0 10E3/UL
NRBC BLD AUTO-RTO: 0 /100
NT-PROBNP SERPL-MCNC: 8827 PG/ML (ref 0–1800)
PLATELET # BLD AUTO: 121 10E3/UL (ref 150–450)
POTASSIUM SERPL-SCNC: 4.1 MMOL/L (ref 3.4–5.3)
PROT SERPL-MCNC: 7.8 G/DL (ref 6.4–8.3)
RBC # BLD AUTO: 3.96 10E6/UL (ref 4.4–5.9)
SODIUM SERPL-SCNC: 136 MMOL/L (ref 135–145)
WBC # BLD AUTO: 8.7 10E3/UL (ref 4–11)

## 2025-03-03 PROCEDURE — G0463 HOSPITAL OUTPT CLINIC VISIT: HCPCS

## 2025-03-03 PROCEDURE — 82565 ASSAY OF CREATININE: CPT | Mod: ZL | Performed by: INTERNAL MEDICINE

## 2025-03-03 PROCEDURE — 85004 AUTOMATED DIFF WBC COUNT: CPT | Mod: ZL | Performed by: INTERNAL MEDICINE

## 2025-03-03 PROCEDURE — 36415 COLL VENOUS BLD VENIPUNCTURE: CPT | Mod: ZL | Performed by: INTERNAL MEDICINE

## 2025-03-03 PROCEDURE — 83880 ASSAY OF NATRIURETIC PEPTIDE: CPT | Mod: ZL | Performed by: INTERNAL MEDICINE

## 2025-03-03 PROCEDURE — 84155 ASSAY OF PROTEIN SERUM: CPT | Mod: ZL | Performed by: INTERNAL MEDICINE

## 2025-03-03 PROCEDURE — 85048 AUTOMATED LEUKOCYTE COUNT: CPT | Mod: ZL | Performed by: INTERNAL MEDICINE

## 2025-03-03 RX ORDER — FUROSEMIDE 20 MG/1
20 TABLET ORAL DAILY
Qty: 90 TABLET | Refills: 3 | Status: SHIPPED | OUTPATIENT
Start: 2025-03-03

## 2025-03-03 RX ORDER — FUROSEMIDE 40 MG/1
40 TABLET ORAL 2 TIMES DAILY
Qty: 180 TABLET | Refills: 3 | Status: SHIPPED | OUTPATIENT
Start: 2025-03-03 | End: 2026-02-26

## 2025-03-03 NOTE — PATIENT INSTRUCTIONS
Thank you for allowing Dr. Larsen and our  team to participate in your care. Please call our office at [phone] with scheduling questions or if you need to cancel or change your appointment. With any other questions or concerns you may call cardiology nurse at [phone].       If you experience chest pain, chest pressure, chest tightness, shortness of breath, fainting, lightheadedness, nausea, vomiting, or other concerning symptoms, please report to the Emergency Department or call 911. These symptoms may be emergent, and best treated in the Emergency Department.

## 2025-03-04 ENCOUNTER — LAB (OUTPATIENT)
Dept: LAB | Facility: OTHER | Age: OVER 89
End: 2025-03-04
Payer: MEDICARE

## 2025-03-04 DIAGNOSIS — I82.5Y3 CHRONIC DEEP VEIN THROMBOSIS (DVT) OF PROXIMAL VEIN OF BOTH LOWER EXTREMITIES (H): ICD-10-CM

## 2025-03-04 DIAGNOSIS — Z95.3 S/P TAVR (TRANSCATHETER AORTIC VALVE REPLACEMENT), BIOPROSTHETIC: ICD-10-CM

## 2025-03-04 DIAGNOSIS — C61 MALIGNANT NEOPLASM OF PROSTATE (H): ICD-10-CM

## 2025-03-04 DIAGNOSIS — Z12.5 SPECIAL SCREENING FOR MALIGNANT NEOPLASM OF PROSTATE: Primary | ICD-10-CM

## 2025-03-04 DIAGNOSIS — Z86.718 HISTORY OF DVT (DEEP VEIN THROMBOSIS): ICD-10-CM

## 2025-03-04 PROCEDURE — 36416 COLLJ CAPILLARY BLOOD SPEC: CPT | Mod: ZL

## 2025-03-04 PROCEDURE — 84153 ASSAY OF PSA TOTAL: CPT | Mod: ZL

## 2025-03-04 PROCEDURE — 36415 COLL VENOUS BLD VENIPUNCTURE: CPT | Mod: ZL

## 2025-03-05 LAB — PSA SERPL DL<=0.01 NG/ML-MCNC: 17.1 NG/ML

## 2025-03-11 ENCOUNTER — TELEPHONE (OUTPATIENT)
Dept: FAMILY MEDICINE | Facility: OTHER | Age: OVER 89
End: 2025-03-11

## 2025-03-11 ENCOUNTER — LAB (OUTPATIENT)
Dept: LAB | Facility: OTHER | Age: OVER 89
End: 2025-03-11
Payer: MEDICARE

## 2025-03-11 ENCOUNTER — ANTICOAGULATION THERAPY VISIT (OUTPATIENT)
Dept: ANTICOAGULATION | Facility: OTHER | Age: OVER 89
End: 2025-03-11
Attending: FAMILY MEDICINE
Payer: MEDICARE

## 2025-03-11 DIAGNOSIS — Z86.718 HISTORY OF DVT (DEEP VEIN THROMBOSIS): Primary | ICD-10-CM

## 2025-03-11 DIAGNOSIS — I82.5Y3 CHRONIC DEEP VEIN THROMBOSIS (DVT) OF PROXIMAL VEIN OF BOTH LOWER EXTREMITIES (H): ICD-10-CM

## 2025-03-11 DIAGNOSIS — Z86.718 PERSONAL HISTORY OF DVT (DEEP VEIN THROMBOSIS): ICD-10-CM

## 2025-03-11 DIAGNOSIS — Z95.3 S/P TAVR (TRANSCATHETER AORTIC VALVE REPLACEMENT), BIOPROSTHETIC: ICD-10-CM

## 2025-03-11 DIAGNOSIS — Z86.718 HISTORY OF DVT (DEEP VEIN THROMBOSIS): ICD-10-CM

## 2025-03-11 LAB — INR BLD: 2.4 (ref 0.9–1.1)

## 2025-03-11 PROCEDURE — 85610 PROTHROMBIN TIME: CPT | Mod: ZL

## 2025-03-11 PROCEDURE — 36415 COLL VENOUS BLD VENIPUNCTURE: CPT | Mod: ZL

## 2025-03-11 NOTE — TELEPHONE ENCOUNTER
Called pt and notified him that this was ordered by urology and he would have to call them for results.  Pt verbalized understanding.

## 2025-03-11 NOTE — TELEPHONE ENCOUNTER
10:00 AM    Reason for Call: Phone Call    Description: pt would like psa results    Was an appointment offered for this call? No  If yes : Appointment type              Date    Preferred method for responding to this message: Telephone Call  What is your phone number ?[phone]     If we cannot reach you directly, may we leave a detailed response at the number you provided? Yes    Can this message wait until your PCP/provider returns, if available today? Not applicable    Kya Sarah

## 2025-03-11 NOTE — PROGRESS NOTES
ANTICOAGULATION MANAGEMENT     Sidneycarol Barriga 89 year old male is on warfarin with therapeutic INR result. (Goal INR 2.0-3.0)    Recent labs: (last 7 days)     03/11/25  1003   INR 2.4*       ASSESSMENT     Source(s): Chart Review and Patient/Caregiver Call     Warfarin doses taken: Warfarin taken as instructed  Diet: No new diet changes identified  Medication/supplement changes: None noted  New illness, injury, or hospitalization: No  Signs or symptoms of bleeding or clotting: No  Previous result: Supratherapeutic  Additional findings: None       PLAN     Recommended plan for no diet, medication or health factor changes affecting INR     Dosing Instructions: Continue your current warfarin dose with next INR in 4 weeks       Summary  As of 3/11/2025      Full warfarin instructions:  5 mg every Mon, Fri; 2.5 mg all other days   Next INR check:  4/8/2025               Telephone call with Sidney who verbalizes understanding and agrees to plan    Lab visit scheduled    Education provided: None required    Plan made per [facility] anticoagulation protocol    Lizbeth Ely RN  3/11/2025  Anticoagulation Clinic  [employer] for routing messages: al SORIA  [facility] patient phone line: [phone]        _______________________________________________________________________     Anticoagulation Episode Summary       Current INR goal:  2.0-3.0   TTR:  73.9% (1 y)   Target end date:  Indefinite   Send INR reminders to:  JOSR SORIA    Indications    History of DVT (deep vein thrombosis)-multiple [Z14.104]  Chronic deep vein thrombosis (DVT) of proximal vein of both lower extremities (H) [I82.5Y3]  Personal history of DVT (deep vein thrombosis) [Z02.271]             Comments:  We will continue on Coumadin with goal INR of 2.0-2.7 per Dr Rae             Anticoagulation Care Providers       Provider Role Specialty Phone number    Zach Arredondo MD Referring [facility] Medicine [phone]

## 2025-03-20 DIAGNOSIS — I49.8 VENTRICULAR BIGEMINY: ICD-10-CM

## 2025-03-20 DIAGNOSIS — I48.19 PERSISTENT ATRIAL FIBRILLATION (H): ICD-10-CM

## 2025-03-20 DIAGNOSIS — R06.09 DOE (DYSPNEA ON EXERTION): ICD-10-CM

## 2025-03-20 DIAGNOSIS — I48.91 NEW ONSET A-FIB (H): ICD-10-CM

## 2025-03-20 DIAGNOSIS — I10 ESSENTIAL HYPERTENSION: ICD-10-CM

## 2025-03-20 DIAGNOSIS — I10 ESSENTIAL HYPERTENSION, BENIGN: ICD-10-CM

## 2025-03-20 RX ORDER — METOPROLOL SUCCINATE 50 MG/1
50 TABLET, EXTENDED RELEASE ORAL 2 TIMES DAILY
Qty: 180 TABLET | Refills: 1 | Status: SHIPPED | OUTPATIENT
Start: 2025-03-20

## 2025-03-20 NOTE — TELEPHONE ENCOUNTER
metoprolol succinate ER (TOPROL XL) 50 MG 24 hr tablet 180 tablet 1 9/16/2024     Last Office Visit: 11/27/2024  Future Office visit:    Next 5 appointments (look out 90 days)      May 28, 2025 10:00 AM  (Arrive by 9:45 AM)  Return Visit with Justyn Rae DO  [facility] ([facility] ) [address] [place] [address]  [phone]             Routing refill request to provider for review/approval because:

## 2025-03-20 NOTE — TELEPHONE ENCOUNTER
Requested Prescriptions   Pending Prescriptions Disp Refills    metoprolol succinate ER (TOPROL XL) 50 MG 24 hr tablet 180 tablet 1     Sig: Take 1 tablet (50 mg) by mouth 2 times daily.       Beta-Blockers Protocol Passed - 3/20/2025  1:15 PM        Passed - Most recent blood pressure under 140/90 in past 12 months     BP Readings from Last 3 Encounters:   03/03/25 96/56   11/27/24 96/60   11/04/24 98/60       No data recorded            Passed - Patient is age 6 or older        Passed - Medication is active on med list and the sig matches. RN to manually verify dose and sig if red X/fail.     If the protocol passes (green check), you do not need to verify med dose and sig.    A prescription matches if they are the same clinical intention.    For Example: once daily and every morning are the same.    The protocol can not identify upper and lower case letters as matching and will fail.     For Example: Take 1 tablet (50 mg) by mouth daily     TAKE 1 TABLET (50 MG) BY MOUTH DAILY    For all fails (red x), verify dose and sig.    If the refill does match what is on file, the RN can still proceed to approve the refill request.       If they do not match, route to the appropriate provider.             Passed - Medication indicated for associated diagnosis     Medication is associated with one or more of the following diagnoses:     Hypertension (HTN)   Atrial fibrillation/flutter   Angina   ASCVD   Migraine   Heart Failure   Tremor   Anxiety   Ocular hypertension   Glaucoma   PTSD   Obstructive hypertrophic cardiomyopathy   History of myocarditis   Palpitations   POTS (postural orthostatic tachycardia syndrome)   SVT (supraventricular tachycardia)   Hyperthyroidism   Tachycardia   Acute non-st segment elevation myocardial infarction   Subsequent non-st segment elevation myocardial infarction   Ischemic myocardial dysfunction          Passed - Recent (12 mo) or future (90 days) visit within the authorizing provider's  specialty     The patient must have completed an in-person or virtual visit within the past 12 months or has a future visit scheduled within the next 90 days with the authorizing provider s specialty.  Urgent care and e-visits do not qualify as an office visit for this protocol.             Prescription approved per [facility] Refill Protocol.

## 2025-03-25 ENCOUNTER — TELEPHONE (OUTPATIENT)
Dept: CARE COORDINATION | Facility: OTHER | Age: OVER 89
End: 2025-03-25

## 2025-03-25 NOTE — TELEPHONE ENCOUNTER
Called patient to let him know that Dr. Larsen did indeed change his dates for November, and will be here on 11/10, not 11/3.  Patient agreeable to changing days, will keep the same time.  Appt card sent to patient by mail.

## 2025-04-04 ENCOUNTER — LAB (OUTPATIENT)
Dept: LAB | Facility: OTHER | Age: OVER 89
End: 2025-04-04
Payer: MEDICARE

## 2025-04-04 DIAGNOSIS — Z86.718 HISTORY OF DVT (DEEP VEIN THROMBOSIS): ICD-10-CM

## 2025-04-04 DIAGNOSIS — I82.5Y3 CHRONIC DEEP VEIN THROMBOSIS (DVT) OF PROXIMAL VEIN OF BOTH LOWER EXTREMITIES (H): ICD-10-CM

## 2025-04-04 DIAGNOSIS — Z95.3 S/P TAVR (TRANSCATHETER AORTIC VALVE REPLACEMENT), BIOPROSTHETIC: ICD-10-CM

## 2025-04-04 LAB — INR BLD: 3.1 (ref 0.9–1.1)

## 2025-04-04 PROCEDURE — 85610 PROTHROMBIN TIME: CPT | Mod: ZL

## 2025-04-04 PROCEDURE — 36415 COLL VENOUS BLD VENIPUNCTURE: CPT | Mod: ZL

## 2025-04-16 ENCOUNTER — APPOINTMENT (OUTPATIENT)
Dept: ANTICOAGULATION | Facility: OTHER | Age: OVER 89
End: 2025-04-16
Attending: FAMILY MEDICINE
Payer: MEDICARE

## 2025-04-18 ENCOUNTER — LAB (OUTPATIENT)
Dept: LAB | Facility: OTHER | Age: OVER 89
End: 2025-04-18
Payer: MEDICARE

## 2025-04-18 DIAGNOSIS — I82.5Y3 CHRONIC DEEP VEIN THROMBOSIS (DVT) OF PROXIMAL VEIN OF BOTH LOWER EXTREMITIES (H): ICD-10-CM

## 2025-04-18 DIAGNOSIS — Z95.3 S/P TAVR (TRANSCATHETER AORTIC VALVE REPLACEMENT), BIOPROSTHETIC: ICD-10-CM

## 2025-04-18 DIAGNOSIS — Z86.718 HISTORY OF DVT (DEEP VEIN THROMBOSIS): ICD-10-CM

## 2025-04-18 LAB — INR BLD: 1.8 (ref 0.9–1.1)

## 2025-04-18 PROCEDURE — 36415 COLL VENOUS BLD VENIPUNCTURE: CPT | Mod: ZL

## 2025-04-18 PROCEDURE — 85610 PROTHROMBIN TIME: CPT | Mod: ZL

## 2025-04-24 ENCOUNTER — TRANSFERRED RECORDS (OUTPATIENT)
Dept: HEALTH INFORMATION MANAGEMENT | Facility: CLINIC | Age: OVER 89
End: 2025-04-24

## 2025-05-02 ENCOUNTER — APPOINTMENT (OUTPATIENT)
Dept: ANTICOAGULATION | Facility: OTHER | Age: OVER 89
End: 2025-05-02
Attending: FAMILY MEDICINE
Payer: MEDICARE

## 2025-05-08 ENCOUNTER — LAB (OUTPATIENT)
Dept: LAB | Facility: OTHER | Age: OVER 89
End: 2025-05-08
Payer: MEDICARE

## 2025-05-08 ENCOUNTER — ANTICOAGULATION THERAPY VISIT (OUTPATIENT)
Dept: ANTICOAGULATION | Facility: OTHER | Age: OVER 89
End: 2025-05-08
Attending: FAMILY MEDICINE
Payer: MEDICARE

## 2025-05-08 DIAGNOSIS — Z86.718 PERSONAL HISTORY OF DVT (DEEP VEIN THROMBOSIS): ICD-10-CM

## 2025-05-08 DIAGNOSIS — I82.5Y3 CHRONIC DEEP VEIN THROMBOSIS (DVT) OF PROXIMAL VEIN OF BOTH LOWER EXTREMITIES (H): ICD-10-CM

## 2025-05-08 DIAGNOSIS — Z86.718 HISTORY OF DVT (DEEP VEIN THROMBOSIS): Primary | ICD-10-CM

## 2025-05-08 DIAGNOSIS — Z86.718 HISTORY OF DVT (DEEP VEIN THROMBOSIS): ICD-10-CM

## 2025-05-08 DIAGNOSIS — Z95.3 S/P TAVR (TRANSCATHETER AORTIC VALVE REPLACEMENT), BIOPROSTHETIC: ICD-10-CM

## 2025-05-08 LAB — INR BLD: 2.9 (ref 0.9–1.1)

## 2025-05-08 PROCEDURE — 85610 PROTHROMBIN TIME: CPT | Mod: ZL

## 2025-05-08 PROCEDURE — 36416 COLLJ CAPILLARY BLOOD SPEC: CPT | Mod: ZL

## 2025-05-08 NOTE — PROGRESS NOTES
ANTICOAGULATION MANAGEMENT     Sidney LAURITA Barriga 89 year old male is on warfarin with therapeutic INR result. (Goal INR 2.0-3.0)    Recent labs: (last 7 days)     05/08/25  1112   INR 2.9*       ASSESSMENT     Source(s): Chart Review and Patient/Caregiver Call     Warfarin doses taken: Warfarin taken as instructed  Diet: No new diet changes identified  Medication/supplement changes: Had to take a couple tylenol.   New illness, injury, or hospitalization: No  Signs or symptoms of bleeding or clotting: No  Previous result: Subtherapeutic  Additional findings: None       PLAN     Recommended plan for no diet, medication or health factor changes affecting INR     Dosing Instructions: Continue your current warfarin dose with next INR in 2 weeks       Summary  As of 5/8/2025      Full warfarin instructions:  5 mg every Mon; 2.5 mg all other days   Next INR check:  5/22/2025               Telephone call with Sidney who verbalizes understanding and agrees to plan and who agrees to plan and repeated back plan correctly    Lab visit scheduled    Education provided: Please call back if any changes to your diet, medications or how you've been taking warfarin    Plan made per [facility] anticoagulation protocol    Marko Rosario RN  5/8/2025  Anticoagulation Clinic  [employer] for routing messages: al SORIA          _______________________________________________________________________     Anticoagulation Episode Summary       Current INR goal:  2.0-3.0   TTR:  71.1% (1 y)   Target end date:  Indefinite   Send INR reminders to:  JOSR SORIA    Indications    History of DVT (deep vein thrombosis)-multiple [Z86.718]  Chronic deep vein thrombosis (DVT) of proximal vein of both lower extremities (H) [I82.5Y3]  Personal history of DVT (deep vein thrombosis) [Z86.718]             Comments:  We will continue on Coumadin with goal INR of 2.0-2.7 per Dr Rae             Anticoagulation Care Providers       Provider Role  Specialty Phone number    Zach Arredondo MD Referring Family Medicine [phone]

## 2025-05-22 ENCOUNTER — ANTICOAGULATION THERAPY VISIT (OUTPATIENT)
Dept: ANTICOAGULATION | Facility: OTHER | Age: OVER 89
End: 2025-05-22
Payer: MEDICARE

## 2025-05-22 ENCOUNTER — LAB (OUTPATIENT)
Dept: LAB | Facility: OTHER | Age: OVER 89
End: 2025-05-22
Payer: MEDICARE

## 2025-05-22 DIAGNOSIS — Z86.718 PERSONAL HISTORY OF DVT (DEEP VEIN THROMBOSIS): ICD-10-CM

## 2025-05-22 DIAGNOSIS — I82.5Y3 CHRONIC DEEP VEIN THROMBOSIS (DVT) OF PROXIMAL VEIN OF BOTH LOWER EXTREMITIES (H): ICD-10-CM

## 2025-05-22 DIAGNOSIS — Z86.718 HISTORY OF DVT (DEEP VEIN THROMBOSIS): Primary | ICD-10-CM

## 2025-05-22 DIAGNOSIS — Z95.3 S/P TAVR (TRANSCATHETER AORTIC VALVE REPLACEMENT), BIOPROSTHETIC: ICD-10-CM

## 2025-05-22 DIAGNOSIS — Z86.718 HISTORY OF DVT (DEEP VEIN THROMBOSIS): ICD-10-CM

## 2025-05-22 LAB — INR BLD: 2.2 (ref 0.9–1.1)

## 2025-05-22 PROCEDURE — 36416 COLLJ CAPILLARY BLOOD SPEC: CPT | Mod: ZL

## 2025-05-22 PROCEDURE — 85610 PROTHROMBIN TIME: CPT | Mod: ZL

## 2025-05-22 NOTE — PROGRESS NOTES
ANTICOAGULATION MANAGEMENT     Sidney LAURITA Barriga 89 year old male is on warfarin with therapeutic INR result. (Goal INR 2.0-3.0)    Recent labs: (last 7 days)     05/22/25  1059   INR 2.2*       ASSESSMENT     Source(s): Chart Review and Patient/Caregiver Call     Warfarin doses taken: Warfarin taken as instructed  Diet: No new diet changes identified  Medication/supplement changes: None noted  New illness, injury, or hospitalization: No  Signs or symptoms of bleeding or clotting: No  Previous result: Therapeutic last visit; previously outside of goal range  Additional findings: None       PLAN     Recommended plan for no diet, medication or health factor changes affecting INR     Dosing Instructions: Continue your current warfarin dose with next INR in 3 weeks       Summary  As of 5/22/2025      Full warfarin instructions:  5 mg every Mon; 2.5 mg all other days   Next INR check:  6/12/2025               Telephone call with Sidney who verbalizes understanding and agrees to plan and who agrees to plan and repeated back plan correctly    Lab visit scheduled    Education provided: Please call back if any changes to your diet, medications or how you've been taking warfarin    Plan made per [facility] anticoagulation protocol    Marko Rosario RN  5/22/2025  Anticoagulation Clinic  [employer] for routing messages: al SORIA          _______________________________________________________________________     Anticoagulation Episode Summary       Current INR goal:  2.0-3.0   TTR:  71.1% (1 y)   Target end date:  Indefinite   Send INR reminders to:  JOSR SORIA    Indications    History of DVT (deep vein thrombosis)-multiple [Z86.718]  Chronic deep vein thrombosis (DVT) of proximal vein of both lower extremities (H) [I82.5Y3]  Personal history of DVT (deep vein thrombosis) [Z86.478]             Comments:  We will continue on Coumadin with goal INR of 2.0-2.7 per Dr Rae             Anticoagulation Care Providers        Provider Role Specialty Phone number    Zach Arredondo MD Referring Family Medicine [phone]

## 2025-05-28 ENCOUNTER — OFFICE VISIT (OUTPATIENT)
Dept: CARDIOLOGY | Facility: OTHER | Age: OVER 89
End: 2025-05-28
Attending: INTERNAL MEDICINE
Payer: MEDICARE

## 2025-05-28 VITALS
WEIGHT: 157.9 LBS | DIASTOLIC BLOOD PRESSURE: 49 MMHG | HEART RATE: 88 BPM | BODY MASS INDEX: 22.6 KG/M2 | SYSTOLIC BLOOD PRESSURE: 76 MMHG | RESPIRATION RATE: 16 BRPM | HEIGHT: 70 IN | OXYGEN SATURATION: 97 %

## 2025-05-28 DIAGNOSIS — I49.9 IRREGULAR HEARTBEAT: ICD-10-CM

## 2025-05-28 DIAGNOSIS — Z95.5 HISTORY OF CORONARY ARTERY STENT PLACEMENT: ICD-10-CM

## 2025-05-28 DIAGNOSIS — Z98.890 H/O CARDIAC CATHETERIZATION: ICD-10-CM

## 2025-05-28 DIAGNOSIS — I35.0 AORTIC STENOSIS, SEVERE: ICD-10-CM

## 2025-05-28 DIAGNOSIS — E83.42 HYPOMAGNESEMIA: ICD-10-CM

## 2025-05-28 DIAGNOSIS — E78.1 HYPERTRIGLYCERIDEMIA: ICD-10-CM

## 2025-05-28 DIAGNOSIS — I50.9 CONGESTIVE HEART FAILURE, UNSPECIFIED HF CHRONICITY, UNSPECIFIED HEART FAILURE TYPE (H): ICD-10-CM

## 2025-05-28 DIAGNOSIS — Z95.1 S/P CABG X 3: ICD-10-CM

## 2025-05-28 DIAGNOSIS — Z98.890 STATUS POST CORONARY ANGIOGRAM: ICD-10-CM

## 2025-05-28 DIAGNOSIS — I35.0 SEVERE AORTIC STENOSIS: ICD-10-CM

## 2025-05-28 DIAGNOSIS — I10 ESSENTIAL HYPERTENSION: ICD-10-CM

## 2025-05-28 DIAGNOSIS — I82.5Y3: ICD-10-CM

## 2025-05-28 DIAGNOSIS — I05.0 MODERATE MITRAL STENOSIS: ICD-10-CM

## 2025-05-28 DIAGNOSIS — I25.10 ATHEROSCLEROSIS OF NATIVE CORONARY ARTERY OF NATIVE HEART WITHOUT ANGINA PECTORIS: ICD-10-CM

## 2025-05-28 DIAGNOSIS — I50.42 CHRONIC COMBINED SYSTOLIC AND DIASTOLIC CONGESTIVE HEART FAILURE (H): ICD-10-CM

## 2025-05-28 DIAGNOSIS — E11.65 TYPE 2 DIABETES MELLITUS WITH HYPERGLYCEMIA, WITHOUT LONG-TERM CURRENT USE OF INSULIN (H): ICD-10-CM

## 2025-05-28 DIAGNOSIS — I25.810 ATHEROSCLEROSIS OF CORONARY ARTERY BYPASS GRAFT OF NATIVE HEART WITHOUT ANGINA PECTORIS: ICD-10-CM

## 2025-05-28 DIAGNOSIS — I25.5 ISCHEMIC CARDIOMYOPATHY: ICD-10-CM

## 2025-05-28 DIAGNOSIS — R93.1 REGIONAL WALL MOTION ABNORMALITY OF HEART: ICD-10-CM

## 2025-05-28 DIAGNOSIS — Z95.3 S/P TAVR (TRANSCATHETER AORTIC VALVE REPLACEMENT), BIOPROSTHETIC: ICD-10-CM

## 2025-05-28 DIAGNOSIS — I48.19 PERSISTENT ATRIAL FIBRILLATION (H): Primary | ICD-10-CM

## 2025-05-28 DIAGNOSIS — E78.2 MIXED HYPERLIPIDEMIA: ICD-10-CM

## 2025-05-28 DIAGNOSIS — R06.09 DOE (DYSPNEA ON EXERTION): ICD-10-CM

## 2025-05-28 DIAGNOSIS — N18.32 STAGE 3B CHRONIC KIDNEY DISEASE (H): ICD-10-CM

## 2025-05-28 DIAGNOSIS — I49.3 FREQUENT PVCS: ICD-10-CM

## 2025-05-28 DIAGNOSIS — I27.20 MILD PULMONARY HYPERTENSION (H): ICD-10-CM

## 2025-05-28 DIAGNOSIS — I71.40 ABDOMINAL AORTIC ANEURYSM (AAA) 3.0 CM TO 5.5 CM IN DIAMETER IN MALE: ICD-10-CM

## 2025-05-28 DIAGNOSIS — I65.23 BILATERAL CAROTID ARTERY STENOSIS: ICD-10-CM

## 2025-05-28 DIAGNOSIS — I10 ESSENTIAL HYPERTENSION, BENIGN: ICD-10-CM

## 2025-05-28 DIAGNOSIS — R07.9 CHEST PAIN, UNSPECIFIED TYPE: ICD-10-CM

## 2025-05-28 PROCEDURE — G0463 HOSPITAL OUTPT CLINIC VISIT: HCPCS | Mod: 25

## 2025-05-28 RX ORDER — FUROSEMIDE 20 MG/1
20 TABLET ORAL DAILY PRN
Status: SHIPPED
Start: 2025-05-28

## 2025-05-28 RX ORDER — CLOPIDOGREL BISULFATE 75 MG/1
75 TABLET ORAL DAILY
Qty: 90 TABLET | Refills: 3 | Status: SHIPPED | OUTPATIENT
Start: 2025-05-28

## 2025-05-28 RX ORDER — AMOXICILLIN 500 MG/1
CAPSULE ORAL
Qty: 4 CAPSULE | Refills: 3 | Status: SHIPPED | OUTPATIENT
Start: 2025-05-28

## 2025-05-28 RX ORDER — ROSUVASTATIN CALCIUM 20 MG/1
20 TABLET, COATED ORAL
Qty: 30 TABLET | Refills: 3 | Status: SHIPPED | OUTPATIENT
Start: 2025-05-29

## 2025-05-28 RX ORDER — NITROGLYCERIN 0.4 MG/1
TABLET SUBLINGUAL
Qty: 25 TABLET | Refills: 3 | Status: SHIPPED | OUTPATIENT
Start: 2025-05-28

## 2025-05-28 RX ORDER — SACUBITRIL AND VALSARTAN 24; 26 MG/1; MG/1
1 TABLET, FILM COATED ORAL 2 TIMES DAILY
Qty: 180 TABLET | Refills: 3 | Status: SHIPPED | OUTPATIENT
Start: 2025-05-28

## 2025-05-28 ASSESSMENT — PAIN SCALES - GENERAL: PAINLEVEL_OUTOF10: NO PAIN (0)

## 2025-05-28 NOTE — PROGRESS NOTES
[facility], [place] HEART CARE   CARDIOLOGY PROGRESS NOTE     Chief Complaint   Patient presents with    Follow Up          Diagnosis:  1.  RIVAS 2/2 CHF, pulm htn, mild COPD, severe diffusion defect on 11/18/20, and DVT/PE???  2.  CABG x3, 4/29/2014.  -LIMA to LAD, SVG to OM, and 2nd OM.  3.  CKD-3.  4.  CHF-systolic, ischemic.    -15-20% on 4/3/24.   -27% on 5/8/2023, [place].  -30-35% on 4/5/22 and 4/5/2023.   -35-40% on 5/5/22.   -30-35% on 12/9/20.   -Diastolic grade 1 on 11/1/2007. Unchanged on 2/11/21.  5.  TAVR on 12/6/22, [place].   -29 mm Patricio S3.  6.  MV and AV.  -moderate stenosis on cardiac cath on 1/15/21.   7.  H/O DVT-x5.   -H/O IVC filter, removal in 2014.  8.  Hypomagnesia.   -2.4 on 11/15/2024.   -2.0 on 3/27/2024.  9.  Hypertension-controlled.  10.  Hypertriglyceridemia-controlled.  11.  Tobacco abuse.    -Quit in 1978.  12.  Cardiac cath x5.    -7/17/2007, 4/20//14, 2019, 1/15/21, and 11/30/22.  13.  Coronary stent placement on 7/17/2007.  -x2 stents to the LAD and in 2019 in [place] to unknown vessel.  14.  H/O sarcoidosis in 1982.  15.  DM-2-controlled.   16.  COPD.  -Mild on 11/18/2020.  17.  WMA on 12/9/20.  18.  Right lung nodule x2 on 2/12/2021.  19. TAAA.   -4.3 cm on 5/15/2021.  20.  AAA.   r/o on ultrasound from 2/12/2021.  21.  Pulmonary HTN.    - Mild cardiac cath, [facility] on 1/15/2021.  22.  A-fib, new onset on 3/27/2024.   -Metoprolol and Coumadin.   -Stop Coreg 12.5 mg.  Start metoprolol 50 mg XL twice daily, 3/27/2024      Assessment/Plan:    1.  Hypotension: I believe he is too dry causing his hypotension.  Had been on Lasix 40 mg twice a day.  An additional 20 mg was given for a total of 60 mg in the morning and 40 mg in the afternoon.  Prior to the addition of his medication, systolic blood pressures have been in the 90s.  Blood pressure today initially was 76/49 mmHg.  On repeat, 78/52 mmHg.  He is denying dizziness, lightheadedness, near-syncope, or syncope.  Will place him back on Lasix 40 mg  twice a day.  Patient will have the option of taking an extra 20 mg as needed for swelling/weight gain.  Otherwise, continue Jardiance 10 mg daily, Lasix 40 mg twice a day, metoprolol 50 mg XL twice a day, and Entresto 24/26 mg twice a day.  2.  CHF: Stable, plan for echocardiogram.  EF was 15-20% on 4/3/24.  Has seen advanced heart failure.  Was seen most recently on 3/3/2025.  The Lasix 40 mg twice a day was increased to 60 mg in the morning and 40 mg in the afternoon..  Today, he has mild peripheral edema but currently dry with hypotension.  Will decrease Lasix from 60 mg in the morning and 40 mg in the afternoon to 40 mg twice a day.  He describes being slightly dyspneic on exertion.  No additional changes.  Not able to increase medications secondary to hypotension as outlined above.  Continue Jardiance 10 mg daily, Lasix 40 mg twice a day, metoprolol 50 mg XL twice a day, and Entresto 24/26 mg twice a day.   3.  CAD: Has been describing substernal chest tightness with activity for last 1 to 1.5 years.  He states he walks in his room to the dining area.  On the way back, he states he will have chest tightness with shortness of breath.  Does not happen every time but rather frequently.  He did have a stress test on 4/5/2024 which showed large area of infarction but no reversible defect.  Plan for repeat stress test.  Continue medications.  If having severe discomfort, needs to go to the ER.  4.  AAA: Concern for.  Measured up to 2.9 cm on 2/11/2021.  Plan for an ultrasound of the abdomen for AAA.  5.  Carotid artery stenosis.  No significant stenosis on 2/11/2021.  Plan for repeat ultrasound of carotids.  6.  Refill medications.  Refills include amoxicillin 2 g, 3060 minutes prior to dental procedures/cleanings, SL nitro as needed for chest pain, Plavix 75 mg daily, Entresto 24/26 mg twice a day, Jardiance 10 mg daily.  [facility] and the other ones to be sent to  [facility].  7.  A-fib: Stable.  EKG shows A-fib with a rate of 86 bpm.  Denies significant bleeding or bruising.  Continue metoprolol and Coumadin.  8.  Follow-up after completion of ultrasound carotids, ultrasound abdomen for AAA, stress test, echo, and refill medications.    Interval history:  As noted above.      HPI:    Mr. Barriga is being seen by cardiology to establish care.  He has a history of coronary disease with both stenting and CABG x3.  His wife passed away on 9/1/2020, suddenly from cancer.  He does not plan to go back to [place] and is establishing care locally.     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     Sidney is here to establish care.  He has a history of CAD with stenting and bypass. He previously obtained his cardiac care through [place], [place].  He states that dating back in 1982, he was diagnosed with sarcoidosis.  More recently, he had a cardiac catheterization on 7/17/2007 with history of MI with stenting x2 placed to the p-mLAD.  He went on to have CABG x3 on 4/29/2014 in [place].  He had LIMA to LAD, SVG to OM1 and OM 2.  At that time, his EF was reported to be 30% with ischemic cardiomyopathy.  More recently, he reports having had a cardiac catheterization once again in [place] with stenting to unknown vessels in 2019.  His symptoms in the past have been exertional dyspnea with intrascapular discomfort.  He states he has been having similar symptoms for the last 6 months but to a lesser degree.  Risk factors include history of tobacco abuse at approximately a half a pack a day until 1978, hypertension, hyperlipidemia, DM-2, family history, and  "diet.     He reportedly has a history of ischemic cardiomyopathy.  EF on 8/12/2010 was 40%.  Recently, while in [place] at time of bypass on 4/29/2014, his EF was 30%.  His echo on 11/1/2017 showed an EF of 40-45% with grade 1 diastolic dysfunction.  There was evidence for wall motion normalities as well as mild left atrial enlargement.  He has not had any swelling to his legs, presently on Lasix 20 mg daily.  He states he does not have issues with fluid overload.     He has been on Coumadin for extended period time.  He describes having had multiple recurrent DVT's with a IVC filter in place.  He is currently on Coumadin with management through the Coumadin clinic.     He has a history of hyperlipidemia.  His most recent lipid panel was on 10/20/2020 showing a total cholesterol of 248 and LDL of 164.  HDL was 46.  He has not been able to tolerate Lipitor or Crestor in the past.  He has been on Zetia.  On 12/7/2020,  he was started on Crestor 20 mg twice a week.  If he is able to tolerate this dosage, would increase as able. He states he has been on \"every statin with issues with all of them\".     Patient is known to have mild COPD with history of tobacco abuse.  PFT's on 11/18/2020 support mild COPD with severe diffusion defect.      Relevant testing:  Stress test on 4/5/2024:    The nuclear stress test is abnormal.     There is a large area of infarction in the anterior, apical, septal,   anteroseptal and inferoseptal segment(s) of the left ventricle.     Left ventricular function is severely reduced.     The left ventricular ejection fraction at stress is 28%.     A prior study was conducted on 4/26/2022.  This study has changes noted when compared with the prior study. The estimated ejection fraction is worse.     Echo on 4/3/2024:  Left ventricular function is decreased.   The ejection fraction is 15-20% (severely reduced).   Severe diffuse hypokinesis is present.  Global right ventricular function is mildly to " moderately reduced.  The 29 mm Patricio III TAVR is well-seated. Leaflet opening is not clearly  visualized. There is no paravalvular or valvular regurgitation. The Vmax is  1.4 m/s, the mean gradient is 5 mmHg, the dimensionless index is 0.64.  Mild tricuspid and mitral insufficiency present.  Estimated right ventricular systolic pressure is 50 mmHg plus right atrial  pressure. Pulmonary hypertension is present.  No pericardial effusion is present.    US lower extremity for DVT on 7/17/2023:   No evidence of deep venous thrombosis within the lower extremities.    Zio patch on 4/19/2023:  Worn for 14 days and 0 hr's.  After removing artifact, total time was 13 days and 23 hr's. Placed on 4/19/2023 at 12:34 PM and completed on 5/3/2023 at 12:34 PM.  Underlying rhythm was sinus.  Hrt rate ranged from 53 bpm, maximum heart rate of 182 bmp, averaging 65 bmp.  No significant bradycardia, pauses, Mobitz type II or 3rd degree heart block.  No atrial fibrillation on this study.  x0 triggered events and x0 diary entries.   x306 runs of VT lasting up to 4 beats with a maximum heart rate of 182 bmp.    x47 runs of SVT lasting up to 20 beats with a maximum heart rate of 160 bmp.  Rare, <1% of PAC's, atrial couplets, and atrial triplets.  Frequent PVC's at 27.4%.  Occasional ventricular couplets at 2.6%.  Occasional ventricular triplets at 1.0%.  + episodes of ventricular bigeminy lasting up to 5 min's and 41 sec's.  + episodes of ventricular trigeminy lasting up to 1 min and 40 sec's.       Echocardiogram on 5/8/2023 at [place]:  1. Status post 29 mm Paulino Patricio 3 transcatheter aortic valve bioprosthesis implanted via a transfemoral approach (6-DEC-2022).   2. Normal prosthetic leaflet(s) motion. Mean gradient 7 mmHg. Trivial prosthetic regurgitation. No periprosthetic regurgitation.   3. Moderate mitral valve regurgitation (PISA ERO 0.31 cm2; RV 32 ml). Mechanism is likely degenerative disease resulting in malcoaptation on a  background of moderate MAC.   4. Moderately enlarged left ventricular chamber size with moderate-severe generalized hypokinesis with regional variability, ejection fraction 27%.   5. Elevated left ventricular filling pressure. See comments.   6. Normal right ventricular chamber size with mildly reduced systolic function. Estimated right ventricular systolic pressure 65 mmHg (right atrial pressure of 10 mmHg).   7. Normal inferior vena cava size with reduced inspiratory collapse (<50%).   8. No  pericardial effusion.   9. Compared to the report of 08/15/2022 the following changes have occurred: in addition to new TAVR implantation, mitral regurgitation has increased and right ventricular systolic pressure has also increased.  Side by side comparison of images   performed.     Echocardiogram in 4/5/2023:  Left ventricular function is decreased. The ejection fraction is 30-35% (moderately reduced). Severe diffuse hypokinesis is present.   Grade II or moderate diastolic dysfunction.  The right ventricle is normal size. Global right ventricular function is normal.  The 29 mm Patricio III TAVR is well-seated. Leaflet opening is not clearly visualized. There is trace paravalvular regurgitation. There is no valvular regurgitation. The Vmax is 2.1 m/s, the mean gradient is 10 mmHg, the dimensionless index is 0.50, and the acceleration time is 120 ms.  This study was compared with the study from 05/05/2022. The AV gradient is lower. There is no significant change in the biventricular function.    LUCY at [place] on 12/8/22:  1. The patient was monitored from 12/8/2022 to 1/6/2023 with a total monitoring time of 28 days 19 hr 54 min. The baseline rhythm was sinus with a first-degree AV delay and a left bundle-branch block. The heart rate varied from 55 bpm to 93 bpm. The   average heart rate was 67 bpm.   2. There were 35,812 PVCs seen singly, paired, and in bigeminy and trigeminy with a PVC burden of 1.26%. There were 6  "ventricular tachycardia events with greater than 3 beats with the longest duration being 8 beats. The maximum VT rate was 183 bpm.   3. There were 15,775 PACs seen singly with a PAC burden of <1%. There were 19 runs of supraventricular tachycardia observed with the longest duration being 39 beats. The maximum SVT rate was 125 bpm.   4. The patient reported 26 symptomatic events of \"dizziness.\" During these events, the rhythm was sinus with a first-degree AV delay and a left bundle-branch block. The heart rate varied from 64 bpm to 92 bpm. At and/or around these times, PVCs were seen    singly and in bigeminy and trigeminy.       Cardiac cath on 11/30/22:  The left main coronary artery is 30% obstructed by multiple discrete lesions.   The proximal left anterior descending artery is 100% obstructed by a discrete lesion. The distal segment is not visible.   The proximal circumflex artery is 40% obstructed by multiple discrete lesions. The distal segment is normal size.   The ramus intermedius segment is 100% obstructed by a discrete lesion. Fills retrogradely from graft.   The proximal right coronary artery is 50% obstructed by a discrete lesion. The distal segment is normal size.   The distal right coronary artery is 30% obstructed by multiple discrete lesions. The distal segment is normal size.   GRAFT ANGIOGRAPHY SUMMARY   Single body vein bypass graft to the Ramus Intermedius Segment. Graft Appears normal.   Single body left internal mammary graft to the Middle Left Anterior Descending Artery.   GRAFT ANGIOGRAPHY SUMMARY   Single body vein bypass graft to the Ramus Intermedius Segment. Graft Appears normal.   Single body left internal mammary graft to the Middle Left Anterior Descending Artery.      ECHO on 5/5/22:  The visual ejection fraction is 35-40%. Moderate diffuse hypokinesis is present.  Right ventricular function, chamber size, wall motion, and thickness are  normal.  The calculated aortic valve area is " 0.94 cm2.The mean gradient across the AV is 22mmHg, the peak velocity is 36 mmHg.  Mild mitral insufficiency is present. Mild tricuspid insufficiency is present.  The inferior vena cava cannot be assessed.   Ascending aorta 4.2 cm.  No pericardial effusion is present.    Stress test on 4/26/22:  There is a large area of a severe   degree of infarction in the anterior, apical, inferior and septal   segment(s) of the left ventricle. The left ventricular ejection fraction   at rest is 37%.  The left ventricular ejection fraction at stress is 38%.       Zio on 3/1/21:  Underlying rhythm was sinus.  Hrt rate ranged from 50 bpm, maximum heart rate of 197 bmp, averaging 72 bmp.  No significant bradycardia, pauses, 2nd degree Mobitz type II or 3rd degree heart block.  No atrial fibrillation was identified on this study.  x0 triggered events and x0 diary entries.  x18 runs of VT lasting to 11 beats with a maximum heart rate of 197 bmp.    x25 runs of SVT lasting up to 15 beats with a maximum heart rate of 156 bmp.  Rare, less than 1% of PAC's, atrial couplets, atrial triplets, ventricular couplets and ventricular triplets.  Frequent PVC's at 15.9%.  + episodes of ventricular bigeminy lasting up to 18 min's and 9 sec's.  + episodes of ventricular trigeminy lasting up to 30 mins and 5 sec's.    Stress echo at [place] on 5/25/2021:  1. AT REST:   2. Estimated left ventricular ejection fraction 25%   3. Stroke volume index 45 ml/m^2, aortic valve mean systolic gradient 20 mm Hg, aortic valve   area 1.16 cm^2, aortic valve area index 0.62 cm^2/m^2   4. STRESS TEST:   5. Dobutamine was infused from 5 mcg/kg/min to 20.0 mcg/kg/min.   6. PEAK STRESS:   7. Estimated left ventricular ejection fraction range 30% - 35%.   8. Stroke volume index 55 ml/m^2, aortic valve mean systolic gradient 35 mm Hg, aortic valve  area 1.36 cm^2, aortic valve area index 0.73 cm^2/m^2       US aorta on 2/11/21:  No abdominal aortic aneurysm.    US carotids  on 2/11/21:  No hemodynamically significant stenoses are identified in either carotid bifurcation.    ECHO on 2/11/21:  No pericardial effusion is present.  Moderately (EF 30-35%) reduced left ventricular function is present.  The right ventricle is normal size.  Global right ventricular function is normal.  Mild left atrial enlargement is present.  Mild mitral insufficiency is present.  Severe aortic valve calcification is present.  The peak aortic velocity is 3.02 m/sec.  The mean gradient across the aortic valve is 18.4 mmHg.  Moderate aortic stenosis is present.  Right ventricular systolic pressure is 44 mmHg above the right atrial pressure.  Mild tricuspid insufficiency is present.  Mild pulmonic insufficiency is present.  Ascending aorta 4.1 cm.  Sinuses of Valsalva 4.3 cm.    CTA chest on 2/11/21:  2 small nodules are identified in the right lung.  Follow-up CT scan in 6 months time is recommended. The ascending aorta measures 4.2 cm in maximal transverse diameter.    Cardiac cath on 1/15/21:  Right sided filling pressures are normal.  Mild elevated pulmonary hypertension.  Left sided filling pressures are moderately elevated.  Left ventricular filling pressures are moderately elevated.  Normal cardiac output level.  Moderate mitral valve stenosis (mean gradient 8.4 mm Hg, 2.2 cm2)  Moderate aortic valve stenosis (mean gradient 17 mm Hg, 1.2 cm2)  Native three vessel CAD  >>> Moderate 50% proximal RCA stenosis [ungrafted vessel]  >>> Mimimal LMCA stenosis  >>> 100% Ostial LAD stenosis [LIMA-LAD widely patent]  >>> Proximal 50% LCx and 90% RI stenoses [Sequential SVG-RI-OM patent with focal 60% ISR in jump segment of SVG]     Suggest medical treatment at this time.  Neither the aortic valve nor mitral valve require intervention at this time.  The proximal RCA stenosis is unlikely be physiologically significant. The LAD territory is infarcted but the LIMA-LAD remains widely patent.  There is 70% ISR of SVG in  jump segment but the OM is receiving brisk DAVID-3 flow via the native coronary (only 50% proximal stenosis).    Cath on 7/17/2007:  1. Severe, single-vessel coronary artery disease. Chronic proximal-to-mild occlusion of the left anterior descending coronary artery with distal right-to-left collaterals.  2. Cypher drug-eluting stenting of the [id number] segment of the left anterior descending.    Stress test on 12/15/20:    The nuclear stress test is abnormal.    There is a large area of a severe degree of infarction in the entire apical and anteroseptal segment(s) of the left ventricle.    Left ventricular function is moderately reduced.    The left ventricular ejection fraction at rest is 44%.  The left ventricular ejection fraction at stress is 35%.    There is no prior study for comparison.    ECHO on 12/9/20:  No pericardial effusion is present.  Left ventricular size is normal.  Anterior wall hypokinesis is present.  Apical wall hypokinesis is present.  Moderately (EF 30-35%) reduced left ventricular function is present.  The right ventricle is normal size.  Global right ventricular function is normal.  Mild mitral annular calcification is present.  Mild mitral insufficiency is present.  Moderate aortic valve calcification is present.  Trace aortic insufficiency is present.  The Aortic valve has significant calcification of valve leaflets with poor  mobility. Our measured values I feel underestimate the amount of stenosis. By  visual estimate would put at least moderate Aortic stenosis.  Trace tricuspid insufficiency is present.  Right ventricular systolic pressure is 6 mmHg above the right atrial pressure.  The peak aortic velocity is 2.57 m/sec.  The mean gradient across the aortic valve is 15.5 mmHg.        ICD-10-CM    1. Persistent atrial fibrillation (H)  I48.19 EKG 12-lead complete w/read - (Clinic Performed)      2. RIVAS (dyspnea on exertion)  R06.09 Echocardiogram Complete     furosemide (LASIX) 20  MG tablet     sacubitril-valsartan (ENTRESTO) 24-26 MG per tablet     empagliflozin (JARDIANCE) 10 MG TABS tablet     NM MPI with Lexiscan      3. Ischemic cardiomyopathy  I25.5 Echocardiogram Complete     furosemide (LASIX) 20 MG tablet     clopidogrel (PLAVIX) 75 MG tablet     sacubitril-valsartan (ENTRESTO) 24-26 MG per tablet     empagliflozin (JARDIANCE) 10 MG TABS tablet      4. Congestive heart failure, unspecified HF chronicity, unspecified heart failure type (H)  I50.9 Echocardiogram Complete     furosemide (LASIX) 20 MG tablet     sacubitril-valsartan (ENTRESTO) 24-26 MG per tablet     empagliflozin (JARDIANCE) 10 MG TABS tablet      5. Mild pulmonary hypertension (H)  I27.20 Echocardiogram Complete     furosemide (LASIX) 20 MG tablet     sacubitril-valsartan (ENTRESTO) 24-26 MG per tablet     empagliflozin (JARDIANCE) 10 MG TABS tablet      6. Irregular heartbeat  I49.9       7. H/O aortic stenosis, severe  I35.0 Echocardiogram Complete     amoxicillin (AMOXIL) 500 MG capsule      8. Frequent PVCs  I49.3       9. Essential hypertension  I10 furosemide (LASIX) 20 MG tablet      10. Essential hypertension, benign  I10       11. Chronic combined systolic and diastolic congestive heart failure (H)  I50.42 Echocardiogram Complete     furosemide (LASIX) 20 MG tablet     sacubitril-valsartan (ENTRESTO) 24-26 MG per tablet     empagliflozin (JARDIANCE) 10 MG TABS tablet      12. Type 2 diabetes mellitus with hyperglycemia, without long-term current use of insulin (H)  E11.65 rosuvastatin (CRESTOR) 20 MG tablet      13. Mixed hyperlipidemia  E78.2 rosuvastatin (CRESTOR) 20 MG tablet      14. Hypertriglyceridemia  E78.1       15. Hypomagnesemia  E83.42       16. Severe aortic stenosis  I35.0 Echocardiogram Complete     amoxicillin (AMOXIL) 500 MG capsule      17. S/p TAVR (transcatheter aortic valve replacement), bioprosthetic on 12/6/2022 with a 29 mm IMAN 3 valve  Z95.3 Echocardiogram Complete     amoxicillin  (AMOXIL) 500 MG capsule      18. Regional wall motion abnormality of heart  R93.1 Echocardiogram Complete      19. Moderate mitral stenosis on his cardiac cath on 1/15/2021  I05.0 Echocardiogram Complete      20. 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 Abdominal aortic aneurysm (AAA) 3.0 cm to 5.5 cm in diameter in male  I71.40 US Abdominal Aorta      22. Stage 3b chronic kidney disease (H)  N18.32 furosemide (LASIX) 20 MG tablet      23. Status post coronary angiogram x4 on 7/17/2007, 4/20/2014, 2019, and 1/15/2021  Z98.890 rosuvastatin (CRESTOR) 20 MG tablet     nitroGLYcerin (NITROSTAT) 0.4 MG sublingual tablet     clopidogrel (PLAVIX) 75 MG tablet     NM MPI with Lexiscan      24. 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, SVG to first OM and second OM  Z95.1 nitroGLYcerin (NITROSTAT) 0.4 MG sublingual tablet     clopidogrel (PLAVIX) 75 MG tablet     NM MPI with Lexiscan      25. History of coronary artery stent placement on 7/17/2007 x2 stents to the LAD and in 2019 in [place] to unknown vessel  Z95.5 nitroGLYcerin (NITROSTAT) 0.4 MG sublingual tablet     clopidogrel (PLAVIX) 75 MG tablet     NM MPI with Lexiscan      26. H/O cardiac catheterization x3 on 7/17/2007, 4/20/2014, and 2019  Z98.890 nitroGLYcerin (NITROSTAT) 0.4 MG sublingual tablet     clopidogrel (PLAVIX) 75 MG tablet     NM MPI with Lexiscan      27. Atherosclerosis of coronary artery bypass graft of native heart without angina pectoris  I25.810 rosuvastatin (CRESTOR) 20 MG tablet     nitroGLYcerin (NITROSTAT) 0.4 MG sublingual tablet     clopidogrel (PLAVIX) 75 MG tablet     NM MPI with Lexiscan      28. Chest pain, unspecified type  R07.9 rosuvastatin (CRESTOR) 20 MG tablet     nitroGLYcerin (NITROSTAT) 0.4 MG sublingual tablet     clopidogrel (PLAVIX) 75 MG tablet     NM MPI with Lexiscan      29. Atherosclerosis of native coronary artery of native heart without  angina pectoris  I25.10 nitroGLYcerin (NITROSTAT) 0.4 MG sublingual tablet     clopidogrel (PLAVIX) 75 MG tablet     NM MPI with Lexiscan      30. 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     No Comments Provided    Sarcoidosis     No Comments Provided    Type 2 or unspecified type diabetes mellitus     No Comments Provided       Past Surgical History:   Procedure Laterality Date    ANGIOPLASTY  01/01/2019    stents    CARDIAC SURGERY  01/01/2014    heart bypass    COLONOSCOPY  01/01/2019    CV CORONARY ANGIOGRAM N/A 1/15/2021    Procedure: CV CORONARY ANGIOGRAM;  Surgeon: Charlie Harris MD;  Location:  [facility]    CV LEFT HEART CATH N/A 1/15/2021    Procedure: Left Heart Cath;  Surgeon: Charlie Harris MD;  Location:  [facility]    CV RIGHT HEART CATH MEASUREMENTS RECORDED N/A 1/15/2021    Procedure: CV RIGHT HEART CATH;  Surgeon: Charlie Harris MD;  Location:  [facility]    IR IVC FILTER REMOVAL  5/2/2014    OTHER SURGICAL HISTORY      [id number],BIOPSY LUNG OR [facility]       Allergies   Allergen Reactions    Isosorbide Nitrate Visual Disturbance     LIGHT HEADED,ALMOST PASSED OUT      Zocor [Simvastatin] Itching    Atorvastatin Itching    Isosorbide Visual Disturbance    Rosuvastatin Itching    Ancef [Cefazolin] Rash     Patient received cefazolin IV while in the [facility] on 12/6/22. On  12/12/22 he was seen by his primary for a drug rash on his trunk. Pt thought it was from amoxicillin administered at the [facility] but the [facility] only infused cefazolin per chart records. Amoxil rx given for dental procedures pt will not take due to fear of reaction.        Current Outpatient Medications   Medication Sig Dispense Refill    acetaminophen (TYLENOL) 500 MG tablet Take 1 tablet (500 mg) by mouth every 8 hours as needed for mild pain 60 tablet 0    albuterol (PROAIR HFA/PROVENTIL HFA/VENTOLIN HFA) 108 (90 Base) MCG/ACT inhaler Inhale 1-2 puffs into the lungs every 4 hours as needed for shortness of breath or wheezing. 18 g 3    amoxicillin (AMOXIL) 500 MG capsule Take 2 g, 30-60 minutes, prior to dental procedures/cleanings 4 capsule 3    blood glucose (NO BRAND SPECIFIED) lancets standard Use to test blood sugar 1 times daily or as directed. 1 each 3    blood glucose (NO BRAND SPECIFIED) test strip Use to test blood sugar 1 times daily or as directed. 90 strip 3    blood glucose monitoring (NO BRAND SPECIFIED) meter device kit Use to test blood sugar 1 times daily or as directed. 1 kit 0    Calcium Carbonate-Vit D-Min (CALCIUM 600+D PLUS MINERALS) 600-400 MG-UNIT TABS Take 1 tablet by mouth daily      cinnamon 500 MG CAPS Take 500 mg by mouth daily       clopidogrel (PLAVIX) 75 MG tablet Take 1 tablet (75 mg) by mouth daily. 90 tablet 3    Coenzyme Q10 (CO Q-10) 200 MG CAPS Take 1 capsule by mouth daily      cyanocobalamin (VITAMIN B-12) 500 MCG SUBL sublingual tablet Place 500 mcg under the tongue daily      empagliflozin (JARDIANCE) 10 MG TABS tablet Take 1 tablet (10 mg) by mouth daily. 90 tablet 3    ezetimibe (ZETIA) 10 MG tablet TAKE 1 TABLET BY MOUTH AT BEDTIME 8PM 90 tablet 3    finasteride (PROSCAR) 5 MG tablet Take 1 tablet by mouth daily at 2 pm.      furosemide (LASIX) 20 MG tablet Take 1 tablet (20 mg) by mouth daily as needed (welling/wt gain).      furosemide (LASIX) 40 MG tablet Take 1 tablet  (40 mg) by mouth 2 times daily. 180 tablet 3    LUTEIN PO Take 1 tablet by mouth daily      metoprolol succinate ER (TOPROL XL) 50 MG 24 hr tablet Take 1 tablet (50 mg) by mouth 2 times daily. 180 tablet 1    nitroGLYcerin (NITROSTAT) 0.4 MG sublingual tablet For chest pain place 1 tablet under the tongue every 5 minutes for 3 doses. If symptoms persist 5 minutes after 1st dose call 911. 25 tablet 3    Omega-3 Fatty Acids (FISH OIL) 1200 MG capsule Take 1 capsule by mouth daily      [START ON 2025] rosuvastatin (CRESTOR) 20 MG tablet Take 1 tablet (20 mg) by mouth twice a week. 30 tablet 3    sacubitril-valsartan (ENTRESTO) 24-26 MG per tablet Take 1 tablet by mouth 2 times daily. 180 tablet 3    vitamin C (ASCORBIC ACID) 500 MG tablet Take 1,000 mg by mouth daily      Vitamin D3 (CHOLECALCIFEROL) 125 MCG (5000 UT) tablet Take 1 tablet by mouth daily      warfarin ANTICOAGULANT (COUMADIN) 2.5 MG tablet Take 3.75 mg (1 and 1/2 tablets) by oral route on  and 2.5mg (1 tablet) by oral route all other days 90 tablet 1       Social History     Socioeconomic History    Marital status:      Spouse name: Not on file    Number of children: Not on file    Years of education: Not on file    Highest education level: Not on file   Occupational History    Not on file   Tobacco Use    Smoking status: Former     Current packs/day: 0.00     Average packs/day: 0.5 packs/day for 24.0 years (12.0 ttl pk-yrs)     Types: Cigarettes     Start date: 1954     Quit date: 1978     Years since quittin.4    Smokeless tobacco: Never   Vaping Use    Vaping status: Never Used   Substance and Sexual Activity    Alcohol use: Yes     Alcohol/week: 5.0 standard drinks of alcohol     Types: 3 Cans of beer, 2 Shots of liquor per week    Drug use: Unknown     Types: Other     Comment: Drug use: No    Sexual activity: Not on file   Other Topics Concern    Parent/sibling w/ CABG, MI or angioplasty before 65F 55M? Not Asked    Social History Narrative    Not on file     Social Drivers of Health     Financial Resource Strain: Low Risk  (10/27/2023)    Financial Resource Strain     Within the past 12 months, have you or your family members you live with been unable to get utilities (heat, electricity) when it was really needed?: No   Food Insecurity: Low Risk  (10/27/2023)    Food Insecurity     Within the past 12 months, did you worry that your food would run out before you got money to buy more?: No     Within the past 12 months, did the food you bought just not last and you didn t have money to get more?: No   Transportation Needs: Low Risk  (10/27/2023)    Transportation Needs     Within the past 12 months, has lack of transportation kept you from medical appointments, getting your medicines, non-medical meetings or appointments, work, or from getting things that you need?: No   Physical Activity: Not on file   Stress: Not on file   Social Connections: Not on file   Interpersonal Safety: Low Risk  (10/27/2023)    Interpersonal Safety     Do you feel physically and emotionally safe where you currently live?: Yes     Within the past 12 months, have you been hit, slapped, kicked or otherwise physically hurt by someone?: No     Within the past 12 months, have you been humiliated or emotionally abused in other ways by your partner or ex-partner?: No   Housing Stability: Low Risk  (10/27/2023)    Housing Stability     Do you have housing? : Yes     Are you worried about losing your housing?: No       LAB RESULTS:   Orders Only on 01/21/2021   Component Date Value Ref Range Status    Sodium 01/21/2021 141  133 - 144 mmol/L Final    Potassium 01/21/2021 4.7  3.4 - 5.3 mmol/L Final    Chloride 01/21/2021 107  94 - 109 mmol/L Final    Carbon Dioxide 01/21/2021 29  20 - 32 mmol/L Final    Anion Gap 01/21/2021 5  3 - 14 mmol/L Final    Glucose 01/21/2021 125* 70 - 99 mg/dL Final    Urea Nitrogen 01/21/2021 26  7 - 30 mg/dL Final    Creatinine  01/21/2021 1.40* 0.66 - 1.25 mg/dL Final    GFR Estimate 01/21/2021 44* >60 mL/min/[1.73_m2] Final    GFR Estimate If Black 01/21/2021 51* >60 mL/min/[1.73_m2] Final    Calcium 01/21/2021 9.9  8.5 - 10.1 mg/dL Final    WBC 01/21/2021 6.7  4.0 - 11.0 10e9/L Final    RBC Count 01/21/2021 3.63* 4.4 - 5.9 10e12/L Final    Hemoglobin 01/21/2021 11.3* 13.3 - 17.7 g/dL Final    Hematocrit 01/21/2021 34.9* 40.0 - 53.0 % Final    MCV 01/21/2021 96  78 - 100 fl Final    MCH 01/21/2021 31.1  26.5 - 33.0 pg Final    MCHC 01/21/2021 32.4  31.5 - 36.5 g/dL Final    RDW 01/21/2021 12.2  10.0 - 15.0 % Final    Platelet Count 01/21/2021 163  150 - 450 10e9/L Final    INR Point of Care 01/21/2021 2.9* 0.86 - 1.14 Final    Capillary Blood Collection 01/21/2021 Capillary collection performed   Final   Office Visit on 01/12/2021   Component Date Value Ref Range Status    WBC 01/12/2021 6.4  4.0 - 11.0 10e9/L Final    RBC Count 01/12/2021 3.72* 4.4 - 5.9 10e12/L Final    Hemoglobin 01/12/2021 11.7* 13.3 - 17.7 g/dL Final    Hematocrit 01/12/2021 35.3* 40.0 - 53.0 % Final    MCV 01/12/2021 95  78 - 100 fl Final    MCH 01/12/2021 31.5  26.5 - 33.0 pg Final    MCHC 01/12/2021 33.1  31.5 - 36.5 g/dL Final    RDW 01/12/2021 12.2  10.0 - 15.0 % Final    Platelet Count 01/12/2021 162  150 - 450 10e9/L Final    % Neutrophils 01/12/2021 62.1  % Final    % Lymphocytes 01/12/2021 19.7  % Final    % Monocytes 01/12/2021 13.5  % Final    % Eosinophils 01/12/2021 4.2  % Final    % Basophils 01/12/2021 0.5  % Final    Absolute Neutrophil 01/12/2021 4.0  1.6 - 8.3 10e9/L Final    Absolute Lymphocytes 01/12/2021 1.3  0.8 - 5.3 10e9/L Final    Absolute Monocytes 01/12/2021 0.9  0.0 - 1.3 10e9/L Final    Absolute Eosinophils 01/12/2021 0.3  0.0 - 0.7 10e9/L Final    Absolute Basophils 01/12/2021 0.0  0.0 - 0.2 10e9/L Final    Diff Method 01/12/2021 Automated Method   Final    Sodium 01/12/2021 142  133 - 144 mmol/L Final    Potassium 01/12/2021 4.4  3.4 -  5.3 mmol/L Final    Chloride 01/12/2021 109  94 - 109 mmol/L Final    Carbon Dioxide 01/12/2021 29  20 - 32 mmol/L Final    Anion Gap 01/12/2021 4  3 - 14 mmol/L Final    Glucose 01/12/2021 100* 70 - 99 mg/dL Final    Urea Nitrogen 01/12/2021 28  7 - 30 mg/dL Final    Creatinine 01/12/2021 1.14  0.66 - 1.25 mg/dL Final    GFR Estimate 01/12/2021 57* >60 mL/min/[1.73_m2] Final    GFR Estimate If Black 01/12/2021 66  >60 mL/min/[1.73_m2] Final    Calcium 01/12/2021 9.6  8.5 - 10.1 mg/dL Final    INR Point of Care 01/12/2021 3.8* 0.86 - 1.14 Final   Office Visit on 01/11/2021   Component Date Value Ref Range Status    COVID-19 Virus PCR to [facility] - So* 01/11/2021 Nasopharyngeal   Final    COVID-19 Virus PCR to [facility] - Re* 01/11/2021 Not Detected   Final   Orders Only on 12/01/2020   Component Date Value Ref Range Status    INR 12/01/2020 2.65* 0.86 - 1.14 Final   Orders Only on 12/01/2020   Component Date Value Ref Range Status    WBC 12/01/2020 5.5  4.0 - 11.0 10e9/L Final    RBC Count 12/01/2020 3.98* 4.4 - 5.9 10e12/L Final    Hemoglobin 12/01/2020 12.5* 13.3 - 17.7 g/dL Final    Hematocrit 12/01/2020 38.4* 40.0 - 53.0 % Final    MCV 12/01/2020 97  78 - 100 fl Final    MCH 12/01/2020 31.4  26.5 - 33.0 pg Final    MCHC 12/01/2020 32.6  31.5 - 36.5 g/dL Final    RDW 12/01/2020 12.4  10.0 - 15.0 % Final    Platelet Count 12/01/2020 141* 150 - 450 10e9/L Final    Diff Method 12/01/2020 Automated Method   Final    % Neutrophils 12/01/2020 63.5  % Final    % Lymphocytes 12/01/2020 21.9  % Final    % Monocytes 12/01/2020 12.2  % Final    % Eosinophils 12/01/2020 1.8  % Final    % Basophils 12/01/2020 0.4  % Final    % Immature Granulocytes 12/01/2020 0.2  % Final    Nucleated RBCs 12/01/2020 0  0 /100 Final    Absolute Neutrophil 12/01/2020 3.5  1.6 - 8.3 10e9/L Final    Absolute Lymphocytes 12/01/2020 1.2  0.8 - 5.3 10e9/L Final    Absolute Monocytes 12/01/2020 0.7  0.0 - 1.3 10e9/L Final    Absolute Eosinophils  "12/01/2020 0.1  0.0 - 0.7 10e9/L Final    Absolute Basophils 12/01/2020 0.0  0.0 - 0.2 10e9/L Final    Abs Immature Granulocytes 12/01/2020 0.0  0 - 0.4 10e9/L Final    Absolute Nucleated RBC 12/01/2020 0.0   Final        Review of systems: Negative except that which was noted in the HPI.    Physical examination:  BP (!) 76/49 (BP Location: Right arm, Patient Position: Sitting, Cuff Size: Adult Regular)   Pulse 88   Resp 16   Ht 1.778 m (5' 10\")   Wt 71.6 kg (157 lb 14.4 oz)   SpO2 97%   BMI 22.66 kg/m      GENERAL APPEARANCE: healthy, alert and no distress  CHEST: lungs clear to auscultation.  CARDIOVASCULAR: Irregular rate irregular rhythm, normal S1 with physiologic split S2, no S3 or S4 but with systolic crescendo decrescendo murmur, no click or rub  EXTREMITIES: no clubbing, cyanosis with scant to mild peripheral edema.    Total time spent on day of visit, including review of tests, obtaining/reviewing separately obtained history, ordering medications/tests/procedures, communicating with PCP/consultants, and documenting in electronic medical record: 41 minutes.              Thank you for allowing me to participate in the care of your patient. Please do not hesitate to contact me if you have any questions.     Justyn Rae, DO              "

## 2025-05-28 NOTE — PATIENT INSTRUCTIONS
Thank you for allowing Dr NOAH Rae and our  team to participate in your care. Please call our office at [phone] with scheduling questions or if you need to cancel or change your appointment. With any other questions or concerns you may call cardiology nurse at  [phone].       If you experience chest pain, chest pressure, chest tightness, shortness of breath, fainting, lightheadedness, nausea, vomiting, or other concerning symptoms, please report to the Emergency Department or call 911. These symptoms may be emergent, and best treated in the Emergency Department.

## 2025-05-29 LAB
ATRIAL RATE - MUSE: NORMAL BPM
DIASTOLIC BLOOD PRESSURE - MUSE: NORMAL MMHG
INTERPRETATION ECG - MUSE: NORMAL
P AXIS - MUSE: NORMAL DEGREES
PR INTERVAL - MUSE: NORMAL MS
QRS DURATION - MUSE: 156 MS
QT - MUSE: 414 MS
QTC - MUSE: 495 MS
R AXIS - MUSE: -31 DEGREES
SYSTOLIC BLOOD PRESSURE - MUSE: NORMAL MMHG
T AXIS - MUSE: 137 DEGREES
VENTRICULAR RATE- MUSE: 86 BPM

## 2025-06-12 ENCOUNTER — LAB (OUTPATIENT)
Dept: LAB | Facility: OTHER | Age: OVER 89
End: 2025-06-12
Attending: FAMILY MEDICINE
Payer: MEDICARE

## 2025-06-12 ENCOUNTER — ANTICOAGULATION THERAPY VISIT (OUTPATIENT)
Dept: ANTICOAGULATION | Facility: OTHER | Age: OVER 89
End: 2025-06-12
Attending: FAMILY MEDICINE
Payer: MEDICARE

## 2025-06-12 DIAGNOSIS — Z86.718 PERSONAL HISTORY OF DVT (DEEP VEIN THROMBOSIS): ICD-10-CM

## 2025-06-12 DIAGNOSIS — I82.5Y3 CHRONIC DEEP VEIN THROMBOSIS (DVT) OF PROXIMAL VEIN OF BOTH LOWER EXTREMITIES (H): ICD-10-CM

## 2025-06-12 DIAGNOSIS — Z95.3 S/P TAVR (TRANSCATHETER AORTIC VALVE REPLACEMENT), BIOPROSTHETIC: ICD-10-CM

## 2025-06-12 DIAGNOSIS — Z86.718 HISTORY OF DVT (DEEP VEIN THROMBOSIS): ICD-10-CM

## 2025-06-12 DIAGNOSIS — Z86.718 HISTORY OF DVT (DEEP VEIN THROMBOSIS): Primary | ICD-10-CM

## 2025-06-12 LAB — INR BLD: 2.2 (ref 0.9–1.1)

## 2025-06-12 PROCEDURE — 36415 COLL VENOUS BLD VENIPUNCTURE: CPT | Mod: ZL

## 2025-06-12 PROCEDURE — 85610 PROTHROMBIN TIME: CPT | Mod: ZL

## 2025-06-12 NOTE — PROGRESS NOTES
ANTICOAGULATION MANAGEMENT     Sidney LAURITA Barrgia 89 year old male is on warfarin with therapeutic INR result. (Goal INR 2.0-3.0)    Recent labs: (last 7 days)     06/12/25  1021   INR 2.2*       ASSESSMENT     Source(s): Chart Review and Patient/Caregiver Call     Warfarin doses taken: Warfarin taken as instructed  Diet: No new diet changes identified  New illness, injury, or hospitalization: No  Medication/supplement changes: None noted  Per Dr. Rae's note on 5/28/25-Will place him back on Lasix 40 mg twice a day. Patient will have the option of taking an extra 20 mg as needed for swelling/weight gain. Otherwise, continue Jardiance 10 mg daily, Lasix 40 mg twice a day, metoprolol 50 mg XL twice a day, and Entresto 24/26 mg twice a day.   Patient verbalized that he still has been taking the extra 20 mg of lasix to make a total of 60 mg in the morning and 40 mg in the evening.   Signs or symptoms of bleeding or clotting: No  Previous INR: Therapeutic last 2(+) visits  Additional findings: None       PLAN     Recommended plan for no diet, medication or health factor changes affecting INR     Dosing Instructions: Continue your current warfarin dose with next INR in 4 weeks       Summary  As of 6/12/2025      Full warfarin instructions:  5 mg every Mon; 2.5 mg all other days   Next INR check:  7/10/2025               Telephone call with Sidney who verbalizes understanding and agrees to plan and who agrees to plan and repeated back plan correctly    Lab visit scheduled    Education provided: Please call back if any changes to your diet, medications or how you've been taking warfarin    Plan made per [facility] anticoagulation protocol    Yolande Johnson, RN  Anticoagulation Clinic  6/12/2025    _______________________________________________________________________     Anticoagulation Episode Summary       Current INR goal:  2.0-3.0   TTR:  71.1% (1 y)   Target end date:  Indefinite   Send INR reminders to:  [facility]  [place]    Indications    History of DVT (deep vein thrombosis)-multiple [Z86.718]  Chronic deep vein thrombosis (DVT) of proximal vein of both lower extremities (H) [I82.5Y3]  Personal history of DVT (deep vein thrombosis) [Z86.718]             Comments:  We will continue on Coumadin with goal INR of 2.0-2.7 per Dr Rae             Anticoagulation Care Providers       Provider Role Specialty Phone number    Zach Arredondo MD Referring Family Medicine [phone]

## 2025-06-13 ENCOUNTER — HOSPITAL ENCOUNTER (OUTPATIENT)
Dept: ULTRASOUND IMAGING | Facility: HOSPITAL | Age: OVER 89
Discharge: HOME | End: 2025-06-13
Attending: INTERNAL MEDICINE
Payer: MEDICARE

## 2025-06-13 ENCOUNTER — HOSPITAL ENCOUNTER (OUTPATIENT)
Dept: CARDIOLOGY | Facility: HOSPITAL | Age: OVER 89
Discharge: HOME | End: 2025-06-13
Attending: INTERNAL MEDICINE
Payer: MEDICARE

## 2025-06-13 DIAGNOSIS — I27.20 MILD PULMONARY HYPERTENSION (H): ICD-10-CM

## 2025-06-13 DIAGNOSIS — I35.0 SEVERE AORTIC STENOSIS: ICD-10-CM

## 2025-06-13 DIAGNOSIS — R93.1 REGIONAL WALL MOTION ABNORMALITY OF HEART: ICD-10-CM

## 2025-06-13 DIAGNOSIS — R06.09 DOE (DYSPNEA ON EXERTION): ICD-10-CM

## 2025-06-13 DIAGNOSIS — I50.9 CONGESTIVE HEART FAILURE, UNSPECIFIED HF CHRONICITY, UNSPECIFIED HEART FAILURE TYPE (H): ICD-10-CM

## 2025-06-13 DIAGNOSIS — I50.42 CHRONIC COMBINED SYSTOLIC AND DIASTOLIC CONGESTIVE HEART FAILURE (H): ICD-10-CM

## 2025-06-13 DIAGNOSIS — I71.40 ABDOMINAL AORTIC ANEURYSM (AAA) 3.0 CM TO 5.5 CM IN DIAMETER IN MALE: ICD-10-CM

## 2025-06-13 DIAGNOSIS — I35.0 AORTIC STENOSIS, SEVERE: ICD-10-CM

## 2025-06-13 DIAGNOSIS — I05.0 MODERATE MITRAL STENOSIS: ICD-10-CM

## 2025-06-13 DIAGNOSIS — I65.23 BILATERAL CAROTID ARTERY STENOSIS: ICD-10-CM

## 2025-06-13 DIAGNOSIS — I25.5 ISCHEMIC CARDIOMYOPATHY: ICD-10-CM

## 2025-06-13 DIAGNOSIS — Z95.3 S/P TAVR (TRANSCATHETER AORTIC VALVE REPLACEMENT), BIOPROSTHETIC: ICD-10-CM

## 2025-06-13 LAB — LVEF ECHO: NORMAL

## 2025-06-13 PROCEDURE — 93880 EXTRACRANIAL BILAT STUDY: CPT

## 2025-06-13 PROCEDURE — 76775 US EXAM ABDO BACK WALL LIM: CPT | Mod: 26 | Performed by: RADIOLOGY

## 2025-06-13 PROCEDURE — 93880 EXTRACRANIAL BILAT STUDY: CPT | Mod: 26 | Performed by: RADIOLOGY

## 2025-06-13 PROCEDURE — 76775 US EXAM ABDO BACK WALL LIM: CPT

## 2025-06-13 PROCEDURE — 93306 TTE W/DOPPLER COMPLETE: CPT

## 2025-06-16 ENCOUNTER — RESULTS FOLLOW-UP (OUTPATIENT)
Dept: CARDIOLOGY | Facility: OTHER | Age: OVER 89
End: 2025-06-16

## 2025-06-18 ENCOUNTER — TELEPHONE (OUTPATIENT)
Dept: INTERVENTIONAL RADIOLOGY/VASCULAR | Facility: HOSPITAL | Age: OVER 89
End: 2025-06-18

## 2025-06-18 NOTE — PROVIDER NOTIFICATION
Attempted to call pt to remind of lexiscan scheduled 6/19/2025. Brief message left no to drink caffeine for 12 hours and no solid food for 3 hours PTA. Sips of water ok. Call back number left.

## 2025-06-19 ENCOUNTER — HOSPITAL ENCOUNTER (OUTPATIENT)
Dept: NUCLEAR MEDICINE | Facility: HOSPITAL | Age: OVER 89
Setting detail: NUCLEAR MEDICINE
End: 2025-06-19
Attending: INTERNAL MEDICINE
Payer: MEDICARE

## 2025-06-19 DIAGNOSIS — Z98.890 STATUS POST CORONARY ANGIOGRAM: ICD-10-CM

## 2025-06-19 DIAGNOSIS — Z95.1 S/P CABG X 3: ICD-10-CM

## 2025-06-19 DIAGNOSIS — I25.10 ATHEROSCLEROSIS OF NATIVE CORONARY ARTERY OF NATIVE HEART WITHOUT ANGINA PECTORIS: ICD-10-CM

## 2025-06-19 DIAGNOSIS — R06.09 DOE (DYSPNEA ON EXERTION): ICD-10-CM

## 2025-06-19 DIAGNOSIS — Z95.5 HISTORY OF CORONARY ARTERY STENT PLACEMENT: ICD-10-CM

## 2025-06-19 DIAGNOSIS — I25.810 ATHEROSCLEROSIS OF CORONARY ARTERY BYPASS GRAFT OF NATIVE HEART WITHOUT ANGINA PECTORIS: ICD-10-CM

## 2025-06-19 DIAGNOSIS — Z98.890 H/O CARDIAC CATHETERIZATION: ICD-10-CM

## 2025-06-19 DIAGNOSIS — R07.9 CHEST PAIN, UNSPECIFIED TYPE: ICD-10-CM

## 2025-06-19 LAB
CV BLOOD PRESSURE: 35 MMHG
STRESS ECHO TARGET HR: 127

## 2025-06-19 PROCEDURE — 343N000001 HC RX 343 MED OP 636: Performed by: RADIOLOGY

## 2025-06-19 PROCEDURE — A9500 TC99M SESTAMIBI: HCPCS | Performed by: RADIOLOGY

## 2025-06-19 PROCEDURE — 78451 HT MUSCLE IMAGE SPECT SING: CPT

## 2025-06-19 RX ADMIN — TECHNETIUM TC 99M SESTAMIBI 10.5 MILLICURIE: 1 INJECTION INTRAVENOUS at 07:53

## 2025-06-24 NOTE — TELEPHONE ENCOUNTER
Call complete for pre procedure reminder, travel screen and updated visitor policy. Pt states he sometimes has a hard time hearing so would like his daughter to be on the phone when MD speaks  COVID Negative   Consultation - Endocrinology   Name: Clarissa Webb 43 y.o. female I MRN: [id number]  Unit/Bed#: [place] [address] -01 I Date of Admission: 6/23/2025   Date of Service: 6/24/2025 I Hospital Day: 1   Inpatient consult to Endocrinology  Consult performed by: Ginny Baker MD  Consult ordered by: Ernestine Kee MD        Physician Requesting Evaluation: Shilo Rodriguez MD   Reason for Evaluation / Principal Problem: Type 1 diabetes    Assessment & Plan  Diabetic ketoacidosis without coma associated with type 1 diabetes mellitus (HCC)  DKA has resolved.    Lab Results   Component Value Date    HGBA1C 8.7 (A) 03/05/2025       Recent Labs     06/24/25  1010 06/24/25  1157 06/24/25  1403 06/24/25  1557   POCGLU 245* 159* 235* 207*       Blood Sugar Average: Last 72 hrs:  (P) 213.1065326351261499    Hypothyroidism  Continue levothyroxine.  Most recent TSH is normal.  Type 1 diabetes mellitus with hyperglycemia (HCC)  She is currently on IV insulin.  We did remove her insertion site as well as her sensor site.  There was no kink in the insertion site.  There was some blood around the filament of the sensor which could be the reason why it was not pairing with the insulin pump.  Once her supplies are available, she may resume her insulin pump along with the sensor.  Discontinue IV insulin 1 hour after insulin pump has been started.  Insulin orders have been placed.    Lab Results   Component Value Date    HGBA1C 8.7 (A) 03/05/2025       Recent Labs     06/24/25  1010 06/24/25  1157 06/24/25  1403 06/24/25  1557   POCGLU 245* 159* 235* 207*       Blood Sugar Average: Last 72 hrs:  (P) 213.7839763371712450    Please contact the SecureChat role for the Endocrinology service with any questions/concerns.    History of Present Illness   Clarissa Webb is a 43 y.o. female who presents to the hospital with DKA.  She states that on Saturday, she was not feeling well and was not thinking that there may be malfunction with her pump  and/or sensor.  She states that the sensor was not communicating with her pump and she ignored this.  When we removed the sensor, there was blood around the filament which could have been the reason for communication failure between the pump and the sensor.  For her DKA, she was treated with IV insulin.  The DKA has improved significantly.  She is currently maintained on IV insulin until her pump supplies and new sensors are brought in by her friend.    Review of Systems   Constitutional:  Negative for chills and fever.   Respiratory:  Negative for shortness of breath.    Cardiovascular:  Negative for chest pain.   Gastrointestinal:  Negative for constipation, diarrhea, nausea and vomiting.   Endocrine: Negative for polydipsia and polyuria.   All other systems reviewed and are negative.      Current Facility-Administered Medications:     amphetamine-dextroamphetamine (ADDERALL) tablet 20 mg, Daily    ascorbic acid (VITAMIN C) tablet 500 mg, Daily    cyanocobalamin (VITAMIN B-12) tablet 1,000 mcg, Daily    [START ON 6/29/2025] ergocalciferol (VITAMIN D2) capsule 50,000 Units, Weekly    escitalopram (LEXAPRO) tablet 20 mg, Daily    ferrous sulfate tablet 325 mg, Every Other Day    folic acid (FOLVITE) tablet 1 mg, Daily    gabapentin (NEURONTIN) capsule 400 mg, TID    insulin regular (HumuLIN R,NovoLIN R) 1 Units/mL in sodium chloride 0.9 % 100 mL infusion, Titrated, Last Rate: 3 Units/hr (06/24/25 1410)    levothyroxine tablet 250 mcg, Early Morning    magnesium Oxide (MAG-OX) 400 mg tablet **ADS Override Pull**,     ondansetron (ZOFRAN) injection 4 mg, Q6H PRN    thiamine tablet 100 mg, Daily    traZODone (DESYREL) tablet 150 mg, HS    Objective :  Temp:  [97.6 °F (36.4 °C)-98.3 °F (36.8 °C)] 97.8 °F (36.6 °C)  HR:  [64-97] 87  BP: ()/(43-93) 131/93  Resp:  [12-37] 22  SpO2:  [96 %-99 %] 96 %    Physical Exam  Vitals and nursing note reviewed.   Constitutional:       Appearance: Normal appearance.   HENT:       Head: Normocephalic and atraumatic.     Eyes:      General: No scleral icterus.        Right eye: No discharge.         Left eye: No discharge.     Pulmonary:      Effort: Pulmonary effort is normal.     Musculoskeletal:         General: Normal range of motion.      Cervical back: Normal range of motion.     Skin:     Coloration: Skin is not jaundiced.     Neurological:      General: No focal deficit present.      Mental Status: She is alert and oriented to person, place, and time.     Psychiatric:         Mood and Affect: Mood normal.         Behavior: Behavior normal.         Lab Results: I have reviewed the following results: Glucose levels    Imaging Results Review: No pertinent imaging studies reviewed.  Other Study Results Review: No additional pertinent studies reviewed.

## 2025-07-09 ENCOUNTER — OFFICE VISIT (OUTPATIENT)
Dept: CARDIOLOGY | Facility: OTHER | Age: OVER 89
End: 2025-07-09
Attending: INTERNAL MEDICINE
Payer: MEDICARE

## 2025-07-09 VITALS
HEIGHT: 68 IN | DIASTOLIC BLOOD PRESSURE: 64 MMHG | WEIGHT: 157 LBS | BODY MASS INDEX: 23.79 KG/M2 | OXYGEN SATURATION: 98 % | HEART RATE: 84 BPM | SYSTOLIC BLOOD PRESSURE: 92 MMHG

## 2025-07-09 DIAGNOSIS — Z87.891 HISTORY OF TOBACCO ABUSE: ICD-10-CM

## 2025-07-09 DIAGNOSIS — I71.40 ABDOMINAL AORTIC ANEURYSM (AAA) 3.0 CM TO 5.5 CM IN DIAMETER IN MALE: ICD-10-CM

## 2025-07-09 DIAGNOSIS — E11.65 TYPE 2 DIABETES MELLITUS WITH HYPERGLYCEMIA, WITHOUT LONG-TERM CURRENT USE OF INSULIN (H): ICD-10-CM

## 2025-07-09 DIAGNOSIS — I25.5 ISCHEMIC CARDIOMYOPATHY: ICD-10-CM

## 2025-07-09 DIAGNOSIS — F17.201 TOBACCO DEPENDENCE IN REMISSION: ICD-10-CM

## 2025-07-09 DIAGNOSIS — E78.1 HYPERTRIGLYCERIDEMIA: ICD-10-CM

## 2025-07-09 DIAGNOSIS — R93.1 REGIONAL WALL MOTION ABNORMALITY OF HEART: ICD-10-CM

## 2025-07-09 DIAGNOSIS — R07.9 CHEST PAIN, UNSPECIFIED TYPE: ICD-10-CM

## 2025-07-09 DIAGNOSIS — I25.10 ATHEROSCLEROSIS OF NATIVE CORONARY ARTERY OF NATIVE HEART WITHOUT ANGINA PECTORIS: ICD-10-CM

## 2025-07-09 DIAGNOSIS — Z95.5 HISTORY OF CORONARY ARTERY STENT PLACEMENT: ICD-10-CM

## 2025-07-09 DIAGNOSIS — Z98.890 H/O CARDIAC CATHETERIZATION: ICD-10-CM

## 2025-07-09 DIAGNOSIS — J44.9 COPD, MILD (H): ICD-10-CM

## 2025-07-09 DIAGNOSIS — N40.1 BENIGN PROSTATIC HYPERPLASIA WITH LOWER URINARY TRACT SYMPTOMS, SYMPTOM DETAILS UNSPECIFIED: ICD-10-CM

## 2025-07-09 DIAGNOSIS — N18.32 STAGE 3B CHRONIC KIDNEY DISEASE (H): ICD-10-CM

## 2025-07-09 DIAGNOSIS — J44.9 CHRONIC OBSTRUCTIVE PULMONARY DISEASE, UNSPECIFIED COPD TYPE (H): ICD-10-CM

## 2025-07-09 DIAGNOSIS — Z95.1 S/P CABG X 3: ICD-10-CM

## 2025-07-09 DIAGNOSIS — I05.0 MODERATE MITRAL STENOSIS: ICD-10-CM

## 2025-07-09 DIAGNOSIS — R06.09 DOE (DYSPNEA ON EXERTION): ICD-10-CM

## 2025-07-09 DIAGNOSIS — Z98.890 STATUS POST CORONARY ANGIOGRAM: Primary | ICD-10-CM

## 2025-07-09 DIAGNOSIS — I25.810 ATHEROSCLEROSIS OF CORONARY ARTERY BYPASS GRAFT OF NATIVE HEART WITHOUT ANGINA PECTORIS: ICD-10-CM

## 2025-07-09 DIAGNOSIS — E78.2 MIXED HYPERLIPIDEMIA: ICD-10-CM

## 2025-07-09 DIAGNOSIS — I71.21 ANEURYSM OF ASCENDING AORTA WITHOUT RUPTURE: ICD-10-CM

## 2025-07-09 DIAGNOSIS — R97.20 ELEVATED PSA: ICD-10-CM

## 2025-07-09 PROCEDURE — G0463 HOSPITAL OUTPT CLINIC VISIT: HCPCS

## 2025-07-09 RX ORDER — FINASTERIDE 5 MG/1
1 TABLET, FILM COATED ORAL
Qty: 90 TABLET | Refills: 3 | Status: SHIPPED | OUTPATIENT
Start: 2025-07-09

## 2025-07-09 RX ORDER — CLOPIDOGREL BISULFATE 75 MG/1
75 TABLET ORAL DAILY
Qty: 90 TABLET | Refills: 3 | Status: SHIPPED | OUTPATIENT
Start: 2025-07-09

## 2025-07-09 ASSESSMENT — PAIN SCALES - GENERAL: PAINLEVEL_OUTOF10: MILD PAIN (1)

## 2025-07-09 NOTE — PATIENT INSTRUCTIONS
Thank you for allowing Dr NOAH Rae and our  team to participate in your care. Please call our office at [phone] with scheduling questions or if you need to cancel or change your appointment. With any other questions or concerns you may call cardiology nurse at  [phone].       If you experience chest pain, chest pressure, chest tightness, shortness of breath, fainting, lightheadedness, nausea, vomiting, or other concerning symptoms, please report to the Emergency Department or call 911. These symptoms may be emergent, and best treated in the Emergency Department.

## 2025-07-09 NOTE — PROGRESS NOTES
[facility] HEART CARE   CARDIOLOGY PROGRESS NOTE     Chief Complaint   Patient presents with    Follow Up     6 Week follow up          Diagnosis:  1.  RIVAS.  -CHF, pulm htn, mild COPD, severe diffusion defect on 11/18/20, and DVT/PE???  2.  CABG x3, 4/29/2014.  -LIMA to LAD, SVG to OM, and 2nd OM.  3.  CKD-3.  4.  CHF-systolic, ischemic.    -20-25% on 6/13/25.   -15-20% on 4/3/24.   -27% on 5/8/2023, [place].  -30-35% on 4/5/22 and 4/5/2023.   -35-40% on 5/5/22.   -30-35% on 12/9/20.   -Diastolic grade 1 on 11/1/2007. Unchanged on 2/11/21.  5.  TAVR on 12/6/22, [place].   -29 mm Patricio S3.  6.  MV and AV.  -moderate stenosis on cardiac cath on 1/15/21.   7.  H/O DVT-x5.   -H/O IVC filter, removal in 2014.  8.  Hypomagnesia.   -2.4 on 11/15/2024.   -2.0 on 3/27/2024.  9.  Hypertension-controlled.  10.  Hypertriglyceridemia-controlled.  11.  Tobacco abuse.    -Quit in 1978.  12.  Cardiac cath x5.    -7/17/2007, 4/20//14, 2019, 1/15/21, and 11/30/22.  13.  Coronary stent placement on 7/17/2007.  -x2 stents to the LAD and in 2019 in [place] to unknown vessel.  14.  H/O sarcoidosis in 1982.  15.  DM-2-controlled.   16.  COPD.  -Mild on 11/18/2020.  17.  WMA on 12/9/20.  18.  Right lung nodule x2 on 2/12/2021.  19. TAAA.   -4.3 cm on 5/15/2021.  20.  AAA.   r/o on ultrasound from 2/12/2021.  21.  Pulmonary HTN.    - Mild cardiac cath, [facility] on 1/15/2021.  22.  A-fib, new onset on 3/27/2024.   -Metoprolol and Coumadin.   -Stop Coreg 12.5 mg.  Start metoprolol 50 mg XL twice daily, 3/27/2024      Assessment/Plan:    1.  Hypotension: His blood pressure remains low but asymptomatic.  Blood pressure initially 87/54.  On repeat 92/64.  Currently on Lasix 40 mg twice a day.  Cannot reduce it as he does have significant peripheral edema.  Previously on 60 mg in the morning and 40 mg in the afternoon.  Again, denying dizziness, lightheadedness, near-syncope, or syncope.  As result, continue as medications for heart failure unchanged.   Presently on Jardiance 10 mg daily, Lasix 40 mg twice a day with as needed 20 mg, Toprol 50 mg twice a day, and Entresto 24/26 mg twice a day.  Patient will have the option of taking an extra 20 mg as needed for swelling/weight gain.    2.  CHF: Slight improvement of EF to 20-25% on 6/13/2024.  Findings discussed.  Previously, EF was 15-20% on 4/3/24.  Has seen advanced heart failure and is to see advanced heart failure.  Was seen most recently on 3/3/2025.  Today, has mild/moderate peripheral edema continue Lasix 40 mg twice a day.  He describes being slightly dyspneic on exertion with burning to his chest.   Continue Jardiance 10 mg daily, Lasix 40 mg twice a day, metoprolol 50 mg XL twice a day, and Entresto 24/26 mg twice a day.   3.  CAD: Has been describing substernal chest tightness with activity for last 1 to 1.5 years.  He states he walks in his room to the dining area.  On the way back, he states he will have chest tightness with shortness of breath.  Does not happen every time but rather frequently.  He did have a stress test on 4/5/2024 which showed large area of infarction but no reversible defect.  Repeat stress test canceled as he was hypotensive.  Able to get resting images but not stress images.  Resting images are unchanged.  Offered angiogram but patient declined.  He states he is done with testing/angiogram.  Concerned his symptoms are from angina.  As result, treated medically.  If having severe discomfort, needs to go to the ER.  4.  AAA: No AAA on 6/13/2025.  5.  Carotid artery stenosis.  No significant stenosis on 2/11/2021.  Less than 50% bilaterally on 6/13/2025.    6.  Refill medications.  Will be sent to [facility].  Refilled Plavix and finasteride.  7.  A-fib: Stable with a heart rate of 84 today.  Denies significant bleeding or bruising.  Continue metoprolol and Coumadin.  8.  Follow-up in 6 months.  Discussed testing.  Refilled medications.  Unable to do stress testing and declined  angiogram.    Interval history:  As noted above.      HPI:    Mr. Barriga is being seen by cardiology to establish care.  He has a history of coronary disease with both stenting and CABG x3.  His wife passed away on 9/1/2020, suddenly from cancer.  He does not plan to go back to [place] and is establishing care locally.     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     Sidney is here to establish care.  He has a history of CAD with stenting and bypass. He previously obtained his cardiac care through [place], [place].  He states that dating back in 1982, he was diagnosed with sarcoidosis.  More recently, he had a cardiac catheterization on 7/17/2007 with history of MI with stenting x2 placed to the p-mLAD.  He went on to have CABG x3 on 4/29/2014 in [place].  He had LIMA to LAD, SVG to OM1 and OM 2.  At that time, his EF was reported to be 30% with ischemic cardiomyopathy.  More recently, he reports having had a cardiac catheterization once again in [place] with stenting to unknown vessels in 2019.  His symptoms in the past have been exertional dyspnea with intrascapular discomfort.  He states he has been having similar symptoms for the last 6 months but to a lesser degree.  Risk factors include history of tobacco abuse at approximately a half a pack a day until 1978, hypertension, hyperlipidemia, DM-2, family history, and diet.     He reportedly has a history of ischemic cardiomyopathy.  EF on 8/12/2010 was 40%.  Recently, while in [place] at time of bypass on 4/29/2014, his EF was 30%.  His echo on 11/1/2017 showed an EF of 40-45% with grade 1 diastolic dysfunction.  There was  "evidence for wall motion normalities as well as mild left atrial enlargement.  He has not had any swelling to his legs, presently on Lasix 20 mg daily.  He states he does not have issues with fluid overload.     He has been on Coumadin for extended period time.  He describes having had multiple recurrent DVT's with a IVC filter in place.  He is currently on Coumadin with management through the Coumadin clinic.     He has a history of hyperlipidemia.  His most recent lipid panel was on 10/20/2020 showing a total cholesterol of 248 and LDL of 164.  HDL was 46.  He has not been able to tolerate Lipitor or Crestor in the past.  He has been on Zetia.  On 12/7/2020,  he was started on Crestor 20 mg twice a week.  If he is able to tolerate this dosage, would increase as able. He states he has been on \"every statin with issues with all of them\".     Patient is known to have mild COPD with history of tobacco abuse.  PFT's on 11/18/2020 support mild COPD with severe diffusion defect.      Relevant testing:  Stress test on 6/19/2025:    The study was not completed due to unstable patient condition. The lack of stress images precludes assessment for ischemia.    There is a large area of infarction in the entire anterior, apical, septal and anteroseptal segment(s) of the left ventricle.    The left ventricular ejection fraction at rest is 35%.    A prior study was conducted on 4/5/2024Pt has had many, last one 4/5/2024 .  This study has no change on the rest phase when compared with the prior study.    US carotids on 6/13/25:  1. Right side: Less than 50 % diameter stenosis by grayscale imaging  and sonographic velocity criteria.  2. Left side: Less than 50 % diameter stenosis by grayscale imaging  and sonographic velocity criteria.    US abdominal aorta on 6/13/25:  No evidence of an abdominal aortic aneurysm.     ECHO on 6/13/25:   The visual ejection fraction is 20-25%. Global left ventricular function is  moderately to " severely reduced, given the presence of left ventricular volume  overload. Severe diffuse hypokinesis is present. Moderate concentric wall  thickening consistent with left ventricular hypertrophy is present.  The right ventricle is normal size. Global right ventricular function is  mildly to moderately reduced.  The 29 mm Patricio III TAVR is well-seated. Leaflet opening is not clearly  visualized. There is trace paravalvular and no valvular regurgitation. The  Vmax is 1.4 m/s, the mean gradient is 5 mmHg, the dimensionless index is 0.6.  Pulmonary artery systolic pressure cannot be assessed.  There is dilation of the ascending aorta which measures 4.1cm  No pericardial effusion is present.  Comapred to prior imaging there are no hemodynamically significant  differences.      Stress test on 4/5/2024:    The nuclear stress test is abnormal.     There is a large area of infarction in the anterior, apical, septal,   anteroseptal and inferoseptal segment(s) of the left ventricle.     Left ventricular function is severely reduced.     The left ventricular ejection fraction at stress is 28%.     A prior study was conducted on 4/26/2022.  This study has changes noted when compared with the prior study. The estimated ejection fraction is worse.     Echo on 4/3/2024:  Left ventricular function is decreased.   The ejection fraction is 15-20% (severely reduced).   Severe diffuse hypokinesis is present.  Global right ventricular function is mildly to moderately reduced.  The 29 mm Patricio III TAVR is well-seated. Leaflet opening is not clearly  visualized. There is no paravalvular or valvular regurgitation. The Vmax is  1.4 m/s, the mean gradient is 5 mmHg, the dimensionless index is 0.64.  Mild tricuspid and mitral insufficiency present.  Estimated right ventricular systolic pressure is 50 mmHg plus right atrial  pressure. Pulmonary hypertension is present.  No pericardial effusion is present.    US lower extremity for DVT on  7/17/2023:   No evidence of deep venous thrombosis within the lower extremities.    Zio patch on 4/19/2023:  Worn for 14 days and 0 hr's.  After removing artifact, total time was 13 days and 23 hr's. Placed on 4/19/2023 at 12:34 PM and completed on 5/3/2023 at 12:34 PM.  Underlying rhythm was sinus.  Hrt rate ranged from 53 bpm, maximum heart rate of 182 bmp, averaging 65 bmp.  No significant bradycardia, pauses, Mobitz type II or 3rd degree heart block.  No atrial fibrillation on this study.  x0 triggered events and x0 diary entries.   x306 runs of VT lasting up to 4 beats with a maximum heart rate of 182 bmp.    x47 runs of SVT lasting up to 20 beats with a maximum heart rate of 160 bmp.  Rare, <1% of PAC's, atrial couplets, and atrial triplets.  Frequent PVC's at 27.4%.  Occasional ventricular couplets at 2.6%.  Occasional ventricular triplets at 1.0%.  + episodes of ventricular bigeminy lasting up to 5 min's and 41 sec's.  + episodes of ventricular trigeminy lasting up to 1 min and 40 sec's.       Echocardiogram on 5/8/2023 at [place]:  1. Status post 29 mm Paulino Patricio 3 transcatheter aortic valve bioprosthesis implanted via a transfemoral approach (6-DEC-2022).   2. Normal prosthetic leaflet(s) motion. Mean gradient 7 mmHg. Trivial prosthetic regurgitation. No periprosthetic regurgitation.   3. Moderate mitral valve regurgitation (PISA ERO 0.31 cm2; RV 32 ml). Mechanism is likely degenerative disease resulting in malcoaptation on a background of moderate MAC.   4. Moderately enlarged left ventricular chamber size with moderate-severe generalized hypokinesis with regional variability, ejection fraction 27%.   5. Elevated left ventricular filling pressure. See comments.   6. Normal right ventricular chamber size with mildly reduced systolic function. Estimated right ventricular systolic pressure 65 mmHg (right atrial pressure of 10 mmHg).   7. Normal inferior vena cava size with reduced inspiratory collapse  "(<50%).   8. No  pericardial effusion.   9. Compared to the report of 08/15/2022 the following changes have occurred: in addition to new TAVR implantation, mitral regurgitation has increased and right ventricular systolic pressure has also increased.  Side by side comparison of images   performed.     Echocardiogram in 4/5/2023:  Left ventricular function is decreased. The ejection fraction is 30-35% (moderately reduced). Severe diffuse hypokinesis is present.   Grade II or moderate diastolic dysfunction.  The right ventricle is normal size. Global right ventricular function is normal.  The 29 mm Patricio III TAVR is well-seated. Leaflet opening is not clearly visualized. There is trace paravalvular regurgitation. There is no valvular regurgitation. The Vmax is 2.1 m/s, the mean gradient is 10 mmHg, the dimensionless index is 0.50, and the acceleration time is 120 ms.  This study was compared with the study from 05/05/2022. The AV gradient is lower. There is no significant change in the biventricular function.    [facility] at [place] on 12/8/22:  1. The patient was monitored from 12/8/2022 to 1/6/2023 with a total monitoring time of 28 days 19 hr 54 min. The baseline rhythm was sinus with a first-degree AV delay and a left bundle-branch block. The heart rate varied from 55 bpm to 93 bpm. The   average heart rate was 67 bpm.   2. There were 35,812 PVCs seen singly, paired, and in bigeminy and trigeminy with a PVC burden of 1.26%. There were 6 ventricular tachycardia events with greater than 3 beats with the longest duration being 8 beats. The maximum VT rate was 183 bpm.   3. There were 15,775 PACs seen singly with a PAC burden of <1%. There were 19 runs of supraventricular tachycardia observed with the longest duration being 39 beats. The maximum SVT rate was 125 bpm.   4. The patient reported 26 symptomatic events of \"dizziness.\" During these events, the rhythm was sinus with a first-degree AV delay and a left bundle-branch " block. The heart rate varied from 64 bpm to 92 bpm. At and/or around these times, PVCs were seen    singly and in bigeminy and trigeminy.       Cardiac cath on 11/30/22:  The left main coronary artery is 30% obstructed by multiple discrete lesions.   The proximal left anterior descending artery is 100% obstructed by a discrete lesion. The distal segment is not visible.   The proximal circumflex artery is 40% obstructed by multiple discrete lesions. The distal segment is normal size.   The ramus intermedius segment is 100% obstructed by a discrete lesion. Fills retrogradely from graft.   The proximal right coronary artery is 50% obstructed by a discrete lesion. The distal segment is normal size.   The distal right coronary artery is 30% obstructed by multiple discrete lesions. The distal segment is normal size.   GRAFT ANGIOGRAPHY SUMMARY   Single body vein bypass graft to the Ramus Intermedius Segment. Graft Appears normal.   Single body left internal mammary graft to the Middle Left Anterior Descending Artery.   GRAFT ANGIOGRAPHY SUMMARY   Single body vein bypass graft to the Ramus Intermedius Segment. Graft Appears normal.   Single body left internal mammary graft to the Middle Left Anterior Descending Artery.      ECHO on 5/5/22:  The visual ejection fraction is 35-40%. Moderate diffuse hypokinesis is present.  Right ventricular function, chamber size, wall motion, and thickness are  normal.  The calculated aortic valve area is 0.94 cm2.The mean gradient across the AV is 22mmHg, the peak velocity is 36 mmHg.  Mild mitral insufficiency is present. Mild tricuspid insufficiency is present.  The inferior vena cava cannot be assessed.   Ascending aorta 4.2 cm.  No pericardial effusion is present.    Stress test on 4/26/22:  There is a large area of a severe   degree of infarction in the anterior, apical, inferior and septal   segment(s) of the left ventricle. The left ventricular ejection fraction   at rest is 37%.   The left ventricular ejection fraction at stress is 38%.       Zio on 3/1/21:  Underlying rhythm was sinus.  Hrt rate ranged from 50 bpm, maximum heart rate of 197 bmp, averaging 72 bmp.  No significant bradycardia, pauses, 2nd degree Mobitz type II or 3rd degree heart block.  No atrial fibrillation was identified on this study.  x0 triggered events and x0 diary entries.  x18 runs of VT lasting to 11 beats with a maximum heart rate of 197 bmp.    x25 runs of SVT lasting up to 15 beats with a maximum heart rate of 156 bmp.  Rare, less than 1% of PAC's, atrial couplets, atrial triplets, ventricular couplets and ventricular triplets.  Frequent PVC's at 15.9%.  + episodes of ventricular bigeminy lasting up to 18 min's and 9 sec's.  + episodes of ventricular trigeminy lasting up to 30 mins and 5 sec's.    Stress echo at [place] on 5/25/2021:  1. AT REST:   2. Estimated left ventricular ejection fraction 25%   3. Stroke volume index 45 ml/m^2, aortic valve mean systolic gradient 20 mm Hg, aortic valve   area 1.16 cm^2, aortic valve area index 0.62 cm^2/m^2   4. STRESS TEST:   5. Dobutamine was infused from 5 mcg/kg/min to 20.0 mcg/kg/min.   6. PEAK STRESS:   7. Estimated left ventricular ejection fraction range 30% - 35%.   8. Stroke volume index 55 ml/m^2, aortic valve mean systolic gradient 35 mm Hg, aortic valve  area 1.36 cm^2, aortic valve area index 0.73 cm^2/m^2       US aorta on 2/11/21:  No abdominal aortic aneurysm.    US carotids on 2/11/21:  No hemodynamically significant stenoses are identified in either carotid bifurcation.    ECHO on 2/11/21:  No pericardial effusion is present.  Moderately (EF 30-35%) reduced left ventricular function is present.  The right ventricle is normal size.  Global right ventricular function is normal.  Mild left atrial enlargement is present.  Mild mitral insufficiency is present.  Severe aortic valve calcification is present.  The peak aortic velocity is 3.02 m/sec.  The mean  gradient across the aortic valve is 18.4 mmHg.  Moderate aortic stenosis is present.  Right ventricular systolic pressure is 44 mmHg above the right atrial pressure.  Mild tricuspid insufficiency is present.  Mild pulmonic insufficiency is present.  Ascending aorta 4.1 cm.  Sinuses of Valsalva 4.3 cm.    CTA chest on 2/11/21:  2 small nodules are identified in the right lung.  Follow-up CT scan in 6 months time is recommended. The ascending aorta measures 4.2 cm in maximal transverse diameter.    Cardiac cath on 1/15/21:  Right sided filling pressures are normal.  Mild elevated pulmonary hypertension.  Left sided filling pressures are moderately elevated.  Left ventricular filling pressures are moderately elevated.  Normal cardiac output level.  Moderate mitral valve stenosis (mean gradient 8.4 mm Hg, 2.2 cm2)  Moderate aortic valve stenosis (mean gradient 17 mm Hg, 1.2 cm2)  Native three vessel CAD  >>> Moderate 50% proximal RCA stenosis [ungrafted vessel]  >>> Mimimal LMCA stenosis  >>> 100% Ostial LAD stenosis [LIMA-LAD widely patent]  >>> Proximal 50% LCx and 90% RI stenoses [Sequential SVG-RI-OM patent with focal 60% ISR in jump segment of SVG]     Suggest medical treatment at this time.  Neither the aortic valve nor mitral valve require intervention at this time.  The proximal RCA stenosis is unlikely be physiologically significant. The LAD territory is infarcted but the LIMA-LAD remains widely patent.  There is 70% ISR of SVG in jump segment but the OM is receiving brisk DAVID-3 flow via the native coronary (only 50% proximal stenosis).    Cath on 7/17/2007:  1. Severe, single-vessel coronary artery disease. Chronic proximal-to-mild occlusion of the left anterior descending coronary artery with distal right-to-left collaterals.  2. Cypher drug-eluting stenting of the [id number] segment of the left anterior descending.    Stress test on 12/15/20:    The nuclear stress test is abnormal.    There is a large  area of a severe degree of infarction in the entire apical and anteroseptal segment(s) of the left ventricle.    Left ventricular function is moderately reduced.    The left ventricular ejection fraction at rest is 44%.  The left ventricular ejection fraction at stress is 35%.    There is no prior study for comparison.    ECHO on 12/9/20:  No pericardial effusion is present.  Left ventricular size is normal.  Anterior wall hypokinesis is present.  Apical wall hypokinesis is present.  Moderately (EF 30-35%) reduced left ventricular function is present.  The right ventricle is normal size.  Global right ventricular function is normal.  Mild mitral annular calcification is present.  Mild mitral insufficiency is present.  Moderate aortic valve calcification is present.  Trace aortic insufficiency is present.  The Aortic valve has significant calcification of valve leaflets with poor  mobility. Our measured values I feel underestimate the amount of stenosis. By  visual estimate would put at least moderate Aortic stenosis.  Trace tricuspid insufficiency is present.  Right ventricular systolic pressure is 6 mmHg above the right atrial pressure.  The peak aortic velocity is 2.57 m/sec.  The mean gradient across the aortic valve is 15.5 mmHg.      No diagnosis found.                  Past Medical History:   Diagnosis Date    Acute thromboembolism of deep veins of lower extremity (H)     No Comments Provided    Benign essential hypertension     No Comments Provided    Cancer (H)     skin    Congestive heart failure (H)     COPD (chronic obstructive pulmonary disease) (H)     Coronary atherosclerosis     No Comments Provided    Osteoarthrosis     No Comments Provided    Other and unspecified hyperlipidemia     No Comments Provided    Other specified forms of chronic ischemic heart disease     No Comments Provided    Sarcoidosis     No Comments Provided    Type 2 or unspecified type diabetes mellitus     No Comments Provided        Past Surgical History:   Procedure Laterality Date    ANGIOPLASTY  01/01/2019    stents    CARDIAC SURGERY  01/01/2014    heart bypass    COLONOSCOPY  01/01/2019    CV CORONARY ANGIOGRAM N/A 1/15/2021    Procedure: CV CORONARY ANGIOGRAM;  Surgeon: Charlie Harris MD;  Location:  HEART CARDIAC CATH LAB    CV LEFT HEART CATH N/A 1/15/2021    Procedure: Left Heart Cath;  Surgeon: Charlie Harris MD;  Location:  HEART CARDIAC CATH LAB    CV RIGHT HEART CATH MEASUREMENTS RECORDED N/A 1/15/2021    Procedure: CV RIGHT HEART CATH;  Surgeon: Charlie Harris MD;  Location:  HEART CARDIAC CATH LAB    IR IVC FILTER REMOVAL  5/2/2014    OTHER SURGICAL HISTORY      [id number],BIOPSY LUNG OR [facility]       Allergies   Allergen Reactions    Isosorbide Nitrate Visual Disturbance     LIGHT HEADED,ALMOST PASSED OUT      Zocor [Simvastatin] Itching    Atorvastatin Itching    Isosorbide Visual Disturbance    Rosuvastatin Itching    Ancef [Cefazolin] Rash     Patient received cefazolin IV while in the [facility] on 12/6/22. On 12/12/22 he was seen by his primary for a drug rash on his trunk. Pt thought it was from amoxicillin administered at the [facility] but the [facility] only infused cefazolin per chart records. Amoxil rx given for dental procedures pt will not take due to fear of reaction.        Current Outpatient Medications   Medication Sig Dispense Refill    acetaminophen (TYLENOL) 500 MG tablet Take 1 tablet (500 mg) by mouth every 8 hours as needed for mild pain 60 tablet 0    albuterol (PROAIR HFA/PROVENTIL HFA/VENTOLIN HFA) 108 (90 Base) MCG/ACT inhaler Inhale 1-2 puffs into the lungs every 4 hours as needed for shortness of breath or wheezing. 18 g 3    amoxicillin (AMOXIL) 500 MG capsule Take 2 g, 30-60 minutes, prior to dental procedures/cleanings 4 capsule 3    blood glucose (NO BRAND SPECIFIED) lancets standard Use to test blood sugar 1 times daily or as directed. 1 each 3    blood glucose  (NO BRAND SPECIFIED) test strip Use to test blood sugar 1 times daily or as directed. 90 strip 3    blood glucose monitoring (NO BRAND SPECIFIED) meter device kit Use to test blood sugar 1 times daily or as directed. 1 kit 0    Calcium Carbonate-Vit D-Min (CALCIUM 600+D PLUS MINERALS) 600-400 MG-UNIT TABS Take 1 tablet by mouth daily      cinnamon 500 MG CAPS Take 500 mg by mouth daily       clopidogrel (PLAVIX) 75 MG tablet Take 1 tablet (75 mg) by mouth daily. 90 tablet 3    Coenzyme Q10 (CO Q-10) 200 MG CAPS Take 1 capsule by mouth daily      cyanocobalamin (VITAMIN B-12) 500 MCG SUBL sublingual tablet Place 500 mcg under the tongue daily      empagliflozin (JARDIANCE) 10 MG TABS tablet Take 1 tablet (10 mg) by mouth daily. 90 tablet 3    ezetimibe (ZETIA) 10 MG tablet TAKE 1 TABLET BY MOUTH AT BEDTIME 8PM 90 tablet 3    furosemide (LASIX) 20 MG tablet Take 1 tablet (20 mg) by mouth daily as needed (welling/wt gain).      furosemide (LASIX) 40 MG tablet Take 1 tablet (40 mg) by mouth 2 times daily. 180 tablet 3    LUTEIN PO Take 1 tablet by mouth daily      metoprolol succinate ER (TOPROL XL) 50 MG 24 hr tablet Take 1 tablet (50 mg) by mouth 2 times daily. 180 tablet 1    nitroGLYcerin (NITROSTAT) 0.4 MG sublingual tablet For chest pain place 1 tablet under the tongue every 5 minutes for 3 doses. If symptoms persist 5 minutes after 1st dose call 911. 25 tablet 3    Omega-3 Fatty Acids (FISH OIL) 1200 MG capsule Take 1 capsule by mouth daily      sacubitril-valsartan (ENTRESTO) 24-26 MG per tablet Take 1 tablet by mouth 2 times daily. 180 tablet 3    vitamin C (ASCORBIC ACID) 500 MG tablet Take 1,000 mg by mouth daily      Vitamin D3 (CHOLECALCIFEROL) 125 MCG (5000 UT) tablet Take 1 tablet by mouth daily      warfarin ANTICOAGULANT (COUMADIN) 2.5 MG tablet Take 3.75 mg (1 and 1/2 tablets) by oral route on Mondays and 2.5mg (1 tablet) by oral route all other days 90 tablet 1    finasteride (PROSCAR) 5 MG tablet  Take 1 tablet by mouth daily at 2 pm. (Patient not taking: Reported on 2025)      rosuvastatin (CRESTOR) 20 MG tablet Take 1 tablet (20 mg) by mouth twice a week. (Patient not taking: Reported on 2025) 30 tablet 3       Social History     Socioeconomic History    Marital status:      Spouse name: Not on file    Number of children: Not on file    Years of education: Not on file    Highest education level: Not on file   Occupational History    Not on file   Tobacco Use    Smoking status: Former     Current packs/day: 0.00     Average packs/day: 0.5 packs/day for 24.0 years (12.0 ttl pk-yrs)     Types: Cigarettes     Start date: 1954     Quit date: 1978     Years since quittin.5    Smokeless tobacco: Never   Vaping Use    Vaping status: Never Used   Substance and Sexual Activity    Alcohol use: Yes     Alcohol/week: 5.0 standard drinks of alcohol     Types: 3 Cans of beer, 2 Shots of liquor per week    Drug use: Unknown     Types: Other     Comment: Drug use: No    Sexual activity: Not on file   Other Topics Concern    Parent/sibling w/ CABG, MI or angioplasty before 65F 55M? Not Asked   Social History Narrative    Not on file     Social Drivers of Health     Financial Resource Strain: Low Risk  (10/27/2023)    Financial Resource Strain     Within the past 12 months, have you or your family members you live with been unable to get utilities (heat, electricity) when it was really needed?: No   Food Insecurity: Low Risk  (10/27/2023)    Food Insecurity     Within the past 12 months, did you worry that your food would run out before you got money to buy more?: No     Within the past 12 months, did the food you bought just not last and you didn t have money to get more?: No   Transportation Needs: Low Risk  (10/27/2023)    Transportation Needs     Within the past 12 months, has lack of transportation kept you from medical appointments, getting your medicines, non-medical meetings or  appointments, work, or from getting things that you need?: No   Physical Activity: Not on file   Stress: Not on file   Social Connections: Not on file   Interpersonal Safety: Low Risk  (10/27/2023)    Interpersonal Safety     Do you feel physically and emotionally safe where you currently live?: Yes     Within the past 12 months, have you been hit, slapped, kicked or otherwise physically hurt by someone?: No     Within the past 12 months, have you been humiliated or emotionally abused in other ways by your partner or ex-partner?: No   Housing Stability: Low Risk  (10/27/2023)    Housing Stability     Do you have housing? : Yes     Are you worried about losing your housing?: No       LAB RESULTS:   Orders Only on 01/21/2021   Component Date Value Ref Range Status    Sodium 01/21/2021 141  133 - 144 mmol/L Final    Potassium 01/21/2021 4.7  3.4 - 5.3 mmol/L Final    Chloride 01/21/2021 107  94 - 109 mmol/L Final    Carbon Dioxide 01/21/2021 29  20 - 32 mmol/L Final    Anion Gap 01/21/2021 5  3 - 14 mmol/L Final    Glucose 01/21/2021 125* 70 - 99 mg/dL Final    Urea Nitrogen 01/21/2021 26  7 - 30 mg/dL Final    Creatinine 01/21/2021 1.40* 0.66 - 1.25 mg/dL Final    GFR Estimate 01/21/2021 44* >60 mL/min/[1.73_m2] Final    GFR Estimate If Black 01/21/2021 51* >60 mL/min/[1.73_m2] Final    Calcium 01/21/2021 9.9  8.5 - 10.1 mg/dL Final    WBC 01/21/2021 6.7  4.0 - 11.0 10e9/L Final    RBC Count 01/21/2021 3.63* 4.4 - 5.9 10e12/L Final    Hemoglobin 01/21/2021 11.3* 13.3 - 17.7 g/dL Final    Hematocrit 01/21/2021 34.9* 40.0 - 53.0 % Final    MCV 01/21/2021 96  78 - 100 fl Final    MCH 01/21/2021 31.1  26.5 - 33.0 pg Final    MCHC 01/21/2021 32.4  31.5 - 36.5 g/dL Final    RDW 01/21/2021 12.2  10.0 - 15.0 % Final    Platelet Count 01/21/2021 163  150 - 450 10e9/L Final    INR Point of Care 01/21/2021 2.9* 0.86 - 1.14 Final    Capillary Blood Collection 01/21/2021 Capillary collection performed   Final   Office Visit on  01/12/2021   Component Date Value Ref Range Status    WBC 01/12/2021 6.4  4.0 - 11.0 10e9/L Final    RBC Count 01/12/2021 3.72* 4.4 - 5.9 10e12/L Final    Hemoglobin 01/12/2021 11.7* 13.3 - 17.7 g/dL Final    Hematocrit 01/12/2021 35.3* 40.0 - 53.0 % Final    MCV 01/12/2021 95  78 - 100 fl Final    MCH 01/12/2021 31.5  26.5 - 33.0 pg Final    MCHC 01/12/2021 33.1  31.5 - 36.5 g/dL Final    RDW 01/12/2021 12.2  10.0 - 15.0 % Final    Platelet Count 01/12/2021 162  150 - 450 10e9/L Final    % Neutrophils 01/12/2021 62.1  % Final    % Lymphocytes 01/12/2021 19.7  % Final    % Monocytes 01/12/2021 13.5  % Final    % Eosinophils 01/12/2021 4.2  % Final    % Basophils 01/12/2021 0.5  % Final    Absolute Neutrophil 01/12/2021 4.0  1.6 - 8.3 10e9/L Final    Absolute Lymphocytes 01/12/2021 1.3  0.8 - 5.3 10e9/L Final    Absolute Monocytes 01/12/2021 0.9  0.0 - 1.3 10e9/L Final    Absolute Eosinophils 01/12/2021 0.3  0.0 - 0.7 10e9/L Final    Absolute Basophils 01/12/2021 0.0  0.0 - 0.2 10e9/L Final    Diff Method 01/12/2021 Automated Method   Final    Sodium 01/12/2021 142  133 - 144 mmol/L Final    Potassium 01/12/2021 4.4  3.4 - 5.3 mmol/L Final    Chloride 01/12/2021 109  94 - 109 mmol/L Final    Carbon Dioxide 01/12/2021 29  20 - 32 mmol/L Final    Anion Gap 01/12/2021 4  3 - 14 mmol/L Final    Glucose 01/12/2021 100* 70 - 99 mg/dL Final    Urea Nitrogen 01/12/2021 28  7 - 30 mg/dL Final    Creatinine 01/12/2021 1.14  0.66 - 1.25 mg/dL Final    GFR Estimate 01/12/2021 57* >60 mL/min/[1.73_m2] Final    GFR Estimate If Black 01/12/2021 66  >60 mL/min/[1.73_m2] Final    Calcium 01/12/2021 9.6  8.5 - 10.1 mg/dL Final    INR Point of Care 01/12/2021 3.8* 0.86 - 1.14 Final   Office Visit on 01/11/2021   Component Date Value Ref Range Status    COVID-19 Virus PCR to [facility] - So* 01/11/2021 Nasopharyngeal   Final    COVID-19 Virus PCR to [facility] - Re* 01/11/2021 Not Detected   Final   Orders Only on 12/01/2020   Component Date  "Value Ref Range Status    INR 12/01/2020 2.65* 0.86 - 1.14 Final   Orders Only on 12/01/2020   Component Date Value Ref Range Status    WBC 12/01/2020 5.5  4.0 - 11.0 10e9/L Final    RBC Count 12/01/2020 3.98* 4.4 - 5.9 10e12/L Final    Hemoglobin 12/01/2020 12.5* 13.3 - 17.7 g/dL Final    Hematocrit 12/01/2020 38.4* 40.0 - 53.0 % Final    MCV 12/01/2020 97  78 - 100 fl Final    MCH 12/01/2020 31.4  26.5 - 33.0 pg Final    MCHC 12/01/2020 32.6  31.5 - 36.5 g/dL Final    RDW 12/01/2020 12.4  10.0 - 15.0 % Final    Platelet Count 12/01/2020 141* 150 - 450 10e9/L Final    Diff Method 12/01/2020 Automated Method   Final    % Neutrophils 12/01/2020 63.5  % Final    % Lymphocytes 12/01/2020 21.9  % Final    % Monocytes 12/01/2020 12.2  % Final    % Eosinophils 12/01/2020 1.8  % Final    % Basophils 12/01/2020 0.4  % Final    % Immature Granulocytes 12/01/2020 0.2  % Final    Nucleated RBCs 12/01/2020 0  0 /100 Final    Absolute Neutrophil 12/01/2020 3.5  1.6 - 8.3 10e9/L Final    Absolute Lymphocytes 12/01/2020 1.2  0.8 - 5.3 10e9/L Final    Absolute Monocytes 12/01/2020 0.7  0.0 - 1.3 10e9/L Final    Absolute Eosinophils 12/01/2020 0.1  0.0 - 0.7 10e9/L Final    Absolute Basophils 12/01/2020 0.0  0.0 - 0.2 10e9/L Final    Abs Immature Granulocytes 12/01/2020 0.0  0 - 0.4 10e9/L Final    Absolute Nucleated RBC 12/01/2020 0.0   Final        Review of systems: Negative except that which was noted in the HPI.    Physical examination:  BP 92/64 (BP Location: Left arm, Patient Position: Sitting, Cuff Size: Adult Regular)   Pulse 84   Ht 1.727 m (5' 8\")   Wt 71.2 kg (157 lb)   SpO2 98%   BMI 23.87 kg/m      GENERAL APPEARANCE: healthy, alert and no distress  CHEST: lungs clear to auscultation.  CARDIOVASCULAR: Irregular rate irregular rhythm, normal S1 with physiologic split S2, no S3 or S4 but with systolic crescendo decrescendo murmur, no click or rub  EXTREMITIES: no clubbing, cyanosis with scant to mild/moderate " peripheral edema.    Total time spent on day of visit, including review of tests, obtaining/reviewing separately obtained history, ordering medications/tests/procedures, communicating with PCP/consultants, and documenting in electronic medical record: 30 minutes.                Thank you for allowing me to participate in the care of your patient. Please do not hesitate to contact me if you have any questions.     Justyn Rae, DO

## 2025-07-10 ENCOUNTER — ANTICOAGULATION THERAPY VISIT (OUTPATIENT)
Dept: ANTICOAGULATION | Facility: OTHER | Age: OVER 89
End: 2025-07-10
Attending: FAMILY MEDICINE
Payer: MEDICARE

## 2025-07-10 ENCOUNTER — LAB (OUTPATIENT)
Dept: LAB | Facility: OTHER | Age: OVER 89
End: 2025-07-10
Payer: MEDICARE

## 2025-07-10 DIAGNOSIS — Z86.718 HISTORY OF DVT (DEEP VEIN THROMBOSIS): Primary | ICD-10-CM

## 2025-07-10 DIAGNOSIS — Z95.3 S/P TAVR (TRANSCATHETER AORTIC VALVE REPLACEMENT), BIOPROSTHETIC: ICD-10-CM

## 2025-07-10 DIAGNOSIS — Z86.718 PERSONAL HISTORY OF DVT (DEEP VEIN THROMBOSIS): ICD-10-CM

## 2025-07-10 DIAGNOSIS — Z86.718 HISTORY OF DVT (DEEP VEIN THROMBOSIS): ICD-10-CM

## 2025-07-10 DIAGNOSIS — I82.5Y3 CHRONIC DEEP VEIN THROMBOSIS (DVT) OF PROXIMAL VEIN OF BOTH LOWER EXTREMITIES (H): ICD-10-CM

## 2025-07-10 LAB — INR BLD: 2.3 (ref 0.9–1.1)

## 2025-07-10 PROCEDURE — 36416 COLLJ CAPILLARY BLOOD SPEC: CPT | Mod: ZL

## 2025-07-10 PROCEDURE — 85610 PROTHROMBIN TIME: CPT | Mod: ZL

## 2025-07-10 NOTE — PROGRESS NOTES
ANTICOAGULATION MANAGEMENT     Sidney LAURITA Barriga 89 year old male is on warfarin with therapeutic INR result. (Goal INR 2.0-3.0)    Recent labs: (last 7 days)     07/10/25  1322   INR 2.3*       ASSESSMENT     Source(s): Chart Review and Patient/Caregiver Call     Warfarin doses taken: Warfarin taken as instructed  Diet: No new diet changes identified  Medication/supplement changes: None noted  New illness, injury, or hospitalization: No  Signs or symptoms of bleeding or clotting: No  Previous result: Therapeutic last 2(+) visits  Additional findings: None       PLAN     Recommended plan for no diet, medication or health factor changes affecting INR     Dosing Instructions: Continue your current warfarin dose with next INR in 4 weeks       Summary  As of 7/10/2025      Full warfarin instructions:  5 mg every Mon; 2.5 mg all other days   Next INR check:  8/7/2025               Telephone call with Sidney who verbalizes understanding and agrees to plan    Lab visit scheduled    Education provided: Please call back if any changes to your diet, medications or how you've been taking warfarin    Plan made per [facility] anticoagulation protocol    Yolande Johnson RN  7/10/2025  Anticoagulation Clinic  Namshi for routing messages: al SORIA  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

## 2025-08-06 ENCOUNTER — ANTICOAGULATION THERAPY VISIT (OUTPATIENT)
Dept: ANTICOAGULATION | Facility: OTHER | Age: OVER 89
End: 2025-08-06
Payer: MEDICARE

## 2025-08-06 ENCOUNTER — LAB (OUTPATIENT)
Dept: LAB | Facility: OTHER | Age: OVER 89
End: 2025-08-06
Payer: MEDICARE

## 2025-08-06 DIAGNOSIS — Z86.718 HISTORY OF DVT (DEEP VEIN THROMBOSIS): ICD-10-CM

## 2025-08-06 DIAGNOSIS — Z86.718 HISTORY OF DVT (DEEP VEIN THROMBOSIS): Primary | ICD-10-CM

## 2025-08-06 DIAGNOSIS — Z95.3 S/P TAVR (TRANSCATHETER AORTIC VALVE REPLACEMENT), BIOPROSTHETIC: ICD-10-CM

## 2025-08-06 DIAGNOSIS — I82.5Y3 CHRONIC DEEP VEIN THROMBOSIS (DVT) OF PROXIMAL VEIN OF BOTH LOWER EXTREMITIES (H): ICD-10-CM

## 2025-08-06 DIAGNOSIS — Z86.718 PERSONAL HISTORY OF DVT (DEEP VEIN THROMBOSIS): ICD-10-CM

## 2025-08-06 LAB — INR BLD: 2.2 (ref 0.9–1.1)

## 2025-08-06 PROCEDURE — 36416 COLLJ CAPILLARY BLOOD SPEC: CPT | Mod: ZL

## 2025-08-06 PROCEDURE — 85610 PROTHROMBIN TIME: CPT | Mod: ZL

## (undated) DEVICE — FASTENER CATH BALLOON CLAMPX2 STATLOCK 0684-00-493

## (undated) DEVICE — VALVE HEMOSTASIS .115" DUOSTAT ADAPTER 23245

## (undated) DEVICE — GUIDEWIRE VASCULAR 0.035IN DIA 145CML 15CML/6CML STAINLESS

## (undated) DEVICE — INTRO SHEATH GRN 11CM 6FR S ARRW

## (undated) DEVICE — INTRO SHEATH 7FRX25CM PINNACLE RSS706

## (undated) DEVICE — INTRO SHTH INTRO SHTH 6FR X 45

## (undated) DEVICE — SLEEVE TR BAND RADIAL COMPRESSION DEVICE 24CM TRB24-REG

## (undated) DEVICE — WIRE GUIDE 0.035"X150CM EMERALD STR 502542

## (undated) DEVICE — CATH ANGIO SUPERTORQUE AL1 6FRX100CM 532-645

## (undated) DEVICE — WIRE GUIDE 0.035"X145CM AMPLATZ XSTIFF J THSCF-35-145-3-AES

## (undated) DEVICE — Device

## (undated) DEVICE — SHTH INTRO 0.021IN ID 6FR DIA

## (undated) DEVICE — CATH ANGIO INFINITI IM 4FRX100CM 538460

## (undated) DEVICE — INTRO SHEATH AVANTI 4FRX23CM 504604T

## (undated) DEVICE — INTRO SHEATH 6FRX25CM PINNACLE RSS606

## (undated) DEVICE — INTRO SHEATH MICRO PLATINUM TIP 4FRX40CM 7274

## (undated) DEVICE — CATH ANGIO SUPERTORQUE PLUS JR4 6FRX100CM 533621

## (undated) DEVICE — CATH ANGIO INFINITI 3DRC 4FRX100CM 538476

## (undated) DEVICE — GLIDEWIRE TERUMO .035X180CM 1.5,, J-TIP GR3525

## (undated) DEVICE — 0.035IN X 260CM, EMERALD DIAGNOSTIC GUIDEWIRE, FIXED-CORE PTFE COATED, STANDARD, 3MM EXCHANGE J-TIP (EA/1)

## (undated) DEVICE — INTRO SHEATH 7FRX10CM PINNACLE RSS702

## (undated) DEVICE — CATH ANGIO SUPERTORQUE PLUS JL4 6FRX100CM 533620

## (undated) DEVICE — KIT HAND CONTROL ACIST 014644 AR-P54

## (undated) DEVICE — GUIDEWIRE OPTOWIRE 3 W/O GAUGE FACTOR CONNECTOR F1032

## (undated) DEVICE — CATH ANGIO INFINITI JR4 4FRX100CM 538421

## (undated) DEVICE — WIRE GUIDE HI-TRQ ALL STAR 3CM JTIP 0.014"X190CM 1001740J

## (undated) RX ORDER — HEPARIN SODIUM 1000 [USP'U]/ML
INJECTION, SOLUTION INTRAVENOUS; SUBCUTANEOUS
Status: DISPENSED
Start: 2021-01-15

## (undated) RX ORDER — SODIUM CHLORIDE 9 MG/ML
INJECTION, SOLUTION INTRAVENOUS
Status: DISPENSED
Start: 2021-01-15

## (undated) RX ORDER — NITROGLYCERIN 5 MG/ML
VIAL (ML) INTRAVENOUS
Status: DISPENSED
Start: 2021-01-15

## (undated) RX ORDER — VERAPAMIL HYDROCHLORIDE 2.5 MG/ML
INJECTION, SOLUTION INTRAVENOUS
Status: DISPENSED
Start: 2021-01-15

## (undated) RX ORDER — FENTANYL CITRATE 50 UG/ML
INJECTION, SOLUTION INTRAMUSCULAR; INTRAVENOUS
Status: DISPENSED
Start: 2021-01-15